# Patient Record
Sex: FEMALE | Race: WHITE | NOT HISPANIC OR LATINO | Employment: OTHER | URBAN - NONMETROPOLITAN AREA
[De-identification: names, ages, dates, MRNs, and addresses within clinical notes are randomized per-mention and may not be internally consistent; named-entity substitution may affect disease eponyms.]

---

## 2017-01-19 PROBLEM — E03.9 HYPOTHYROIDISM: Status: ACTIVE | Noted: 2017-01-19

## 2017-01-19 PROBLEM — M81.0 OSTEOPOROSIS: Status: ACTIVE | Noted: 2017-01-19

## 2017-01-19 PROBLEM — F41.9 ANXIETY: Status: RESOLVED | Noted: 2017-01-19 | Resolved: 2017-01-19

## 2017-01-19 PROBLEM — M19.90 ARTHRITIS: Status: ACTIVE | Noted: 2017-01-19

## 2017-01-19 PROBLEM — M85.80 OSTEOPENIA: Status: ACTIVE | Noted: 2017-01-19

## 2017-01-19 PROBLEM — K58.9 IBS (IRRITABLE BOWEL SYNDROME): Status: ACTIVE | Noted: 2017-01-19

## 2017-01-19 PROBLEM — K21.9 ESOPHAGEAL REFLUX: Status: ACTIVE | Noted: 2017-01-19

## 2017-01-19 PROBLEM — F17.200 CURRENT EVERY DAY SMOKER: Status: ACTIVE | Noted: 2017-01-19

## 2017-01-19 PROBLEM — M79.7 FIBROMYALGIA: Status: ACTIVE | Noted: 2017-01-19

## 2017-01-19 PROBLEM — G47.00 INSOMNIA: Status: ACTIVE | Noted: 2017-01-19

## 2017-01-19 PROBLEM — F41.1 GENERALIZED ANXIETY DISORDER: Status: ACTIVE | Noted: 2017-01-19

## 2017-01-19 PROBLEM — E55.9 VITAMIN D DEFICIENCY: Status: ACTIVE | Noted: 2017-01-19

## 2017-01-19 PROBLEM — E78.5 HYPERLIPIDEMIA: Status: ACTIVE | Noted: 2017-01-19

## 2017-01-19 PROBLEM — F41.9 ANXIETY: Status: ACTIVE | Noted: 2017-01-19

## 2017-01-19 PROBLEM — M06.9 RA (RHEUMATOID ARTHRITIS): Status: ACTIVE | Noted: 2017-01-19

## 2017-01-20 ENCOUNTER — OFFICE VISIT (OUTPATIENT)
Dept: INTERNAL MEDICINE | Facility: CLINIC | Age: 72
End: 2017-01-20

## 2017-01-20 VITALS
TEMPERATURE: 99.7 F | BODY MASS INDEX: 23.54 KG/M2 | WEIGHT: 150 LBS | DIASTOLIC BLOOD PRESSURE: 58 MMHG | HEIGHT: 67 IN | RESPIRATION RATE: 14 BRPM | HEART RATE: 58 BPM | OXYGEN SATURATION: 97 % | SYSTOLIC BLOOD PRESSURE: 130 MMHG

## 2017-01-20 DIAGNOSIS — J06.9 ACUTE URI: Primary | ICD-10-CM

## 2017-01-20 PROCEDURE — 99213 OFFICE O/P EST LOW 20 MIN: CPT | Performed by: INTERNAL MEDICINE

## 2017-01-20 RX ORDER — OMEPRAZOLE 40 MG/1
CAPSULE, DELAYED RELEASE ORAL
COMMUNITY
Start: 2014-02-17 | End: 2018-05-10 | Stop reason: SDUPTHER

## 2017-01-20 RX ORDER — AMOXICILLIN 500 MG/1
1000 CAPSULE ORAL 2 TIMES DAILY
Qty: 21 CAPSULE | Refills: 0 | Status: SHIPPED | OUTPATIENT
Start: 2017-01-20 | End: 2017-05-03

## 2017-01-20 RX ORDER — CHLORAL HYDRATE 500 MG
1000 CAPSULE ORAL
COMMUNITY
Start: 2014-02-17

## 2017-01-20 RX ORDER — IBUPROFEN 200 MG
600 CAPSULE ORAL 2 TIMES DAILY
Status: ON HOLD | COMMUNITY
Start: 2014-02-17 | End: 2023-02-13

## 2017-01-20 RX ORDER — AMITRIPTYLINE HYDROCHLORIDE 10 MG/1
TABLET, FILM COATED ORAL
COMMUNITY
Start: 2014-02-17 | End: 2018-05-10 | Stop reason: SDUPTHER

## 2017-01-20 RX ORDER — TIOCONAZOLE 6.5 %
OINTMENT WITH PREFILLED APPLICATOR VAGINAL DAILY
COMMUNITY
Start: 2014-02-17

## 2017-01-20 RX ORDER — ALPRAZOLAM 0.25 MG/1
TABLET ORAL
COMMUNITY
Start: 2016-10-27 | End: 2017-05-03 | Stop reason: SDUPTHER

## 2017-01-20 RX ORDER — LEVOTHYROXINE SODIUM 88 UG/1
88 TABLET ORAL DAILY
Qty: 90 TABLET | Refills: 3 | Status: SHIPPED | OUTPATIENT
Start: 2017-01-20 | End: 2018-03-11 | Stop reason: SDUPTHER

## 2017-01-20 RX ORDER — GUAIFENESIN AND DEXTROMETHORPHAN HYDROBROMIDE 400; 20 MG/1; MG/1
TABLET ORAL
COMMUNITY
Start: 2014-02-17 | End: 2019-05-22

## 2017-01-20 NOTE — PROGRESS NOTES
Subjective   Yamileth De Guzman is a 71 y.o. female.     Chief Complaint   Patient presents with   • URI     sinus infection possible       URI    This is a new problem. The current episode started 1 to 4 weeks ago. The problem has been gradually worsening. There has been no fever. Associated symptoms include congestion, coughing, headaches, neck pain, a plugged ear sensation, rhinorrhea, sinus pain and sneezing. Pertinent negatives include no nausea, sore throat, vomiting or wheezing.          Current Outpatient Prescriptions:   •  ALPRAZolam (XANAX) 0.25 MG tablet, , Disp: , Rfl:   •  amitriptyline (ELAVIL) 10 MG tablet, Take  by mouth., Disp: , Rfl:   •  calcium (OS-STIVEN) 600 MG tablet, Take  by mouth., Disp: , Rfl:   •  Cholecalciferol (VITAMIN D3) 1000 UNITS capsule, Take  by mouth., Disp: , Rfl:   •  Dextromethorphan-Guaifenesin (MUCOSA DM)  MG tablet, Take  by mouth., Disp: , Rfl:   •  fluticasone (FLONASE) 50 MCG/ACT nasal spray, USE TWO SPRAY(S) IN EACH NOSTRIL ONCE DAILY, Disp: 16 g, Rfl: 5  •  Garlic 400 MG tablet, Take  by mouth., Disp: , Rfl:   •  levothyroxine (SYNTHROID, LEVOTHROID) 88 MCG tablet, Take 1 tablet by mouth Daily., Disp: 90 tablet, Rfl: 3  •  Omega-3 Fatty Acids (FISH OIL) 1000 MG capsule capsule, Take  by mouth., Disp: , Rfl:   •  omeprazole (priLOSEC) 40 MG capsule, Take  by mouth., Disp: , Rfl:   •  amoxicillin (AMOXIL) 500 MG capsule, Take 2 capsules by mouth 2 (Two) Times a Day., Disp: 21 capsule, Rfl: 0    The following portions of the patient's history were reviewed and updated as appropriate: allergies, current medications, past family history, past medical history, past social history, past surgical history and problem list.    Review of Systems   Constitutional: Negative.    HENT: Positive for congestion, rhinorrhea and sneezing. Negative for sore throat.    Respiratory: Positive for cough. Negative for wheezing.    Cardiovascular: Negative.    Gastrointestinal: Negative.   Negative for nausea and vomiting.   Musculoskeletal: Positive for neck pain.   Skin: Negative.    Neurological: Positive for headaches.   Psychiatric/Behavioral: Negative.        Objective   Physical Exam   Constitutional: She is oriented to person, place, and time. She appears well-developed and well-nourished.   HENT:   Head: Normocephalic and atraumatic.   TM bulge   Neck: Neck supple.   Cardiovascular: Normal rate, regular rhythm and normal heart sounds.    Pulmonary/Chest: Effort normal and breath sounds normal.   Abdominal: Soft. Bowel sounds are normal.   Neurological: She is alert and oriented to person, place, and time.   Psychiatric: She has a normal mood and affect. Her behavior is normal.       All tests have been reviewed.    Assessment/Plan   Diagnoses and all orders for this visit:    Acute URI    Other orders  -     omeprazole (priLOSEC) 40 MG capsule; Take  by mouth.  -     ALPRAZolam (XANAX) 0.25 MG tablet;   -     amitriptyline (ELAVIL) 10 MG tablet; Take  by mouth.  -     Garlic 400 MG tablet; Take  by mouth.  -     Cholecalciferol (VITAMIN D3) 1000 UNITS capsule; Take  by mouth.  -     Omega-3 Fatty Acids (FISH OIL) 1000 MG capsule capsule; Take  by mouth.  -     calcium (OS-STIVEN) 600 MG tablet; Take  by mouth.  -     Dextromethorphan-Guaifenesin (MUCOSA DM)  MG tablet; Take  by mouth.  -     levothyroxine (SYNTHROID, LEVOTHROID) 88 MCG tablet; Take 1 tablet by mouth Daily.  -     amoxicillin (AMOXIL) 500 MG capsule; Take 2 capsules by mouth 2 (Two) Times a Day.           zyrtec and advil and mucinex, water and rest, amox

## 2017-01-20 NOTE — MR AVS SNAPSHOT
Yamileth De Guzman   1/20/2017 10:45 AM   Office Visit    Provider:  Da Dong MD   Department:  [facility]   Dept Phone:  [phone]                Your Full Care Plan              Today's Medication Changes          These changes are accurate as of: 1/20/17 11:19 AM.  If you have any questions, ask your nurse or doctor.               New Medication(s)Ordered:     amoxicillin 500 MG capsule   Commonly known as:  AMOXIL   Take 2 capsules by mouth 2 (Two) Times a Day.   Started by:  Da Dong MD         Medication(s)that have changed:     levothyroxine 88 MCG tablet   Commonly known as:  SYNTHROID, LEVOTHROID   Take 1 tablet by mouth Daily.   What changed:  See the new instructions.   Changed by:  Da Dong MD            Where to Get Your Medications      These medications were sent to [facility] [address] [place] [address] - [facility] - [phone] [place] [phone]   [address] [facility] [address]     Phone:  [phone]     amoxicillin 500 MG capsule    levothyroxine 88 MCG tablet                  Your Updated Medication List          This list is accurate as of: 1/20/17 11:19 AM.  Always use your most recent med list.                ALPRAZolam 0.25 MG tablet   Commonly known as:  XANAX       amitriptyline 10 MG tablet   Commonly known as:  ELAVIL       amoxicillin 500 MG capsule   Commonly known as:  AMOXIL   Take 2 capsules by mouth 2 (Two) Times a Day.       calcium 600 MG tablet   Commonly known as:  OS-STIVEN       fish oil 1000 MG capsule capsule       fluticasone 50 MCG/ACT nasal spray   Commonly known as:  FLONASE   USE TWO SPRAY(S) IN EACH NOSTRIL ONCE DAILY       Garlic 400 MG tablet       levothyroxine 88 MCG tablet   Commonly known as:  SYNTHROID, LEVOTHROID   Take 1 tablet by mouth Daily.       MUCOSA DM  MG tablet   Generic drug:  Dextromethorphan-Guaifenesin       omeprazole 40 MG capsule   Commonly known as:  priLOSEC       "   Vitamin D3 1000 UNITS capsule               You Were Diagnosed With        Codes Comments    Acute URI    -  Primary ICD-10-CM: J06.9  ICD-9-CM: 465.9       Instructions     zyrtec and advil and mucinex, water and rest, amox     Patient Instructions History      MyChart Signup     Our records indicate that you have declined [facility]. If you would like to sign up for [facility], please email [email or web address] or call [phone] to obtain an activation code.             Other Info from Your Visit           Your Appointments     Apr 28, 2017  9:00 AM EDT   Subsequent Medicare Wellness with Da Dong MD   [facility] (--)    [address] [place] [id number] [phone]              Allergies     No Known Allergies      Reason for Visit     URI sinus infection possible      Vital Signs     Blood Pressure Pulse Temperature Respirations Height Weight    130/58 58 99.7 °F (37.6 °C) 14 66.5\" (168.9 cm) 150 lb (68 kg)    Oxygen Saturation Body Mass Index Smoking Status             97% 23.85 kg/m2 Current Every Day Smoker         Problems and Diagnoses Noted     Acute upper respiratory infection    -  Primary      "

## 2017-05-03 ENCOUNTER — RESULTS ENCOUNTER (OUTPATIENT)
Dept: INTERNAL MEDICINE | Facility: CLINIC | Age: 72
End: 2017-05-03

## 2017-05-03 ENCOUNTER — OFFICE VISIT (OUTPATIENT)
Dept: INTERNAL MEDICINE | Facility: CLINIC | Age: 72
End: 2017-05-03

## 2017-05-03 VITALS
HEART RATE: 56 BPM | WEIGHT: 150 LBS | DIASTOLIC BLOOD PRESSURE: 64 MMHG | OXYGEN SATURATION: 91 % | BODY MASS INDEX: 23.54 KG/M2 | TEMPERATURE: 98.8 F | SYSTOLIC BLOOD PRESSURE: 118 MMHG | RESPIRATION RATE: 14 BRPM | HEIGHT: 67 IN

## 2017-05-03 DIAGNOSIS — F41.1 GENERALIZED ANXIETY DISORDER: ICD-10-CM

## 2017-05-03 DIAGNOSIS — R31.9 HEMATURIA: Primary | ICD-10-CM

## 2017-05-03 DIAGNOSIS — E55.9 VITAMIN D DEFICIENCY: ICD-10-CM

## 2017-05-03 DIAGNOSIS — M81.0 OSTEOPOROSIS: ICD-10-CM

## 2017-05-03 DIAGNOSIS — M85.80 OSTEOPENIA: ICD-10-CM

## 2017-05-03 DIAGNOSIS — E78.5 HYPERLIPIDEMIA, UNSPECIFIED HYPERLIPIDEMIA TYPE: Primary | ICD-10-CM

## 2017-05-03 DIAGNOSIS — F17.200 CURRENT EVERY DAY SMOKER: ICD-10-CM

## 2017-05-03 DIAGNOSIS — M79.7 FIBROMYALGIA: ICD-10-CM

## 2017-05-03 DIAGNOSIS — K58.9 IRRITABLE BOWEL SYNDROME WITHOUT DIARRHEA: ICD-10-CM

## 2017-05-03 DIAGNOSIS — R31.9 HEMATURIA: ICD-10-CM

## 2017-05-03 DIAGNOSIS — M06.9 RHEUMATOID ARTHRITIS, INVOLVING UNSPECIFIED SITE, UNSPECIFIED RHEUMATOID FACTOR PRESENCE: ICD-10-CM

## 2017-05-03 DIAGNOSIS — E03.9 HYPOTHYROIDISM, UNSPECIFIED TYPE: ICD-10-CM

## 2017-05-03 DIAGNOSIS — K21.9 GASTROESOPHAGEAL REFLUX DISEASE WITHOUT ESOPHAGITIS: ICD-10-CM

## 2017-05-03 PROCEDURE — G0439 PPPS, SUBSEQ VISIT: HCPCS | Performed by: INTERNAL MEDICINE

## 2017-05-03 PROCEDURE — 99213 OFFICE O/P EST LOW 20 MIN: CPT | Performed by: INTERNAL MEDICINE

## 2017-05-03 RX ORDER — ALPRAZOLAM 0.25 MG/1
0.25 TABLET ORAL NIGHTLY PRN
Qty: 30 TABLET | Refills: 2 | Status: SHIPPED | OUTPATIENT
Start: 2017-05-03 | End: 2018-05-10 | Stop reason: SDUPTHER

## 2017-05-04 LAB
25(OH)D3+25(OH)D2 SERPL-MCNC: 62.3 NG/ML
ALBUMIN SERPL-MCNC: 4.5 G/DL (ref 3.5–5)
ALBUMIN/GLOB SERPL: 1.5 G/DL (ref 1–2)
ALP SERPL-CCNC: 73 U/L (ref 38–126)
ALT SERPL-CCNC: 39 U/L (ref 13–69)
APPEARANCE UR: CLEAR
AST SERPL-CCNC: 31 U/L (ref 15–46)
BACTERIA #/AREA URNS HPF: ABNORMAL /HPF
BASOPHILS # BLD AUTO: 0.04 10*3/MM3 (ref 0–0.2)
BASOPHILS NFR BLD AUTO: 0.4 % (ref 0–2.5)
BILIRUB SERPL-MCNC: 0.5 MG/DL (ref 0.2–1.3)
BILIRUB UR QL STRIP: NEGATIVE
BUN SERPL-MCNC: 12 MG/DL (ref 7–20)
BUN/CREAT SERPL: 15 (ref 7.1–23.5)
CALCIUM SERPL-MCNC: 9.8 MG/DL (ref 8.4–10.2)
CHLORIDE SERPL-SCNC: 104 MMOL/L (ref 98–107)
CHOLEST SERPL-MCNC: 189 MG/DL (ref 0–199)
CO2 SERPL-SCNC: 28 MMOL/L (ref 26–30)
COLOR UR: YELLOW
CREAT SERPL-MCNC: 0.8 MG/DL (ref 0.6–1.3)
EOSINOPHIL # BLD AUTO: 0.15 10*3/MM3 (ref 0–0.7)
EOSINOPHIL NFR BLD AUTO: 1.6 % (ref 0–7)
EPI CELLS #/AREA URNS HPF: ABNORMAL /HPF
ERYTHROCYTE [DISTWIDTH] IN BLOOD BY AUTOMATED COUNT: 12.9 % (ref 11.5–14.5)
GLOBULIN SER CALC-MCNC: 3.1 GM/DL
GLUCOSE SERPL-MCNC: 84 MG/DL (ref 74–98)
GLUCOSE UR QL: NEGATIVE
HCT VFR BLD AUTO: 41.5 % (ref 37–47)
HCV AB S/CO SERPL IA: <0.1 S/CO RATIO (ref 0–0.9)
HDLC SERPL-MCNC: 66 MG/DL (ref 40–60)
HGB BLD-MCNC: 13.5 G/DL (ref 12–16)
HGB UR QL STRIP: ABNORMAL
IMM GRANULOCYTES # BLD: 0.02 10*3/MM3 (ref 0–0.06)
IMM GRANULOCYTES NFR BLD: 0.2 % (ref 0–0.6)
KETONES UR QL STRIP: NEGATIVE
LDLC SERPL CALC-MCNC: 104 MG/DL (ref 0–99)
LEUKOCYTE ESTERASE UR QL STRIP: NEGATIVE
LYMPHOCYTES # BLD AUTO: 3.64 10*3/MM3 (ref 0.6–3.4)
LYMPHOCYTES NFR BLD AUTO: 38.3 % (ref 10–50)
MCH RBC QN AUTO: 32.6 PG (ref 27–31)
MCHC RBC AUTO-ENTMCNC: 32.5 G/DL (ref 30–37)
MCV RBC AUTO: 100.2 FL (ref 81–99)
MONOCYTES # BLD AUTO: 0.62 10*3/MM3 (ref 0–0.9)
MONOCYTES NFR BLD AUTO: 6.5 % (ref 0–12)
NEUTROPHILS # BLD AUTO: 5.03 10*3/MM3 (ref 2–6.9)
NEUTROPHILS NFR BLD AUTO: 53 % (ref 37–80)
NITRITE UR QL STRIP: NEGATIVE
NRBC BLD AUTO-RTO: 0 /100 WBC (ref 0–0)
PH UR STRIP: 7 [PH] (ref 5–8)
PLATELET # BLD AUTO: 296 10*3/MM3 (ref 130–400)
POTASSIUM SERPL-SCNC: 4 MMOL/L (ref 3.5–5.1)
PROT SERPL-MCNC: 7.6 G/DL (ref 6.3–8.2)
PROT UR QL STRIP: NEGATIVE
RBC # BLD AUTO: 4.14 10*6/MM3 (ref 4.2–5.4)
RBC #/AREA URNS HPF: ABNORMAL /HPF
SODIUM SERPL-SCNC: 142 MMOL/L (ref 137–145)
SP GR UR: 1.01 (ref 1–1.03)
TRIGL SERPL-MCNC: 97 MG/DL
TSH SERPL DL<=0.005 MIU/L-ACNC: 1.64 MIU/ML (ref 0.47–4.68)
UROBILINOGEN UR STRIP-MCNC: ABNORMAL MG/DL
VLDLC SERPL CALC-MCNC: 19.4 MG/DL
WBC # BLD AUTO: 9.5 10*3/MM3 (ref 4.8–10.8)
WBC #/AREA URNS HPF: ABNORMAL /HPF

## 2017-05-08 ENCOUNTER — HOSPITAL ENCOUNTER (OUTPATIENT)
Dept: CT IMAGING | Facility: HOSPITAL | Age: 72
Discharge: HOME | End: 2017-05-08
Attending: INTERNAL MEDICINE | Admitting: INTERNAL MEDICINE

## 2017-05-08 DIAGNOSIS — E78.5 HYPERLIPIDEMIA, UNSPECIFIED HYPERLIPIDEMIA TYPE: ICD-10-CM

## 2017-05-08 PROCEDURE — 71250 CT THORAX DX C-: CPT

## 2017-06-19 RX ORDER — FLUTICASONE PROPIONATE 50 MCG
SPRAY, SUSPENSION (ML) NASAL
Qty: 1 EACH | Refills: 11 | Status: SHIPPED | OUTPATIENT
Start: 2017-06-19 | End: 2018-05-10 | Stop reason: SDUPTHER

## 2017-07-03 ENCOUNTER — TRANSCRIBE ORDERS (OUTPATIENT)
Dept: MAMMOGRAPHY | Facility: HOSPITAL | Age: 72
End: 2017-07-03

## 2017-07-03 DIAGNOSIS — Z13.9 SCREENING: Primary | ICD-10-CM

## 2017-08-01 ENCOUNTER — HOSPITAL ENCOUNTER (OUTPATIENT)
Dept: MAMMOGRAPHY | Facility: HOSPITAL | Age: 72
Discharge: HOME | End: 2017-08-01
Admitting: INTERNAL MEDICINE

## 2017-08-01 DIAGNOSIS — Z13.9 SCREENING: ICD-10-CM

## 2017-08-01 PROCEDURE — 77063 BREAST TOMOSYNTHESIS BI: CPT

## 2017-08-01 PROCEDURE — G0202 SCR MAMMO BI INCL CAD: HCPCS

## 2018-03-12 RX ORDER — LEVOTHYROXINE SODIUM 88 UG/1
88 TABLET ORAL DAILY
Qty: 30 TABLET | Refills: 1 | Status: SHIPPED | OUTPATIENT
Start: 2018-03-12 | End: 2018-05-10 | Stop reason: SDUPTHER

## 2018-05-09 DIAGNOSIS — E03.9 HYPOTHYROIDISM, UNSPECIFIED TYPE: ICD-10-CM

## 2018-05-09 DIAGNOSIS — M81.0 OSTEOPOROSIS, UNSPECIFIED OSTEOPOROSIS TYPE, UNSPECIFIED PATHOLOGICAL FRACTURE PRESENCE: ICD-10-CM

## 2018-05-09 DIAGNOSIS — M79.7 FIBROMYALGIA: ICD-10-CM

## 2018-05-09 DIAGNOSIS — E55.9 VITAMIN D DEFICIENCY: ICD-10-CM

## 2018-05-09 DIAGNOSIS — K58.9 IRRITABLE BOWEL SYNDROME WITHOUT DIARRHEA: ICD-10-CM

## 2018-05-09 DIAGNOSIS — Z00.00 HEALTHCARE MAINTENANCE: Primary | ICD-10-CM

## 2018-05-09 DIAGNOSIS — M06.9 RHEUMATOID ARTHRITIS, INVOLVING UNSPECIFIED SITE, UNSPECIFIED RHEUMATOID FACTOR PRESENCE: ICD-10-CM

## 2018-05-09 DIAGNOSIS — E78.5 HYPERLIPIDEMIA, UNSPECIFIED HYPERLIPIDEMIA TYPE: Primary | ICD-10-CM

## 2018-05-09 DIAGNOSIS — K21.9 GASTROESOPHAGEAL REFLUX DISEASE WITHOUT ESOPHAGITIS: ICD-10-CM

## 2018-05-10 ENCOUNTER — OFFICE VISIT (OUTPATIENT)
Dept: INTERNAL MEDICINE | Facility: CLINIC | Age: 73
End: 2018-05-10

## 2018-05-10 VITALS
HEART RATE: 65 BPM | BODY MASS INDEX: 23.99 KG/M2 | OXYGEN SATURATION: 98 % | HEIGHT: 66 IN | DIASTOLIC BLOOD PRESSURE: 70 MMHG | TEMPERATURE: 98 F | SYSTOLIC BLOOD PRESSURE: 110 MMHG | WEIGHT: 149.25 LBS

## 2018-05-10 DIAGNOSIS — R91.1 LUNG NODULE: ICD-10-CM

## 2018-05-10 DIAGNOSIS — E78.5 HYPERLIPIDEMIA, UNSPECIFIED HYPERLIPIDEMIA TYPE: Primary | ICD-10-CM

## 2018-05-10 DIAGNOSIS — M81.0 OSTEOPOROSIS, UNSPECIFIED OSTEOPOROSIS TYPE, UNSPECIFIED PATHOLOGICAL FRACTURE PRESENCE: ICD-10-CM

## 2018-05-10 DIAGNOSIS — F17.200 CURRENT EVERY DAY SMOKER: ICD-10-CM

## 2018-05-10 DIAGNOSIS — M79.7 FIBROMYALGIA: ICD-10-CM

## 2018-05-10 DIAGNOSIS — E55.9 VITAMIN D DEFICIENCY: ICD-10-CM

## 2018-05-10 DIAGNOSIS — F41.1 GENERALIZED ANXIETY DISORDER: ICD-10-CM

## 2018-05-10 DIAGNOSIS — E03.9 HYPOTHYROIDISM, UNSPECIFIED TYPE: ICD-10-CM

## 2018-05-10 DIAGNOSIS — K21.9 GASTROESOPHAGEAL REFLUX DISEASE WITHOUT ESOPHAGITIS: ICD-10-CM

## 2018-05-10 DIAGNOSIS — M06.9 RHEUMATOID ARTHRITIS, INVOLVING UNSPECIFIED SITE, UNSPECIFIED RHEUMATOID FACTOR PRESENCE: ICD-10-CM

## 2018-05-10 DIAGNOSIS — K58.9 IRRITABLE BOWEL SYNDROME WITHOUT DIARRHEA: ICD-10-CM

## 2018-05-10 DIAGNOSIS — Z23 NEED FOR PNEUMOCOCCAL VACCINE: ICD-10-CM

## 2018-05-10 PROBLEM — M85.80 OSTEOPENIA: Status: RESOLVED | Noted: 2017-01-19 | Resolved: 2018-05-10

## 2018-05-10 PROBLEM — G47.00 INSOMNIA: Status: RESOLVED | Noted: 2017-01-19 | Resolved: 2018-05-10

## 2018-05-10 LAB
25(OH)D3+25(OH)D2 SERPL-MCNC: 60.1 NG/ML
ALBUMIN SERPL-MCNC: 4.5 G/DL (ref 3.5–5)
ALBUMIN/GLOB SERPL: 1.6 G/DL (ref 1–2)
ALP SERPL-CCNC: 76 U/L (ref 38–126)
ALT SERPL-CCNC: 28 U/L (ref 13–69)
APPEARANCE UR: CLEAR
AST SERPL-CCNC: 25 U/L (ref 15–46)
BACTERIA #/AREA URNS HPF: ABNORMAL /HPF
BASOPHILS # BLD AUTO: 0.05 10*3/MM3 (ref 0–0.2)
BASOPHILS NFR BLD AUTO: 0.6 % (ref 0–2.5)
BILIRUB SERPL-MCNC: 0.5 MG/DL (ref 0.2–1.3)
BILIRUB UR QL STRIP: NEGATIVE
BUN SERPL-MCNC: 15 MG/DL (ref 7–20)
BUN/CREAT SERPL: 21.4 (ref 7.1–23.5)
CALCIUM SERPL-MCNC: 9.8 MG/DL (ref 8.4–10.2)
CASTS URNS MICRO: ABNORMAL
CHLORIDE SERPL-SCNC: 103 MMOL/L (ref 98–107)
CHOLEST SERPL-MCNC: 218 MG/DL (ref 0–199)
CO2 SERPL-SCNC: 28 MMOL/L (ref 26–30)
COLOR UR: YELLOW
CREAT SERPL-MCNC: 0.7 MG/DL (ref 0.6–1.3)
CRP SERPL-MCNC: <0.5 MG/DL (ref 0–1)
EOSINOPHIL # BLD AUTO: 0.16 10*3/MM3 (ref 0–0.7)
EOSINOPHIL NFR BLD AUTO: 2.1 % (ref 0–7)
EPI CELLS #/AREA URNS HPF: ABNORMAL /HPF
ERYTHROCYTE [DISTWIDTH] IN BLOOD BY AUTOMATED COUNT: 12.9 % (ref 11.5–14.5)
ERYTHROCYTE [SEDIMENTATION RATE] IN BLOOD BY WESTERGREN METHOD: 16 MM/HR (ref 0–20)
GFR SERPLBLD CREATININE-BSD FMLA CKD-EPI: 100 ML/MIN/1.73
GFR SERPLBLD CREATININE-BSD FMLA CKD-EPI: 83 ML/MIN/1.73
GLOBULIN SER CALC-MCNC: 2.9 GM/DL
GLUCOSE SERPL-MCNC: 84 MG/DL (ref 74–98)
GLUCOSE UR QL: NEGATIVE
HCT VFR BLD AUTO: 38.8 % (ref 37–47)
HCV AB S/CO SERPL IA: <0.1 S/CO RATIO (ref 0–0.9)
HDLC SERPL-MCNC: 64 MG/DL (ref 40–60)
HGB BLD-MCNC: 12.9 G/DL (ref 12–16)
HGB UR QL STRIP: ABNORMAL
IMM GRANULOCYTES # BLD: 0.02 10*3/MM3 (ref 0–0.06)
IMM GRANULOCYTES NFR BLD: 0.3 % (ref 0–0.6)
KETONES UR QL STRIP: NEGATIVE
LDLC SERPL CALC-MCNC: 125 MG/DL (ref 0–99)
LEUKOCYTE ESTERASE UR QL STRIP: NEGATIVE
LYMPHOCYTES # BLD AUTO: 2.77 10*3/MM3 (ref 0.6–3.4)
LYMPHOCYTES NFR BLD AUTO: 35.7 % (ref 10–50)
MCH RBC QN AUTO: 32.5 PG (ref 27–31)
MCHC RBC AUTO-ENTMCNC: 33.2 G/DL (ref 30–37)
MCV RBC AUTO: 97.7 FL (ref 81–99)
MONOCYTES # BLD AUTO: 0.61 10*3/MM3 (ref 0–0.9)
MONOCYTES NFR BLD AUTO: 7.9 % (ref 0–12)
NEUTROPHILS # BLD AUTO: 4.15 10*3/MM3 (ref 2–6.9)
NEUTROPHILS NFR BLD AUTO: 53.4 % (ref 37–80)
NITRITE UR QL STRIP: NEGATIVE
NRBC BLD AUTO-RTO: 0 /100 WBC (ref 0–0)
PH UR STRIP: 6.5 [PH] (ref 5–8)
PLATELET # BLD AUTO: 237 10*3/MM3 (ref 130–400)
POTASSIUM SERPL-SCNC: 4.4 MMOL/L (ref 3.5–5.1)
PROT SERPL-MCNC: 7.4 G/DL (ref 6.3–8.2)
PROT UR QL STRIP: NEGATIVE
RBC # BLD AUTO: 3.97 10*6/MM3 (ref 4.2–5.4)
RBC #/AREA URNS HPF: ABNORMAL /HPF
RHEUMATOID FACT SERPL-ACNC: <10 IU/ML (ref 0–13.9)
SODIUM SERPL-SCNC: 142 MMOL/L (ref 137–145)
SP GR UR: 1.01 (ref 1–1.03)
TRIGL SERPL-MCNC: 147 MG/DL
TSH SERPL DL<=0.005 MIU/L-ACNC: 2.48 MIU/ML (ref 0.47–4.68)
UROBILINOGEN UR STRIP-MCNC: ABNORMAL MG/DL
VLDLC SERPL CALC-MCNC: 29.4 MG/DL
WBC # BLD AUTO: 7.76 10*3/MM3 (ref 4.8–10.8)
WBC #/AREA URNS HPF: ABNORMAL /HPF

## 2018-05-10 PROCEDURE — G0439 PPPS, SUBSEQ VISIT: HCPCS | Performed by: INTERNAL MEDICINE

## 2018-05-10 RX ORDER — LEVOTHYROXINE SODIUM 88 UG/1
88 TABLET ORAL DAILY
Qty: 90 TABLET | Refills: 3 | Status: SHIPPED | OUTPATIENT
Start: 2018-05-10 | End: 2019-05-22 | Stop reason: SDUPTHER

## 2018-05-10 RX ORDER — AMITRIPTYLINE HYDROCHLORIDE 10 MG/1
10 TABLET, FILM COATED ORAL NIGHTLY
Qty: 90 TABLET | Refills: 3 | Status: SHIPPED | OUTPATIENT
Start: 2018-05-10 | End: 2019-05-22

## 2018-05-10 RX ORDER — FLUTICASONE PROPIONATE 50 MCG
2 SPRAY, SUSPENSION (ML) NASAL DAILY
Qty: 16 G | Refills: 5 | Status: SHIPPED | OUTPATIENT
Start: 2018-05-10 | End: 2019-01-25 | Stop reason: SDUPTHER

## 2018-05-10 RX ORDER — OMEPRAZOLE 40 MG/1
40 CAPSULE, DELAYED RELEASE ORAL DAILY
Qty: 90 CAPSULE | Refills: 3 | Status: SHIPPED | OUTPATIENT
Start: 2018-05-10 | End: 2019-05-22 | Stop reason: SDUPTHER

## 2018-05-10 RX ORDER — ALPRAZOLAM 0.25 MG/1
0.25 TABLET ORAL NIGHTLY PRN
Qty: 30 TABLET | Refills: 2 | Status: SHIPPED | OUTPATIENT
Start: 2018-05-10 | End: 2019-05-22 | Stop reason: SDUPTHER

## 2018-05-10 NOTE — PROGRESS NOTES
QUICK REFERENCE INFORMATION:  The ABCs of the Annual Wellness Visit    Subsequent Medicare Wellness Visit    HEALTH RISK ASSESSMENT    1945    Recent Hospitalizations:  No hospitalization(s) within the last year..        Current Medical Providers:  Patient Care Team:  Da Dong MD as PCP - General  Da Dong MD as PCP - Family Medicine  Da Dong MD as PCP - Claims Attributed        Smoking Status:  History   Smoking Status   • Current Every Day Smoker   Smokeless Tobacco   • Never Used       Alcohol Consumption:  History   Alcohol Use No       Depression Screen:   PHQ-2/PHQ-9 Depression Screening 5/10/2018   Little interest or pleasure in doing things 0   Feeling down, depressed, or hopeless 0   Trouble falling or staying asleep, or sleeping too much -   Feeling tired or having little energy -   Poor appetite or overeating -   Feeling bad about yourself - or that you are a failure or have let yourself or your family down -   Trouble concentrating on things, such as reading the newspaper or watching television -   Moving or speaking so slowly that other people could have noticed. Or the opposite - being so fidgety or restless that you have been moving around a lot more than usual -   Thoughts that you would be better off dead, or of hurting yourself in some way -   Total Score 0   If you checked off any problems, how difficult have these problems made it for you to do your work, take care of things at home, or get along with other people? -       Health Habits and Functional and Cognitive Screening:  Functional & Cognitive Status 5/10/2018   Do you have difficulty preparing food and eating? No   Do you have difficulty bathing yourself, getting dressed or grooming yourself? No   Do you have difficulty using the toilet? No   Do you have difficulty moving around from place to place? No   Do you have trouble with steps or getting out of a bed or a chair? No   In the past year have you fallen or  experienced a near fall? No   Current Diet Well Balanced Diet   Dental Exam Up to date   Eye Exam Up to date   Exercise (times per week) 7 times per week   Current Exercise Activities Include Housecleaning   Do you need help using the phone?  No   Are you deaf or do you have serious difficulty hearing?  No   Do you need help with transportation? No   Do you need help shopping? No   Do you need help preparing meals?  No   Do you need help with housework?  No   Do you need help with laundry? No   Do you need help taking your medications? No   Do you need help managing money? No   Do you ever drive or ride in a car without wearing a seat belt? No   Have you felt unusual stress, anger or loneliness in the last month? No   Who do you live with? Spouse   If you need help, do you have trouble finding someone available to you? No   Have you been bothered in the last four weeks by sexual problems? No   Do you have difficulty concentrating, remembering or making decisions? No           Does the patient have evidence of cognitive impairment? No    Aspirin use counseling: Does not need ASA (and currently is not on it)      Recent Lab Results:  CMP:  Lab Results   Component Value Date    GLU 84 05/09/2018    BUN 15 05/09/2018    CREATININE 0.70 05/09/2018    EGFRIFNONA 83 05/09/2018    EGFRIFAFRI 100 05/09/2018    BCR 21.4 05/09/2018     05/09/2018    K 4.4 05/09/2018    CO2 28.0 05/09/2018    CALCIUM 9.8 05/09/2018    PROTENTOTREF 7.4 05/09/2018    ALBUMIN 4.50 05/09/2018    LABGLOBREF 2.9 05/09/2018    LABIL2 1.6 05/09/2018    BILITOT 0.5 05/09/2018    ALKPHOS 76 05/09/2018    AST 25 05/09/2018    ALT 28 05/09/2018     Lipid Panel:  Lab Results   Component Value Date    TRIG 147 05/09/2018    HDL 64 (H) 05/09/2018    VLDL 29.4 05/09/2018     HbA1c:       Visual Acuity:  No exam data present    Age-appropriate Screening Schedule:  Refer to the list below for future screening recommendations based on patient's age, sex  and/or medical conditions. Orders for these recommended tests are listed in the plan section. The patient has been provided with a written plan.    Health Maintenance   Topic Date Due   • PNEUMOCOCCAL VACCINES (65+ LOW/MEDIUM RISK) (2 of 2 - PPSV23) 05/03/2018   • DXA SCAN  06/01/2018   • INFLUENZA VACCINE  08/01/2018   • LIPID PANEL  05/09/2019   • MAMMOGRAM  08/01/2019   • COLONOSCOPY  04/06/2021   • TDAP/TD VACCINES (2 - Td) 04/15/2025   • ZOSTER VACCINE  Completed        Subjective   History of Present Illness    Yamileth De Guzman is a 72 y.o. female who presents for an Subsequent Wellness Visit.Patient here for annual wellness check.  Anxiety stable on medication patient needs a refill.  Constipation stable supplement.  IBS is stable.  Insomnia stable medication.  Hypothyroidism stable medication.  Lung nodule needs to be repeated.  Bone density scan needs to be repeated of osteoporosis.  History of a bloody urine benign.    The following portions of the patient's history were reviewed and updated as appropriate: allergies, current medications, past family history, past medical history, past social history, past surgical history and problem list.    Outpatient Medications Prior to Visit   Medication Sig Dispense Refill   • ALPRAZolam (XANAX) 0.25 MG tablet Take 1 tablet by mouth At Night As Needed for Anxiety. 30 tablet 2   • calcium (OS-STIVEN) 600 MG tablet Take  by mouth.     • Cholecalciferol (VITAMIN D3) 1000 UNITS capsule Take  by mouth.     • Dextromethorphan-Guaifenesin (MUCOSA DM)  MG tablet Take  by mouth.     • Garlic 400 MG tablet Take  by mouth.     • Omega-3 Fatty Acids (FISH OIL) 1000 MG capsule capsule Take  by mouth.     • amitriptyline (ELAVIL) 10 MG tablet Take  by mouth.     • fluticasone (FLONASE) 50 MCG/ACT nasal spray USE TWO SPRAY(S) IN EACH NOSTRIL ONCE DAILY 1 each 11   • levothyroxine (SYNTHROID, LEVOTHROID) 88 MCG tablet Take 1 tablet by mouth Daily. PATIENT NEEDS FOLLOW UP  "APPOINTMENT FOR FURTHER REFILLS. 30 tablet 1   • omeprazole (priLOSEC) 40 MG capsule Take  by mouth.       No facility-administered medications prior to visit.        Patient Active Problem List   Diagnosis   • RA (rheumatoid arthritis)   • Esophageal reflux   • Fibromyalgia   • Generalized anxiety disorder   • Hyperlipidemia   • Hypothyroidism   • .   • Osteopenia   • Osteoporosis   • Vitamin D deficiency   • IBS (irritable bowel syndrome)   • Current every day smoker       Advance Care Planning:  has NO advance directive - information provided to the patient today    Identification of Risk Factors:  Risk factors include: tobacco use.    Review of Systems   Constitutional: Negative.    Respiratory: Negative.    Cardiovascular: Negative.    Gastrointestinal: Negative.    Skin: Negative.    Psychiatric/Behavioral: Negative.        Compared to one year ago, the patient feels her physical health is the same.  Compared to one year ago, the patient feels her mental health is the same.    Objective     Physical Exam   Constitutional: She is oriented to person, place, and time. She appears well-developed and well-nourished.   Neck: Neck supple.   Cardiovascular: Normal rate, regular rhythm and normal heart sounds.    Pulmonary/Chest: Effort normal and breath sounds normal.   Abdominal: Soft. Bowel sounds are normal.   Neurological: She is alert and oriented to person, place, and time.   Psychiatric: She has a normal mood and affect. Her behavior is normal.       Vitals:    05/10/18 1000   BP: 110/70   Pulse: 65   Temp: 98 °F (36.7 °C)   SpO2: 98%   Weight: 67.7 kg (149 lb 4 oz)   Height: 168.9 cm (66.5\")   PainSc:   2   PainLoc: Abdomen  Comment: right side soreness.       Patient's Body mass index is 23.73 kg/m². BMI is above normal parameters. Recommendations include: normal.      Assessment/Plan   Patient Self-Management and Personalized Health Advice  The patient has been provided with information about: tobacco " cessation and preventive services including:   · Advance directive.    Visit Diagnoses:  No diagnosis found.    No orders of the defined types were placed in this encounter.      Outpatient Encounter Prescriptions as of 5/10/2018   Medication Sig Dispense Refill   • ALPRAZolam (XANAX) 0.25 MG tablet Take 1 tablet by mouth At Night As Needed for Anxiety. 30 tablet 2   • amitriptyline (ELAVIL) 10 MG tablet Take 1 tablet by mouth Every Night. 90 tablet 3   • calcium (OS-STIVEN) 600 MG tablet Take  by mouth.     • Cholecalciferol (VITAMIN D3) 1000 UNITS capsule Take  by mouth.     • Dextromethorphan-Guaifenesin (MUCOSA DM)  MG tablet Take  by mouth.     • fluticasone (FLONASE) 50 MCG/ACT nasal spray 2 sprays by Each Nare route Daily. 16 g 5   • Garlic 400 MG tablet Take  by mouth.     • levothyroxine (SYNTHROID, LEVOTHROID) 88 MCG tablet Take 1 tablet by mouth Daily. 90 tablet 3   • Omega-3 Fatty Acids (FISH OIL) 1000 MG capsule capsule Take  by mouth.     • omeprazole (priLOSEC) 40 MG capsule Take 1 capsule by mouth Daily. 90 capsule 3   • [DISCONTINUED] amitriptyline (ELAVIL) 10 MG tablet Take  by mouth.     • [DISCONTINUED] fluticasone (FLONASE) 50 MCG/ACT nasal spray USE TWO SPRAY(S) IN EACH NOSTRIL ONCE DAILY 1 each 11   • [DISCONTINUED] levothyroxine (SYNTHROID, LEVOTHROID) 88 MCG tablet Take 1 tablet by mouth Daily. PATIENT NEEDS FOLLOW UP APPOINTMENT FOR FURTHER REFILLS. 30 tablet 1   • [DISCONTINUED] omeprazole (priLOSEC) 40 MG capsule Take  by mouth.       No facility-administered encounter medications on file as of 5/10/2018.        Reviewed use of high risk medication in the elderly: no  Reviewed for potential of harmful drug interactions in the elderly: no    Follow Up:  No Follow-up on file.     An After Visit Summary and PPPS with all of these plans were given to the patient.           anxiety stable on xanax. Continue   Constipation continue MiraLAX  IBS no abd pain stable on IBS OTC medicine,  omeprazole to 40mg , history EGD 2011 gastritis and hh, asa d/c  Insomnia stable on amitriptyline. Continue  Hypothryoidism. Continue medication   lung nodule follow up RML soft tissue nodule repeat,   Bone density scan showed this osteoporosis continue Calcium 1,200mg and vit D3 1,000. on reclast refer back to rheum for reclast and repeat dexa   hematuria history of it. seen by urologist long ago. 4/17/14 micro showed trace blood. micro negative, no urine frequency.  vaccination: zostavax done Td 2015 refuse pneumovax,refuse prevnar, refuse flu shot, refuse shingrix  colon 2011 hemorroid  Bimanual today no ovarian mass patient has partial hysterectomy  left carotid bruit, 4/28/14 U/S negative    Follow-up annual physical

## 2018-05-30 ENCOUNTER — APPOINTMENT (OUTPATIENT)
Dept: BONE DENSITY | Facility: HOSPITAL | Age: 73
End: 2018-05-30

## 2018-05-30 ENCOUNTER — HOSPITAL ENCOUNTER (OUTPATIENT)
Dept: CT IMAGING | Facility: HOSPITAL | Age: 73
Discharge: HOME | End: 2018-05-30
Attending: INTERNAL MEDICINE | Admitting: INTERNAL MEDICINE

## 2018-05-30 DIAGNOSIS — R91.1 LUNG NODULE: ICD-10-CM

## 2018-05-30 DIAGNOSIS — R91.1 LUNG NODULE: Primary | ICD-10-CM

## 2018-05-30 PROCEDURE — 71250 CT THORAX DX C-: CPT

## 2018-05-30 PROCEDURE — 77080 DXA BONE DENSITY AXIAL: CPT

## 2018-07-12 ENCOUNTER — TRANSCRIBE ORDERS (OUTPATIENT)
Dept: MAMMOGRAPHY | Facility: HOSPITAL | Age: 73
End: 2018-07-12

## 2018-07-12 DIAGNOSIS — Z12.39 BREAST CANCER SCREENING: Primary | ICD-10-CM

## 2018-08-16 ENCOUNTER — HOSPITAL ENCOUNTER (OUTPATIENT)
Dept: MAMMOGRAPHY | Facility: HOSPITAL | Age: 73
Discharge: HOME | End: 2018-08-16
Admitting: INTERNAL MEDICINE

## 2018-08-16 DIAGNOSIS — Z12.39 BREAST CANCER SCREENING: ICD-10-CM

## 2018-08-16 PROCEDURE — 77067 SCR MAMMO BI INCL CAD: CPT

## 2018-08-16 PROCEDURE — 77063 BREAST TOMOSYNTHESIS BI: CPT

## 2018-10-15 PROBLEM — M81.0 SENILE OSTEOPOROSIS: Status: ACTIVE | Noted: 2018-10-15

## 2018-10-17 ENCOUNTER — HOSPITAL ENCOUNTER (OUTPATIENT)
Dept: INFUSION THERAPY | Facility: HOSPITAL | Age: 73
Discharge: HOME | End: 2018-10-17
Admitting: INTERNAL MEDICINE

## 2018-10-17 VITALS
SYSTOLIC BLOOD PRESSURE: 123 MMHG | HEART RATE: 69 BPM | WEIGHT: 145 LBS | DIASTOLIC BLOOD PRESSURE: 65 MMHG | BODY MASS INDEX: 22.76 KG/M2 | TEMPERATURE: 98.2 F | RESPIRATION RATE: 18 BRPM | OXYGEN SATURATION: 98 % | HEIGHT: 67 IN

## 2018-10-17 DIAGNOSIS — M81.0 SENILE OSTEOPOROSIS: ICD-10-CM

## 2018-10-17 PROCEDURE — 96372 THER/PROPH/DIAG INJ SC/IM: CPT

## 2018-10-17 PROCEDURE — 25010000002 DENOSUMAB 60 MG/ML SOLUTION: Performed by: INTERNAL MEDICINE

## 2018-10-17 PROCEDURE — 96401 CHEMO ANTI-NEOPL SQ/IM: CPT

## 2018-10-17 RX ORDER — CETIRIZINE HYDROCHLORIDE 10 MG/1
10 TABLET ORAL
COMMUNITY
End: 2019-04-08

## 2018-10-17 RX ADMIN — DENOSUMAB 60 MG: 60 INJECTION SUBCUTANEOUS at 11:03

## 2018-11-05 ENCOUNTER — OFFICE VISIT (OUTPATIENT)
Dept: INTERNAL MEDICINE | Facility: CLINIC | Age: 73
End: 2018-11-05

## 2018-11-05 VITALS
DIASTOLIC BLOOD PRESSURE: 64 MMHG | HEART RATE: 68 BPM | HEIGHT: 67 IN | BODY MASS INDEX: 23.07 KG/M2 | WEIGHT: 147 LBS | SYSTOLIC BLOOD PRESSURE: 122 MMHG | OXYGEN SATURATION: 98 %

## 2018-11-05 DIAGNOSIS — J32.9 SINUSITIS, UNSPECIFIED CHRONICITY, UNSPECIFIED LOCATION: ICD-10-CM

## 2018-11-05 DIAGNOSIS — N39.0 URINARY TRACT INFECTION WITHOUT HEMATURIA, SITE UNSPECIFIED: ICD-10-CM

## 2018-11-05 DIAGNOSIS — R82.90 URINE ABNORMALITY: ICD-10-CM

## 2018-11-05 DIAGNOSIS — S09.90XA INJURY OF HEAD, INITIAL ENCOUNTER: Primary | ICD-10-CM

## 2018-11-05 LAB
BILIRUB BLD-MCNC: NEGATIVE MG/DL
CLARITY, POC: CLEAR
COLOR UR: YELLOW
GLUCOSE UR STRIP-MCNC: NEGATIVE MG/DL
KETONES UR QL: NEGATIVE
LEUKOCYTE EST, POC: NEGATIVE
NITRITE UR-MCNC: NEGATIVE MG/ML
PH UR: 7 [PH] (ref 5–8)
PROT UR STRIP-MCNC: NEGATIVE MG/DL
RBC # UR STRIP: ABNORMAL /UL
SP GR UR: 1 (ref 1–1.03)
UROBILINOGEN UR QL: NORMAL

## 2018-11-05 PROCEDURE — 81003 URINALYSIS AUTO W/O SCOPE: CPT | Performed by: INTERNAL MEDICINE

## 2018-11-05 PROCEDURE — 99214 OFFICE O/P EST MOD 30 MIN: CPT | Performed by: INTERNAL MEDICINE

## 2018-11-05 RX ORDER — SULFAMETHOXAZOLE AND TRIMETHOPRIM 800; 160 MG/1; MG/1
1 TABLET ORAL 2 TIMES DAILY
Qty: 14 TABLET | Refills: 0 | Status: SHIPPED | OUTPATIENT
Start: 2018-11-05 | End: 2019-04-25

## 2018-11-05 NOTE — PROGRESS NOTES
Subjective   Yamileth De Guzman is a 73 y.o. female.     Chief Complaint   Patient presents with   • Suture / Staple Removal     on top of head        History of Present Illness   7 days ago head injury by 2x4 resulted in laceration. Patient went to ER work up negative for brain injury. Patient had stappled and requests to have them removed today. Wound clean looking. Also c/o sinus congetion nasal drainage, clear.     Current Outpatient Prescriptions:   •  ALPRAZolam (XANAX) 0.25 MG tablet, Take 1 tablet by mouth At Night As Needed for Anxiety., Disp: 30 tablet, Rfl: 2  •  amitriptyline (ELAVIL) 10 MG tablet, Take 1 tablet by mouth Every Night., Disp: 90 tablet, Rfl: 3  •  calcium (OS-STIVEN) 600 MG tablet, Take  by mouth 2 (Two) Times a Day., Disp: , Rfl:   •  cetirizine (zyrTEC) 10 MG tablet, Take 10 mg by mouth every night at bedtime., Disp: , Rfl:   •  Cholecalciferol (VITAMIN D3) 1000 UNITS capsule, Take  by mouth., Disp: , Rfl:   •  denosumab (PROLIA) 60 MG/ML solution syringe, Inject  under the skin into the appropriate area as directed Every 6 (Six) Months., Disp: , Rfl:   •  Dextromethorphan-Guaifenesin (MUCOSA DM)  MG tablet, Take  by mouth., Disp: , Rfl:   •  fluticasone (FLONASE) 50 MCG/ACT nasal spray, 2 sprays by Each Nare route Daily., Disp: 16 g, Rfl: 5  •  Garlic 400 MG tablet, Take  by mouth., Disp: , Rfl:   •  levothyroxine (SYNTHROID, LEVOTHROID) 88 MCG tablet, Take 1 tablet by mouth Daily., Disp: 90 tablet, Rfl: 3  •  Omega-3 Fatty Acids (FISH OIL) 1000 MG capsule capsule, Take  by mouth., Disp: , Rfl:   •  omeprazole (priLOSEC) 40 MG capsule, Take 1 capsule by mouth Daily., Disp: 90 capsule, Rfl: 3    The following portions of the patient's history were reviewed and updated as appropriate: allergies, current medications, past family history, past medical history, past social history, past surgical history and problem list.    Review of Systems   Constitutional: Negative.    HENT: Positive  for congestion, postnasal drip, rhinorrhea and sinus pressure. Negative for ear pain and nosebleeds.    Respiratory: Negative.    Cardiovascular: Negative.    Gastrointestinal: Negative.    Genitourinary: Positive for frequency and urgency. Negative for dysuria.   Musculoskeletal: Negative.    Skin: Positive for wound.   Neurological: Negative.    Psychiatric/Behavioral: Negative.        Objective   Physical Exam   Constitutional: She is oriented to person, place, and time. She appears well-developed and well-nourished.   Neck: Neck supple.   Cardiovascular: Normal rate, regular rhythm and normal heart sounds.    Pulmonary/Chest: Effort normal and breath sounds normal.   Abdominal: Bowel sounds are normal.   Musculoskeletal: She exhibits no tenderness.   Neurological: She is alert and oriented to person, place, and time.   Skin: Skin is warm.   stapples on the head. Clean looking   Psychiatric: She has a normal mood and affect.       All tests have been reviewed.    Assessment/Plan   Diagnoses and all orders for this visit:    Injury of head, initial encounter laceration and staple remove today    Sinusitis, unspecified chronicity, unspecified location,  Zyrtec no help. Change to allegra 180mg daily. Bactrim start     Urinary tract infection UA without WBC , do micro    hematuria, hx of it watch for now

## 2018-12-04 ENCOUNTER — HOSPITAL ENCOUNTER (OUTPATIENT)
Dept: CT IMAGING | Facility: HOSPITAL | Age: 73
Discharge: HOME | End: 2018-12-04
Admitting: INTERNAL MEDICINE

## 2018-12-04 DIAGNOSIS — R91.1 LUNG NODULE: ICD-10-CM

## 2018-12-04 PROCEDURE — 71250 CT THORAX DX C-: CPT

## 2019-01-25 RX ORDER — FLUTICASONE PROPIONATE 50 MCG
SPRAY, SUSPENSION (ML) NASAL
Qty: 16 G | Refills: 5 | Status: SHIPPED | OUTPATIENT
Start: 2019-01-25 | End: 2019-05-22 | Stop reason: SDUPTHER

## 2019-04-08 ENCOUNTER — OFFICE VISIT (OUTPATIENT)
Dept: INTERNAL MEDICINE | Facility: CLINIC | Age: 74
End: 2019-04-08

## 2019-04-08 VITALS
BODY MASS INDEX: 22.91 KG/M2 | RESPIRATION RATE: 16 BRPM | OXYGEN SATURATION: 98 % | HEIGHT: 67 IN | HEART RATE: 60 BPM | SYSTOLIC BLOOD PRESSURE: 122 MMHG | WEIGHT: 146 LBS | DIASTOLIC BLOOD PRESSURE: 70 MMHG | TEMPERATURE: 98.3 F

## 2019-04-08 DIAGNOSIS — J32.9 SINUSITIS, UNSPECIFIED CHRONICITY, UNSPECIFIED LOCATION: Primary | ICD-10-CM

## 2019-04-08 DIAGNOSIS — R09.89 BRUIT OF LEFT CAROTID ARTERY: ICD-10-CM

## 2019-04-08 PROCEDURE — 99214 OFFICE O/P EST MOD 30 MIN: CPT | Performed by: INTERNAL MEDICINE

## 2019-04-08 RX ORDER — AMOXICILLIN AND CLAVULANATE POTASSIUM 875; 125 MG/1; MG/1
1 TABLET, FILM COATED ORAL 2 TIMES DAILY
Qty: 14 TABLET | Refills: 0 | Status: SHIPPED | OUTPATIENT
Start: 2019-04-08 | End: 2019-04-25

## 2019-04-08 RX ORDER — FEXOFENADINE HCL 180 MG/1
180 TABLET ORAL DAILY
COMMUNITY
End: 2019-05-22 | Stop reason: SDUPTHER

## 2019-04-08 NOTE — PROGRESS NOTES
Subjective   Yamileth De Guzman is a 73 y.o. female.     Chief Complaint   Patient presents with   • Sinus Problem     Pt states it has been going on in all winter but it has gotten worsse in the last 3 or 4 weeks       History of Present Illness   Sinusitis  Patient presents with sinusitis. The patient reports chronic sinus infections for 4 weeks.  Her symptoms include nasal congestion, purulent rhinorrhea, cough, sneezing, sniffing, sore throats, puffiness of the eyes, itchy eyes, itchy nose.  There has not been a history of nasal congestion, cough. There did not have been a history of chronic otitis media or pharyngotonsillitis.  Prior antibiotic therapy has included Azithromycin. Other medications have included Flonase.  She does not have had allergy testing which was not done.        Current Outpatient Medications:   •  ALPRAZolam (XANAX) 0.25 MG tablet, Take 1 tablet by mouth At Night As Needed for Anxiety., Disp: 30 tablet, Rfl: 2  •  amitriptyline (ELAVIL) 10 MG tablet, Take 1 tablet by mouth Every Night., Disp: 90 tablet, Rfl: 3  •  calcium (OS-STIVEN) 600 MG tablet, Take  by mouth 2 (Two) Times a Day., Disp: , Rfl:   •  Cholecalciferol (VITAMIN D3) 1000 UNITS capsule, Take  by mouth., Disp: , Rfl:   •  denosumab (PROLIA) 60 MG/ML solution syringe, Inject  under the skin into the appropriate area as directed Every 6 (Six) Months., Disp: , Rfl:   •  Dextromethorphan-Guaifenesin (MUCOSA DM)  MG tablet, Take  by mouth., Disp: , Rfl:   •  fexofenadine (ALLEGRA) 180 MG tablet, Take 180 mg by mouth Daily., Disp: , Rfl:   •  fluticasone (FLONASE) 50 MCG/ACT nasal spray, USE 2 SPRAY(S) IN EACH NOSTRIL ONCE DAILY, Disp: 16 g, Rfl: 5  •  Garlic 400 MG tablet, Take  by mouth., Disp: , Rfl:   •  levothyroxine (SYNTHROID, LEVOTHROID) 88 MCG tablet, Take 1 tablet by mouth Daily., Disp: 90 tablet, Rfl: 3  •  Omega-3 Fatty Acids (FISH OIL) 1000 MG capsule capsule, Take  by mouth., Disp: , Rfl:   •  omeprazole (priLOSEC)  40 MG capsule, Take 1 capsule by mouth Daily., Disp: 90 capsule, Rfl: 3  •  sulfamethoxazole-trimethoprim (BACTRIM DS) 800-160 MG per tablet, Take 1 tablet by mouth 2 (Two) Times a Day., Disp: 14 tablet, Rfl: 0  •  amoxicillin-clavulanate (AUGMENTIN) 875-125 MG per tablet, Take 1 tablet by mouth 2 (Two) Times a Day., Disp: 14 tablet, Rfl: 0    The following portions of the patient's history were reviewed and updated as appropriate: allergies, current medications, past family history, past medical history, past social history, past surgical history and problem list.    Review of Systems   Constitutional: Negative.    Respiratory: Negative.    Cardiovascular: Negative.    Gastrointestinal: Negative.    Musculoskeletal: Negative.    Skin: Negative.    Neurological: Negative.    Psychiatric/Behavioral: Negative.        Objective   Physical Exam   Constitutional: She is oriented to person, place, and time. She appears well-nourished.   Neck: Neck supple.   Cardiovascular: Normal rate and regular rhythm.   Murmur (left carotid) heard.  Pulmonary/Chest: Effort normal and breath sounds normal.   Abdominal: Bowel sounds are normal.   Neurological: She is alert and oriented to person, place, and time.   Skin: Skin is warm.   Psychiatric: She has a normal mood and affect.       All tests have been reviewed.    Assessment/Plan   Diagnoses and all orders for this visit:    Sinusitis, unspecified chronicity, unspecified location    Bruit of left carotid artery  -     Duplex Carotid Ultrasound CAR    Other orders  -     fexofenadine (ALLEGRA) 180 MG tablet; Take 180 mg by mouth Daily.  -     amoxicillin-clavulanate (AUGMENTIN) 875-125 MG per tablet; Take 1 tablet by mouth 2 (Two) Times a Day.    sinus rinse, mucinex, ibuprofen    Call if no better

## 2019-04-08 NOTE — ADDENDUM NOTE
Addended by: CHINTAN DALLAS on: 4/8/2019 11:34 AM     Modules accepted: Orders, Level of Service    
rolling walker

## 2019-04-25 ENCOUNTER — HOSPITAL ENCOUNTER (OUTPATIENT)
Dept: INFUSION THERAPY | Facility: HOSPITAL | Age: 74
Discharge: HOME | End: 2019-04-25
Admitting: INTERNAL MEDICINE

## 2019-04-25 VITALS
DIASTOLIC BLOOD PRESSURE: 59 MMHG | RESPIRATION RATE: 18 BRPM | BODY MASS INDEX: 23.3 KG/M2 | HEART RATE: 62 BPM | HEIGHT: 66 IN | OXYGEN SATURATION: 98 % | WEIGHT: 145 LBS | TEMPERATURE: 98.8 F | SYSTOLIC BLOOD PRESSURE: 133 MMHG

## 2019-04-25 DIAGNOSIS — M81.0 SENILE OSTEOPOROSIS: Primary | ICD-10-CM

## 2019-04-25 PROCEDURE — 96372 THER/PROPH/DIAG INJ SC/IM: CPT

## 2019-04-25 PROCEDURE — 25010000002 DENOSUMAB 60 MG/ML SOLUTION: Performed by: INTERNAL MEDICINE

## 2019-04-25 RX ADMIN — DENOSUMAB 60 MG: 60 INJECTION SUBCUTANEOUS at 10:52

## 2019-04-25 NOTE — PATIENT INSTRUCTIONS
Denosumab injection  What is this medicine?  DENOSUMAB (den oh sailaja mab) slows bone breakdown. Prolia is used to treat osteoporosis in women after menopause and in men, and in people who are taking corticosteroids for 6 months or more. Xgeva is used to treat a high calcium level due to cancer and to prevent bone fractures and other bone problems caused by multiple myeloma or cancer bone metastases. Xgeva is also used to treat giant cell tumor of the bone.  This medicine may be used for other purposes; ask your health care provider or pharmacist if you have questions.  COMMON BRAND NAME(S): Prolia, XGEVA  What should I tell my health care provider before I take this medicine?  They need to know if you have any of these conditions:  -dental disease  -having surgery or tooth extraction  -infection  -kidney disease  -low levels of calcium or Vitamin D in the blood  -malnutrition  -on hemodialysis  -skin conditions or sensitivity  -thyroid or parathyroid disease  -an unusual reaction to denosumab, other medicines, foods, dyes, or preservatives  -pregnant or trying to get pregnant  -breast-feeding  How should I use this medicine?  This medicine is for injection under the skin. It is given by a health care professional in a hospital or clinic setting.  If you are getting Prolia, a special MedGuide will be given to you by the pharmacist with each prescription and refill. Be sure to read this information carefully each time.  For Prolia, talk to your pediatrician regarding the use of this medicine in children. Special care may be needed. For Xgeva, talk to your pediatrician regarding the use of this medicine in children. While this drug may be prescribed for children as young as 13 years for selected conditions, precautions do apply.  Overdosage: If you think you have taken too much of this medicine contact a poison control center or emergency room at once.  NOTE: This medicine is only for you. Do not share this medicine with  others.  What if I miss a dose?  It is important not to miss your dose. Call your doctor or health care professional if you are unable to keep an appointment.  What may interact with this medicine?  Do not take this medicine with any of the following medications:  -other medicines containing denosumab  This medicine may also interact with the following medications:  -medicines that lower your chance of fighting infection  -steroid medicines like prednisone or cortisone  This list may not describe all possible interactions. Give your health care provider a list of all the medicines, herbs, non-prescription drugs, or dietary supplements you use. Also tell them if you smoke, drink alcohol, or use illegal drugs. Some items may interact with your medicine.  What should I watch for while using this medicine?  Visit your doctor or health care professional for regular checks on your progress. Your doctor or health care professional may order blood tests and other tests to see how you are doing.  Call your doctor or health care professional for advice if you get a fever, chills or sore throat, or other symptoms of a cold or flu. Do not treat yourself. This drug may decrease your body's ability to fight infection. Try to avoid being around people who are sick.  You should make sure you get enough calcium and vitamin D while you are taking this medicine, unless your doctor tells you not to. Discuss the foods you eat and the vitamins you take with your health care professional.  See your dentist regularly. Brush and floss your teeth as directed. Before you have any dental work done, tell your dentist you are receiving this medicine.  Do not become pregnant while taking this medicine or for 5 months after stopping it. Talk with your doctor or health care professional about your birth control options while taking this medicine. Women should inform their doctor if they wish to become pregnant or think they might be pregnant. There  is a potential for serious side effects to an unborn child. Talk to your health care professional or pharmacist for more information.  What side effects may I notice from receiving this medicine?  Side effects that you should report to your doctor or health care professional as soon as possible:  -allergic reactions like skin rash, itching or hives, swelling of the face, lips, or tongue  -bone pain  -breathing problems  -dizziness  -jaw pain, especially after dental work  -redness, blistering, peeling of the skin  -signs and symptoms of infection like fever or chills; cough; sore throat; pain or trouble passing urine  -signs of low calcium like fast heartbeat, muscle cramps or muscle pain; pain, tingling, numbness in the hands or feet; seizures  -unusual bleeding or bruising  -unusually weak or tired  Side effects that usually do not require medical attention (report to your doctor or health care professional if they continue or are bothersome):  -constipation  -diarrhea  -headache  -joint pain  -loss of appetite  -muscle pain  -runny nose  -tiredness  -upset stomach  This list may not describe all possible side effects. Call your doctor for medical advice about side effects. You may report side effects to FDA at 7-407-FDA-3364.  Where should I keep my medicine?  This medicine is only given in a clinic, doctor's office, or other health care setting and will not be stored at home.  NOTE: This sheet is a summary. It may not cover all possible information. If you have questions about this medicine, talk to your doctor, pharmacist, or health care provider.  © 2019 [employer] (2018-05-23 14:50:05)

## 2019-04-27 ENCOUNTER — OFFICE VISIT (OUTPATIENT)
Dept: INTERNAL MEDICINE | Facility: CLINIC | Age: 74
End: 2019-04-27

## 2019-04-27 VITALS
OXYGEN SATURATION: 100 % | HEIGHT: 66 IN | WEIGHT: 149 LBS | SYSTOLIC BLOOD PRESSURE: 115 MMHG | TEMPERATURE: 98.5 F | DIASTOLIC BLOOD PRESSURE: 53 MMHG | BODY MASS INDEX: 23.95 KG/M2 | HEART RATE: 66 BPM

## 2019-04-27 DIAGNOSIS — N89.8 VAGINAL IRRITATION: ICD-10-CM

## 2019-04-27 DIAGNOSIS — R35.0 URINARY FREQUENCY: Primary | ICD-10-CM

## 2019-04-27 LAB
BILIRUB BLD-MCNC: NEGATIVE MG/DL
CLARITY, POC: CLEAR
COLOR UR: YELLOW
GLUCOSE UR STRIP-MCNC: NEGATIVE MG/DL
KETONES UR QL: NEGATIVE
LEUKOCYTE EST, POC: NEGATIVE
NITRITE UR-MCNC: NEGATIVE MG/ML
PH UR: 5 [PH] (ref 5–8)
PROT UR STRIP-MCNC: NEGATIVE MG/DL
RBC # UR STRIP: ABNORMAL /UL
SP GR UR: 1.01 (ref 1–1.03)
UROBILINOGEN UR QL: NORMAL

## 2019-04-27 PROCEDURE — 99213 OFFICE O/P EST LOW 20 MIN: CPT | Performed by: PHYSICIAN ASSISTANT

## 2019-04-27 PROCEDURE — 81003 URINALYSIS AUTO W/O SCOPE: CPT | Performed by: PHYSICIAN ASSISTANT

## 2019-04-27 RX ORDER — NYSTATIN 100000 U/G
OINTMENT TOPICAL 2 TIMES DAILY PRN
Qty: 30 G | Refills: 0 | Status: SHIPPED | OUTPATIENT
Start: 2019-04-27 | End: 2019-05-22

## 2019-04-27 RX ORDER — FLUCONAZOLE 150 MG/1
150 TABLET ORAL ONCE
Qty: 1 TABLET | Refills: 0 | Status: SHIPPED | OUTPATIENT
Start: 2019-04-27 | End: 2019-04-27

## 2019-04-27 NOTE — PROGRESS NOTES
Yamileth De Guzman is a 73 y.o. female.     Subjective   History of Present Illness   Here today with concern of around 1 week of increased urinary frequency, burning of the external vaginal area with urination as well as redness and irritation of the labia and rectum. No vaginal discharge, fever, chills, low back pain, flank pain, lower abdominal pain, hematuria or urgency.  She was recently on Augmentin for sinusitis which she finished right around the time these symptoms began.          The following portions of the patient's history were reviewed and updated as appropriate: allergies, current medications, past family history, past medical history, past social history, past surgical history and problem list.    Review of Systems   Constitutional: Negative for appetite change, chills, fatigue, fever and unexpected weight change.   Respiratory: Negative for shortness of breath.    Cardiovascular: Negative for chest pain and palpitations.   Gastrointestinal: Negative for abdominal pain, constipation, diarrhea, nausea and vomiting.   Genitourinary: Positive for dysuria, frequency, genital sores (rash) and vaginal pain (irritation). Negative for decreased urine volume, difficulty urinating, dyspareunia, enuresis, flank pain, hematuria, pelvic pain, urgency, vaginal bleeding and vaginal discharge.   Musculoskeletal: Negative for back pain.   Skin: Positive for rash.   Neurological: Negative for weakness.         Objective    Physical Exam   Constitutional: She is oriented to person, place, and time. She appears well-developed and well-nourished. No distress.   Eyes: Conjunctivae and EOM are normal. Pupils are equal, round, and reactive to light.   Neck: Normal range of motion. Neck supple.   Cardiovascular: Normal rate, regular rhythm and normal heart sounds. Exam reveals no gallop and no friction rub.   No murmur heard.  Pulmonary/Chest: Effort normal and breath sounds normal. No respiratory distress. She has no  "wheezes. She has no rales.   Abdominal: Soft. Bowel sounds are normal. She exhibits no mass. There is no tenderness. No hernia.   Genitourinary:   Genitourinary Comments: Vaginal exam deferred today.   Musculoskeletal: Normal range of motion. She exhibits no edema, tenderness or deformity.   Neurological: She is alert and oriented to person, place, and time. She displays normal reflexes. No cranial nerve deficit or sensory deficit. She exhibits normal muscle tone. Coordination normal.   Skin: Skin is warm and dry. Capillary refill takes less than 2 seconds. She is not diaphoretic.   Psychiatric: She has a normal mood and affect. Her behavior is normal. Judgment and thought content normal.   Nursing note and vitals reviewed.        /53 (BP Location: Left arm, Patient Position: Sitting, Cuff Size: Adult)   Pulse 66   Temp 98.5 °F (36.9 °C) (Temporal)   Ht 167.6 cm (66\")   Wt 67.6 kg (149 lb)   SpO2 100%   BMI 24.05 kg/m²     Nursing note and vitals reviewed.        Assessment/Plan   Yamileth was seen today for rash and urinary frequency.    Diagnoses and all orders for this visit:    Urinary frequency  -     POC Urinalysis Dipstick, Automated  -     Urine Culture - Urine, Urine, Clean Catch  Brief Urine Lab Results  (Last result in the past 365 days)      Color   Clarity   Blood   Leuk Est   Nitrite   Protein   CREAT   Urine HCG        04/27/19 0952 Yellow Clear 50 Aubrey/ul Negative Negative Negative           Urinalysis minimally positive for UTI so will wait for urine culture results before considering antibiotic use.    Vaginal irritation  -     fluconazole (DIFLUCAN) 150 MG tablet; Take 1 tablet by mouth 1 (One) Time for 1 dose.  -     nystatin (MYCOSTATIN) 836419 UNIT/GM ointment; Apply  topically to the appropriate area as directed 2 (Two) Times a Day As Needed (rash).      Suspect candidiasis secondary to recent antibiotic use.        Call or return to clinic if symptoms worsen or persist.         "

## 2019-05-02 LAB
BACTERIA UR CULT: ABNORMAL
OTHER ANTIBIOTIC SUSC ISLT: ABNORMAL

## 2019-05-02 RX ORDER — NITROFURANTOIN 25; 75 MG/1; MG/1
100 CAPSULE ORAL 2 TIMES DAILY
Qty: 14 CAPSULE | Refills: 0 | Status: SHIPPED | OUTPATIENT
Start: 2019-05-02 | End: 2019-05-09

## 2019-05-09 ENCOUNTER — OFFICE VISIT (OUTPATIENT)
Dept: INTERNAL MEDICINE | Facility: CLINIC | Age: 74
End: 2019-05-09

## 2019-05-09 VITALS
BODY MASS INDEX: 23.63 KG/M2 | WEIGHT: 147 LBS | HEIGHT: 66 IN | HEART RATE: 62 BPM | TEMPERATURE: 98.4 F | OXYGEN SATURATION: 97 % | DIASTOLIC BLOOD PRESSURE: 62 MMHG | SYSTOLIC BLOOD PRESSURE: 110 MMHG

## 2019-05-09 DIAGNOSIS — K64.4 EXTERNAL HEMORRHOID: ICD-10-CM

## 2019-05-09 DIAGNOSIS — B02.9 HERPES ZOSTER WITHOUT COMPLICATION: Primary | ICD-10-CM

## 2019-05-09 DIAGNOSIS — R35.0 URINARY FREQUENCY: ICD-10-CM

## 2019-05-09 LAB
BILIRUB BLD-MCNC: NEGATIVE MG/DL
CLARITY, POC: CLEAR
COLOR UR: YELLOW
GLUCOSE UR STRIP-MCNC: NEGATIVE MG/DL
KETONES UR QL: NEGATIVE
LEUKOCYTE EST, POC: NEGATIVE
NITRITE UR-MCNC: NEGATIVE MG/ML
PH UR: 5 [PH] (ref 5–8)
PROT UR STRIP-MCNC: NEGATIVE MG/DL
RBC # UR STRIP: ABNORMAL /UL
SP GR UR: 1 (ref 1–1.03)
UROBILINOGEN UR QL: NORMAL

## 2019-05-09 PROCEDURE — 81003 URINALYSIS AUTO W/O SCOPE: CPT | Performed by: PHYSICIAN ASSISTANT

## 2019-05-09 PROCEDURE — 99214 OFFICE O/P EST MOD 30 MIN: CPT | Performed by: PHYSICIAN ASSISTANT

## 2019-05-09 RX ORDER — VALACYCLOVIR HYDROCHLORIDE 1 G/1
1000 TABLET, FILM COATED ORAL 3 TIMES DAILY
Qty: 21 TABLET | Refills: 0 | Status: SHIPPED | OUTPATIENT
Start: 2019-05-09 | End: 2019-05-16

## 2019-05-09 NOTE — PROGRESS NOTES
Yamileth De Guzman is a 73 y.o. female.     Subjective   History of Present Illness   Here today with concern of rash in the vaginal and rectal area which has a burning sensation but no pain or itching. One week ago she was prescribed Nystatin ointment which helped some but not significantly.      She has had bothersome gasiness in the LUQ for the last 3-4 days and the day of onset she had diarrhea off and on all day but she has had no bowel movement since then. No nausea or vomiting.  She developed rash on the upper abdomen which began 3-4 days ago also and has worsened since onset. The rash burns some. She has tried OTC cortisone cream which did not help.     1 week ago she began Macrobid which was sensitive to both bacteria grown in her urine culture.  She continues to have urinary frequency, however, the remainder of her symptoms have resolved.         The following portions of the patient's history were reviewed and updated as appropriate: allergies, current medications, past family history, past medical history, past social history, past surgical history and problem list.    Review of Systems   Constitutional: Negative for appetite change, chills, diaphoresis, fatigue, fever and unexpected weight change.   Respiratory: Negative for cough, chest tightness, shortness of breath and wheezing.    Cardiovascular: Negative for chest pain, palpitations and leg swelling.   Gastrointestinal: Positive for abdominal distention, constipation, diarrhea and rectal pain (rectal rash). Negative for abdominal pain, anal bleeding, blood in stool, nausea and vomiting.   Genitourinary: Positive for frequency and vaginal discharge (vaginal rash). Negative for decreased urine volume, difficulty urinating, dysuria, enuresis, flank pain, genital sores, hematuria, pelvic pain, urgency, vaginal bleeding and vaginal pain.   Skin: Positive for color change and rash. Negative for pallor and wound.   Neurological: Negative for dizziness,  "tremors, seizures, syncope, weakness and headaches.   Hematological: Negative.  Negative for adenopathy. Does not bruise/bleed easily.   Psychiatric/Behavioral: Negative for agitation, confusion, dysphoric mood, self-injury and suicidal ideas. The patient is not nervous/anxious and is not hyperactive.          Objective    Physical Exam   Abdominal: Soft. Bowel sounds are normal. She exhibits no distension and no mass. There is no tenderness. There is no rebound and no guarding. No hernia. Hernia confirmed negative in the right inguinal area and confirmed negative in the left inguinal area.   Genitourinary: Rectal exam shows external hemorrhoid. Rectal exam shows no fissure and no tenderness.       Pelvic exam was performed with patient prone. No labial fusion. There is no rash, tenderness, lesion or injury on the right labia. There is no rash, tenderness, lesion or injury on the left labia.   Lymphadenopathy: No inguinal adenopathy noted on the right or left side.   Skin: Rash noted. No purpura noted. Rash is macular and vesicular. Rash is not papular, not maculopapular, not nodular, not pustular and not urticarial.              /62   Pulse 62   Temp 98.4 °F (36.9 °C)   Ht 167.6 cm (65.98\")   Wt 66.7 kg (147 lb)   SpO2 97%   BMI 23.74 kg/m²     Nursing note and vitals reviewed.        Assessment/Plan   Yamileth was seen today for rash.    Diagnoses and all orders for this visit:    Herpes zoster without complication  -     valACYclovir (VALTREX) 1000 MG tablet; Take 1 tablet by mouth 3 (Three) Times a Day for 7 days.  Shingles rash in T7-8 dermatomes. Use tylenol and NSAIDs prn for pain.     External hemorrhoid  Non-thrombosed and non-bleeding. No appreciable rash in vaginal or rectal area.  No current concern.     Urinary frequency  -     POC Urinalysis Dipstick, Automated - 50 blood, otherwise normal.     Finish Macrobid. Will reassess next week if urinary frequency persists.       Call or RTC if " symptoms worsen or persist.

## 2019-05-22 ENCOUNTER — OFFICE VISIT (OUTPATIENT)
Dept: INTERNAL MEDICINE | Facility: CLINIC | Age: 74
End: 2019-05-22

## 2019-05-22 VITALS
HEIGHT: 66 IN | BODY MASS INDEX: 23 KG/M2 | HEART RATE: 66 BPM | WEIGHT: 143.12 LBS | SYSTOLIC BLOOD PRESSURE: 124 MMHG | DIASTOLIC BLOOD PRESSURE: 56 MMHG | OXYGEN SATURATION: 98 % | TEMPERATURE: 98.5 F

## 2019-05-22 DIAGNOSIS — M81.0 SENILE OSTEOPOROSIS: ICD-10-CM

## 2019-05-22 DIAGNOSIS — E03.9 HYPOTHYROIDISM, UNSPECIFIED TYPE: ICD-10-CM

## 2019-05-22 DIAGNOSIS — F15.11 OTHER STIMULANT ABUSE, IN REMISSION (HCC): ICD-10-CM

## 2019-05-22 DIAGNOSIS — F41.1 GENERALIZED ANXIETY DISORDER: ICD-10-CM

## 2019-05-22 DIAGNOSIS — E78.5 HYPERLIPIDEMIA, UNSPECIFIED HYPERLIPIDEMIA TYPE: Primary | ICD-10-CM

## 2019-05-22 DIAGNOSIS — E55.9 VITAMIN D DEFICIENCY: ICD-10-CM

## 2019-05-22 DIAGNOSIS — K21.9 GASTROESOPHAGEAL REFLUX DISEASE WITHOUT ESOPHAGITIS: ICD-10-CM

## 2019-05-22 DIAGNOSIS — F17.200 CURRENT EVERY DAY SMOKER: ICD-10-CM

## 2019-05-22 DIAGNOSIS — M06.9 RHEUMATOID ARTHRITIS, INVOLVING UNSPECIFIED SITE, UNSPECIFIED RHEUMATOID FACTOR PRESENCE: ICD-10-CM

## 2019-05-22 DIAGNOSIS — R09.89 BRUIT OF LEFT CAROTID ARTERY: ICD-10-CM

## 2019-05-22 DIAGNOSIS — M79.7 FIBROMYALGIA: ICD-10-CM

## 2019-05-22 DIAGNOSIS — M81.0 OSTEOPOROSIS, UNSPECIFIED OSTEOPOROSIS TYPE, UNSPECIFIED PATHOLOGICAL FRACTURE PRESENCE: ICD-10-CM

## 2019-05-22 DIAGNOSIS — K58.9 IRRITABLE BOWEL SYNDROME WITHOUT DIARRHEA: ICD-10-CM

## 2019-05-22 PROBLEM — J32.9 SINUSITIS: Status: RESOLVED | Noted: 2018-11-05 | Resolved: 2019-05-22

## 2019-05-22 PROBLEM — S09.90XA HEAD INJURY: Status: RESOLVED | Noted: 2018-11-05 | Resolved: 2019-05-22

## 2019-05-22 PROBLEM — N39.0 URINARY TRACT INFECTION WITHOUT HEMATURIA: Status: RESOLVED | Noted: 2018-11-05 | Resolved: 2019-05-22

## 2019-05-22 PROCEDURE — G0439 PPPS, SUBSEQ VISIT: HCPCS | Performed by: INTERNAL MEDICINE

## 2019-05-22 RX ORDER — ALPRAZOLAM 0.25 MG/1
0.25 TABLET ORAL NIGHTLY PRN
Qty: 30 TABLET | Refills: 2 | Status: SHIPPED | OUTPATIENT
Start: 2019-05-22 | End: 2020-05-26 | Stop reason: SDUPTHER

## 2019-05-22 RX ORDER — OMEPRAZOLE 20 MG/1
CAPSULE, DELAYED RELEASE ORAL
Qty: 60 CAPSULE | Refills: 3 | Status: SHIPPED | OUTPATIENT
Start: 2019-05-22 | End: 2019-12-16 | Stop reason: SDUPTHER

## 2019-05-22 RX ORDER — FLUTICASONE PROPIONATE 50 MCG
2 SPRAY, SUSPENSION (ML) NASAL DAILY
Qty: 16 G | Refills: 5 | Status: SHIPPED | OUTPATIENT
Start: 2019-05-22 | End: 2020-02-12

## 2019-05-22 RX ORDER — FEXOFENADINE HCL 180 MG/1
180 TABLET ORAL DAILY
Qty: 30 TABLET | Refills: 3 | Status: SHIPPED | OUTPATIENT
Start: 2019-05-22 | End: 2020-05-26 | Stop reason: CLARIF

## 2019-05-22 RX ORDER — LEVOTHYROXINE SODIUM 88 UG/1
88 TABLET ORAL DAILY
Qty: 90 TABLET | Refills: 3 | Status: SHIPPED | OUTPATIENT
Start: 2019-05-22 | End: 2019-06-28 | Stop reason: SDUPTHER

## 2019-05-22 RX ORDER — ALPRAZOLAM 0.25 MG/1
0.25 TABLET ORAL NIGHTLY PRN
Qty: 30 TABLET | Refills: 2 | Status: SHIPPED | OUTPATIENT
Start: 2019-05-22 | End: 2019-05-22 | Stop reason: SDUPTHER

## 2019-05-22 NOTE — PROGRESS NOTES
QUICK REFERENCE INFORMATION:  The ABCs of the Annual Wellness Visit    Subsequent Medicare Wellness Visit     HEALTH RISK ASSESSMENT    : 1945    Recent Hospitalizations:  No hospitalization(s) within the last year..  ccc      Current Medical Providers:  Patient Care Team:  Da Dong MD as PCP - General (Internal Medicine)  Da Dong MD as PCP - Family Medicine  Mike Abebe MD as PCP - Claims Attributed        Smoking Status:  Social History     Tobacco Use   Smoking Status Current Every Day Smoker   • Packs/day: 1.00   • Types: Cigarettes   Smokeless Tobacco Never Used       Alcohol Consumption:  Social History     Substance and Sexual Activity   Alcohol Use No       Depression Screen:   PHQ-2/PHQ-9 Depression Screening 2019   Little interest or pleasure in doing things 0   Feeling down, depressed, or hopeless 0   Trouble falling or staying asleep, or sleeping too much -   Feeling tired or having little energy -   Poor appetite or overeating -   Feeling bad about yourself - or that you are a failure or have let yourself or your family down -   Trouble concentrating on things, such as reading the newspaper or watching television -   Moving or speaking so slowly that other people could have noticed. Or the opposite - being so fidgety or restless that you have been moving around a lot more than usual -   Thoughts that you would be better off dead, or of hurting yourself in some way -   Total Score 0   If you checked off any problems, how difficult have these problems made it for you to do your work, take care of things at home, or get along with other people? -       Health Habits and Functional and Cognitive Screening:  Functional & Cognitive Status 2019   Do you have difficulty preparing food and eating? No   Do you have difficulty bathing yourself, getting dressed or grooming yourself? No   Do you have difficulty using the toilet? No   Do you have difficulty moving around from place  to place? No   Do you have trouble with steps or getting out of a bed or a chair? No   In the past year have you fallen or experienced a near fall? No   Current Diet Well Balanced Diet   Dental Exam Not up to date   Eye Exam Not up to date   Exercise (times per week) 0 times per week   Current Exercise Activities Include None   Do you need help using the phone?  No   Are you deaf or do you have serious difficulty hearing?  No   Do you need help with transportation? No   Do you need help shopping? No   Do you need help preparing meals?  No   Do you need help with housework?  No   Do you need help with laundry? No   Do you need help taking your medications? No   Do you need help managing money? No   Do you ever drive or ride in a car without wearing a seat belt? No   Have you felt unusual stress, anger or loneliness in the last month? No   Who do you live with? Spouse   If you need help, do you have trouble finding someone available to you? No   Have you been bothered in the last four weeks by sexual problems? No   Do you have difficulty concentrating, remembering or making decisions? Yes           Does the patient have evidence of cognitive impairment? No    Asiprin use counseling: Does not need ASA (and currently is not on it)      Recent Lab Results:    Lab Results   Component Value Date    GLU 84 05/09/2018        Lab Results   Component Value Date    TRIG 147 05/09/2018    HDL 64 (H) 05/09/2018    VLDL 29.4 05/09/2018           Age-appropriate Screening Schedule:  Refer to the list below for future screening recommendations based on patient's age, sex and/or medical conditions. Orders for these recommended tests are listed in the plan section. The patient has been provided with a written plan.    Health Maintenance   Topic Date Due   • ZOSTER VACCINE (2 of 2) 06/11/2015   • PNEUMOCOCCAL VACCINES (65+ LOW/MEDIUM RISK) (2 of 2 - PPSV23) 05/03/2018   • LIPID PANEL  05/09/2019   • INFLUENZA VACCINE  08/01/2019   •  DXA SCAN  05/30/2020   • MAMMOGRAM  08/16/2020   • COLONOSCOPY  04/06/2021   • TDAP/TD VACCINES (2 - Td) 04/15/2025        Subjective   History of Present Illness    Yamileth De Guzman is a 73 y.o. female who presents for an Annual Wellness Visit.    The following portions of the patient's history were reviewed and updated as appropriate: allergies, current medications, past family history, past medical history, past social history, past surgical history and problem list.    Outpatient Medications Prior to Visit   Medication Sig Dispense Refill   • ALPRAZolam (XANAX) 0.25 MG tablet Take 1 tablet by mouth At Night As Needed for Anxiety. 30 tablet 2   • calcium (OS-STIVEN) 600 MG tablet Take  by mouth 2 (Two) Times a Day.     • denosumab (PROLIA) 60 MG/ML solution syringe Inject  under the skin into the appropriate area as directed Every 6 (Six) Months.     • Garlic 400 MG tablet Take  by mouth.     • Omega-3 Fatty Acids (FISH OIL) 1000 MG capsule capsule Take  by mouth.     • omeprazole (priLOSEC) 40 MG capsule Take 1 capsule by mouth Daily. 90 capsule 3   • amitriptyline (ELAVIL) 10 MG tablet Take 1 tablet by mouth Every Night. 90 tablet 3   • Cholecalciferol (VITAMIN D3) 1000 UNITS capsule Take  by mouth.     • Dextromethorphan-Guaifenesin (MUCOSA DM)  MG tablet Take  by mouth.     • fexofenadine (ALLEGRA) 180 MG tablet Take 180 mg by mouth Daily.     • fluticasone (FLONASE) 50 MCG/ACT nasal spray USE 2 SPRAY(S) IN EACH NOSTRIL ONCE DAILY 16 g 5   • levothyroxine (SYNTHROID, LEVOTHROID) 88 MCG tablet Take 1 tablet by mouth Daily. 90 tablet 3   • nystatin (MYCOSTATIN) 100422 UNIT/GM ointment Apply  topically to the appropriate area as directed 2 (Two) Times a Day As Needed (rash). 30 g 0     No facility-administered medications prior to visit.        Patient Active Problem List   Diagnosis   • RA (rheumatoid arthritis) (CMS/[facility])   • Esophageal reflux   • Fibromyalgia   • Generalized anxiety disorder   •  "Hyperlipidemia   • Hypothyroidism   • Osteoporosis   • Vitamin D deficiency   • IBS (irritable bowel syndrome)   • Current every day smoker   • Senile osteoporosis   • Head injury   • Sinusitis   • Urinary tract infection without hematuria   • Bruit of left carotid artery       Advance Care Planning:  Patient has an advance directive - a copy has not been provided. Have asked the patient to send this to us to add to record    Identification of Risk Factors:  Risk factors include: none.    Review of Systems   Constitutional: Negative.    Respiratory: Negative.    Cardiovascular: Negative.    Gastrointestinal: Negative.    Skin: Negative.    Psychiatric/Behavioral: Negative.        Compared to one year ago, the patient feels her physical health is the same.  Compared to one year ago, the patient feels her mental health is the same.    Objective     Physical Exam   Constitutional: She is oriented to person, place, and time. She appears well-developed and well-nourished.   Neck: Neck supple.   Cardiovascular: Normal rate, regular rhythm and normal heart sounds.   Pulmonary/Chest: Effort normal and breath sounds normal.   Abdominal: Soft. Bowel sounds are normal.   Neurological: She is alert and oriented to person, place, and time.   Psychiatric: She has a normal mood and affect. Her behavior is normal.       Vitals:    05/22/19 0923   BP: 124/56   Pulse: 66   Temp: 98.5 °F (36.9 °C)   TempSrc: Temporal   SpO2: 98%   Weight: 64.9 kg (143 lb 1.9 oz)   Height: 167.6 cm (66\")   PainSc: 0-No pain       Patient's Body mass index is 23.1 kg/m². BMI is within normal parameters. No follow-up required..      Assessment/Plan   Patient Self-Management and Personalized Health Advice  The patient has been provided with information about: diet, exercise and weight management and preventive services including:   · Advance directive.    Visit Diagnoses:  No diagnosis found.    No orders of the defined types were placed in this " encounter.      Outpatient Encounter Medications as of 5/22/2019   Medication Sig Dispense Refill   • ALPRAZolam (XANAX) 0.25 MG tablet Take 1 tablet by mouth At Night As Needed for Anxiety. 30 tablet 2   • calcium (OS-STIVEN) 600 MG tablet Take  by mouth 2 (Two) Times a Day.     • denosumab (PROLIA) 60 MG/ML solution syringe Inject  under the skin into the appropriate area as directed Every 6 (Six) Months.     • fexofenadine (ALLEGRA) 180 MG tablet Take 1 tablet by mouth Daily. 30 tablet 3   • fluticasone (FLONASE) 50 MCG/ACT nasal spray 2 sprays into the nostril(s) as directed by provider Daily. 16 g 5   • Garlic 400 MG tablet Take  by mouth.     • levothyroxine (SYNTHROID, LEVOTHROID) 88 MCG tablet Take 1 tablet by mouth Daily. 90 tablet 3   • Omega-3 Fatty Acids (FISH OIL) 1000 MG capsule capsule Take  by mouth.     • omeprazole (priLOSEC) 40 MG capsule Take 1 capsule by mouth Daily. 90 capsule 3   • [DISCONTINUED] amitriptyline (ELAVIL) 10 MG tablet Take 1 tablet by mouth Every Night. 90 tablet 3   • [DISCONTINUED] Cholecalciferol (VITAMIN D3) 1000 UNITS capsule Take  by mouth.     • [DISCONTINUED] Dextromethorphan-Guaifenesin (MUCOSA DM)  MG tablet Take  by mouth.     • [DISCONTINUED] fexofenadine (ALLEGRA) 180 MG tablet Take 180 mg by mouth Daily.     • [DISCONTINUED] fluticasone (FLONASE) 50 MCG/ACT nasal spray USE 2 SPRAY(S) IN EACH NOSTRIL ONCE DAILY 16 g 5   • [DISCONTINUED] levothyroxine (SYNTHROID, LEVOTHROID) 88 MCG tablet Take 1 tablet by mouth Daily. 90 tablet 3   • [DISCONTINUED] nystatin (MYCOSTATIN) 188950 UNIT/GM ointment Apply  topically to the appropriate area as directed 2 (Two) Times a Day As Needed (rash). 30 g 0     No facility-administered encounter medications on file as of 5/22/2019.        Reviewed use of high risk medication in the elderly: yes  Reviewed for potential of harmful drug interactions in the elderly: yes    Follow Up:  No Follow-up on file.     An After Visit Summary and  PPPS with all of these plans were given to the patient.        anxiety stable on xanax. Continue prn  Constipation continue MiraLAX  IBS no abd pain stable on IBS OTC medicine, omeprazole to 40mg , history EGD 2011 gastritis and hh, asa d/c  Insomnia stable on amitriptyline. Continue  Hypothryoidism. Continue medication   lung nodule follow up RML soft tissue nodule repeat,   Bone density scan showed this osteoporosis continue Calcium 1,200mg and vit D3 1,000. on reclast refer back to rheum for reclast and repeat dexa   hematuria history of it. seen by urologist long ago. 4/17/14 micro showed trace blood. micro negative, no urine frequency.  vaccination: zostavax done Td 2015 refuse pneumovax,refuse prevnar, pending shingrix, refuse hepA shot  colon 2011 hemorroid, patient declines now, patient will call if ready  left carotid bruit, 4/28/14 U/S negative   annual physical

## 2019-05-23 LAB
25(OH)D3+25(OH)D2 SERPL-MCNC: 67.9 NG/ML (ref 30–100)
ALBUMIN SERPL-MCNC: 4.6 G/DL (ref 3.5–4.8)
ALBUMIN/GLOB SERPL: 1.6 {RATIO} (ref 1.2–2.2)
ALP SERPL-CCNC: 101 IU/L (ref 39–117)
ALT SERPL-CCNC: 37 IU/L (ref 0–32)
APPEARANCE UR: CLEAR
AST SERPL-CCNC: 30 IU/L (ref 0–40)
BASOPHILS # BLD AUTO: 0 X10E3/UL (ref 0–0.2)
BASOPHILS NFR BLD AUTO: 0 %
BILIRUB SERPL-MCNC: 0.4 MG/DL (ref 0–1.2)
BILIRUB UR QL STRIP: NEGATIVE
BUN SERPL-MCNC: 16 MG/DL (ref 8–27)
BUN/CREAT SERPL: 20 (ref 12–28)
CALCIUM SERPL-MCNC: 9.6 MG/DL (ref 8.7–10.3)
CHLORIDE SERPL-SCNC: 102 MMOL/L (ref 96–106)
CHOLEST SERPL-MCNC: 225 MG/DL (ref 100–199)
CO2 SERPL-SCNC: 22 MMOL/L (ref 20–29)
COLOR UR: YELLOW
CREAT SERPL-MCNC: 0.79 MG/DL (ref 0.57–1)
EOSINOPHIL # BLD AUTO: 0.4 X10E3/UL (ref 0–0.4)
EOSINOPHIL NFR BLD AUTO: 4 %
ERYTHROCYTE [DISTWIDTH] IN BLOOD BY AUTOMATED COUNT: 14.2 % (ref 12.3–15.4)
GLOBULIN SER CALC-MCNC: 2.8 G/DL (ref 1.5–4.5)
GLUCOSE SERPL-MCNC: 91 MG/DL (ref 65–99)
GLUCOSE UR QL: NEGATIVE
HCT VFR BLD AUTO: 40.8 % (ref 34–46.6)
HDLC SERPL-MCNC: 61 MG/DL
HGB BLD-MCNC: 13.5 G/DL (ref 11.1–15.9)
HGB UR QL STRIP: NEGATIVE
IMM GRANULOCYTES # BLD AUTO: 0 X10E3/UL (ref 0–0.1)
IMM GRANULOCYTES NFR BLD AUTO: 0 %
KETONES UR QL STRIP: NEGATIVE
LDLC SERPL CALC-MCNC: 144 MG/DL (ref 0–99)
LEUKOCYTE ESTERASE UR QL STRIP: NEGATIVE
LYMPHOCYTES # BLD AUTO: 3.2 X10E3/UL (ref 0.7–3.1)
LYMPHOCYTES NFR BLD AUTO: 35 %
MCH RBC QN AUTO: 32.4 PG (ref 26.6–33)
MCHC RBC AUTO-ENTMCNC: 33.1 G/DL (ref 31.5–35.7)
MCV RBC AUTO: 98 FL (ref 79–97)
MICRO URNS: NORMAL
MONOCYTES # BLD AUTO: 0.7 X10E3/UL (ref 0.1–0.9)
MONOCYTES NFR BLD AUTO: 8 %
NEUTROPHILS # BLD AUTO: 4.9 X10E3/UL (ref 1.4–7)
NEUTROPHILS NFR BLD AUTO: 53 %
NITRITE UR QL STRIP: NEGATIVE
PH UR STRIP: 6 [PH] (ref 5–7.5)
PLATELET # BLD AUTO: 334 X10E3/UL (ref 150–450)
POTASSIUM SERPL-SCNC: 4.5 MMOL/L (ref 3.5–5.2)
PROT SERPL-MCNC: 7.4 G/DL (ref 6–8.5)
PROT UR QL STRIP: NEGATIVE
RBC # BLD AUTO: 4.17 X10E6/UL (ref 3.77–5.28)
SODIUM SERPL-SCNC: 142 MMOL/L (ref 134–144)
SP GR UR: 1.01 (ref 1–1.03)
TRIGL SERPL-MCNC: 100 MG/DL (ref 0–149)
TSH SERPL DL<=0.005 MIU/L-ACNC: 6.74 UIU/ML (ref 0.45–4.5)
UROBILINOGEN UR STRIP-MCNC: 0.2 MG/DL (ref 0.2–1)
VLDLC SERPL CALC-MCNC: 20 MG/DL (ref 5–40)
WBC # BLD AUTO: 9.3 X10E3/UL (ref 3.4–10.8)

## 2019-05-31 LAB — DRUGS UR: NORMAL

## 2019-06-28 ENCOUNTER — OFFICE VISIT (OUTPATIENT)
Dept: INTERNAL MEDICINE | Facility: CLINIC | Age: 74
End: 2019-06-28

## 2019-06-28 VITALS
DIASTOLIC BLOOD PRESSURE: 65 MMHG | SYSTOLIC BLOOD PRESSURE: 129 MMHG | OXYGEN SATURATION: 99 % | TEMPERATURE: 98.8 F | HEART RATE: 62 BPM | HEIGHT: 66 IN | BODY MASS INDEX: 22.82 KG/M2 | WEIGHT: 142 LBS

## 2019-06-28 DIAGNOSIS — R91.1 LUNG NODULE: ICD-10-CM

## 2019-06-28 DIAGNOSIS — E78.5 HYPERLIPIDEMIA, UNSPECIFIED HYPERLIPIDEMIA TYPE: Primary | ICD-10-CM

## 2019-06-28 DIAGNOSIS — M79.7 FIBROMYALGIA: ICD-10-CM

## 2019-06-28 DIAGNOSIS — F17.200 CURRENT EVERY DAY SMOKER: ICD-10-CM

## 2019-06-28 DIAGNOSIS — M81.0 SENILE OSTEOPOROSIS: ICD-10-CM

## 2019-06-28 DIAGNOSIS — K58.9 IRRITABLE BOWEL SYNDROME WITHOUT DIARRHEA: ICD-10-CM

## 2019-06-28 DIAGNOSIS — M06.9 RHEUMATOID ARTHRITIS, INVOLVING UNSPECIFIED SITE, UNSPECIFIED RHEUMATOID FACTOR PRESENCE: ICD-10-CM

## 2019-06-28 DIAGNOSIS — E03.9 HYPOTHYROIDISM, UNSPECIFIED TYPE: ICD-10-CM

## 2019-06-28 DIAGNOSIS — R09.89 BRUIT OF LEFT CAROTID ARTERY: ICD-10-CM

## 2019-06-28 DIAGNOSIS — F41.1 GENERALIZED ANXIETY DISORDER: ICD-10-CM

## 2019-06-28 DIAGNOSIS — E55.9 VITAMIN D DEFICIENCY: ICD-10-CM

## 2019-06-28 DIAGNOSIS — M81.0 OSTEOPOROSIS, UNSPECIFIED OSTEOPOROSIS TYPE, UNSPECIFIED PATHOLOGICAL FRACTURE PRESENCE: ICD-10-CM

## 2019-06-28 DIAGNOSIS — K21.9 GASTROESOPHAGEAL REFLUX DISEASE WITHOUT ESOPHAGITIS: ICD-10-CM

## 2019-06-28 PROCEDURE — 99214 OFFICE O/P EST MOD 30 MIN: CPT | Performed by: INTERNAL MEDICINE

## 2019-06-28 RX ORDER — LEVOTHYROXINE SODIUM 0.1 MG/1
TABLET ORAL
Qty: 30 TABLET | Refills: 2 | Status: SHIPPED | OUTPATIENT
Start: 2019-06-28 | End: 2019-09-17 | Stop reason: SDUPTHER

## 2019-06-28 NOTE — PROGRESS NOTES
Subjective   Yamileth De Guzman is a 73 y.o. female.     Chief Complaint   Patient presents with   • Follow-up     lab results       History of Present Illness   Patient here for follow-up of.  Anxiety stable.  Sleeping problems still.  TSH elevated on medication.  Lung nodule needs to be repeated.  Carotid bruit patient yet to do ultrasound of the neck.  Bone density due.  Osteoporosis continue supplements calcium low vitamin D since her vitamin D is on the high side now.  Hyperlipidemia patient declined the medication.    Current Outpatient Medications:   •  ALPRAZolam (XANAX) 0.25 MG tablet, Take 1 tablet by mouth At Night As Needed for Anxiety., Disp: 30 tablet, Rfl: 2  •  calcium (OS-STIVEN) 600 MG tablet, Take  by mouth 2 (Two) Times a Day., Disp: , Rfl:   •  denosumab (PROLIA) 60 MG/ML solution syringe, Inject  under the skin into the appropriate area as directed Every 6 (Six) Months., Disp: , Rfl:   •  fexofenadine (ALLEGRA) 180 MG tablet, Take 1 tablet by mouth Daily., Disp: 30 tablet, Rfl: 3  •  fluticasone (FLONASE) 50 MCG/ACT nasal spray, 2 sprays into the nostril(s) as directed by provider Daily., Disp: 16 g, Rfl: 5  •  Garlic 400 MG tablet, Take  by mouth., Disp: , Rfl:   •  levothyroxine (SYNTHROID, LEVOTHROID) 88 MCG tablet, Take 1 tablet by mouth Daily., Disp: 90 tablet, Rfl: 3  •  Omega-3 Fatty Acids (FISH OIL) 1000 MG capsule capsule, Take  by mouth., Disp: , Rfl:   •  omeprazole (priLOSEC) 20 MG capsule, 1 bid po, Disp: 60 capsule, Rfl: 3    The following portions of the patient's history were reviewed and updated as appropriate: allergies, current medications, past family history, past medical history, past social history, past surgical history and problem list.    Review of Systems   Constitutional: Negative.    Respiratory: Negative.    Cardiovascular: Negative.    Gastrointestinal: Negative.    Musculoskeletal: Negative.    Skin: Negative.    Neurological: Negative.    Psychiatric/Behavioral:  Negative.        Objective   Physical Exam   Constitutional: She is oriented to person, place, and time. She appears well-nourished.   Neck: Neck supple.   Cardiovascular: Normal rate, regular rhythm and normal heart sounds.   Pulmonary/Chest: Effort normal and breath sounds normal.   Abdominal: Bowel sounds are normal.   Neurological: She is alert and oriented to person, place, and time.   Skin: Skin is warm.   Psychiatric: She has a normal mood and affect.       All tests have been reviewed.    Assessment/Plan   There are no diagnoses linked to this encounter.            anxiety stable on xanax. Continue prn  Constipation continue MiraLAX  IBS no abd pain stable on IBS OTC medicine, omeprazole to 40mg , history EGD 2011 gastritis and hh, asa d/c  Insomnia stable on amitriptyline. Continue  Hypothryoidism. Continue medication  Increase med from 88 to 100 daily  lung nodule follow up RML soft tissue nodule repeat, repeat now  Bone density scan showed this osteoporosis continue Calcium 1,200mg and vit D3 hold for now  weight gain is . on reclast refer back to rheum for reclast and repeat dexa   hematuria history of it. seen by urologist long ago. 4/17/14 micro showed trace blood. micro negative, no urine frequency.  vaccination: zostavax done Td 2015 refuse pneumovax,refuse prevnar, pending shingrix, refuse hepA shot  colon 2011 hemorroid, patient declines now, patient will call if ready  left carotid bruit, 4/28/14 U/S negative  AST high watch for now  HLD diet, patient refuses medicine      6 weeks after lab

## 2019-07-15 ENCOUNTER — TRANSCRIBE ORDERS (OUTPATIENT)
Dept: ADMINISTRATIVE | Facility: HOSPITAL | Age: 74
End: 2019-07-15

## 2019-07-15 DIAGNOSIS — Z12.31 ENCOUNTER FOR SCREENING MAMMOGRAM FOR MALIGNANT NEOPLASM OF BREAST: Primary | ICD-10-CM

## 2019-08-08 LAB
AST SERPL-CCNC: 22 U/L (ref 1–32)
CHOLEST SERPL-MCNC: 196 MG/DL (ref 0–200)
HDLC SERPL-MCNC: 65 MG/DL (ref 40–60)
LDLC SERPL CALC-MCNC: 112 MG/DL (ref 0–100)
TRIGL SERPL-MCNC: 97 MG/DL (ref 0–150)
VLDLC SERPL CALC-MCNC: 19.4 MG/DL

## 2019-08-13 ENCOUNTER — OFFICE VISIT (OUTPATIENT)
Dept: INTERNAL MEDICINE | Facility: CLINIC | Age: 74
End: 2019-08-13

## 2019-08-13 VITALS
DIASTOLIC BLOOD PRESSURE: 66 MMHG | SYSTOLIC BLOOD PRESSURE: 114 MMHG | HEIGHT: 66 IN | HEART RATE: 62 BPM | WEIGHT: 140.8 LBS | TEMPERATURE: 97.9 F | BODY MASS INDEX: 22.63 KG/M2 | OXYGEN SATURATION: 98 %

## 2019-08-13 DIAGNOSIS — K58.9 IRRITABLE BOWEL SYNDROME WITHOUT DIARRHEA: ICD-10-CM

## 2019-08-13 DIAGNOSIS — E55.9 VITAMIN D DEFICIENCY: ICD-10-CM

## 2019-08-13 DIAGNOSIS — K21.9 GASTROESOPHAGEAL REFLUX DISEASE WITHOUT ESOPHAGITIS: ICD-10-CM

## 2019-08-13 DIAGNOSIS — R09.89 BRUIT OF LEFT CAROTID ARTERY: ICD-10-CM

## 2019-08-13 DIAGNOSIS — M79.7 FIBROMYALGIA: ICD-10-CM

## 2019-08-13 DIAGNOSIS — E03.9 HYPOTHYROIDISM, UNSPECIFIED TYPE: ICD-10-CM

## 2019-08-13 DIAGNOSIS — E78.5 HYPERLIPIDEMIA, UNSPECIFIED HYPERLIPIDEMIA TYPE: Primary | ICD-10-CM

## 2019-08-13 DIAGNOSIS — M06.9 RHEUMATOID ARTHRITIS, INVOLVING UNSPECIFIED SITE, UNSPECIFIED RHEUMATOID FACTOR PRESENCE: ICD-10-CM

## 2019-08-13 DIAGNOSIS — F41.1 GENERALIZED ANXIETY DISORDER: ICD-10-CM

## 2019-08-13 DIAGNOSIS — F17.200 CURRENT EVERY DAY SMOKER: ICD-10-CM

## 2019-08-13 DIAGNOSIS — M81.0 SENILE OSTEOPOROSIS: ICD-10-CM

## 2019-08-13 PROCEDURE — 99214 OFFICE O/P EST MOD 30 MIN: CPT | Performed by: INTERNAL MEDICINE

## 2019-08-13 NOTE — PROGRESS NOTES
Subjective   Yamileth De Guzman is a 73 y.o. female.     Chief Complaint   Patient presents with   • Follow-up     6 week f/u. Had lab work 8/8/19       History of Present Illness   Patient here for follow-up of.  TSH needs to be repeated levothyroxine adjusted.  Lung nodule patient yet to do CT scan.  Carotid bruit patient yet to do ultrasound.  Anxiety stable.  Constipation stable.  Osteoporosis patient is on medication calcium normal now patient could resume Os-Cole D.  Liver enzyme returned back to normal.  Cholesterol normal.    Current Outpatient Medications:   •  ALPRAZolam (XANAX) 0.25 MG tablet, Take 1 tablet by mouth At Night As Needed for Anxiety., Disp: 30 tablet, Rfl: 2  •  calcium (OS-COLE) 600 MG tablet, Take  by mouth 2 (Two) Times a Day., Disp: , Rfl:   •  denosumab (PROLIA) 60 MG/ML solution syringe, Inject  under the skin into the appropriate area as directed Every 6 (Six) Months., Disp: , Rfl:   •  fexofenadine (ALLEGRA) 180 MG tablet, Take 1 tablet by mouth Daily., Disp: 30 tablet, Rfl: 3  •  fluticasone (FLONASE) 50 MCG/ACT nasal spray, 2 sprays into the nostril(s) as directed by provider Daily., Disp: 16 g, Rfl: 5  •  Garlic 400 MG tablet, Take  by mouth., Disp: , Rfl:   •  levothyroxine (SYNTHROID, LEVOTHROID) 100 MCG tablet, 1 qd po, Disp: 30 tablet, Rfl: 2  •  Omega-3 Fatty Acids (FISH OIL) 1000 MG capsule capsule, Take  by mouth., Disp: , Rfl:   •  omeprazole (priLOSEC) 20 MG capsule, 1 bid po, Disp: 60 capsule, Rfl: 3    The following portions of the patient's history were reviewed and updated as appropriate: allergies, current medications, past family history, past medical history, past social history, past surgical history and problem list.    Review of Systems   Constitutional: Negative.    Respiratory: Negative.    Cardiovascular: Negative.    Gastrointestinal: Negative.    Musculoskeletal: Negative.    Skin: Negative.    Neurological: Negative.    Psychiatric/Behavioral: Negative.         Objective   Physical Exam   Constitutional: She is oriented to person, place, and time. She appears well-developed and well-nourished.   Neck: Neck supple.   Cardiovascular: Normal rate, regular rhythm and normal heart sounds.   Pulmonary/Chest: Effort normal and breath sounds normal.   Abdominal: Bowel sounds are normal.   Neurological: She is alert and oriented to person, place, and time.   Skin: Skin is warm.   Psychiatric: She has a normal mood and affect.       All tests have been reviewed.    Assessment/Plan   Diagnoses and all orders for this visit:    Hyperlipidemia, unspecified hyperlipidemia type    Gastroesophageal reflux disease without esophagitis    Vitamin D deficiency    Irritable bowel syndrome without diarrhea    Hypothyroidism, unspecified type    Fibromyalgia    Rheumatoid arthritis, involving unspecified site, unspecified rheumatoid factor presence (CMS/[facility])    Senile osteoporosis    Generalized anxiety disorder    Current every day smoker    Bruit of left carotid artery                 anxiety stable on xanax. Continue prn  Constipation continue MiraLAX  IBS no abd pain stable on IBS OTC medicine, omeprazole to 40mg , history EGD 2011 gastritis and hh, asa d/c  Insomnia stable on amitriptyline. Continue  Hypothryoidism. Continue medication  Increase med from 88 to 100 daily do lab  lung nodule follow up RML soft tissue nodule repeat, repeat now  Bone density scan showed this osteoporosis continue Calcium 1,200mg and vit D3 hold for now  osteoporosis on reclast resume oscal D  hematuria history of it. seen by urologist long ago. 4/17/14 micro showed trace blood. micro negative, no urine frequency.  vaccination: zostavax done Td 2015 refuse pneumovax,refuse prevnar, pending shingrix, refuse hepA shot  colon 2011 hemorroid, patient declines now, patient will call if ready  left carotid bruit, 4/28/14 U/S negative, pending US  AST high watch for now repeat normal  HLD diet, patient refuses  medicine  tob to quit      6 weeks after lab

## 2019-08-14 LAB — TSH SERPL DL<=0.005 MIU/L-ACNC: 0.79 MIU/ML (ref 0.27–4.2)

## 2019-09-16 ENCOUNTER — HOSPITAL ENCOUNTER (OUTPATIENT)
Dept: MAMMOGRAPHY | Facility: HOSPITAL | Age: 74
Discharge: HOME | End: 2019-09-16
Admitting: INTERNAL MEDICINE

## 2019-09-16 DIAGNOSIS — Z12.31 ENCOUNTER FOR SCREENING MAMMOGRAM FOR MALIGNANT NEOPLASM OF BREAST: ICD-10-CM

## 2019-09-16 PROCEDURE — 77063 BREAST TOMOSYNTHESIS BI: CPT

## 2019-09-16 PROCEDURE — 77067 SCR MAMMO BI INCL CAD: CPT

## 2019-09-17 DIAGNOSIS — E03.9 HYPOTHYROIDISM, UNSPECIFIED TYPE: ICD-10-CM

## 2019-09-17 RX ORDER — LEVOTHYROXINE SODIUM 0.1 MG/1
TABLET ORAL
Qty: 90 TABLET | Refills: 0 | Status: SHIPPED | OUTPATIENT
Start: 2019-09-17 | End: 2019-12-16 | Stop reason: SDUPTHER

## 2019-10-28 ENCOUNTER — HOSPITAL ENCOUNTER (OUTPATIENT)
Dept: INFUSION THERAPY | Facility: HOSPITAL | Age: 74
Discharge: HOME | End: 2019-10-28
Admitting: INTERNAL MEDICINE

## 2019-10-28 VITALS
HEART RATE: 57 BPM | HEIGHT: 67 IN | SYSTOLIC BLOOD PRESSURE: 119 MMHG | RESPIRATION RATE: 18 BRPM | OXYGEN SATURATION: 99 % | BODY MASS INDEX: 21.97 KG/M2 | TEMPERATURE: 99 F | WEIGHT: 140 LBS | DIASTOLIC BLOOD PRESSURE: 48 MMHG

## 2019-10-28 DIAGNOSIS — M81.0 SENILE OSTEOPOROSIS: Primary | ICD-10-CM

## 2019-10-28 PROCEDURE — 96372 THER/PROPH/DIAG INJ SC/IM: CPT

## 2019-10-28 PROCEDURE — 25010000002 DENOSUMAB 60 MG/ML SOLUTION PREFILLED SYRINGE: Performed by: INTERNAL MEDICINE

## 2019-10-28 PROCEDURE — 96401 CHEMO ANTI-NEOPL SQ/IM: CPT

## 2019-10-28 RX ADMIN — DENOSUMAB 60 MG: 60 INJECTION SUBCUTANEOUS at 11:19

## 2019-12-16 DIAGNOSIS — E03.9 HYPOTHYROIDISM, UNSPECIFIED TYPE: ICD-10-CM

## 2019-12-16 RX ORDER — LEVOTHYROXINE SODIUM 0.1 MG/1
TABLET ORAL
Qty: 90 TABLET | Refills: 0 | Status: SHIPPED | OUTPATIENT
Start: 2019-12-16 | End: 2020-03-17

## 2019-12-16 RX ORDER — OMEPRAZOLE 20 MG/1
CAPSULE, DELAYED RELEASE ORAL
Qty: 180 CAPSULE | Refills: 0 | Status: SHIPPED | OUTPATIENT
Start: 2019-12-16 | End: 2020-03-27

## 2020-02-12 RX ORDER — FLUTICASONE PROPIONATE 50 MCG
SPRAY, SUSPENSION (ML) NASAL
Qty: 48 G | Refills: 0 | Status: SHIPPED | OUTPATIENT
Start: 2020-02-12 | End: 2020-05-15

## 2020-02-20 ENCOUNTER — OFFICE VISIT (OUTPATIENT)
Dept: INTERNAL MEDICINE | Facility: CLINIC | Age: 75
End: 2020-02-20

## 2020-02-20 VITALS
RESPIRATION RATE: 16 BRPM | BODY MASS INDEX: 23.14 KG/M2 | TEMPERATURE: 98.3 F | HEIGHT: 66 IN | WEIGHT: 144 LBS | SYSTOLIC BLOOD PRESSURE: 120 MMHG | OXYGEN SATURATION: 95 % | HEART RATE: 67 BPM | DIASTOLIC BLOOD PRESSURE: 62 MMHG

## 2020-02-20 DIAGNOSIS — J32.9 SINUSITIS, UNSPECIFIED CHRONICITY, UNSPECIFIED LOCATION: Primary | ICD-10-CM

## 2020-02-20 PROCEDURE — 99213 OFFICE O/P EST LOW 20 MIN: CPT | Performed by: INTERNAL MEDICINE

## 2020-02-20 RX ORDER — AMOXICILLIN AND CLAVULANATE POTASSIUM 875; 125 MG/1; MG/1
1 TABLET, FILM COATED ORAL 2 TIMES DAILY
Qty: 14 TABLET | Refills: 0 | Status: SHIPPED | OUTPATIENT
Start: 2020-02-20 | End: 2020-05-26

## 2020-02-20 NOTE — PROGRESS NOTES
Subjective   Yamileth De Guzman is a 74 y.o. female.     Chief Complaint   Patient presents with   • Nasal Congestion   • URI   • Facial Pain       History of Present Illness   3 mo, nasal congestion, sinus congestion, facial pain PND , mild cough. No fever chill, no wheeze no soa, mild cough , drainage green looking, otc med no help.     Current Outpatient Medications:   •  ALPRAZolam (XANAX) 0.25 MG tablet, Take 1 tablet by mouth At Night As Needed for Anxiety., Disp: 30 tablet, Rfl: 2  •  calcium (OS-STIVEN) 600 MG tablet, Take  by mouth 2 (Two) Times a Day., Disp: , Rfl:   •  denosumab (PROLIA) 60 MG/ML solution syringe, Inject  under the skin into the appropriate area as directed Every 6 (Six) Months., Disp: , Rfl:   •  fexofenadine (ALLEGRA) 180 MG tablet, Take 1 tablet by mouth Daily., Disp: 30 tablet, Rfl: 3  •  fluticasone (FLONASE) 50 MCG/ACT nasal spray, USE 2 SPRAY(S) IN EACH NOSTRIL ONCE DAILY AS DIRECTED, Disp: 48 g, Rfl: 0  •  Garlic 400 MG tablet, Take  by mouth., Disp: , Rfl:   •  levothyroxine (SYNTHROID, LEVOTHROID) 100 MCG tablet, TAKE 1 TABLET BY MOUTH ONCE DAILY, Disp: 90 tablet, Rfl: 0  •  Omega-3 Fatty Acids (FISH OIL) 1000 MG capsule capsule, Take  by mouth., Disp: , Rfl:   •  omeprazole (priLOSEC) 20 MG capsule, TAKE 1 CAPSULE BY MOUTH TWICE DAILY, Disp: 180 capsule, Rfl: 0  •  amoxicillin-clavulanate (AUGMENTIN) 875-125 MG per tablet, Take 1 tablet by mouth 2 (Two) Times a Day., Disp: 14 tablet, Rfl: 0    The following portions of the patient's history were reviewed and updated as appropriate: allergies, current medications, past family history, past medical history, past social history, past surgical history and problem list.    Review of Systems   Constitutional: Negative.    HENT: Positive for congestion, postnasal drip, sinus pressure and sinus pain. Negative for ear pain and sore throat.    Respiratory: Positive for cough. Negative for wheezing.    Cardiovascular: Negative.     Gastrointestinal: Negative.    Skin: Negative.    Psychiatric/Behavioral: Negative.        Objective   Physical Exam   Constitutional: She is oriented to person, place, and time. She appears well-developed and well-nourished.   HENT:   Tm bulge   Neck: Neck supple.   Cardiovascular: Normal rate, regular rhythm and normal heart sounds.   Pulmonary/Chest: Effort normal and breath sounds normal.   Abdominal: Soft. Bowel sounds are normal.   Neurological: She is alert and oriented to person, place, and time.   Psychiatric: She has a normal mood and affect. Her behavior is normal.       All tests have been reviewed.    Assessment/Plan   Diagnoses and all orders for this visit:    Sinusitis, unspecified chronicity, unspecified location  -     amoxicillin-clavulanate (AUGMENTIN) 875-125 MG per tablet; Take 1 tablet by mouth 2 (Two) Times a Day.      Zyrtec and advil mucinex     1 mo

## 2020-02-25 ENCOUNTER — TELEPHONE (OUTPATIENT)
Dept: INTERNAL MEDICINE | Facility: CLINIC | Age: 75
End: 2020-02-25

## 2020-02-25 DIAGNOSIS — R91.1 LUNG NODULE: Primary | ICD-10-CM

## 2020-02-26 ENCOUNTER — HOSPITAL ENCOUNTER (OUTPATIENT)
Dept: CT IMAGING | Facility: HOSPITAL | Age: 75
Discharge: HOME | End: 2020-02-26

## 2020-02-26 ENCOUNTER — HOSPITAL ENCOUNTER (OUTPATIENT)
Dept: CARDIOLOGY | Facility: HOSPITAL | Age: 75
Discharge: HOME | End: 2020-02-26
Admitting: INTERNAL MEDICINE

## 2020-02-26 DIAGNOSIS — R91.1 LUNG NODULE: ICD-10-CM

## 2020-02-26 LAB
BH CV ECHO MEAS - BSA(HAYCOCK): 1.8 M^2
BH CV ECHO MEAS - BSA: 1.7 M^2
BH CV ECHO MEAS - BZI_BMI: 23.4 KILOGRAMS/M^2
BH CV ECHO MEAS - BZI_METRIC_HEIGHT: 167.6 CM
BH CV ECHO MEAS - BZI_METRIC_WEIGHT: 65.8 KG
BH CV XLRA MEAS LEFT DIST CCA EDV: 25.9 CM/SEC
BH CV XLRA MEAS LEFT DIST CCA PSV: 107 CM/SEC
BH CV XLRA MEAS LEFT DIST ICA EDV: 22.4 CM/SEC
BH CV XLRA MEAS LEFT DIST ICA PSV: 68 CM/SEC
BH CV XLRA MEAS LEFT MID CCA EDV: 26.7 CM/SEC
BH CV XLRA MEAS LEFT MID CCA PSV: 141 CM/SEC
BH CV XLRA MEAS LEFT MID ICA EDV: 32.2 CM/SEC
BH CV XLRA MEAS LEFT MID ICA PSV: 111 CM/SEC
BH CV XLRA MEAS LEFT PROX CCA EDV: 15.7 CM/SEC
BH CV XLRA MEAS LEFT PROX CCA PSV: 93.5 CM/SEC
BH CV XLRA MEAS LEFT PROX ECA EDV: 16.5 CM/SEC
BH CV XLRA MEAS LEFT PROX ECA PSV: 106 CM/SEC
BH CV XLRA MEAS LEFT PROX ICA EDV: 24.4 CM/SEC
BH CV XLRA MEAS LEFT PROX ICA PSV: 102 CM/SEC
BH CV XLRA MEAS LEFT PROX SCLA EDV: 0 CM/SEC
BH CV XLRA MEAS LEFT PROX SCLA PSV: 87.2 CM/SEC
BH CV XLRA MEAS LEFT VERTEBRAL A EDV: 22 CM/SEC
BH CV XLRA MEAS LEFT VERTEBRAL A PSV: 59.7 CM/SEC
BH CV XLRA MEAS RIGHT DIST CCA EDV: 22 CM/SEC
BH CV XLRA MEAS RIGHT DIST CCA PSV: 94.3 CM/SEC
BH CV XLRA MEAS RIGHT DIST ICA EDV: 30.8 CM/SEC
BH CV XLRA MEAS RIGHT DIST ICA PSV: 102 CM/SEC
BH CV XLRA MEAS RIGHT ICA/CCA RATIO: 1
BH CV XLRA MEAS RIGHT MID CCA EDV: 20.1 CM/SEC
BH CV XLRA MEAS RIGHT MID CCA PSV: 106 CM/SEC
BH CV XLRA MEAS RIGHT MID ICA EDV: 27 CM/SEC
BH CV XLRA MEAS RIGHT MID ICA PSV: 94.3 CM/SEC
BH CV XLRA MEAS RIGHT PROX CCA EDV: 13.8 CM/SEC
BH CV XLRA MEAS RIGHT PROX CCA PSV: 97.4 CM/SEC
BH CV XLRA MEAS RIGHT PROX ECA EDV: 19.6 CM/SEC
BH CV XLRA MEAS RIGHT PROX ECA PSV: 191 CM/SEC
BH CV XLRA MEAS RIGHT PROX ICA EDV: 28.9 CM/SEC
BH CV XLRA MEAS RIGHT PROX ICA PSV: 86.1 CM/SEC
BH CV XLRA MEAS RIGHT PROX SCLA EDV: 0 CM/SEC
BH CV XLRA MEAS RIGHT PROX SCLA PSV: 164 CM/SEC
BH CV XLRA MEAS RIGHT VERTEBRAL A EDV: 23.6 CM/SEC
BH CV XLRA MEAS RIGHT VERTEBRAL A PSV: 110 CM/SEC

## 2020-02-26 PROCEDURE — 93880 EXTRACRANIAL BILAT STUDY: CPT

## 2020-02-26 PROCEDURE — 71250 CT THORAX DX C-: CPT

## 2020-02-26 PROCEDURE — 93880 EXTRACRANIAL BILAT STUDY: CPT | Performed by: INTERNAL MEDICINE

## 2020-03-16 DIAGNOSIS — E03.9 HYPOTHYROIDISM, UNSPECIFIED TYPE: ICD-10-CM

## 2020-03-17 RX ORDER — LEVOTHYROXINE SODIUM 0.1 MG/1
TABLET ORAL
Qty: 90 TABLET | Refills: 0 | Status: SHIPPED | OUTPATIENT
Start: 2020-03-17 | End: 2020-05-26 | Stop reason: SDUPTHER

## 2020-03-27 RX ORDER — OMEPRAZOLE 20 MG/1
CAPSULE, DELAYED RELEASE ORAL
Qty: 180 CAPSULE | Refills: 0 | Status: SHIPPED | OUTPATIENT
Start: 2020-03-27 | End: 2020-05-26 | Stop reason: SDUPTHER

## 2020-04-28 ENCOUNTER — TELEPHONE (OUTPATIENT)
Dept: INFUSION THERAPY | Facility: HOSPITAL | Age: 75
End: 2020-04-28

## 2020-04-29 ENCOUNTER — HOSPITAL ENCOUNTER (OUTPATIENT)
Dept: INFUSION THERAPY | Facility: HOSPITAL | Age: 75
Discharge: HOME | End: 2020-04-29
Admitting: INTERNAL MEDICINE

## 2020-04-29 VITALS
RESPIRATION RATE: 18 BRPM | DIASTOLIC BLOOD PRESSURE: 58 MMHG | HEIGHT: 67 IN | HEART RATE: 70 BPM | TEMPERATURE: 98.7 F | WEIGHT: 145 LBS | SYSTOLIC BLOOD PRESSURE: 118 MMHG | OXYGEN SATURATION: 96 % | BODY MASS INDEX: 22.76 KG/M2

## 2020-04-29 DIAGNOSIS — M81.0 SENILE OSTEOPOROSIS: Primary | ICD-10-CM

## 2020-04-29 PROCEDURE — 96372 THER/PROPH/DIAG INJ SC/IM: CPT

## 2020-04-29 PROCEDURE — 25010000002 DENOSUMAB 60 MG/ML SOLUTION PREFILLED SYRINGE: Performed by: INTERNAL MEDICINE

## 2020-04-29 PROCEDURE — 96401 CHEMO ANTI-NEOPL SQ/IM: CPT

## 2020-04-29 RX ADMIN — DENOSUMAB 60 MG: 60 INJECTION SUBCUTANEOUS at 10:57

## 2020-05-15 RX ORDER — FLUTICASONE PROPIONATE 50 MCG
SPRAY, SUSPENSION (ML) NASAL
Qty: 48 G | Refills: 5 | Status: SHIPPED | OUTPATIENT
Start: 2020-05-15 | End: 2020-05-26 | Stop reason: SDUPTHER

## 2020-05-26 ENCOUNTER — OFFICE VISIT (OUTPATIENT)
Dept: INTERNAL MEDICINE | Facility: CLINIC | Age: 75
End: 2020-05-26

## 2020-05-26 VITALS
HEIGHT: 67 IN | DIASTOLIC BLOOD PRESSURE: 68 MMHG | SYSTOLIC BLOOD PRESSURE: 127 MMHG | HEART RATE: 68 BPM | BODY MASS INDEX: 22.88 KG/M2 | OXYGEN SATURATION: 98 % | TEMPERATURE: 98.2 F | WEIGHT: 145.8 LBS

## 2020-05-26 DIAGNOSIS — K58.9 IRRITABLE BOWEL SYNDROME WITHOUT DIARRHEA: ICD-10-CM

## 2020-05-26 DIAGNOSIS — E78.5 HYPERLIPIDEMIA, UNSPECIFIED HYPERLIPIDEMIA TYPE: ICD-10-CM

## 2020-05-26 DIAGNOSIS — F41.1 GENERALIZED ANXIETY DISORDER: ICD-10-CM

## 2020-05-26 DIAGNOSIS — F17.200 CURRENT EVERY DAY SMOKER: ICD-10-CM

## 2020-05-26 DIAGNOSIS — M06.9 RHEUMATOID ARTHRITIS, INVOLVING UNSPECIFIED SITE, UNSPECIFIED RHEUMATOID FACTOR PRESENCE: ICD-10-CM

## 2020-05-26 DIAGNOSIS — M79.7 FIBROMYALGIA: ICD-10-CM

## 2020-05-26 DIAGNOSIS — E03.9 HYPOTHYROIDISM, UNSPECIFIED TYPE: ICD-10-CM

## 2020-05-26 DIAGNOSIS — K21.9 GASTROESOPHAGEAL REFLUX DISEASE WITHOUT ESOPHAGITIS: ICD-10-CM

## 2020-05-26 DIAGNOSIS — R09.89 BRUIT OF LEFT CAROTID ARTERY: Primary | ICD-10-CM

## 2020-05-26 DIAGNOSIS — M81.0 SENILE OSTEOPOROSIS: ICD-10-CM

## 2020-05-26 DIAGNOSIS — E55.9 VITAMIN D DEFICIENCY: ICD-10-CM

## 2020-05-26 PROBLEM — J32.9 SINUSITIS: Status: RESOLVED | Noted: 2018-11-05 | Resolved: 2020-05-26

## 2020-05-26 LAB
25(OH)D3+25(OH)D2 SERPL-MCNC: 69.3 NG/ML (ref 30–100)
ALBUMIN SERPL-MCNC: 4.5 G/DL (ref 3.5–5.2)
ALBUMIN/GLOB SERPL: 2.4 G/DL
ALP SERPL-CCNC: 56 U/L (ref 39–117)
ALT SERPL-CCNC: 17 U/L (ref 1–33)
AST SERPL-CCNC: 21 U/L (ref 1–32)
BASOPHILS # BLD AUTO: 0.04 10*3/MM3 (ref 0–0.2)
BASOPHILS NFR BLD AUTO: 0.5 % (ref 0–1.5)
BILIRUB SERPL-MCNC: 0.3 MG/DL (ref 0.2–1.2)
BUN SERPL-MCNC: 18 MG/DL (ref 8–23)
BUN/CREAT SERPL: 22.5 (ref 7–25)
CALCIUM SERPL-MCNC: 9.3 MG/DL (ref 8.6–10.5)
CHLORIDE SERPL-SCNC: 105 MMOL/L (ref 98–107)
CHOLEST SERPL-MCNC: 194 MG/DL (ref 0–200)
CO2 SERPL-SCNC: 27.1 MMOL/L (ref 22–29)
CREAT SERPL-MCNC: 0.8 MG/DL (ref 0.57–1)
EOSINOPHIL # BLD AUTO: 0.1 10*3/MM3 (ref 0–0.4)
EOSINOPHIL NFR BLD AUTO: 1.2 % (ref 0.3–6.2)
ERYTHROCYTE [DISTWIDTH] IN BLOOD BY AUTOMATED COUNT: 12.4 % (ref 12.3–15.4)
GLOBULIN SER CALC-MCNC: 1.9 GM/DL
GLUCOSE SERPL-MCNC: 93 MG/DL (ref 65–99)
HCT VFR BLD AUTO: 38 % (ref 34–46.6)
HDLC SERPL-MCNC: 66 MG/DL (ref 40–60)
HGB BLD-MCNC: 12.8 G/DL (ref 12–15.9)
IMM GRANULOCYTES # BLD AUTO: 0.03 10*3/MM3 (ref 0–0.05)
IMM GRANULOCYTES NFR BLD AUTO: 0.4 % (ref 0–0.5)
LDLC SERPL CALC-MCNC: 107 MG/DL (ref 0–100)
LYMPHOCYTES # BLD AUTO: 2 10*3/MM3 (ref 0.7–3.1)
LYMPHOCYTES NFR BLD AUTO: 24.5 % (ref 19.6–45.3)
MCH RBC QN AUTO: 33 PG (ref 26.6–33)
MCHC RBC AUTO-ENTMCNC: 33.7 G/DL (ref 31.5–35.7)
MCV RBC AUTO: 97.9 FL (ref 79–97)
MONOCYTES # BLD AUTO: 0.59 10*3/MM3 (ref 0.1–0.9)
MONOCYTES NFR BLD AUTO: 7.2 % (ref 5–12)
NEUTROPHILS # BLD AUTO: 5.39 10*3/MM3 (ref 1.7–7)
NEUTROPHILS NFR BLD AUTO: 66.2 % (ref 42.7–76)
NRBC BLD AUTO-RTO: 0 /100 WBC (ref 0–0.2)
PLATELET # BLD AUTO: 216 10*3/MM3 (ref 140–450)
POTASSIUM SERPL-SCNC: 4.2 MMOL/L (ref 3.5–5.2)
PROT SERPL-MCNC: 6.4 G/DL (ref 6–8.5)
RBC # BLD AUTO: 3.88 10*6/MM3 (ref 3.77–5.28)
SODIUM SERPL-SCNC: 141 MMOL/L (ref 136–145)
TRIGL SERPL-MCNC: 107 MG/DL (ref 0–150)
TSH SERPL DL<=0.005 MIU/L-ACNC: 2.04 UIU/ML (ref 0.27–4.2)
VLDLC SERPL CALC-MCNC: 21.4 MG/DL
WBC # BLD AUTO: 8.15 10*3/MM3 (ref 3.4–10.8)

## 2020-05-26 PROCEDURE — G0439 PPPS, SUBSEQ VISIT: HCPCS | Performed by: INTERNAL MEDICINE

## 2020-05-26 RX ORDER — ALPRAZOLAM 0.25 MG/1
0.25 TABLET ORAL NIGHTLY PRN
Qty: 30 TABLET | Refills: 2 | Status: SHIPPED | OUTPATIENT
Start: 2020-05-26 | End: 2021-06-01 | Stop reason: SDUPTHER

## 2020-05-26 RX ORDER — CETIRIZINE HYDROCHLORIDE 10 MG/1
10 TABLET ORAL NIGHTLY
COMMUNITY

## 2020-05-26 RX ORDER — LEVOTHYROXINE SODIUM 0.1 MG/1
100 TABLET ORAL DAILY
Qty: 90 TABLET | Refills: 0 | Status: SHIPPED | OUTPATIENT
Start: 2020-05-26 | End: 2020-08-25

## 2020-05-26 RX ORDER — OMEPRAZOLE 20 MG/1
20 CAPSULE, DELAYED RELEASE ORAL 2 TIMES DAILY
Qty: 180 CAPSULE | Refills: 0 | Status: SHIPPED | OUTPATIENT
Start: 2020-05-26 | End: 2020-11-25 | Stop reason: SDUPTHER

## 2020-05-26 RX ORDER — FLUTICASONE PROPIONATE 50 MCG
2 SPRAY, SUSPENSION (ML) NASAL DAILY
Qty: 48 G | Refills: 5 | Status: SHIPPED | OUTPATIENT
Start: 2020-05-26 | End: 2021-08-19 | Stop reason: SDUPTHER

## 2020-05-26 NOTE — PROGRESS NOTES
The ABCs of the Annual Wellness Visit  Subsequent Medicare Wellness Visit    Chief Complaint   Patient presents with   • Medicare Wellness-subsequent       Subjective   History of Present Illness:  Yamileth De Guzman is a 74 y.o. female who presents for a Subsequent Medicare Wellness Visit.    HEALTH RISK ASSESSMENT    Recent Hospitalizations:  No hospitalization(s) within the last year.    Current Medical Providers:  Patient Care Team:  Da Dong MD as PCP - General (Internal Medicine)  Da Dong MD as PCP - Family Medicine  Da Dong MD as PCP - Claims Attributed    Smoking Status:  Social History     Tobacco Use   Smoking Status Current Every Day Smoker   • Packs/day: 1.00   • Types: Cigarettes   Smokeless Tobacco Never Used       Alcohol Consumption:  Social History     Substance and Sexual Activity   Alcohol Use No       Depression Screen:   PHQ-2/PHQ-9 Depression Screening 5/26/2020   Little interest or pleasure in doing things 0   Feeling down, depressed, or hopeless 0   Trouble falling or staying asleep, or sleeping too much -   Feeling tired or having little energy -   Poor appetite or overeating -   Feeling bad about yourself - or that you are a failure or have let yourself or your family down -   Trouble concentrating on things, such as reading the newspaper or watching television -   Moving or speaking so slowly that other people could have noticed. Or the opposite - being so fidgety or restless that you have been moving around a lot more than usual -   Thoughts that you would be better off dead, or of hurting yourself in some way -   Total Score 0   If you checked off any problems, how difficult have these problems made it for you to do your work, take care of things at home, or get along with other people? -       Fall Risk Screen:  STEADI Fall Risk Assessment was completed, and patient is at LOW risk for falls.Assessment completed on:5/26/2020    Health Habits and Functional and  Cognitive Screening:  Functional & Cognitive Status 5/26/2020   Do you have difficulty preparing food and eating? No   Do you have difficulty bathing yourself, getting dressed or grooming yourself? No   Do you have difficulty using the toilet? No   Do you have difficulty moving around from place to place? No   Do you have trouble with steps or getting out of a bed or a chair? No   Current Diet Well Balanced Diet   Dental Exam Up to date   Eye Exam Up to date   Exercise (times per week) 0 times per week   Current Exercise Activities Include Yard Work   Do you need help using the phone?  No   Are you deaf or do you have serious difficulty hearing?  No   Do you need help with transportation? No   Do you need help shopping? No   Do you need help preparing meals?  No   Do you need help with housework?  No   Do you need help with laundry? No   Do you need help taking your medications? No   Do you need help managing money? No   Do you ever drive or ride in a car without wearing a seat belt? No   Have you felt unusual stress, anger or loneliness in the last month? No   Who do you live with? Spouse   If you need help, do you have trouble finding someone available to you? No   Have you been bothered in the last four weeks by sexual problems? No   Do you have difficulty concentrating, remembering or making decisions? No         Does the patient have evidence of cognitive impairment? No    Asprin use counseling:Start ASA 81 mg daily     Age-appropriate Screening Schedule:  Refer to the list below for future screening recommendations based on patient's age, sex and/or medical conditions. Orders for these recommended tests are listed in the plan section. The patient has been provided with a written plan.    Health Maintenance   Topic Date Due   • ZOSTER VACCINE (2 of 2) 06/11/2015   • DXA SCAN  05/30/2020   • INFLUENZA VACCINE  08/01/2020   • LIPID PANEL  08/08/2020   • COLONOSCOPY  04/06/2021   • MAMMOGRAM  09/16/2021   •  TDAP/TD VACCINES (3 - Td) 04/15/2025          The following portions of the patient's history were reviewed and updated as appropriate: allergies, current medications, past family history, past medical history, past social history, past surgical history and problem list.    Outpatient Medications Prior to Visit   Medication Sig Dispense Refill   • calcium (OS-STIVEN) 600 MG tablet Take  by mouth 2 (Two) Times a Day.     • cetirizine (zyrTEC) 10 MG tablet Take 10 mg by mouth Daily.     • denosumab (PROLIA) 60 MG/ML solution syringe Inject  under the skin into the appropriate area as directed Every 6 (Six) Months.     • Garlic 400 MG tablet Take  by mouth.     • Omega-3 Fatty Acids (FISH OIL) 1000 MG capsule capsule Take  by mouth.     • ALPRAZolam (XANAX) 0.25 MG tablet Take 1 tablet by mouth At Night As Needed for Anxiety. 30 tablet 2   • fluticasone (FLONASE) 50 MCG/ACT nasal spray USE 2 SPRAY(S) IN EACH NOSTRIL ONCE DAILY AS DIRECTED 48 g 5   • levothyroxine (SYNTHROID, LEVOTHROID) 100 MCG tablet Take 1 tablet by mouth once daily 90 tablet 0   • omeprazole (priLOSEC) 20 MG capsule Take 1 capsule by mouth twice daily 180 capsule 0   • amoxicillin-clavulanate (AUGMENTIN) 875-125 MG per tablet Take 1 tablet by mouth 2 (Two) Times a Day. 14 tablet 0   • fexofenadine (ALLEGRA) 180 MG tablet Take 1 tablet by mouth Daily. 30 tablet 3     No facility-administered medications prior to visit.        Patient Active Problem List   Diagnosis   • RA (rheumatoid arthritis) (CMS/[facility])   • Esophageal reflux   • Fibromyalgia   • Generalized anxiety disorder   • Hyperlipidemia   • Hypothyroidism   • Vitamin D deficiency   • IBS (irritable bowel syndrome)   • Current every day smoker   • Senile osteoporosis   • Bruit of left carotid artery       Advanced Care Planning:  ACP discussion was held with the patient during this visit. Patient has an advance directive in EMR which is still valid.     Review of Systems   Constitutional:  "Negative.    HENT: Negative.    Eyes: Negative.    Respiratory: Negative.    Cardiovascular: Negative.    Gastrointestinal: Negative.    Endocrine: Negative.    Genitourinary: Negative.    Musculoskeletal: Negative.    Skin: Negative.    Allergic/Immunologic: Negative.    Neurological: Negative.    Hematological: Negative.    Psychiatric/Behavioral: Negative.        Compared to one year ago, the patient feels her physical health is the same.  Compared to one year ago, the patient feels her mental health is the same.    Reviewed chart for potential of high risk medication in the elderly: yes  Reviewed chart for potential of harmful drug interactions in the elderly:no    Objective         Vitals:    05/26/20 0819   BP: 127/68   Pulse: 68   Temp: 98.2 °F (36.8 °C)   TempSrc: Temporal   SpO2: 98%   Weight: 66.1 kg (145 lb 12.8 oz)   Height: 168.9 cm (66.5\")   PainSc: 0-No pain       Body mass index is 23.18 kg/m².  Discussed the patient's BMI with her. The BMI is in the acceptable range.    Physical Exam   Constitutional: She is oriented to person, place, and time. She appears well-developed and well-nourished.   Neck: Neck supple.   Cardiovascular: Normal rate, regular rhythm and normal heart sounds.   Pulmonary/Chest: Effort normal and breath sounds normal.   Abdominal: Soft. Bowel sounds are normal.   Neurological: She is alert and oriented to person, place, and time.   Psychiatric: She has a normal mood and affect. Her behavior is normal.             Assessment/Plan   Medicare Risks and Personalized Health Plan  CMS Preventative Services Quick Reference  Advance Directive Discussion  Inactivity/Sedentary    The above risks/problems have been discussed with the patient.  Pertinent information has been shared with the patient in the After Visit Summary.  Follow up plans and orders are seen below in the Assessment/Plan Section.    Diagnoses and all orders for this visit:    1. Bruit of left carotid artery " (Primary)    2. Hypothyroidism, unspecified type  -     levothyroxine (SYNTHROID, LEVOTHROID) 100 MCG tablet; Take 1 tablet by mouth Daily.  Dispense: 90 tablet; Refill: 0  -     TSH    3. Hyperlipidemia, unspecified hyperlipidemia type  -     Lipid Panel    4. Gastroesophageal reflux disease without esophagitis    5. Vitamin D deficiency  -     Vitamin D 25 Hydroxy    6. Irritable bowel syndrome without diarrhea    7. Fibromyalgia    8. Rheumatoid arthritis, involving unspecified site, unspecified rheumatoid factor presence (CMS/[facility])  -     CBC & Differential  -     Comprehensive Metabolic Panel  -     C-reactive Protein  -     Sedimentation Rate    9. Senile osteoporosis    10. Generalized anxiety disorder  -     ALPRAZolam (XANAX) 0.25 MG tablet; Take 1 tablet by mouth At Night As Needed for Anxiety.  Dispense: 30 tablet; Refill: 2    11. Current every day smoker    Other orders  -     omeprazole (priLOSEC) 20 MG capsule; Take 1 capsule by mouth 2 (Two) Times a Day.  Dispense: 180 capsule; Refill: 0  -     fluticasone (FLONASE) 50 MCG/ACT nasal spray; 2 sprays by Each Nare route Daily.  Dispense: 48 g; Refill: 5      Follow Up:  No follow-ups on file.     An After Visit Summary and PPPS were given to the patient.           anxiety stable on xanax. Continue prn  Constipation continue MiraLAX  IBS no abd pain stable on IBS OTC medicine history EGD 2011 gastritis and hh, asa d/c  Insomnia stable on amitriptyline. Continue  Hypothryoidism. Continue medication  do lab  lung nodule follow up RML soft tissue nodule repeat no more,   Bone density scan showed this osteoporosis continue Calcium 1,200mg and vit D3 hold for now  osteoporosis on reclast  oscal D  hematuria history of it. seen by urologist long ago. 4/17/14 micro showed trace blood. micro negative, no urine frequency.  vaccination: zostavax done Td 2015 refuse pneumovax,refuse prevnar, pending shingrix, refuse hepA shot  colon 2011 hemorroid, patient declines  now, patient will call if ready--  left carotid bruit, 0-49%  HLD diet, patient refuses medicine  tob to quit, low dose ct next       6 weeks after lab

## 2020-05-27 LAB
CRP SERPL-MCNC: 0.05 MG/DL (ref 0–0.5)
ERYTHROCYTE [SEDIMENTATION RATE] IN BLOOD BY WESTERGREN METHOD: 8 MM/HR (ref 0–30)

## 2020-07-09 ENCOUNTER — TRANSCRIBE ORDERS (OUTPATIENT)
Dept: ADMINISTRATIVE | Facility: HOSPITAL | Age: 75
End: 2020-07-09

## 2020-07-09 DIAGNOSIS — Z12.31 VISIT FOR SCREENING MAMMOGRAM: Primary | ICD-10-CM

## 2020-07-10 ENCOUNTER — OFFICE VISIT (OUTPATIENT)
Dept: INTERNAL MEDICINE | Facility: CLINIC | Age: 75
End: 2020-07-10

## 2020-07-10 VITALS
SYSTOLIC BLOOD PRESSURE: 110 MMHG | RESPIRATION RATE: 16 BRPM | TEMPERATURE: 96.9 F | BODY MASS INDEX: 22.6 KG/M2 | WEIGHT: 144 LBS | DIASTOLIC BLOOD PRESSURE: 54 MMHG | HEART RATE: 71 BPM | HEIGHT: 67 IN | OXYGEN SATURATION: 99 %

## 2020-07-10 DIAGNOSIS — R10.32 LEFT LOWER QUADRANT ABDOMINAL PAIN: ICD-10-CM

## 2020-07-10 DIAGNOSIS — K21.9 GASTROESOPHAGEAL REFLUX DISEASE WITHOUT ESOPHAGITIS: ICD-10-CM

## 2020-07-10 DIAGNOSIS — M06.9 RHEUMATOID ARTHRITIS, INVOLVING UNSPECIFIED SITE, UNSPECIFIED RHEUMATOID FACTOR PRESENCE: ICD-10-CM

## 2020-07-10 DIAGNOSIS — K58.9 IRRITABLE BOWEL SYNDROME WITHOUT DIARRHEA: ICD-10-CM

## 2020-07-10 DIAGNOSIS — E78.5 HYPERLIPIDEMIA, UNSPECIFIED HYPERLIPIDEMIA TYPE: Primary | ICD-10-CM

## 2020-07-10 DIAGNOSIS — F41.1 GENERALIZED ANXIETY DISORDER: ICD-10-CM

## 2020-07-10 DIAGNOSIS — E03.9 HYPOTHYROIDISM, UNSPECIFIED TYPE: ICD-10-CM

## 2020-07-10 DIAGNOSIS — M79.7 FIBROMYALGIA: ICD-10-CM

## 2020-07-10 PROCEDURE — 99214 OFFICE O/P EST MOD 30 MIN: CPT | Performed by: INTERNAL MEDICINE

## 2020-07-10 NOTE — PROGRESS NOTES
Subjective   Yamileth De Guzman is a 74 y.o. female.     Chief Complaint   Patient presents with   • Abdominal Pain     patient wanting to discuss getting a colonoscopy       History of Present Illness   Left lower abd pain 1 mo off and on , gas pain , BM no help, no n/v/d, has constipation. Normal appetitie, hx of partial hysterectomy. Vitamin D stable hyperlipidemia stable on medication.  The rheumatoid arthritis and lab work are negative.  Osteoporosis on medication stable.  Anxiety stable patient still smokes.  IBS is stable now.  Constipation still has.    Current Outpatient Medications:   •  ALPRAZolam (XANAX) 0.25 MG tablet, Take 1 tablet by mouth At Night As Needed for Anxiety., Disp: 30 tablet, Rfl: 2  •  calcium (OS-STIVEN) 600 MG tablet, Take  by mouth 2 (Two) Times a Day., Disp: , Rfl:   •  cetirizine (zyrTEC) 10 MG tablet, Take 10 mg by mouth Daily., Disp: , Rfl:   •  denosumab (PROLIA) 60 MG/ML solution syringe, Inject  under the skin into the appropriate area as directed Every 6 (Six) Months., Disp: , Rfl:   •  fluticasone (FLONASE) 50 MCG/ACT nasal spray, 2 sprays by Each Nare route Daily., Disp: 48 g, Rfl: 5  •  Garlic 400 MG tablet, Take  by mouth., Disp: , Rfl:   •  levothyroxine (SYNTHROID, LEVOTHROID) 100 MCG tablet, Take 1 tablet by mouth Daily., Disp: 90 tablet, Rfl: 0  •  Omega-3 Fatty Acids (FISH OIL) 1000 MG capsule capsule, Take  by mouth., Disp: , Rfl:   •  omeprazole (priLOSEC) 20 MG capsule, Take 1 capsule by mouth 2 (Two) Times a Day., Disp: 180 capsule, Rfl: 0    The following portions of the patient's history were reviewed and updated as appropriate: allergies, current medications, past family history, past medical history, past social history, past surgical history and problem list.    Review of Systems   Constitutional: Negative.    Respiratory: Negative.    Cardiovascular: Negative.    Gastrointestinal: Positive for abdominal pain.   Musculoskeletal: Negative.    Skin: Negative.     Neurological: Negative.    Psychiatric/Behavioral: Negative.        Objective   Physical Exam   Constitutional: She is oriented to person, place, and time. She appears well-nourished.   Neck: Neck supple.   Cardiovascular: Normal rate, regular rhythm and normal heart sounds.   Pulmonary/Chest: Effort normal and breath sounds normal.   Abdominal: Bowel sounds are normal.   No abdominal tenderness today   Neurological: She is alert and oriented to person, place, and time.   Skin: Skin is warm.   Psychiatric: She has a normal mood and affect.       All tests have been reviewed.    Assessment/Plan   There are no diagnoses linked to this encounter.          1. Bruit of left carotid artery (Primary)    Left lower abdominal pain we will do ultrasound abdomen.  Also colonoscopy patient is due for colon cancer screening test.? Due to constipation , start miralax and fiber     2. Hypothyroidism, cont med  3. Hyperlipidemia,cont med     4. Gastroesophageal reflux disease without esophagitis     5. Vitamin D deficiency  -     Vitamin D 25 Hydroxy     6. Irritable bowel syndrome without diarrhea stable     7. Fibromyalgia     8. Rheumatoid arthritis, involving unspecified site, unspecified rheumatoid factor presence (CMS/[facility]) normal labs     9. Senile osteoporosis cont med     10. Generalized anxiety disorder cont med     11. Current every day smoker to quit          Follow Up:  No follow-ups on file.      An After Visit Summary and PPPS were given to the patient.         Below is to historical records for reference only:  anxiety stable on xanax. Continue prn  Constipation continue MiraLAX  IBS no abd pain stable on IBS OTC medicine history EGD 2011 gastritis and hh, asa d/c  Insomnia stable on amitriptyline. Continue  Hypothryoidism. Continue medication    lung nodule follow up RML soft tissue nodule repeat no more,   Bone density scan showed this osteoporosis continue Calcium 1,200mg and vit D3 hold for now  osteoporosis on  reclast  oscal D  hematuria history of it. seen by urologist long ago. 4/17/14 micro showed trace blood. micro negative, no urine frequency.  vaccination: zostavax done Td 2015 refuse pneumovax,refuse prevnar, pending shingrix, refuse hepA shot  colon 2011 hemorroid, patient declines now, patient will call if ready--  left carotid bruit, 0-49%  HLD diet, patient refuses medicine  tob to quit, low dose ct next

## 2020-07-17 ENCOUNTER — HOSPITAL ENCOUNTER (OUTPATIENT)
Dept: ULTRASOUND IMAGING | Facility: HOSPITAL | Age: 75
Discharge: HOME | End: 2020-07-17
Admitting: INTERNAL MEDICINE

## 2020-07-17 PROCEDURE — 76700 US EXAM ABDOM COMPLETE: CPT

## 2020-07-21 ENCOUNTER — OFFICE VISIT (OUTPATIENT)
Dept: GASTROENTEROLOGY | Facility: CLINIC | Age: 75
End: 2020-07-21

## 2020-07-21 VITALS
RESPIRATION RATE: 16 BRPM | TEMPERATURE: 97.7 F | HEIGHT: 67 IN | BODY MASS INDEX: 22.6 KG/M2 | SYSTOLIC BLOOD PRESSURE: 95 MMHG | HEART RATE: 58 BPM | DIASTOLIC BLOOD PRESSURE: 51 MMHG | WEIGHT: 144 LBS

## 2020-07-21 DIAGNOSIS — R12 HEARTBURN: Chronic | ICD-10-CM

## 2020-07-21 DIAGNOSIS — Z01.812 PRE-PROCEDURE LAB EXAM: ICD-10-CM

## 2020-07-21 DIAGNOSIS — K59.00 CONSTIPATION, UNSPECIFIED CONSTIPATION TYPE: Chronic | ICD-10-CM

## 2020-07-21 DIAGNOSIS — R10.32 LEFT LOWER QUADRANT ABDOMINAL PAIN: Chronic | ICD-10-CM

## 2020-07-21 DIAGNOSIS — R14.1 GAS PAIN: Chronic | ICD-10-CM

## 2020-07-21 DIAGNOSIS — K57.92 DIVERTICULITIS: Primary | ICD-10-CM

## 2020-07-21 PROBLEM — R91.8 LUNG MASS: Status: ACTIVE | Noted: 2020-07-21

## 2020-07-21 PROBLEM — R14.0 ABDOMINAL BLOATING: Status: ACTIVE | Noted: 2020-07-21

## 2020-07-21 PROBLEM — R31.9 HEMATURIA: Status: ACTIVE | Noted: 2020-07-21

## 2020-07-21 PROBLEM — K21.00 GASTROESOPHAGEAL REFLUX DISEASE WITH ESOPHAGITIS: Status: ACTIVE | Noted: 2020-07-21

## 2020-07-21 PROCEDURE — 99204 OFFICE O/P NEW MOD 45 MIN: CPT | Performed by: NURSE PRACTITIONER

## 2020-07-21 RX ORDER — METRONIDAZOLE 250 MG/1
TABLET ORAL
Qty: 28 TABLET | Refills: 0 | Status: SHIPPED | OUTPATIENT
Start: 2020-07-21 | End: 2020-08-17 | Stop reason: HOSPADM

## 2020-07-21 RX ORDER — MULTIVIT WITH MINERALS/LUTEIN
250 TABLET ORAL DAILY
COMMUNITY

## 2020-07-21 RX ORDER — SODIUM CHLORIDE 9 MG/ML
70 INJECTION, SOLUTION INTRAVENOUS CONTINUOUS PRN
Status: CANCELLED | OUTPATIENT
Start: 2020-08-17

## 2020-07-21 RX ORDER — CIPROFLOXACIN 500 MG/1
TABLET, FILM COATED ORAL
Qty: 14 TABLET | Refills: 0 | Status: SHIPPED | OUTPATIENT
Start: 2020-07-21 | End: 2020-08-17 | Stop reason: HOSPADM

## 2020-07-21 RX ORDER — BISACODYL 5 MG/1
TABLET, DELAYED RELEASE ORAL
Qty: 4 TABLET | Refills: 0 | Status: SHIPPED | OUTPATIENT
Start: 2020-07-21 | End: 2020-08-21

## 2020-07-21 NOTE — PATIENT INSTRUCTIONS
1. Antireflux measures: Avoid fried, fatty foods, alcohol, chocolate, coffee, tea,  soft drinks, peppermint and spearmint, spicy foods, tomatoes and tomato based foods, onion based foods, and smoking. Other antireflux measures include weight reduction if overweight, avoiding tight clothing around the abdomen, elevating the head of the bed 6 inches with blocks under the head board, and don't drink or eat before going to bed and avoid lying down immediately after meals.  2. Omeprazole 20 mg 1 po daily in the am 30 minutes before breakfast.  3. Recommended to take Levothyroxine first in the am upon waking, wait 30 minutes, then take Omeprazole 20 mg, wait 30 minutes, then eat breakfast and take other medications.  4. Treatment for diverticulitis:  A. Low-fat low fiber diet for 5 days thereafter low-fat high-fiber diet.   B. Cipro (ciprofloxacin) tablets 500 mg. Take 1 tablet by mouth twice a day for 7 days. Side effects were discussed.   C. Flagyl (metronidazole) tablets 250 mg. Take 1 tablet one by mouth 4 times a day for 7 days. Side effects were discussed.  D. Avoid laxatives, enemas for next 5 days. However, for constipation the patient may use stool softeners.  E. Patient may take probiotics while taking antibiotics, and continue for an additional 1-2 weeks.  F. Colonoscopy: Description of the procedure, risks, benefits, alternatives and options, including nonoperative options, were discussed with the patient in detail. The patient understands and wishes to proceed, although it may be advisable to wait 3-4 weeks to schedule procedure.  G. COVID-19 testing prior to procedure. The patient will need to self-quarantine after testing until the procedure. Instructions given to patient.

## 2020-07-21 NOTE — PROGRESS NOTES
New Patient Consult      Date: 2020   Patient Name: Yamileth De Guzman  MRN: [id number]  : 1945     Primary Care Provider: Da Dong MD    Chief Complaint   Patient presents with   • Constipation   • Gas     History of Present Illness: Yamileth De Guzman is a 74 y.o. female who is here today to establish care with Gastroenterology for constipation and gas.    There is a long standing history of constipation. The patient has 1 firm bowel movement every 3-4 days. She frequently has gas and bloating. For the past 2-3 months, constipation and gas have worsened, and she has been having pain in the lower abdomen. The pain comes and goes, and is a moderate, sharp pain. No history of fevers or chills, nausea or vomiting.    The patient is taking Omeprazole 20mg with reasonable control of heartburn. Reflux is worse at night with lying down. Heartburn is moderate. The patient denies difficulty swallowing.    Last EGD was in .  Last colonoscopy was in . She has a history of polyps in the past. There is no family history of colon cancer.    Subjective      Past Medical History:   Diagnosis Date   • Abdominal bloating    • Acid reflux    • Arthritis    • Carotid bruit    • Colon polyp    • Disease of thyroid gland    • Fibromyalgia    • Hematuria    • Lung nodule    • Osteoporosis    • Sinusitis      Past Surgical History:   Procedure Laterality Date   • APPENDECTOMY     • COLONOSCOPY     • HYSTERECTOMY     • UPPER GASTROINTESTINAL ENDOSCOPY       Family History   Problem Relation Age of Onset   • Hearing loss Father    • Arthritis Father    • Cancer Father    • GI problems Father    • Breast cancer Sister    • Colon cancer Neg Hx      Social History     Socioeconomic History   • Marital status:      Spouse name: Not on file   • Number of children: Not on file   • Years of education: Not on file   • Highest education level: Not on file   Tobacco Use   • Smoking status: Current Every  Day Smoker     Packs/day: 1.00     Types: Cigarettes   • Smokeless tobacco: Never Used   Substance and Sexual Activity   • Alcohol use: Yes     Comment: moderate   • Drug use: No   • Sexual activity: Defer       Current Outpatient Medications:   •  ALPRAZolam (XANAX) 0.25 MG tablet, Take 1 tablet by mouth At Night As Needed for Anxiety., Disp: 30 tablet, Rfl: 2  •  calcium (OS-STIVEN) 600 MG tablet, Take  by mouth 2 (Two) Times a Day., Disp: , Rfl:   •  cetirizine (zyrTEC) 10 MG tablet, Take 10 mg by mouth Daily., Disp: , Rfl:   •  denosumab (PROLIA) 60 MG/ML solution syringe, Inject  under the skin into the appropriate area as directed Every 6 (Six) Months., Disp: , Rfl:   •  fluticasone (FLONASE) 50 MCG/ACT nasal spray, 2 sprays by Each Nare route Daily., Disp: 48 g, Rfl: 5  •  Garlic 400 MG tablet, Take  by mouth., Disp: , Rfl:   •  levothyroxine (SYNTHROID, LEVOTHROID) 100 MCG tablet, Take 1 tablet by mouth Daily., Disp: 90 tablet, Rfl: 0  •  Multiple Vitamin (MULTI VITAMIN DAILY PO), Take  by mouth., Disp: , Rfl:   •  Omega-3 Fatty Acids (FISH OIL) 1000 MG capsule capsule, Take  by mouth., Disp: , Rfl:   •  omeprazole (priLOSEC) 20 MG capsule, Take 1 capsule by mouth 2 (Two) Times a Day., Disp: 180 capsule, Rfl: 0  •  vitamin C (ASCORBIC ACID) 250 MG tablet, Take 250 mg by mouth Daily., Disp: , Rfl:   •  bisacodyl (DULCOLAX) 5 MG EC tablet, Take as directed for colon prep, Disp: 4 tablet, Rfl: 0  •  ciprofloxacin (Cipro) 500 MG tablet, Take 1 tablet twice a day x 7 days, Disp: 14 tablet, Rfl: 0  •  metroNIDAZOLE (FLAGYL) 250 MG tablet, Take 1 tablet four times daily x 7 days, Disp: 28 tablet, Rfl: 0  •  polyethylene glycol (GoLYTELY) 236 g solution, Starting at 5 pm on day prior to procedure, drink 8 ounces every 30 minutes as directed, Disp: 4000 mL, Rfl: 0  •  Probiotic capsule, Take 1 capsule by mouth Daily., Disp: 30 capsule, Rfl: 1    No Known Allergies    Review of Systems   Constitutional: Negative for  appetite change and unexpected weight change.   HENT: Negative for tinnitus.    Gastrointestinal: Positive for abdominal distention and constipation. Negative for abdominal pain, anal bleeding, blood in stool, diarrhea, nausea and vomiting.     The following portions of the patient's history were reviewed and updated as appropriate: allergies, current medications, past family history, past medical history, past social history, past surgical history and problem list.    Objective     Physical Exam   Constitutional: She is oriented to person, place, and time. She appears well-developed and well-nourished. No distress.   HENT:   Head: Normocephalic and atraumatic.   Right Ear: Hearing and external ear normal.   Left Ear: Hearing and external ear normal.   Nose: Nose normal.   Mouth/Throat: Oropharynx is clear and moist and mucous membranes are normal. Mucous membranes are not pale, not dry and not cyanotic. No oral lesions. No oropharyngeal exudate.   Eyes: Conjunctivae and EOM are normal. Right eye exhibits no discharge. Left eye exhibits no discharge.   Neck: Trachea normal. Neck supple. No JVD present. No edema present. No thyroid mass and no thyromegaly present.   Cardiovascular: Normal rate, regular rhythm, S2 normal and normal heart sounds. Exam reveals no gallop, no S3 and no friction rub.   No murmur heard.  Pulmonary/Chest: Effort normal and breath sounds normal. No respiratory distress. She exhibits no tenderness.   Abdominal: Normal appearance and bowel sounds are normal. She exhibits no distension, no ascites and no mass. There is no splenomegaly or hepatomegaly. There is tenderness (moderate) in the left lower quadrant. There is no rigidity, no rebound and no guarding. No hernia.     Vascular Status -  Her right foot exhibits no edema. Her left foot exhibits no edema.  Lymphadenopathy:     She has no cervical adenopathy.        Left: No supraclavicular adenopathy present.   Neurological: She is alert and  "oriented to person, place, and time. She has normal strength. No cranial nerve deficit or sensory deficit.   Skin: No rash noted. She is not diaphoretic. No cyanosis. No pallor. Nails show no clubbing.   Psychiatric: She has a normal mood and affect.   Nursing note and vitals reviewed.    Vital Signs:   Vitals:    07/21/20 0919   BP: 95/51   Pulse: 58   Resp: 16   Temp: 97.7 °F (36.5 °C)   Weight: 65.3 kg (144 lb)   Height: 168.9 cm (66.5\")     Results Review:   I have reviewed the patient's new clinical and imaging results.    Office Visit on 05/26/2020   Component Date Value Ref Range Status   • WBC 05/26/2020 8.15  3.40 - 10.80 10*3/mm3 Final   • RBC 05/26/2020 3.88  3.77 - 5.28 10*6/mm3 Final   • Hemoglobin 05/26/2020 12.8  12.0 - 15.9 g/dL Final   • Hematocrit 05/26/2020 38.0  34.0 - 46.6 % Final   • MCV 05/26/2020 97.9* 79.0 - 97.0 fL Final   • MCH 05/26/2020 33.0  26.6 - 33.0 pg Final   • MCHC 05/26/2020 33.7  31.5 - 35.7 g/dL Final   • RDW 05/26/2020 12.4  12.3 - 15.4 % Final   • Platelets 05/26/2020 216  140 - 450 10*3/mm3 Final   • Neutrophil Rel % 05/26/2020 66.2  42.7 - 76.0 % Final   • Lymphocyte Rel % 05/26/2020 24.5  19.6 - 45.3 % Final   • Monocyte Rel % 05/26/2020 7.2  5.0 - 12.0 % Final   • Eosinophil Rel % 05/26/2020 1.2  0.3 - 6.2 % Final   • Basophil Rel % 05/26/2020 0.5  0.0 - 1.5 % Final   • Neutrophils Absolute 05/26/2020 5.39  1.70 - 7.00 10*3/mm3 Final   • Lymphocytes Absolute 05/26/2020 2.00  0.70 - 3.10 10*3/mm3 Final   • Monocytes Absolute 05/26/2020 0.59  0.10 - 0.90 10*3/mm3 Final   • Eosinophils Absolute 05/26/2020 0.10  0.00 - 0.40 10*3/mm3 Final   • Basophils Absolute 05/26/2020 0.04  0.00 - 0.20 10*3/mm3 Final   • Immature Granulocyte Rel % 05/26/2020 0.4  0.0 - 0.5 % Final   • Immature Grans Absolute 05/26/2020 0.03  0.00 - 0.05 10*3/mm3 Final   • nRBC 05/26/2020 0.0  0.0 - 0.2 /100 WBC Final   • Glucose 05/26/2020 93  65 - 99 mg/dL Final   • BUN 05/26/2020 18  8 - 23 mg/dL Final  "   • Creatinine 05/26/2020 0.80  0.57 - 1.00 mg/dL Final   • eGFR Non African Am 05/26/2020 71  >60 mL/min/1.73 Final    Comment: The MDRD GFR formula is only valid for adults with stable  renal function between ages 18 and 70.     • eGFR  Am 05/26/2020 85  >60 mL/min/1.73 Final   • BUN/Creatinine Ratio 05/26/2020 22.5  7.0 - 25.0 Final   • Sodium 05/26/2020 141  136 - 145 mmol/L Final   • Potassium 05/26/2020 4.2  3.5 - 5.2 mmol/L Final   • Chloride 05/26/2020 105  98 - 107 mmol/L Final   • Total CO2 05/26/2020 27.1  22.0 - 29.0 mmol/L Final   • Calcium 05/26/2020 9.3  8.6 - 10.5 mg/dL Final   • Total Protein 05/26/2020 6.4  6.0 - 8.5 g/dL Final   • Albumin 05/26/2020 4.50  3.50 - 5.20 g/dL Final   • Globulin 05/26/2020 1.9  gm/dL Final   • A/G Ratio 05/26/2020 2.4  g/dL Final   • Total Bilirubin 05/26/2020 0.3  0.2 - 1.2 mg/dL Final   • Alkaline Phosphatase 05/26/2020 56  39 - 117 U/L Final   • AST (SGOT) 05/26/2020 21  1 - 32 U/L Final   • ALT (SGPT) 05/26/2020 17  1 - 33 U/L Final   • Total Cholesterol 05/26/2020 194  0 - 200 mg/dL Final   • Triglycerides 05/26/2020 107  0 - 150 mg/dL Final   • HDL Cholesterol 05/26/2020 66* 40 - 60 mg/dL Final   • VLDL Cholesterol 05/26/2020 21.4  mg/dL Final   • LDL Cholesterol  05/26/2020 107* 0 - 100 mg/dL Final   • TSH 05/26/2020 2.040  0.270 - 4.200 uIU/mL Final    Results may be falsely decreased if patient taking Biotin   • 25 Hydroxy, Vitamin D 05/26/2020 69.3  30.0 - 100.0 ng/ml Final    Comment: Results may be falsely increased if patient taking Biotin.  Reference Range for Total Vitamin D 25(OH)  Deficiency <20.0 ng/mL  Insufficiency 21-29 ng/mL  Sufficiency  ng/mL  Toxicity >100 ng/ml     • C-Reactive Protein 05/26/2020 0.05  0.00 - 0.50 mg/dL Final   • Sed Rate 05/26/2020 8  0 - 30 mm/hr Final      Assessment / Plan      1. Diverticulitis  Symptoms and exam consistent with diverticulitis.    - ciprofloxacin (Cipro) 500 MG tablet; Take 1 tablet twice a  day x 7 days  Dispense: 14 tablet; Refill: 0  - metroNIDAZOLE (FLAGYL) 250 MG tablet; Take 1 tablet four times daily x 7 days  Dispense: 28 tablet; Refill: 0  - Probiotic capsule; Take 1 capsule by mouth Daily.  Dispense: 30 capsule; Refill: 1  - Case Request; Standing  - Implement Anesthesia Orders Day of Procedure; Standing  - Obtain Informed Consent; Standing  - Verify Bowel Prep Was Successful; Standing  - Oxygen Therapy- Nasal Cannula; 2 LPM; Titrate for SPO2: equal to or greater than, 90%; Standing  - sodium chloride 0.9 % infusion  - Case Request  - polyethylene glycol (GoLYTELY) 236 g solution; Starting at 5 pm on day prior to procedure, drink 8 ounces every 30 minutes as directed  Dispense: 4000 mL; Refill: 0  - bisacodyl (DULCOLAX) 5 MG EC tablet; Take as directed for colon prep  Dispense: 4 tablet; Refill: 0    2. Left lower quadrant abdominal pain  Differentials include diverticulitis.    - Case Request; Standing  - Implement Anesthesia Orders Day of Procedure; Standing  - Obtain Informed Consent; Standing  - Verify Bowel Prep Was Successful; Standing  - Oxygen Therapy- Nasal Cannula; 2 LPM; Titrate for SPO2: equal to or greater than, 90%; Standing  - sodium chloride 0.9 % infusion  - Case Request  - polyethylene glycol (GoLYTELY) 236 g solution; Starting at 5 pm on day prior to procedure, drink 8 ounces every 30 minutes as directed  Dispense: 4000 mL; Refill: 0  - bisacodyl (DULCOLAX) 5 MG EC tablet; Take as directed for colon prep  Dispense: 4 tablet; Refill: 0    3. Constipation, unspecified constipation type  Long standing history of constipation with recent worsening. Differentials include diverticulitis.    - Case Request; Standing  - Implement Anesthesia Orders Day of Procedure; Standing  - Obtain Informed Consent; Standing  - Verify Bowel Prep Was Successful; Standing  - Oxygen Therapy- Nasal Cannula; 2 LPM; Titrate for SPO2: equal to or greater than, 90%; Standing  - sodium chloride 0.9 %  infusion  - Case Request  - polyethylene glycol (GoLYTELY) 236 g solution; Starting at 5 pm on day prior to procedure, drink 8 ounces every 30 minutes as directed  Dispense: 4000 mL; Refill: 0  - bisacodyl (DULCOLAX) 5 MG EC tablet; Take as directed for colon prep  Dispense: 4 tablet; Refill: 0    4. Gas pain  Likely secondary to constipation and diverticulitis.    - Case Request; Standing  - Implement Anesthesia Orders Day of Procedure; Standing  - Obtain Informed Consent; Standing  - Verify Bowel Prep Was Successful; Standing  - Oxygen Therapy- Nasal Cannula; 2 LPM; Titrate for SPO2: equal to or greater than, 90%; Standing  - sodium chloride 0.9 % infusion  - Case Request    5. Heartburn  Well controlled with Omeprazole.    6. Pre-procedure lab exam    - COVID PRE-OP / PRE-PROCEDURE SCREENING ORDER (NO ISOLATION) - Swab, Nasopharynx; Future    Patient Instructions   1. Antireflux measures: Avoid fried, fatty foods, alcohol, chocolate, coffee, tea,  soft drinks, peppermint and spearmint, spicy foods, tomatoes and tomato based foods, onion based foods, and smoking. Other antireflux measures include weight reduction if overweight, avoiding tight clothing around the abdomen, elevating the head of the bed 6 inches with blocks under the head board, and don't drink or eat before going to bed and avoid lying down immediately after meals.  2. Omeprazole 20 mg 1 po daily in the am 30 minutes before breakfast.  3. Recommended to take Levothyroxine first in the am upon waking, wait 30 minutes, then take Omeprazole 20 mg, wait 30 minutes, then eat breakfast and take other medications.  4. Treatment for diverticulitis:  A. Low-fat low fiber diet for 5 days thereafter low-fat high-fiber diet.   B. Cipro (ciprofloxacin) tablets 500 mg. Take 1 tablet by mouth twice a day for 7 days. Side effects were discussed.   C. Flagyl (metronidazole) tablets 250 mg. Take 1 tablet one by mouth 4 times a day for 7 days. Side effects were  discussed.  D. Avoid laxatives, enemas for next 5 days. However, for constipation the patient may use stool softeners.  E. Patient may take probiotics while taking antibiotics, and continue for an additional 1-2 weeks.  F. Colonoscopy: Description of the procedure, risks, benefits, alternatives and options, including nonoperative options, were discussed with the patient in detail. The patient understands and wishes to proceed, although it may be advisable to wait 3-4 weeks to schedule procedure.  G. COVID-19 testing prior to procedure. The patient will need to self-quarantine after testing until the procedure. Instructions given to patient.     Stew Grant, APRN  7/21/2020    Please note that portions of this note may have been completed with a voice recognition program. Efforts were made to edit the dictations, but occasionally words are mistranscribed.

## 2020-08-13 ENCOUNTER — LAB (OUTPATIENT)
Dept: LAB | Facility: HOSPITAL | Age: 75
End: 2020-08-13

## 2020-08-13 DIAGNOSIS — Z01.812 PRE-PROCEDURE LAB EXAM: ICD-10-CM

## 2020-08-13 PROCEDURE — U0004 COV-19 TEST NON-CDC HGH THRU: HCPCS

## 2020-08-13 PROCEDURE — C9803 HOPD COVID-19 SPEC COLLECT: HCPCS

## 2020-08-13 PROCEDURE — U0002 COVID-19 LAB TEST NON-CDC: HCPCS

## 2020-08-14 LAB
REF LAB TEST METHOD: NORMAL
SARS-COV-2 RNA RESP QL NAA+PROBE: NOT DETECTED

## 2020-08-17 ENCOUNTER — ANESTHESIA EVENT (OUTPATIENT)
Dept: GASTROENTEROLOGY | Facility: HOSPITAL | Age: 75
End: 2020-08-17

## 2020-08-17 ENCOUNTER — HOSPITAL ENCOUNTER (OUTPATIENT)
Facility: HOSPITAL | Age: 75
Setting detail: HOSPITAL OUTPATIENT SURGERY
Discharge: HOME | End: 2020-08-17
Attending: INTERNAL MEDICINE | Admitting: INTERNAL MEDICINE

## 2020-08-17 ENCOUNTER — ANESTHESIA (OUTPATIENT)
Dept: GASTROENTEROLOGY | Facility: HOSPITAL | Age: 75
End: 2020-08-17

## 2020-08-17 VITALS
TEMPERATURE: 97.6 F | BODY MASS INDEX: 23.3 KG/M2 | OXYGEN SATURATION: 98 % | DIASTOLIC BLOOD PRESSURE: 55 MMHG | WEIGHT: 145 LBS | HEIGHT: 66 IN | SYSTOLIC BLOOD PRESSURE: 132 MMHG | HEART RATE: 57 BPM | RESPIRATION RATE: 18 BRPM

## 2020-08-17 DIAGNOSIS — K59.00 CONSTIPATION, UNSPECIFIED CONSTIPATION TYPE: ICD-10-CM

## 2020-08-17 DIAGNOSIS — R14.1 GAS PAIN: ICD-10-CM

## 2020-08-17 DIAGNOSIS — K57.92 DIVERTICULITIS: ICD-10-CM

## 2020-08-17 DIAGNOSIS — R10.32 LEFT LOWER QUADRANT ABDOMINAL PAIN: ICD-10-CM

## 2020-08-17 PROCEDURE — 25010000002 PROPOFOL 10 MG/ML EMULSION: Performed by: NURSE ANESTHETIST, CERTIFIED REGISTERED

## 2020-08-17 PROCEDURE — 45390 COLONOSCOPY W/RESECTION: CPT | Performed by: INTERNAL MEDICINE

## 2020-08-17 PROCEDURE — 25010000002 MIDAZOLAM PER 1MG: Performed by: NURSE ANESTHETIST, CERTIFIED REGISTERED

## 2020-08-17 PROCEDURE — 45385 COLONOSCOPY W/LESION REMOVAL: CPT | Performed by: INTERNAL MEDICINE

## 2020-08-17 PROCEDURE — 25010000002 FENTANYL CITRATE (PF) 100 MCG/2ML SOLUTION: Performed by: NURSE ANESTHETIST, CERTIFIED REGISTERED

## 2020-08-17 PROCEDURE — 45381 COLONOSCOPY SUBMUCOUS NJX: CPT | Performed by: INTERNAL MEDICINE

## 2020-08-17 DEVICE — DEV CLIP GI QUICKCLIPPRO FIX ROT 2300MM: Type: IMPLANTABLE DEVICE | Site: ASCENDING COLON | Status: FUNCTIONAL

## 2020-08-17 RX ORDER — SODIUM CHLORIDE 9 MG/ML
70 INJECTION, SOLUTION INTRAVENOUS CONTINUOUS PRN
Status: DISCONTINUED | OUTPATIENT
Start: 2020-08-17 | End: 2020-08-17 | Stop reason: HOSPADM

## 2020-08-17 RX ORDER — FENTANYL CITRATE 50 UG/ML
INJECTION, SOLUTION INTRAMUSCULAR; INTRAVENOUS AS NEEDED
Status: DISCONTINUED | OUTPATIENT
Start: 2020-08-17 | End: 2020-08-17 | Stop reason: SURG

## 2020-08-17 RX ORDER — ONDANSETRON 2 MG/ML
4 INJECTION INTRAMUSCULAR; INTRAVENOUS ONCE
Status: CANCELLED | OUTPATIENT
Start: 2020-08-17 | End: 2020-08-17

## 2020-08-17 RX ORDER — SODIUM CHLORIDE 9 MG/ML
INJECTION, SOLUTION INTRAVENOUS CONTINUOUS PRN
Status: DISCONTINUED | OUTPATIENT
Start: 2020-08-17 | End: 2020-08-17 | Stop reason: SURG

## 2020-08-17 RX ORDER — PROPOFOL 10 MG/ML
VIAL (ML) INTRAVENOUS AS NEEDED
Status: DISCONTINUED | OUTPATIENT
Start: 2020-08-17 | End: 2020-08-17 | Stop reason: SURG

## 2020-08-17 RX ORDER — MIDAZOLAM HYDROCHLORIDE 2 MG/2ML
INJECTION, SOLUTION INTRAMUSCULAR; INTRAVENOUS AS NEEDED
Status: DISCONTINUED | OUTPATIENT
Start: 2020-08-17 | End: 2020-08-17 | Stop reason: SURG

## 2020-08-17 RX ORDER — KETAMINE HCL IN NACL, ISO-OSM 100MG/10ML
SYRINGE (ML) INJECTION AS NEEDED
Status: DISCONTINUED | OUTPATIENT
Start: 2020-08-17 | End: 2020-08-17 | Stop reason: SURG

## 2020-08-17 RX ADMIN — PROPOFOL 20 MG: 10 INJECTION, EMULSION INTRAVENOUS at 12:27

## 2020-08-17 RX ADMIN — FENTANYL CITRATE 50 MCG: 50 INJECTION, SOLUTION INTRAMUSCULAR; INTRAVENOUS at 11:36

## 2020-08-17 RX ADMIN — FENTANYL CITRATE 50 MCG: 50 INJECTION, SOLUTION INTRAMUSCULAR; INTRAVENOUS at 11:39

## 2020-08-17 RX ADMIN — PROPOFOL 20 MG: 10 INJECTION, EMULSION INTRAVENOUS at 11:45

## 2020-08-17 RX ADMIN — PROPOFOL 20 MG: 10 INJECTION, EMULSION INTRAVENOUS at 12:23

## 2020-08-17 RX ADMIN — PROPOFOL 20 MG: 10 INJECTION, EMULSION INTRAVENOUS at 12:12

## 2020-08-17 RX ADMIN — Medication 25 MG: at 11:36

## 2020-08-17 RX ADMIN — PROPOFOL 20 MG: 10 INJECTION, EMULSION INTRAVENOUS at 11:48

## 2020-08-17 RX ADMIN — PROPOFOL 20 MG: 10 INJECTION, EMULSION INTRAVENOUS at 11:36

## 2020-08-17 RX ADMIN — SODIUM CHLORIDE 70 ML/HR: 9 INJECTION, SOLUTION INTRAVENOUS at 11:00

## 2020-08-17 RX ADMIN — PROPOFOL 20 MG: 10 INJECTION, EMULSION INTRAVENOUS at 11:55

## 2020-08-17 RX ADMIN — MIDAZOLAM HYDROCHLORIDE 2 MG: 1 INJECTION, SOLUTION INTRAMUSCULAR; INTRAVENOUS at 11:34

## 2020-08-17 RX ADMIN — PROPOFOL 20 MG: 10 INJECTION, EMULSION INTRAVENOUS at 11:39

## 2020-08-17 RX ADMIN — PROPOFOL 20 MG: 10 INJECTION, EMULSION INTRAVENOUS at 12:19

## 2020-08-17 RX ADMIN — PROPOFOL 20 MG: 10 INJECTION, EMULSION INTRAVENOUS at 12:15

## 2020-08-17 RX ADMIN — PROPOFOL 20 MG: 10 INJECTION, EMULSION INTRAVENOUS at 11:51

## 2020-08-17 RX ADMIN — PROPOFOL 20 MG: 10 INJECTION, EMULSION INTRAVENOUS at 12:05

## 2020-08-17 RX ADMIN — PROPOFOL 20 MG: 10 INJECTION, EMULSION INTRAVENOUS at 12:09

## 2020-08-17 RX ADMIN — PROPOFOL 20 MG: 10 INJECTION, EMULSION INTRAVENOUS at 11:59

## 2020-08-17 RX ADMIN — SODIUM CHLORIDE: 9 INJECTION, SOLUTION INTRAVENOUS at 10:53

## 2020-08-17 RX ADMIN — PROPOFOL 20 MG: 10 INJECTION, EMULSION INTRAVENOUS at 11:42

## 2020-08-17 RX ADMIN — PROPOFOL 20 MG: 10 INJECTION, EMULSION INTRAVENOUS at 12:02

## 2020-08-17 NOTE — ANESTHESIA PREPROCEDURE EVALUATION
Anesthesia Evaluation     Patient summary reviewed and Nursing notes reviewed   no history of anesthetic complications:  NPO Solid Status: > 8 hours  NPO Liquid Status: > 8 hours           Airway   Mallampati: II  TM distance: >3 FB  Neck ROM: full  No difficulty expected  Dental      Pulmonary    (+) a smoker Current, COPD, decreased breath sounds,     ROS comment: Lung mass  Cardiovascular     (+) PVD, hyperlipidemia,       Neuro/Psych  (+) psychiatric history Anxiety and Depression,     GI/Hepatic/Renal/Endo    (+)  GERD,  thyroid problem hypothyroidism    Musculoskeletal     (+) arthralgias, back pain, chronic pain, myalgias,   Abdominal    Substance History   (+) alcohol use,      OB/GYN          Other   arthritis,                      Anesthesia Plan    ASA 3     MAC   (Risks and benefits discussed including risk of aspiration, recall and dental damage. All patient questions answered.    Will continue with plan of care.)  intravenous induction     Anesthetic plan, all risks, benefits, and alternatives have been provided, discussed and informed consent has been obtained with: patient.

## 2020-08-17 NOTE — H&P
[facility]  HISTORY AND PHYSICAL    Patient Name: Yamileth De Guzman  : 1945  MRN: [id number]    Chief Complaint:   For surveillance colonoscopy    History Of Presenting Illness:      History of colon polyp , details unknown    Past Medical History:   Diagnosis Date   • Abdominal bloating    • Acid reflux    • Arthritis    • Carotid bruit    • Colon polyp    • Disease of thyroid gland    • Fibromyalgia    • Hematuria    • Lung nodule    • Osteoporosis    • Sinusitis        Past Surgical History:   Procedure Laterality Date   • APPENDECTOMY     • COLONOSCOPY     • HYSTERECTOMY     • UPPER GASTROINTESTINAL ENDOSCOPY         Social History     Socioeconomic History   • Marital status:      Spouse name: Not on file   • Number of children: Not on file   • Years of education: Not on file   • Highest education level: Not on file   Tobacco Use   • Smoking status: Current Every Day Smoker     Packs/day: 1.00     Types: Cigarettes   • Smokeless tobacco: Never Used   Substance and Sexual Activity   • Alcohol use: Yes     Comment: moderate   • Drug use: No   • Sexual activity: Defer       Family History   Problem Relation Age of Onset   • Hearing loss Father    • Arthritis Father    • Cancer Father    • GI problems Father    • Breast cancer Sister    • Colon cancer Neg Hx        Prior to Admission Medications:  Medications Prior to Admission   Medication Sig Dispense Refill Last Dose   • ALPRAZolam (XANAX) 0.25 MG tablet Take 1 tablet by mouth At Night As Needed for Anxiety. 30 tablet 2 Taking   • bisacodyl (DULCOLAX) 5 MG EC tablet Take as directed for colon prep 4 tablet 0    • calcium (OS-STIVEN) 600 MG tablet Take 600 mg by mouth 2 (Two) Times a Day.   Taking   • cetirizine (zyrTEC) 10 MG tablet Take 10 mg by mouth Daily.   Taking   • denosumab (PROLIA) 60 MG/ML solution syringe Inject 60 mg under the skin into the appropriate area as directed Every 6 (Six) Months.   Taking   •  fluticasone (FLONASE) 50 MCG/ACT nasal spray 2 sprays by Each Nare route Daily. 48 g 5 Taking   • Garlic 400 MG tablet Take  by mouth Daily.   Taking   • levothyroxine (SYNTHROID, LEVOTHROID) 100 MCG tablet Take 1 tablet by mouth Daily. 90 tablet 0 Taking   • Multiple Vitamin (MULTI VITAMIN DAILY PO) Take 1 tablet by mouth Daily.   Taking   • Omega-3 Fatty Acids (FISH OIL) 1000 MG capsule capsule Take 1,000 mg by mouth Daily With Breakfast.   Taking   • omeprazole (priLOSEC) 20 MG capsule Take 1 capsule by mouth 2 (Two) Times a Day. 180 capsule 0 Taking   • polyethylene glycol (GoLYTELY) 236 g solution Starting at 5 pm on day prior to procedure, drink 8 ounces every 30 minutes as directed 4000 mL 0    • vitamin C (ASCORBIC ACID) 250 MG tablet Take 250 mg by mouth Daily.   Taking   • ciprofloxacin (Cipro) 500 MG tablet Take 1 tablet twice a day x 7 days 14 tablet 0    • metroNIDAZOLE (FLAGYL) 250 MG tablet Take 1 tablet four times daily x 7 days 28 tablet 0    • Probiotic capsule Take 1 capsule by mouth Daily. 30 capsule 1        Allergies:  No Known Allergies     Vitals:      Review Of Systems:  Constitutional:  Negative for chills, fever, and unexpected weight change.  Respiratory:  Negative for cough, chest tightness, shortness of breath, and wheezing.  Cardiovascular:  Negative for chest pain, palpitations, and leg swelling.  Gastrointestinal:  Negative for abdominal distention, abdominal pain, Nausea, vomiting.  Neurological:  Negative for Weakness, numbness, and headaches.     Physical Exam:    General Appearance:  Alert, cooperative, in no acute distress.   Lungs:   Clear to auscultation, respirations regular, even and                 unlabored.   Heart:  Regular rhythm and normal rate.   Abdomen:   Normal bowel sounds, no masses, no organomegaly. Soft, non-tender, non-distended   Neurologic: Alert and oriented x 3. Moves all four limbs equally       Plan: COLONOSCOPY (N/A)     Scar Rogers  MD  8/17/2020

## 2020-08-17 NOTE — ANESTHESIA POSTPROCEDURE EVALUATION
Patient called said she would like the doctors opinion for a orthopedic surgeon for her injured shoulder.    Patient: Yamileth De Guzman    Procedure Summary     Date:  08/17/20 Room / Location:  [facility] ENDOSCOPY 1 / [facility] ENDOSCOPY    Anesthesia Start:  1132 Anesthesia Stop:  1236    Procedure:  COLONOSCOPY WITH COLD SNARE POLYPECTOMY, COLD FORCEP POLYPECTOMY, SUBMUCOSAL INJECTION OF NORMAL SALINE, ENDOSCOPIC MUCOSAL RESECTION, HOT SNARE POLYPECTOMY, THERMAL ABLATION, QUICK CLIP PLACEMENT TIMES 4 AND ABRBIE INK INJECTION (N/A Anus) Diagnosis:       Diverticulitis      Left lower quadrant abdominal pain      Constipation, unspecified constipation type      Gas pain      (Diverticulitis [K57.92])      (Left lower quadrant abdominal pain [R10.32])      (Constipation, unspecified constipation type [K59.00])      (Gas pain [R14.1])    Surgeon:  Scar Rogers MD Provider:  Velasquez Rosenberg CRNA    Anesthesia Type:  MAC ASA Status:  3          Anesthesia Type: MAC    Vitals  No vitals data found for the desired time range.          Post Anesthesia Care and Evaluation    Patient location during evaluation: PHASE II  Patient participation: complete - patient participated  Level of consciousness: awake and alert  Pain score: 1  Pain management: satisfactory to patient  Airway patency: patent  Anesthetic complications: No anesthetic complications  PONV Status: none  Cardiovascular status: acceptable and stable  Respiratory status: acceptable and nasal cannula  Hydration status: acceptable    Comments: vsss resp spont, reflexes intact, responsive, report given to pacu nurse

## 2020-08-17 NOTE — DISCHARGE INSTRUCTIONS
Please follow all post op instructions and follow up appointment time from your physician's office included in your discharge packet.     No pushing, pulling, tugging,  heavy lifting, or strenuous activity.  No major decision making, driving, or drinking alcoholic beverages for 24 hours. ( due to the medications you have  received)  Always use good hand hygiene/washing techniques.  NO driving while taking pain medications.    To assist you in voiding:  Drink plenty of fluids  Listen to running water while attempting to void.    - Discharge patient to home (ambulatory).   - High fiber diet.   - Continue present medications.   - Await pathology results.   - No NSAIDS/ASA for 3 days  - Repeat colonoscopy in 6 months for surveillance.   - Return to GI office in 4 weeks.  If you are unable to urinate and you have an uncomfortable urge to void or it has been   6 hours since you were discharged, return to the Emergency Room

## 2020-08-21 ENCOUNTER — OFFICE VISIT (OUTPATIENT)
Dept: INTERNAL MEDICINE | Facility: CLINIC | Age: 75
End: 2020-08-21

## 2020-08-21 VITALS
DIASTOLIC BLOOD PRESSURE: 64 MMHG | HEIGHT: 66 IN | SYSTOLIC BLOOD PRESSURE: 122 MMHG | HEART RATE: 63 BPM | RESPIRATION RATE: 16 BRPM | BODY MASS INDEX: 22.95 KG/M2 | WEIGHT: 142.8 LBS | TEMPERATURE: 97.1 F | OXYGEN SATURATION: 97 %

## 2020-08-21 DIAGNOSIS — M81.0 SENILE OSTEOPOROSIS: ICD-10-CM

## 2020-08-21 DIAGNOSIS — K21.9 GASTROESOPHAGEAL REFLUX DISEASE WITHOUT ESOPHAGITIS: ICD-10-CM

## 2020-08-21 DIAGNOSIS — K21.00 GASTROESOPHAGEAL REFLUX DISEASE WITH ESOPHAGITIS: ICD-10-CM

## 2020-08-21 DIAGNOSIS — K59.00 CONSTIPATION, UNSPECIFIED CONSTIPATION TYPE: ICD-10-CM

## 2020-08-21 DIAGNOSIS — K58.9 IRRITABLE BOWEL SYNDROME WITHOUT DIARRHEA: ICD-10-CM

## 2020-08-21 DIAGNOSIS — K63.5 POLYP OF COLON, UNSPECIFIED PART OF COLON, UNSPECIFIED TYPE: ICD-10-CM

## 2020-08-21 DIAGNOSIS — E78.5 HYPERLIPIDEMIA, UNSPECIFIED HYPERLIPIDEMIA TYPE: Primary | ICD-10-CM

## 2020-08-21 DIAGNOSIS — M06.9 RHEUMATOID ARTHRITIS, INVOLVING UNSPECIFIED SITE, UNSPECIFIED RHEUMATOID FACTOR PRESENCE: ICD-10-CM

## 2020-08-21 DIAGNOSIS — M79.7 FIBROMYALGIA: ICD-10-CM

## 2020-08-21 DIAGNOSIS — R09.89 CAROTID BRUIT, UNSPECIFIED LATERALITY: ICD-10-CM

## 2020-08-21 DIAGNOSIS — E03.9 HYPOTHYROIDISM, UNSPECIFIED TYPE: ICD-10-CM

## 2020-08-21 DIAGNOSIS — F17.200 CURRENT EVERY DAY SMOKER: ICD-10-CM

## 2020-08-21 DIAGNOSIS — F41.1 GENERALIZED ANXIETY DISORDER: ICD-10-CM

## 2020-08-21 DIAGNOSIS — E55.9 VITAMIN D DEFICIENCY: ICD-10-CM

## 2020-08-21 DIAGNOSIS — R31.9 HEMATURIA, UNSPECIFIED TYPE: ICD-10-CM

## 2020-08-21 DIAGNOSIS — R91.8 LUNG MASS: ICD-10-CM

## 2020-08-21 PROBLEM — R14.0 ABDOMINAL BLOATING: Status: RESOLVED | Noted: 2020-07-21 | Resolved: 2020-08-21

## 2020-08-21 PROBLEM — R14.1 GAS PAIN: Chronic | Status: RESOLVED | Noted: 2020-07-21 | Resolved: 2020-08-21

## 2020-08-21 PROBLEM — F41.9 ANXIETY: Status: RESOLVED | Noted: 2017-01-19 | Resolved: 2020-08-21

## 2020-08-21 PROBLEM — R10.32 LEFT LOWER QUADRANT ABDOMINAL PAIN: Chronic | Status: RESOLVED | Noted: 2020-07-21 | Resolved: 2020-08-21

## 2020-08-21 PROBLEM — K57.92 DIVERTICULITIS: Status: RESOLVED | Noted: 2020-07-21 | Resolved: 2020-08-21

## 2020-08-21 PROBLEM — M85.80 OSTEOPENIA: Status: RESOLVED | Noted: 2017-01-19 | Resolved: 2020-08-21

## 2020-08-21 PROBLEM — R12 HEARTBURN: Chronic | Status: RESOLVED | Noted: 2020-07-21 | Resolved: 2020-08-21

## 2020-08-21 LAB
LAB AP CASE REPORT: NORMAL
PATH REPORT.FINAL DX SPEC: NORMAL

## 2020-08-21 PROCEDURE — 99214 OFFICE O/P EST MOD 30 MIN: CPT | Performed by: INTERNAL MEDICINE

## 2020-08-21 NOTE — PROGRESS NOTES
Subjective   Yamileth De Guzman is a 74 y.o. female.     Chief Complaint   Patient presents with   • Hyperlipidemia     1 mo f/u, pt also had colonoscopy done and wants to go over results    • Hypothyroidism       History of Present Illness   Patient here for follow-up.  Hyperlipidemia stable on medication.  GERD stable on medication.  Hypothyroidism stable on medication.  Patient still uses tobacco.  Anxiety stable on medication.  Hematuria stable.  Rheumatoid arthritis is stable now.  Recent colonoscopy showed multiple polyps no malignancy.    Current Outpatient Medications:   •  ALPRAZolam (XANAX) 0.25 MG tablet, Take 1 tablet by mouth At Night As Needed for Anxiety., Disp: 30 tablet, Rfl: 2  •  calcium (OS-STIVNE) 600 MG tablet, Take 600 mg by mouth 2 (Two) Times a Day., Disp: , Rfl:   •  cetirizine (zyrTEC) 10 MG tablet, Take 10 mg by mouth Daily., Disp: , Rfl:   •  denosumab (PROLIA) 60 MG/ML solution syringe, Inject 60 mg under the skin into the appropriate area as directed Every 6 (Six) Months., Disp: , Rfl:   •  fluticasone (FLONASE) 50 MCG/ACT nasal spray, 2 sprays by Each Nare route Daily., Disp: 48 g, Rfl: 5  •  Garlic 400 MG tablet, Take  by mouth Daily., Disp: , Rfl:   •  levothyroxine (SYNTHROID, LEVOTHROID) 100 MCG tablet, Take 1 tablet by mouth Daily., Disp: 90 tablet, Rfl: 0  •  Multiple Vitamin (MULTI VITAMIN DAILY PO), Take 1 tablet by mouth Daily., Disp: , Rfl:   •  Omega-3 Fatty Acids (FISH OIL) 1000 MG capsule capsule, Take 1,000 mg by mouth Daily With Breakfast., Disp: , Rfl:   •  omeprazole (priLOSEC) 20 MG capsule, Take 1 capsule by mouth 2 (Two) Times a Day., Disp: 180 capsule, Rfl: 0  •  Probiotic capsule, Take 1 capsule by mouth Daily., Disp: 30 capsule, Rfl: 1  •  vitamin C (ASCORBIC ACID) 250 MG tablet, Take 250 mg by mouth Daily., Disp: , Rfl:     The following portions of the patient's history were reviewed and updated as appropriate: allergies, current medications, past family history,  past medical history, past social history, past surgical history and problem list.    Review of Systems   Constitutional: Negative.    Respiratory: Negative.    Cardiovascular: Negative.    Gastrointestinal: Negative.    Musculoskeletal: Negative.    Skin: Negative.    Neurological: Negative.    Psychiatric/Behavioral: Negative.        Objective   Physical Exam   Constitutional: She is oriented to person, place, and time. She appears well-nourished.   Neck: Neck supple.   Cardiovascular: Normal rate, regular rhythm and normal heart sounds.   Pulmonary/Chest: Effort normal and breath sounds normal.   Abdominal: Bowel sounds are normal.   Neurological: She is alert and oriented to person, place, and time.   Skin: Skin is warm.   Psychiatric: She has a normal mood and affect.       All tests have been reviewed.    Assessment/Plan   Diagnoses and all orders for this visit:    Hyperlipidemia, unspecified hyperlipidemia type continue medication    Carotid bruit, unspecified laterality mild blockages    Lung mass continue follow-up    Gastroesophageal reflux disease with esophagitis continue medication    Hypothyroidism, unspecified type continue medication    Current every day smoker to quit    Generalized anxiety disorder continue medication    Rheumatoid arthritis, involving unspecified site, unspecified rheumatoid factor presence (CMS/[facility]) continue medication    Fibromyalgia continue medication    Hematuria, unspecified type stable    Senile osteoporosis continue medication    Constipation, unspecified constipation type stable    Vitamin D deficiency continue supplement    Irritable bowel syndrome without diarrhea stable now    Polyp of colon, unspecified part of colon, unspecified type repeat 6 months    6 mo            Below is to historical records for reference only:  anxiety stable on xanax. Continue prn  Constipation continue MiraLAX  IBS no abd pain stable on IBS OTC medicine history EGD 2011 gastritis and hh,  asa d/c  Insomnia stable on amitriptyline. Continue  Hypothryoidism. Continue medication    lung nodule follow up RML soft tissue nodule repeat no more,   Bone density scan showed this osteoporosis continue Calcium 1,200mg and vit D3 hold for now  osteoporosis on reclast  oscal D  hematuria history of it. seen by urologist long ago. 4/17/14 micro showed trace blood. micro negative, no urine frequency.  vaccination: zostavax done Td 2015 refuse pneumovax,refuse prevnar, pending shingrix, refuse hepA shot  colon 2011 hemorroid, patient declines now, patient will call if ready--  left carotid bruit, 0-49%  HLD diet, patient refuses medicine  tob to quit,

## 2020-08-25 DIAGNOSIS — E03.9 HYPOTHYROIDISM, UNSPECIFIED TYPE: ICD-10-CM

## 2020-08-25 RX ORDER — LEVOTHYROXINE SODIUM 0.1 MG/1
TABLET ORAL
Qty: 90 TABLET | Refills: 0 | Status: SHIPPED | OUTPATIENT
Start: 2020-08-25 | End: 2020-11-25 | Stop reason: SDUPTHER

## 2020-09-18 ENCOUNTER — HOSPITAL ENCOUNTER (OUTPATIENT)
Dept: MAMMOGRAPHY | Facility: HOSPITAL | Age: 75
Discharge: HOME | End: 2020-09-18
Admitting: INTERNAL MEDICINE

## 2020-09-18 DIAGNOSIS — Z12.31 VISIT FOR SCREENING MAMMOGRAM: ICD-10-CM

## 2020-09-18 PROCEDURE — 77063 BREAST TOMOSYNTHESIS BI: CPT

## 2020-09-18 PROCEDURE — 77067 SCR MAMMO BI INCL CAD: CPT

## 2020-09-21 ENCOUNTER — OFFICE VISIT (OUTPATIENT)
Dept: GASTROENTEROLOGY | Facility: CLINIC | Age: 75
End: 2020-09-21

## 2020-09-21 ENCOUNTER — TELEPHONE (OUTPATIENT)
Dept: GASTROENTEROLOGY | Facility: CLINIC | Age: 75
End: 2020-09-21

## 2020-09-21 VITALS
TEMPERATURE: 98.6 F | WEIGHT: 146 LBS | RESPIRATION RATE: 16 BRPM | DIASTOLIC BLOOD PRESSURE: 58 MMHG | BODY MASS INDEX: 23.46 KG/M2 | SYSTOLIC BLOOD PRESSURE: 135 MMHG | HEIGHT: 66 IN | HEART RATE: 63 BPM

## 2020-09-21 DIAGNOSIS — D36.9 ADENOMATOUS POLYPS: Primary | Chronic | ICD-10-CM

## 2020-09-21 DIAGNOSIS — K21.9 GASTROESOPHAGEAL REFLUX DISEASE, ESOPHAGITIS PRESENCE NOT SPECIFIED: Chronic | ICD-10-CM

## 2020-09-21 DIAGNOSIS — K59.04 CHRONIC IDIOPATHIC CONSTIPATION: Chronic | ICD-10-CM

## 2020-09-21 PROCEDURE — 99214 OFFICE O/P EST MOD 30 MIN: CPT | Performed by: NURSE PRACTITIONER

## 2020-09-21 RX ORDER — SODIUM CHLORIDE 9 MG/ML
70 INJECTION, SOLUTION INTRAVENOUS CONTINUOUS PRN
Status: CANCELLED | OUTPATIENT
Start: 2020-09-21

## 2020-09-21 RX ORDER — BISACODYL 5 MG/1
TABLET, DELAYED RELEASE ORAL
Qty: 4 TABLET | Refills: 0 | Status: SHIPPED | OUTPATIENT
Start: 2020-09-21 | End: 2020-11-25

## 2020-09-21 NOTE — PATIENT INSTRUCTIONS
1. Antireflux measures: Avoid fried, fatty foods, alcohol, chocolate, coffee, tea,  soft drinks, peppermint and spearmint, spicy foods, tomatoes and tomato based foods, onion based foods, and smoking. Other antireflux measures include weight reduction if overweight, avoiding tight clothing around the abdomen, elevating the head of the bed 6 inches with blocks under the head board, and don't drink or eat before going to bed and avoid lying down immediately after meals.  2. Omeprazole 20 mg 1 po daily in the am 30 minutes before breakfast.  3. Recommended to take Levothyroxine first in the am upon waking, wait 30 minutes, then take Omeprazole 20 mg, wait 30 minutes, then eat breakfast and take other medications.  4. High fiber, low fat diet with liberal water intake.  5. Miralax 17 grams in 8 ounces of noncarbonated beverage daily. Adjust to have 1-2 soft bowel movements per day.  6. Follow up colonoscopy in 6 months due to EMR of 20-25mm polyp in proximal ascending colon.  7. Colonoscopy: Description of the procedure, risks, benefits, alternatives and options, including nonoperative options, were discussed with the patient in detail. The patient understands and wishes to proceed.  8. COVID-19 testing prior to procedure. The patient will need to self-quarantine after testing until the procedure. Instructions given to patient.

## 2020-09-21 NOTE — TELEPHONE ENCOUNTER
Patient will be scheduled for colonoscopy in March. Will call her back around January to schedule.

## 2020-09-21 NOTE — PROGRESS NOTES
Follow Up Note     Date: 2020   Patient Name: Yamileth De Guzman  MRN: [id number]  : 1945     Primary Care Provider: Da Dong MD     Chief Complaint:    Chief Complaint   Patient presents with   • Follow-up     History of present illness:   2020  Yamileth De Guzman is a 75 y.o. female who is here today for follow up for constipation. She has felt much better. Abdominal pain has significantly improved, she has not had any further episodes. Constipation is improving with high fiber diet.    Interval History:  2020    There is a long standing history of constipation. The patient has 1 firm bowel movement every 3-4 days. She frequently has gas and bloating. For the past 2-3 months, constipation and gas have worsened, and she has been having pain in the lower abdomen. The pain comes and goes, and is a moderate, sharp pain. No history of fevers or chills, nausea or vomiting.     The patient is taking Omeprazole 20mg with reasonable control of heartburn. Reflux is worse at night with lying down. Heartburn is moderate. The patient denies difficulty swallowing.     Last EGD was in .    Subjective      Past Medical History:   Diagnosis Date   • Abdominal bloating    • Acid reflux    • Adenomatous polyp of colon 2020   • Arthritis    • Carotid bruit    • Colon polyp    • Disease of thyroid gland    • Fibromyalgia    • Hematuria    • Lung nodule    • Osteoporosis    • Sinusitis      Past Surgical History:   Procedure Laterality Date   • APPENDECTOMY     • COLONOSCOPY     • COLONOSCOPY N/A 2020    Procedure: COLONOSCOPY WITH COLD SNARE POLYPECTOMY, COLD FORCEP POLYPECTOMY, SUBMUCOSAL INJECTION OF NORMAL SALINE, ENDOSCOPIC MUCOSAL RESECTION, HOT SNARE POLYPECTOMY, THERMAL ABLATION, QUICK CLIP PLACEMENT TIMES 4 AND BARBIE INK INJECTION;  Surgeon: Scar Rogers MD;  Location: [facility];  Service: Gastroenterology;  Laterality: N/A;   • HYSTERECTOMY     • UPPER  GASTROINTESTINAL ENDOSCOPY  2012     Family History   Problem Relation Age of Onset   • Hearing loss Father    • Arthritis Father    • Cancer Father    • GI problems Father    • Breast cancer Sister    • Colon cancer Neg Hx      Social History     Socioeconomic History   • Marital status:      Spouse name: Not on file   • Number of children: Not on file   • Years of education: Not on file   • Highest education level: Not on file   Tobacco Use   • Smoking status: Current Every Day Smoker     Packs/day: 1.00     Types: Cigarettes   • Smokeless tobacco: Never Used   Substance and Sexual Activity   • Alcohol use: Yes     Comment: moderate   • Drug use: No   • Sexual activity: Defer       Current Outpatient Medications:   •  ALPRAZolam (XANAX) 0.25 MG tablet, Take 1 tablet by mouth At Night As Needed for Anxiety., Disp: 30 tablet, Rfl: 2  •  calcium (OS-STIVEN) 600 MG tablet, Take 600 mg by mouth 2 (Two) Times a Day., Disp: , Rfl:   •  cetirizine (zyrTEC) 10 MG tablet, Take 10 mg by mouth Daily., Disp: , Rfl:   •  denosumab (PROLIA) 60 MG/ML solution syringe, Inject 60 mg under the skin into the appropriate area as directed Every 6 (Six) Months., Disp: , Rfl:   •  fluticasone (FLONASE) 50 MCG/ACT nasal spray, 2 sprays by Each Nare route Daily., Disp: 48 g, Rfl: 5  •  Garlic 400 MG tablet, Take  by mouth Daily., Disp: , Rfl:   •  levothyroxine (SYNTHROID, LEVOTHROID) 100 MCG tablet, Take 1 tablet by mouth once daily, Disp: 90 tablet, Rfl: 0  •  Multiple Vitamin (MULTI VITAMIN DAILY PO), Take 1 tablet by mouth Daily., Disp: , Rfl:   •  Omega-3 Fatty Acids (FISH OIL) 1000 MG capsule capsule, Take 1,000 mg by mouth Daily With Breakfast., Disp: , Rfl:   •  omeprazole (priLOSEC) 20 MG capsule, Take 1 capsule by mouth 2 (Two) Times a Day., Disp: 180 capsule, Rfl: 0  •  vitamin C (ASCORBIC ACID) 250 MG tablet, Take 250 mg by mouth Daily., Disp: , Rfl:   •  bisacodyl (DULCOLAX) 5 MG EC tablet, Take as directed for colon prep,  Disp: 4 tablet, Rfl: 0  •  polyethylene glycol (GoLYTELY) 236 g solution, Starting at 5 pm on day prior to procedure, drink 8 ounces every 30 minutes as directed, Disp: 4000 mL, Rfl: 0     No Known Allergies     Review of Systems   Constitutional: Negative for appetite change and unexpected weight loss.   HENT: Negative for trouble swallowing.    Eyes: Negative for blurred vision.   Respiratory: Negative for choking and chest tightness.    Cardiovascular: Negative for leg swelling.   Gastrointestinal: Positive for constipation and GERD. Negative for abdominal distention, abdominal pain, anal bleeding, blood in stool, diarrhea, nausea, rectal pain, vomiting and indigestion.   Endocrine: Negative for polyphagia.   Genitourinary: Negative for hematuria.   Musculoskeletal: Negative for arthralgias and myalgias.   Skin: Negative for rash.   Allergic/Immunologic: Negative for food allergies.   Neurological: Negative for dizziness, syncope and confusion.   Hematological: Does not bruise/bleed easily.   Psychiatric/Behavioral: Negative for depressed mood.      The following portions of the patient's history were reviewed and updated as appropriate: allergies, current medications, past family history, past medical history, past social history, past surgical history and problem list.  Objective     Physical Exam  Vitals signs and nursing note reviewed.   Constitutional:       General: She is awake. She is not in acute distress.     Appearance: Normal appearance. She is well-developed and normal weight. She is not diaphoretic.   HENT:      Head: Normocephalic and atraumatic.      Right Ear: Hearing and external ear normal.      Left Ear: Hearing and external ear normal.      Nose: Nose normal.      Mouth/Throat:      Lips: Pink.      Mouth: Mucous membranes are not pale, not dry and not cyanotic. No oral lesions.      Pharynx: No oropharyngeal exudate.   Eyes:      General: Lids are normal.         Right eye: No discharge.      "     Left eye: No discharge.      Conjunctiva/sclera: Conjunctivae normal.   Neck:      Musculoskeletal: Neck supple. No edema.      Thyroid: No thyroid mass or thyromegaly.      Vascular: No JVD.      Trachea: Trachea normal.   Cardiovascular:      Rate and Rhythm: Normal rate and regular rhythm.      Heart sounds: Normal heart sounds and S2 normal. No murmur. No friction rub. No gallop. No S3 sounds.    Pulmonary:      Effort: Pulmonary effort is normal. No respiratory distress.      Breath sounds: Normal breath sounds.   Chest:      Chest wall: No tenderness.   Abdominal:      General: Bowel sounds are normal. There is no distension.      Palpations: Abdomen is not rigid. There is no hepatomegaly, splenomegaly or mass.      Tenderness: There is no abdominal tenderness. There is no guarding or rebound.      Hernia: No hernia is present.   Musculoskeletal: Normal range of motion.   Lymphadenopathy:      Cervical: No cervical adenopathy.      Upper Body:      Left upper body: No supraclavicular adenopathy.   Skin:     General: Skin is warm and dry.      Coloration: Skin is not pale.      Findings: No rash.      Nails: There is no clubbing.     Neurological:      Mental Status: She is alert and oriented to person, place, and time.      Cranial Nerves: No cranial nerve deficit.      Sensory: No sensory deficit.   Psychiatric:         Mood and Affect: Mood normal.         Speech: Speech normal.         Behavior: Behavior is cooperative.       Vitals:    09/21/20 1031   BP: 135/58   Pulse: 63   Resp: 16   Temp: 98.6 °F (37 °C)   Weight: 66.2 kg (146 lb)   Height: 167.6 cm (66\")     Results Review:   I reviewed the patient's new clinical results.    Admission on 08/17/2020, Discharged on 08/17/2020   Component Date Value Ref Range Status   • Case Report 08/17/2020    Final                    Value:Surgical Pathology Report                         Case: [id number]                                  Authorizing Provider:  " Scar Rogers MD  Collected:           08/17/2020 11:53 AM          Ordering Location:     [facility]    Received:            08/18/2020 08:54 AM                                 [facility]                                                                    Pathologist:           Maxi Blue MD                                                       Specimens:   1) - Large Intestine, Cecum                                                                         2) - Large Intestine, Right / Ascending Colon                                                       3) - Large Intestine, Right / Ascending Colon, ASCENDING COLON POLYPS JAR #2                        4) - Large Intestine, Transverse Colon, TRANSVERSE COLON POLYPS                           • Final Diagnosis 08/17/2020    Final                    Value:This result contains rich text formatting which cannot be displayed here.   Lab on 08/13/2020   Component Date Value Ref Range Status   • Reference Lab Report 08/13/2020 See attachment   Final   • COVID19 08/13/2020 Not Detected  Not Detected - Ref. Range Final      Us Abdomen Complete    Result Date: 7/17/2020  Unremarkable ultrasound of the abdomen.  This report was finalized on 7/17/2020 10:21 AM by Yanni Renee M.D..    Mammo Screening Digital Tomosynthesis Bilateral With Cad    Result Date: 9/18/2020  BI-RADS CATEGORY: 1 , NEGATIVE.      RECOMMENDATIONS: Annual screening mammogram.    NOTES: Mammography does not detect approximately 10-15% of breast cancers. Physical examination of the breasts by a physician and regular monthly breast self examinations are integral parts of breast cancer screening.  A normal mammogram does not exclude breast cancer if there is an abnormal finding on physical examination. When clinically indicated, a biopsy should not be postponed because of a normal mammogram report.  [place]. [id number]  NOTE:  If a biopsy is performed on this patient, a copy of the pathology report would be appreciated.  This report was finalized on 9/18/2020 11:07 AM by Yanni Renee M.D..     Colonoscopy dated 8/17/2020 with 6 6 to 8 mm polyps removed.  One 20 to 25 mm polyp in the ascending colon removed by mucosal resection.  Cecum, ascending colon, and transverse colon polyps with tubular adenoma.  No high-grade dysplasia.    Assessment / Plan      1. Adenomatous polyps  6 colon polyps removed, tubular adenoma without dysplasia. Ascending colon with 20-25 mm polyp removed by EMR, tubular adenoma without dysplasia.   Colonoscopy in 6 months for surveillance.    - Case Request; Standing  - Implement Anesthesia Orders Day of Procedure; Standing  - Obtain Informed Consent; Standing  - Verify Bowel Prep Was Successful; Standing  - Oxygen Therapy- Nasal Cannula; 2 LPM; Titrate for SPO2: equal to or greater than, 90%; Standing  - sodium chloride 0.9 % infusion  - Case Request  - bisacodyl (DULCOLAX) 5 MG EC tablet; Take as directed for colon prep  Dispense: 4 tablet; Refill: 0  - polyethylene glycol (GoLYTELY) 236 g solution; Starting at 5 pm on day prior to procedure, drink 8 ounces every 30 minutes as directed  Dispense: 4000 mL; Refill: 0    2. Chronic idiopathic constipation  No history of rectal bleeding. TSH normal.  Improved with high fiber diet and Miralax. Will continue.    3. Gastroesophageal reflux disease, esophagitis presence not specified  Reasonable control of reflux with low dose PPI. Symptoms return when off of PPI.  Continue Omeprazole 20 mg daily for now.    Patient Instructions   1. Antireflux measures: Avoid fried, fatty foods, alcohol, chocolate, coffee, tea,  soft drinks, peppermint and spearmint, spicy foods, tomatoes and tomato based foods, onion based foods, and smoking. Other antireflux measures include weight reduction if overweight, avoiding tight clothing around the abdomen, elevating the head of the bed 6  inches with blocks under the head board, and don't drink or eat before going to bed and avoid lying down immediately after meals.  2. Omeprazole 20 mg 1 po daily in the am 30 minutes before breakfast.  3. Recommended to take Levothyroxine first in the am upon waking, wait 30 minutes, then take Omeprazole 20 mg, wait 30 minutes, then eat breakfast and take other medications.  4. High fiber, low fat diet with liberal water intake.  5. Miralax 17 grams in 8 ounces of noncarbonated beverage daily. Adjust to have 1-2 soft bowel movements per day.  6. Follow up colonoscopy in 6 months due to EMR of 20-25mm polyp in proximal ascending colon.  7. Colonoscopy: Description of the procedure, risks, benefits, alternatives and options, including nonoperative options, were discussed with the patient in detail. The patient understands and wishes to proceed.  8. COVID-19 testing prior to procedure. The patient will need to self-quarantine after testing until the procedure. Instructions given to patient.    Stew Grant, APRN  9/21/2020    Please note that portions of this note may have been completed with a voice recognition program. Efforts were made to edit the dictations, but occasionally words are mistranscribed.

## 2020-10-30 ENCOUNTER — HOSPITAL ENCOUNTER (OUTPATIENT)
Dept: INFUSION THERAPY | Facility: HOSPITAL | Age: 75
Discharge: HOME | End: 2020-10-30
Admitting: INTERNAL MEDICINE

## 2020-10-30 VITALS
HEART RATE: 61 BPM | WEIGHT: 145 LBS | OXYGEN SATURATION: 100 % | BODY MASS INDEX: 22.76 KG/M2 | SYSTOLIC BLOOD PRESSURE: 127 MMHG | DIASTOLIC BLOOD PRESSURE: 41 MMHG | TEMPERATURE: 98.4 F | HEIGHT: 67 IN

## 2020-10-30 DIAGNOSIS — M81.0 SENILE OSTEOPOROSIS: Primary | ICD-10-CM

## 2020-10-30 PROCEDURE — 96372 THER/PROPH/DIAG INJ SC/IM: CPT

## 2020-10-30 PROCEDURE — 25010000002 DENOSUMAB 60 MG/ML SOLUTION PREFILLED SYRINGE: Performed by: INTERNAL MEDICINE

## 2020-10-30 PROCEDURE — 96401 CHEMO ANTI-NEOPL SQ/IM: CPT

## 2020-10-30 RX ADMIN — DENOSUMAB 60 MG: 60 INJECTION SUBCUTANEOUS at 10:39

## 2020-11-25 ENCOUNTER — HOSPITAL ENCOUNTER (OUTPATIENT)
Dept: GENERAL RADIOLOGY | Facility: HOSPITAL | Age: 75
Discharge: HOME | End: 2020-11-25
Admitting: INTERNAL MEDICINE

## 2020-11-25 ENCOUNTER — OFFICE VISIT (OUTPATIENT)
Dept: INTERNAL MEDICINE | Facility: CLINIC | Age: 75
End: 2020-11-25

## 2020-11-25 VITALS
HEIGHT: 66 IN | RESPIRATION RATE: 16 BRPM | TEMPERATURE: 97.1 F | HEART RATE: 63 BPM | SYSTOLIC BLOOD PRESSURE: 110 MMHG | WEIGHT: 142.12 LBS | BODY MASS INDEX: 22.84 KG/M2 | OXYGEN SATURATION: 97 % | DIASTOLIC BLOOD PRESSURE: 66 MMHG

## 2020-11-25 DIAGNOSIS — M54.50 ACUTE RIGHT-SIDED LOW BACK PAIN, UNSPECIFIED WHETHER SCIATICA PRESENT: ICD-10-CM

## 2020-11-25 DIAGNOSIS — K21.00 GASTROESOPHAGEAL REFLUX DISEASE WITH ESOPHAGITIS WITHOUT HEMORRHAGE: ICD-10-CM

## 2020-11-25 DIAGNOSIS — R31.9 HEMATURIA, UNSPECIFIED TYPE: ICD-10-CM

## 2020-11-25 DIAGNOSIS — R10.31 RIGHT LOWER QUADRANT ABDOMINAL PAIN: ICD-10-CM

## 2020-11-25 DIAGNOSIS — N39.41 URGE INCONTINENCE OF URINE: Primary | ICD-10-CM

## 2020-11-25 DIAGNOSIS — E03.9 HYPOTHYROIDISM, UNSPECIFIED TYPE: ICD-10-CM

## 2020-11-25 LAB
ALBUMIN SERPL-MCNC: 4.7 G/DL (ref 3.5–5.2)
ALBUMIN/GLOB SERPL: 2 G/DL
ALP SERPL-CCNC: 56 U/L (ref 39–117)
ALT SERPL-CCNC: 14 U/L (ref 1–33)
AST SERPL-CCNC: 18 U/L (ref 1–32)
BASOPHILS # BLD AUTO: 0.03 10*3/MM3 (ref 0–0.2)
BASOPHILS NFR BLD AUTO: 0.3 % (ref 0–1.5)
BILIRUB BLD-MCNC: NEGATIVE MG/DL
BILIRUB SERPL-MCNC: 0.2 MG/DL (ref 0–1.2)
BUN SERPL-MCNC: 16 MG/DL (ref 8–23)
BUN/CREAT SERPL: 17.6 (ref 7–25)
CALCIUM SERPL-MCNC: 9.9 MG/DL (ref 8.6–10.5)
CHLORIDE SERPL-SCNC: 102 MMOL/L (ref 98–107)
CLARITY, POC: CLEAR
CO2 SERPL-SCNC: 28.1 MMOL/L (ref 22–29)
COLOR UR: YELLOW
CREAT SERPL-MCNC: 0.91 MG/DL (ref 0.57–1)
EOSINOPHIL # BLD AUTO: 0.1 10*3/MM3 (ref 0–0.4)
EOSINOPHIL NFR BLD AUTO: 1 % (ref 0.3–6.2)
ERYTHROCYTE [DISTWIDTH] IN BLOOD BY AUTOMATED COUNT: 12.2 % (ref 12.3–15.4)
GLOBULIN SER CALC-MCNC: 2.3 GM/DL
GLUCOSE SERPL-MCNC: 81 MG/DL (ref 65–99)
GLUCOSE UR STRIP-MCNC: NEGATIVE MG/DL
HCT VFR BLD AUTO: 41.6 % (ref 34–46.6)
HGB BLD-MCNC: 13.9 G/DL (ref 12–15.9)
IMM GRANULOCYTES # BLD AUTO: 0.04 10*3/MM3 (ref 0–0.05)
IMM GRANULOCYTES NFR BLD AUTO: 0.4 % (ref 0–0.5)
KETONES UR QL: NEGATIVE
LEUKOCYTE EST, POC: NEGATIVE
LYMPHOCYTES # BLD AUTO: 2.91 10*3/MM3 (ref 0.7–3.1)
LYMPHOCYTES NFR BLD AUTO: 29.9 % (ref 19.6–45.3)
MCH RBC QN AUTO: 32.6 PG (ref 26.6–33)
MCHC RBC AUTO-ENTMCNC: 33.4 G/DL (ref 31.5–35.7)
MCV RBC AUTO: 97.7 FL (ref 79–97)
MONOCYTES # BLD AUTO: 0.6 10*3/MM3 (ref 0.1–0.9)
MONOCYTES NFR BLD AUTO: 6.2 % (ref 5–12)
NEUTROPHILS # BLD AUTO: 6.04 10*3/MM3 (ref 1.7–7)
NEUTROPHILS NFR BLD AUTO: 62.2 % (ref 42.7–76)
NITRITE UR-MCNC: NEGATIVE MG/ML
NRBC BLD AUTO-RTO: 0 /100 WBC (ref 0–0.2)
PH UR: 6 [PH] (ref 5–8)
PLATELET # BLD AUTO: 231 10*3/MM3 (ref 140–450)
POTASSIUM SERPL-SCNC: 4.3 MMOL/L (ref 3.5–5.2)
PROT SERPL-MCNC: 7 G/DL (ref 6–8.5)
PROT UR STRIP-MCNC: NEGATIVE MG/DL
RBC # BLD AUTO: 4.26 10*6/MM3 (ref 3.77–5.28)
RBC # UR STRIP: ABNORMAL /UL
SODIUM SERPL-SCNC: 140 MMOL/L (ref 136–145)
SP GR UR: 1.01 (ref 1–1.03)
UROBILINOGEN UR QL: NORMAL
WBC # BLD AUTO: 9.72 10*3/MM3 (ref 3.4–10.8)

## 2020-11-25 PROCEDURE — 74018 RADEX ABDOMEN 1 VIEW: CPT

## 2020-11-25 PROCEDURE — 99214 OFFICE O/P EST MOD 30 MIN: CPT | Performed by: INTERNAL MEDICINE

## 2020-11-25 PROCEDURE — 81003 URINALYSIS AUTO W/O SCOPE: CPT | Performed by: INTERNAL MEDICINE

## 2020-11-25 RX ORDER — LEVOTHYROXINE SODIUM 0.1 MG/1
100 TABLET ORAL DAILY
Qty: 90 TABLET | Refills: 3 | Status: SHIPPED | OUTPATIENT
Start: 2020-11-25 | End: 2021-07-07 | Stop reason: SDUPTHER

## 2020-11-25 RX ORDER — OMEPRAZOLE 20 MG/1
20 CAPSULE, DELAYED RELEASE ORAL 2 TIMES DAILY
Qty: 180 CAPSULE | Refills: 3 | Status: SHIPPED | OUTPATIENT
Start: 2020-11-25 | End: 2022-02-25

## 2020-11-25 NOTE — PROGRESS NOTES
Subjective   Yamileth De Guzman is a 75 y.o. female.     Chief Complaint   Patient presents with   • Hip Pain     c/o lower right back and hip pain, onset last Friday.   • Abdominal Pain     c/o right lower abdomen pain        History of Present Illness   Right hip pain, supragluteal pain, radiate to thigh and groin. No numb and tingling, started after cleaning fridge. No urinary sx. No fever or chill , right lower abd pain close to the groin, and sometime flank pain.  Hypothyroidism stable on medication GERD stable on medication.  Urine test that showed blood in urine    Current Outpatient Medications:   •  ALPRAZolam (XANAX) 0.25 MG tablet, Take 1 tablet by mouth At Night As Needed for Anxiety., Disp: 30 tablet, Rfl: 2  •  calcium (OS-STIVEN) 600 MG tablet, Take 600 mg by mouth 2 (Two) Times a Day., Disp: , Rfl:   •  cetirizine (zyrTEC) 10 MG tablet, Take 10 mg by mouth Daily., Disp: , Rfl:   •  denosumab (PROLIA) 60 MG/ML solution syringe, Inject 60 mg under the skin into the appropriate area as directed Every 6 (Six) Months., Disp: , Rfl:   •  fluticasone (FLONASE) 50 MCG/ACT nasal spray, 2 sprays by Each Nare route Daily., Disp: 48 g, Rfl: 5  •  Garlic 400 MG tablet, Take  by mouth Daily., Disp: , Rfl:   •  Multiple Vitamin (MULTI VITAMIN DAILY PO), Take 1 tablet by mouth Daily., Disp: , Rfl:   •  Omega-3 Fatty Acids (FISH OIL) 1000 MG capsule capsule, Take 1,000 mg by mouth Daily With Breakfast., Disp: , Rfl:   •  vitamin C (ASCORBIC ACID) 250 MG tablet, Take 250 mg by mouth Daily., Disp: , Rfl:   •  levothyroxine (SYNTHROID, LEVOTHROID) 100 MCG tablet, Take 1 tablet by mouth Daily., Disp: 90 tablet, Rfl: 3  •  omeprazole (priLOSEC) 20 MG capsule, Take 1 capsule by mouth 2 (Two) Times a Day., Disp: 180 capsule, Rfl: 3    The following portions of the patient's history were reviewed and updated as appropriate: allergies, current medications, past family history, past medical history, past social history, past  surgical history and problem list.    Review of Systems   Constitutional: Negative.    Respiratory: Negative.    Cardiovascular: Negative.    Gastrointestinal: Positive for abdominal pain.   Musculoskeletal: Positive for back pain.   Skin: Negative.    Neurological: Negative.    Psychiatric/Behavioral: Negative.        Objective   Physical Exam  Neck:      Musculoskeletal: Neck supple.   Cardiovascular:      Rate and Rhythm: Normal rate and regular rhythm.      Heart sounds: Normal heart sounds.   Pulmonary:      Effort: Pulmonary effort is normal.      Breath sounds: Normal breath sounds.   Abdominal:      General: Bowel sounds are normal. There is no distension.      Palpations: There is no mass.      Tenderness: There is abdominal tenderness ( Right lower quadrant close to groin mild tenderness). There is no right CVA tenderness, left CVA tenderness, guarding or rebound.      Hernia: No hernia is present.   Musculoskeletal:         General: Tenderness (  Right lower back tenderness) present.   Skin:     General: Skin is warm.   Neurological:      Mental Status: She is alert and oriented to person, place, and time.         All tests have been reviewed.    Assessment/Plan   Diagnoses and all orders for this visit:    Urge incontinence of urine  -     POCT urinalysis dipstick, automated  -     Urinalysis With Microscopic - Urine, Clean Catch    Hypothyroidism, unspecified type  -     levothyroxine (SYNTHROID, LEVOTHROID) 100 MCG tablet; Take 1 tablet by mouth Daily.    Gastroesophageal reflux disease with esophagitis without hemorrhage  -     omeprazole (priLOSEC) 20 MG capsule; Take 1 capsule by mouth 2 (Two) Times a Day.    Acute right-sided low back pain, unspecified whether sciatica present  -     Ambulatory Referral to Physical Therapy    Right lower quadrant abdominal pain  -     CBC & Differential  -     Comprehensive Metabolic Panel    Hematuria, unspecified type  -     XR Abdomen KUB      1 week

## 2020-11-26 LAB
APPEARANCE UR: CLEAR
BACTERIA #/AREA URNS HPF: NORMAL /HPF
BILIRUB UR QL STRIP: NEGATIVE
CASTS URNS MICRO: NORMAL
COLOR UR: YELLOW
EPI CELLS #/AREA URNS HPF: NORMAL /HPF
GLUCOSE UR QL: NEGATIVE
HGB UR QL STRIP: NEGATIVE
KETONES UR QL STRIP: NEGATIVE
LEUKOCYTE ESTERASE UR QL STRIP: NEGATIVE
NITRITE UR QL STRIP: NEGATIVE
PH UR STRIP: 6 [PH] (ref 5–8)
PROT UR QL STRIP: NEGATIVE
RBC #/AREA URNS HPF: NORMAL /HPF
SP GR UR: 1.01 (ref 1–1.03)
UROBILINOGEN UR STRIP-MCNC: NORMAL MG/DL
WBC #/AREA URNS HPF: NORMAL /HPF

## 2020-12-10 ENCOUNTER — OFFICE VISIT (OUTPATIENT)
Dept: INTERNAL MEDICINE | Facility: CLINIC | Age: 75
End: 2020-12-10

## 2020-12-10 VITALS
TEMPERATURE: 98 F | DIASTOLIC BLOOD PRESSURE: 60 MMHG | WEIGHT: 146 LBS | HEART RATE: 69 BPM | BODY MASS INDEX: 23.46 KG/M2 | OXYGEN SATURATION: 97 % | HEIGHT: 66 IN | SYSTOLIC BLOOD PRESSURE: 120 MMHG

## 2020-12-10 DIAGNOSIS — K21.00 GASTROESOPHAGEAL REFLUX DISEASE WITH ESOPHAGITIS WITHOUT HEMORRHAGE: ICD-10-CM

## 2020-12-10 DIAGNOSIS — R31.9 HEMATURIA, UNSPECIFIED TYPE: Primary | ICD-10-CM

## 2020-12-10 DIAGNOSIS — E03.9 HYPOTHYROIDISM, UNSPECIFIED TYPE: ICD-10-CM

## 2020-12-10 DIAGNOSIS — K59.04 CHRONIC IDIOPATHIC CONSTIPATION: ICD-10-CM

## 2020-12-10 LAB
BILIRUB BLD-MCNC: NEGATIVE MG/DL
CLARITY, POC: CLEAR
COLOR UR: YELLOW
GLUCOSE UR STRIP-MCNC: NEGATIVE MG/DL
KETONES UR QL: NEGATIVE
LEUKOCYTE EST, POC: NEGATIVE
NITRITE UR-MCNC: NEGATIVE MG/ML
PH UR: 6 [PH] (ref 5–8)
PROT UR STRIP-MCNC: NEGATIVE MG/DL
RBC # UR STRIP: ABNORMAL /UL
SP GR UR: 1.02 (ref 1–1.03)
UROBILINOGEN UR QL: NORMAL

## 2020-12-10 PROCEDURE — 81003 URINALYSIS AUTO W/O SCOPE: CPT | Performed by: INTERNAL MEDICINE

## 2020-12-10 PROCEDURE — 99214 OFFICE O/P EST MOD 30 MIN: CPT | Performed by: INTERNAL MEDICINE

## 2020-12-10 NOTE — PROGRESS NOTES
Subjective   Yamileth De Guzman is a 75 y.o. female.     Chief Complaint   Patient presents with   • Abdominal Pain     1 wk f/u        History of Present Illness   Patient here for follow-up.  Abdominal pain resolved.  Lower back pain resolved.  Hypothyroidism stable on medication.  Urine dip test that showed blood patient yet to do microscopic urinalysis.  GERD stable on medication.  Patient also complains of constipation KUB showed moderate load of stool.    Current Outpatient Medications:   •  ALPRAZolam (XANAX) 0.25 MG tablet, Take 1 tablet by mouth At Night As Needed for Anxiety., Disp: 30 tablet, Rfl: 2  •  calcium (OS-STIVEN) 600 MG tablet, Take 600 mg by mouth 2 (Two) Times a Day., Disp: , Rfl:   •  cetirizine (zyrTEC) 10 MG tablet, Take 10 mg by mouth Daily., Disp: , Rfl:   •  denosumab (PROLIA) 60 MG/ML solution syringe, Inject 60 mg under the skin into the appropriate area as directed Every 6 (Six) Months., Disp: , Rfl:   •  fluticasone (FLONASE) 50 MCG/ACT nasal spray, 2 sprays by Each Nare route Daily., Disp: 48 g, Rfl: 5  •  Garlic 400 MG tablet, Take  by mouth Daily., Disp: , Rfl:   •  levothyroxine (SYNTHROID, LEVOTHROID) 100 MCG tablet, Take 1 tablet by mouth Daily., Disp: 90 tablet, Rfl: 3  •  Multiple Vitamin (MULTI VITAMIN DAILY PO), Take 1 tablet by mouth Daily., Disp: , Rfl:   •  Omega-3 Fatty Acids (FISH OIL) 1000 MG capsule capsule, Take 1,000 mg by mouth Daily With Breakfast., Disp: , Rfl:   •  omeprazole (priLOSEC) 20 MG capsule, Take 1 capsule by mouth 2 (Two) Times a Day., Disp: 180 capsule, Rfl: 3  •  vitamin C (ASCORBIC ACID) 250 MG tablet, Take 250 mg by mouth Daily., Disp: , Rfl:     The following portions of the patient's history were reviewed and updated as appropriate: allergies, current medications, past family history, past medical history, past social history, past surgical history and problem list.    Review of Systems   Constitutional: Negative.    Respiratory: Negative.     Cardiovascular: Negative.    Gastrointestinal: Positive for constipation.   Musculoskeletal: Negative.    Skin: Negative.    Neurological: Negative.    Psychiatric/Behavioral: Negative.        Objective   Physical Exam  Neck:      Musculoskeletal: Neck supple.   Cardiovascular:      Rate and Rhythm: Normal rate and regular rhythm.      Heart sounds: Normal heart sounds.   Pulmonary:      Effort: Pulmonary effort is normal.      Breath sounds: Normal breath sounds.   Abdominal:      General: Bowel sounds are normal.   Skin:     General: Skin is warm.   Neurological:      Mental Status: She is alert and oriented to person, place, and time.         All tests have been reviewed.    Assessment/Plan   There are no diagnoses linked to this encounter.          hematuria  Repeat      Hypothyroidism, cont med  Gastroesophageal reflux disease cont med.     Acute right-sided low back pain, unspecified whether sciatica present resolved     Right lower quadrant abdominal pain resolved     Constipation fiber and water        As a scheduled

## 2020-12-11 DIAGNOSIS — R31.9 HEMATURIA, UNSPECIFIED TYPE: Primary | ICD-10-CM

## 2020-12-11 LAB
APPEARANCE UR: CLEAR
BACTERIA #/AREA URNS HPF: NORMAL /HPF
BILIRUB UR QL STRIP: NEGATIVE
CASTS URNS MICRO: NORMAL
COLOR UR: YELLOW
EPI CELLS #/AREA URNS HPF: NORMAL /HPF
GLUCOSE UR QL: NEGATIVE
HGB UR QL STRIP: ABNORMAL
KETONES UR QL STRIP: NEGATIVE
LEUKOCYTE ESTERASE UR QL STRIP: NEGATIVE
NITRITE UR QL STRIP: NEGATIVE
PH UR STRIP: 6 [PH] (ref 5–8)
PROT UR QL STRIP: NEGATIVE
RBC #/AREA URNS HPF: NORMAL /HPF
SP GR UR: 1.01 (ref 1–1.03)
UROBILINOGEN UR STRIP-MCNC: ABNORMAL MG/DL
WBC #/AREA URNS HPF: NORMAL /HPF

## 2021-01-04 ENCOUNTER — TELEPHONE (OUTPATIENT)
Dept: GASTROENTEROLOGY | Facility: CLINIC | Age: 76
End: 2021-01-04

## 2021-01-04 NOTE — TELEPHONE ENCOUNTER
----- Message from Renay Solano MA sent at 9/21/2020 11:13 AM EDT -----  Call patient to schedule colonoscopy for March. She has prep instructions for Golytely as well as a printed Rx script

## 2021-01-06 ENCOUNTER — TELEPHONE (OUTPATIENT)
Dept: GASTROENTEROLOGY | Facility: CLINIC | Age: 76
End: 2021-01-06

## 2021-01-13 ENCOUNTER — TELEPHONE (OUTPATIENT)
Dept: GASTROENTEROLOGY | Facility: CLINIC | Age: 76
End: 2021-01-13

## 2021-01-13 DIAGNOSIS — D36.9 ADENOMATOUS POLYPS: Primary | ICD-10-CM

## 2021-01-13 RX ORDER — BISACODYL 5 MG/1
TABLET, DELAYED RELEASE ORAL
Qty: 4 TABLET | Refills: 0 | Status: SHIPPED | OUTPATIENT
Start: 2021-01-13 | End: 2021-03-01 | Stop reason: HOSPADM

## 2021-01-13 NOTE — TELEPHONE ENCOUNTER
Patient has been scheduled for colonoscopy however she states that she no longer has the Rx or the prep instructions. I will mail the instructions once prep has been sent to pharmacy.

## 2021-01-13 NOTE — TELEPHONE ENCOUNTER
----- Message from Renay Solano MA sent at 1/13/2021 11:14 AM EST -----  Had placed patient on call list to call this month to schedule her colonoscopy for March. You had given her the written Rx for prep however she states that she no longer has it. Can you send to pharmacy? [place] is in [place] so I believe she can still get the Golytely there.

## 2021-02-25 ENCOUNTER — LAB (OUTPATIENT)
Dept: LAB | Facility: HOSPITAL | Age: 76
End: 2021-02-25

## 2021-02-25 DIAGNOSIS — Z01.818 PREOP TESTING: Primary | ICD-10-CM

## 2021-02-25 DIAGNOSIS — Z01.818 PREOP TESTING: ICD-10-CM

## 2021-02-25 PROCEDURE — C9803 HOPD COVID-19 SPEC COLLECT: HCPCS

## 2021-02-25 PROCEDURE — U0005 INFEC AGEN DETEC AMPLI PROBE: HCPCS

## 2021-02-25 PROCEDURE — U0004 COV-19 TEST NON-CDC HGH THRU: HCPCS

## 2021-02-26 LAB — SARS-COV-2 RNA RESP QL NAA+PROBE: NOT DETECTED

## 2021-03-01 ENCOUNTER — HOSPITAL ENCOUNTER (OUTPATIENT)
Facility: HOSPITAL | Age: 76
Setting detail: HOSPITAL OUTPATIENT SURGERY
Discharge: HOME | End: 2021-03-01
Attending: INTERNAL MEDICINE | Admitting: INTERNAL MEDICINE

## 2021-03-01 ENCOUNTER — ANESTHESIA (OUTPATIENT)
Dept: GASTROENTEROLOGY | Facility: HOSPITAL | Age: 76
End: 2021-03-01

## 2021-03-01 ENCOUNTER — ANESTHESIA EVENT (OUTPATIENT)
Dept: GASTROENTEROLOGY | Facility: HOSPITAL | Age: 76
End: 2021-03-01

## 2021-03-01 VITALS
TEMPERATURE: 97.8 F | HEIGHT: 66 IN | RESPIRATION RATE: 16 BRPM | DIASTOLIC BLOOD PRESSURE: 62 MMHG | WEIGHT: 146 LBS | OXYGEN SATURATION: 100 % | SYSTOLIC BLOOD PRESSURE: 118 MMHG | BODY MASS INDEX: 23.46 KG/M2 | HEART RATE: 66 BPM

## 2021-03-01 DIAGNOSIS — D36.9 ADENOMATOUS POLYPS: ICD-10-CM

## 2021-03-01 PROCEDURE — 25010000002 MIDAZOLAM PER 1MG: Performed by: NURSE ANESTHETIST, CERTIFIED REGISTERED

## 2021-03-01 PROCEDURE — 45380 COLONOSCOPY AND BIOPSY: CPT | Performed by: INTERNAL MEDICINE

## 2021-03-01 PROCEDURE — 25010000002 FENTANYL CITRATE (PF) 100 MCG/2ML SOLUTION: Performed by: NURSE ANESTHETIST, CERTIFIED REGISTERED

## 2021-03-01 PROCEDURE — 45385 COLONOSCOPY W/LESION REMOVAL: CPT | Performed by: INTERNAL MEDICINE

## 2021-03-01 PROCEDURE — 88305 TISSUE EXAM BY PATHOLOGIST: CPT | Performed by: INTERNAL MEDICINE

## 2021-03-01 PROCEDURE — 25010000002 PROPOFOL 10 MG/ML EMULSION: Performed by: NURSE ANESTHETIST, CERTIFIED REGISTERED

## 2021-03-01 RX ORDER — SODIUM CHLORIDE 9 MG/ML
70 INJECTION, SOLUTION INTRAVENOUS CONTINUOUS PRN
Status: DISCONTINUED | OUTPATIENT
Start: 2021-03-01 | End: 2021-03-01 | Stop reason: HOSPADM

## 2021-03-01 RX ORDER — KETAMINE HCL IN NACL, ISO-OSM 100MG/10ML
SYRINGE (ML) INJECTION AS NEEDED
Status: DISCONTINUED | OUTPATIENT
Start: 2021-03-01 | End: 2021-03-01 | Stop reason: SURG

## 2021-03-01 RX ORDER — SODIUM CHLORIDE 0.9 % (FLUSH) 0.9 %
10 SYRINGE (ML) INJECTION AS NEEDED
Status: DISCONTINUED | OUTPATIENT
Start: 2021-03-01 | End: 2021-03-01 | Stop reason: HOSPADM

## 2021-03-01 RX ORDER — SODIUM CHLORIDE 9 MG/ML
INJECTION, SOLUTION INTRAVENOUS CONTINUOUS PRN
Status: DISCONTINUED | OUTPATIENT
Start: 2021-03-01 | End: 2021-03-01 | Stop reason: SURG

## 2021-03-01 RX ORDER — MIDAZOLAM HYDROCHLORIDE 2 MG/2ML
INJECTION, SOLUTION INTRAMUSCULAR; INTRAVENOUS AS NEEDED
Status: DISCONTINUED | OUTPATIENT
Start: 2021-03-01 | End: 2021-03-01 | Stop reason: SURG

## 2021-03-01 RX ORDER — FENTANYL CITRATE 50 UG/ML
INJECTION, SOLUTION INTRAMUSCULAR; INTRAVENOUS AS NEEDED
Status: DISCONTINUED | OUTPATIENT
Start: 2021-03-01 | End: 2021-03-01 | Stop reason: SURG

## 2021-03-01 RX ORDER — PROPOFOL 10 MG/ML
VIAL (ML) INTRAVENOUS AS NEEDED
Status: DISCONTINUED | OUTPATIENT
Start: 2021-03-01 | End: 2021-03-01 | Stop reason: SURG

## 2021-03-01 RX ADMIN — PROPOFOL 20 MG: 10 INJECTION, EMULSION INTRAVENOUS at 09:07

## 2021-03-01 RX ADMIN — FENTANYL CITRATE 50 MCG: 50 INJECTION, SOLUTION INTRAMUSCULAR; INTRAVENOUS at 09:06

## 2021-03-01 RX ADMIN — PROPOFOL 20 MG: 10 INJECTION, EMULSION INTRAVENOUS at 09:24

## 2021-03-01 RX ADMIN — MIDAZOLAM HYDROCHLORIDE 2 MG: 1 INJECTION, SOLUTION INTRAMUSCULAR; INTRAVENOUS at 09:05

## 2021-03-01 RX ADMIN — PROPOFOL 20 MG: 10 INJECTION, EMULSION INTRAVENOUS at 09:15

## 2021-03-01 RX ADMIN — GLYCOPYRROLATE 0.2 MG: 0.2 INJECTION, SOLUTION INTRAMUSCULAR; INTRAVITREAL at 09:17

## 2021-03-01 RX ADMIN — FENTANYL CITRATE 50 MCG: 50 INJECTION, SOLUTION INTRAMUSCULAR; INTRAVENOUS at 09:07

## 2021-03-01 RX ADMIN — Medication 25 MG: at 09:06

## 2021-03-01 RX ADMIN — SODIUM CHLORIDE 70 ML/HR: 9 INJECTION, SOLUTION INTRAVENOUS at 08:15

## 2021-03-01 RX ADMIN — PROPOFOL 20 MG: 10 INJECTION, EMULSION INTRAVENOUS at 09:10

## 2021-03-01 RX ADMIN — SODIUM CHLORIDE: 9 INJECTION, SOLUTION INTRAVENOUS at 08:53

## 2021-03-01 RX ADMIN — PROPOFOL 20 MG: 10 INJECTION, EMULSION INTRAVENOUS at 09:20

## 2021-03-01 RX ADMIN — PROPOFOL 20 MG: 10 INJECTION, EMULSION INTRAVENOUS at 09:12

## 2021-03-01 NOTE — ANESTHESIA POSTPROCEDURE EVALUATION
Patient: Yamileth De Guzman    Procedure Summary     Date: 03/01/21 Room / Location: [facility] / [facility]    Anesthesia Start: 0902 Anesthesia Stop:     Procedure: COLONOSCOPY WITH BIOPSY AND POLYPECTOMY (N/A Anus) Diagnosis:       Adenomatous polyps      (Adenomatous polyps [D36.9])    Surgeon: Scar Rogers MD Provider: Velasquez Rosenberg CRNA    Anesthesia Type: MAC ASA Status: 3          Anesthesia Type: MAC    Vitals  No vitals data found for the desired time range.          Post Anesthesia Care and Evaluation    Patient location during evaluation: PHASE II  Patient participation: complete - patient participated  Level of consciousness: awake  Pain score: 0  Pain management: adequate  Airway patency: patent  Anesthetic complications: No anesthetic complications  PONV Status: none  Cardiovascular status: acceptable  Respiratory status: acceptable and nasal cannula  Hydration status: acceptable    Comments: vsss resp spont, reflexes intact, responsive, report given to pacu nurse

## 2021-03-01 NOTE — ANESTHESIA PREPROCEDURE EVALUATION
Anesthesia Evaluation     Patient summary reviewed and Nursing notes reviewed   no history of anesthetic complications:  NPO Solid Status: > 8 hours  NPO Liquid Status: > 8 hours           Airway   Mallampati: II  TM distance: >3 FB  Neck ROM: full  No difficulty expected  Dental      Pulmonary    (+) a smoker Current, COPD, decreased breath sounds,     ROS comment: Lung mass  Cardiovascular     (+) PVD, hyperlipidemia,       Neuro/Psych  (+) psychiatric history Anxiety and Depression,     GI/Hepatic/Renal/Endo    (+)  GERD,  thyroid problem hypothyroidism    Musculoskeletal     (+) arthralgias, back pain, chronic pain, myalgias,   Abdominal    Substance History   (+) alcohol use,      OB/GYN          Other   arthritis,      ROS/Med Hx Other: Lung mass                    Anesthesia Plan    ASA 3     MAC   (Risks and benefits discussed including risk of aspiration, recall and dental damage. All patient questions answered.    Will continue with plan of care.)  intravenous induction     Anesthetic plan, all risks, benefits, and alternatives have been provided, discussed and informed consent has been obtained with: patient.

## 2021-03-01 NOTE — DISCHARGE INSTRUCTIONS
Rest today  No pushing,pulling,tugging,heavy lifting, or strenuous activity   No major decision making,driving,or drinking alcoholic beverages for 24 hours due to the sedation you received  Always use good hand hygiene/washing technique  No driving on pain medication.    To assist you in voiding:  Drink plenty of fluids  Listen to running water while attempting to void.    If you are unable to urinate and you have an uncomfortable urge to void or it has been   6 hours since you were discharged, return to the Emergency Room.    - Discharge patient to home (ambulatory).   - High fiber diet.   - Continue present medications.   - Await pathology results.   - Repeat colonoscopy in 3 years for surveillance ( unless prior EMR site biopsy shows adenomatous changes).   - Return to GI office in 6 weeks.

## 2021-03-01 NOTE — H&P
[facility]  HISTORY AND PHYSICAL    Patient Name: Yamileth De Guzman  : 1945  MRN: [id number]    Chief Complaint:   For surveillance colonoscopy    History Of Presenting Illness:    Advanced poly AC s/p EMR with in one year for surveillance    Past Medical History:   Diagnosis Date   • Abdominal bloating    • Acid reflux    • Adenomatous polyp of colon 2020   • Arthritis    • Carotid bruit    • Colon polyp    • Constipation    • Disease of thyroid gland    • Fibromyalgia    • Full dentures    • Hematuria    • Lung nodule    • Osteoporosis    • Sinusitis    • Wears glasses     READING GLASSES ONLY       Past Surgical History:   Procedure Laterality Date   • APPENDECTOMY     • COLONOSCOPY     • COLONOSCOPY N/A 2020    Procedure: COLONOSCOPY WITH COLD SNARE POLYPECTOMY, COLD FORCEP POLYPECTOMY, SUBMUCOSAL INJECTION OF NORMAL SALINE, ENDOSCOPIC MUCOSAL RESECTION, HOT SNARE POLYPECTOMY, THERMAL ABLATION, QUICK CLIP PLACEMENT TIMES 4 AND BARBIE INK INJECTION;  Surgeon: Scar Rogers MD;  Location: [facility] ENDOSCOPY;  Service: Gastroenterology;  Laterality: N/A;   • HYSTERECTOMY      PARTIAL STILL HAS OVARIES   • MULTIPLE TOOTH EXTRACTIONS     • TUBAL ABDOMINAL LIGATION     • UPPER GASTROINTESTINAL ENDOSCOPY     • VAGINAL DELIVERY      X1       Social History     Socioeconomic History   • Marital status:      Spouse name: Not on file   • Number of children: Not on file   • Years of education: Not on file   • Highest education level: Not on file   Tobacco Use   • Smoking status: Current Every Day Smoker     Packs/day: 1.00     Types: Cigarettes   • Smokeless tobacco: Never Used   Substance and Sexual Activity   • Alcohol use: Yes     Comment: moderate   • Drug use: No   • Sexual activity: Defer       Family History   Problem Relation Age of Onset   • Hearing loss Father    • Arthritis Father    • Cancer Father    • GI problems Father    • Breast cancer Sister     • Colon cancer Neg Hx        Prior to Admission Medications:  Medications Prior to Admission   Medication Sig Dispense Refill Last Dose   • ALPRAZolam (XANAX) 0.25 MG tablet Take 1 tablet by mouth At Night As Needed for Anxiety. 30 tablet 2 Past Week at Unknown time   • bisacodyl (DULCOLAX) 5 MG EC tablet Take as directed for colon prep 4 tablet 0 2/28/2021 at 1900   • calcium (OS-STIVEN) 600 MG tablet Take 600 mg by mouth 2 (Two) Times a Day.   2/28/2021 at 0800   • cetirizine (zyrTEC) 10 MG tablet Take 10 mg by mouth Daily.   2/28/2021 at 2200   • fluticasone (FLONASE) 50 MCG/ACT nasal spray 2 sprays by Each Nare route Daily. 48 g 5 2/28/2021 at 2200   • Garlic 400 MG tablet Take  by mouth Daily.   2/28/2021 at 0800   • levothyroxine (SYNTHROID, LEVOTHROID) 100 MCG tablet Take 1 tablet by mouth Daily. 90 tablet 3 2/28/2021 at 0800   • Multiple Vitamin (MULTI VITAMIN DAILY PO) Take 1 tablet by mouth Daily.   Past Week at Unknown time   • Omega-3 Fatty Acids (FISH OIL) 1000 MG capsule capsule Take 1,000 mg by mouth Daily With Breakfast.   2/28/2021 at 0800   • omeprazole (priLOSEC) 20 MG capsule Take 1 capsule by mouth 2 (Two) Times a Day. 180 capsule 3 2/28/2021 at 0800   • polyethylene glycol (GoLYTELY) 236 g solution Starting at 5 pm on day prior to procedure, drink 8 ounces every 30 minutes as directed 4000 mL 0 2/28/2021 at 1600   • vitamin C (ASCORBIC ACID) 250 MG tablet Take 250 mg by mouth Daily.   2/28/2021 at 0800   • denosumab (PROLIA) 60 MG/ML solution syringe Inject 60 mg under the skin into the appropriate area as directed Every 6 (Six) Months.   More than a month at Unknown time       Allergies:  No Known Allergies     Vitals: Temp:  [98.1 °F (36.7 °C)] 98.1 °F (36.7 °C)  Heart Rate:  [71] 71  Resp:  [16] 16  BP: (136)/(63) 136/63    Review Of Systems:  Constitutional:  Negative for chills, fever, and unexpected weight change.  Respiratory:  Negative for cough, chest tightness, shortness of breath, and  wheezing.  Cardiovascular:  Negative for chest pain, palpitations, and leg swelling.  Gastrointestinal:  Negative for abdominal distention, abdominal pain, Nausea, vomiting.  Neurological:  Negative for Weakness, numbness, and headaches.     Physical Exam:    General Appearance:  Alert, cooperative, in no acute distress.   Lungs:   Clear to auscultation, respirations regular, even and                 unlabored.   Heart:  Regular rhythm and normal rate.   Abdomen:   Normal bowel sounds, no masses, no organomegaly. Soft, non-tender, non-distended   Neurologic: Alert and oriented x 3. Moves all four limbs equally       Plan: COLONOSCOPY (N/A)     Scar Rogers MD  3/1/2021

## 2021-03-04 LAB
LAB AP CASE REPORT: NORMAL
PATH REPORT.FINAL DX SPEC: NORMAL

## 2021-04-14 ENCOUNTER — OFFICE VISIT (OUTPATIENT)
Dept: GASTROENTEROLOGY | Facility: CLINIC | Age: 76
End: 2021-04-14

## 2021-04-14 VITALS
SYSTOLIC BLOOD PRESSURE: 109 MMHG | DIASTOLIC BLOOD PRESSURE: 60 MMHG | WEIGHT: 146 LBS | RESPIRATION RATE: 16 BRPM | TEMPERATURE: 98.2 F | HEIGHT: 66 IN | HEART RATE: 72 BPM | BODY MASS INDEX: 23.46 KG/M2

## 2021-04-14 DIAGNOSIS — D36.9 ADENOMATOUS POLYPS: Primary | Chronic | ICD-10-CM

## 2021-04-14 DIAGNOSIS — K59.04 CHRONIC IDIOPATHIC CONSTIPATION: Chronic | ICD-10-CM

## 2021-04-14 DIAGNOSIS — K21.9 GASTROESOPHAGEAL REFLUX DISEASE, UNSPECIFIED WHETHER ESOPHAGITIS PRESENT: Chronic | ICD-10-CM

## 2021-04-14 PROBLEM — K59.00 CONSTIPATION: Status: RESOLVED | Noted: 2020-07-21 | Resolved: 2021-04-14

## 2021-04-14 PROBLEM — R10.31 RIGHT LOWER QUADRANT ABDOMINAL PAIN: Status: RESOLVED | Noted: 2020-11-25 | Resolved: 2021-04-14

## 2021-04-14 PROBLEM — K58.9 IRRITABLE BOWEL SYNDROME: Status: RESOLVED | Noted: 2017-01-19 | Resolved: 2021-04-14

## 2021-04-14 PROBLEM — K21.00 GASTROESOPHAGEAL REFLUX DISEASE WITH ESOPHAGITIS: Chronic | Status: ACTIVE | Noted: 2020-07-21

## 2021-04-14 PROCEDURE — 99213 OFFICE O/P EST LOW 20 MIN: CPT | Performed by: NURSE PRACTITIONER

## 2021-04-14 RX ORDER — ASPIRIN 81 MG
TABLET, DELAYED RELEASE (ENTERIC COATED) ORAL
Qty: 60 TABLET | Refills: 2 | Status: SHIPPED | OUTPATIENT
Start: 2021-04-14 | End: 2022-12-13

## 2021-04-14 NOTE — PROGRESS NOTES
Follow Up Note     Date: 2021   Patient Name: Yamileth De Guzman  MRN: [id number]  : 1945     Primary Care Provider: Da Dong MD     Chief Complaint   Patient presents with   • Follow-up   • Constipation     History of present illness:   2021  Yamileth De Guzman is a 75 y.o. female who is here today for follow up for constipation.     She continues to have constipation that is unchanged. She tried Miralax, but it caused her to have incontinence at times and she stopped it. She has been eating a high fiber diet with improvement. No rectal bleeding. No abdominal pain. Reflux well controlled with Omeprazole 20 mg. No history of difficulty swallowing.    Interval History:  2020  Yamileth De Guzman is a 75 y.o. female who is here today for follow up for constipation. She has felt much better. Abdominal pain has significantly improved, she has not had any further episodes. Constipation is improving with high fiber diet.     2020  There is a long standing history of constipation. The patient has 1 firm bowel movement every 3-4 days. She frequently has gas and bloating. For the past 2-3 months, constipation and gas have worsened, and she has been having pain in the lower abdomen. The pain comes and goes, and is a moderate, sharp pain. No history of fevers or chills, nausea or vomiting.     The patient is taking Omeprazole 20mg with reasonable control of heartburn. Reflux is worse at night with lying down. Heartburn is moderate. The patient denies difficulty swallowing.     Last EGD was in .    Subjective      Past Medical History:   Diagnosis Date   • Abdominal bloating    • Acid reflux    • Adenomatous polyp of colon 2020   • Arthritis    • Carotid bruit    • Colon polyp    • Constipation    • Disease of thyroid gland    • Fibromyalgia    • Full dentures    • Hematuria    • Lung nodule    • Osteoporosis    • Sinusitis    • Wears glasses     READING GLASSES ONLY     Past  Surgical History:   Procedure Laterality Date   • APPENDECTOMY     • COLONOSCOPY  2012   • COLONOSCOPY N/A 8/17/2020    Procedure: COLONOSCOPY WITH COLD SNARE POLYPECTOMY, COLD FORCEP POLYPECTOMY, SUBMUCOSAL INJECTION OF NORMAL SALINE, ENDOSCOPIC MUCOSAL RESECTION, HOT SNARE POLYPECTOMY, THERMAL ABLATION, QUICK CLIP PLACEMENT TIMES 4 AND BARBIE INK INJECTION;  Surgeon: Scar Rogers MD;  Location: [facility] ENDOSCOPY;  Service: Gastroenterology;  Laterality: N/A;   • COLONOSCOPY N/A 3/1/2021    Procedure: COLONOSCOPY WITH BIOPSY AND POLYPECTOMY;  Surgeon: Scar Rogers MD;  Location: [facility] ENDOSCOPY;  Service: Gastroenterology;  Laterality: N/A;   • HYSTERECTOMY      PARTIAL STILL HAS OVARIES   • MULTIPLE TOOTH EXTRACTIONS     • TUBAL ABDOMINAL LIGATION     • UPPER GASTROINTESTINAL ENDOSCOPY  2012   • VAGINAL DELIVERY      X1     Family History   Problem Relation Age of Onset   • Hearing loss Father    • Arthritis Father    • Cancer Father    • GI problems Father    • Breast cancer Sister    • Colon cancer Neg Hx      Social History     Socioeconomic History   • Marital status:      Spouse name: Not on file   • Number of children: Not on file   • Years of education: Not on file   • Highest education level: Not on file   Tobacco Use   • Smoking status: Current Every Day Smoker     Packs/day: 1.00     Types: Cigarettes   • Smokeless tobacco: Never Used   Vaping Use   • Vaping Use: Never used   Substance and Sexual Activity   • Alcohol use: Yes     Comment: moderate   • Drug use: No   • Sexual activity: Defer       Current Outpatient Medications:   •  ALPRAZolam (XANAX) 0.25 MG tablet, Take 1 tablet by mouth At Night As Needed for Anxiety., Disp: 30 tablet, Rfl: 2  •  calcium (OS-STIVEN) 600 MG tablet, Take 600 mg by mouth 2 (Two) Times a Day., Disp: , Rfl:   •  cetirizine (zyrTEC) 10 MG tablet, Take 10 mg by mouth Daily., Disp: , Rfl:   •  denosumab (PROLIA) 60 MG/ML solution syringe, Inject 60 mg  under the skin into the appropriate area as directed Every 6 (Six) Months., Disp: , Rfl:   •  fluticasone (FLONASE) 50 MCG/ACT nasal spray, 2 sprays by Each Nare route Daily., Disp: 48 g, Rfl: 5  •  Garlic 400 MG tablet, Take  by mouth Daily., Disp: , Rfl:   •  levothyroxine (SYNTHROID, LEVOTHROID) 100 MCG tablet, Take 1 tablet by mouth Daily., Disp: 90 tablet, Rfl: 3  •  Multiple Vitamin (MULTI VITAMIN DAILY PO), Take 1 tablet by mouth Daily., Disp: , Rfl:   •  Omega-3 Fatty Acids (FISH OIL) 1000 MG capsule capsule, Take 1,000 mg by mouth Daily With Breakfast., Disp: , Rfl:   •  omeprazole (priLOSEC) 20 MG capsule, Take 1 capsule by mouth 2 (Two) Times a Day., Disp: 180 capsule, Rfl: 3  •  vitamin C (ASCORBIC ACID) 250 MG tablet, Take 250 mg by mouth Daily., Disp: , Rfl:   •  Docusate Sodium 100 MG capsule, Take 1 - 2 tablets daily. Adjust to have 1-2 soft stools daily., Disp: 60 tablet, Rfl: 2     No Known Allergies     The following portions of the patient's history were reviewed and updated as appropriate: allergies, current medications, past family history, past medical history, past social history, past surgical history and problem list.  Objective     Physical Exam  Vitals and nursing note reviewed.   Constitutional:       General: She is awake. She is not in acute distress.     Appearance: Normal appearance. She is well-developed. She is not diaphoretic.   HENT:      Head: Normocephalic and atraumatic.      Right Ear: Hearing and external ear normal.      Left Ear: Hearing and external ear normal.      Nose: Nose normal.      Mouth/Throat:      Mouth: Mucous membranes are not pale, not dry and not cyanotic.   Eyes:      General: Lids are normal.         Right eye: No discharge.         Left eye: No discharge.      Conjunctiva/sclera: Conjunctivae normal.   Neck:      Thyroid: No thyroid mass or thyromegaly.      Vascular: No JVD.      Trachea: Trachea normal.   Cardiovascular:      Rate and Rhythm: Normal  "rate and regular rhythm.      Heart sounds: Normal heart sounds and S2 normal. No murmur heard.   No friction rub. No gallop. No S3 sounds.    Pulmonary:      Effort: Pulmonary effort is normal. No respiratory distress.      Breath sounds: Normal breath sounds.   Chest:      Chest wall: No tenderness.   Abdominal:      General: Bowel sounds are normal. There is no distension.      Palpations: Abdomen is not rigid. There is no hepatomegaly, splenomegaly or mass.      Tenderness: There is no abdominal tenderness. There is no guarding or rebound.      Hernia: No hernia is present.   Musculoskeletal:         General: Normal range of motion.      Cervical back: Neck supple. No edema.   Lymphadenopathy:      Cervical: No cervical adenopathy.      Upper Body:      Left upper body: No supraclavicular adenopathy.   Skin:     General: Skin is warm and dry.      Coloration: Skin is not pale.      Findings: No rash.      Nails: There is no clubbing.   Neurological:      Mental Status: She is alert and oriented to person, place, and time.      Cranial Nerves: No cranial nerve deficit.      Sensory: No sensory deficit.   Psychiatric:         Mood and Affect: Mood normal.         Speech: Speech normal.         Behavior: Behavior is cooperative.       Vitals:    04/14/21 1027   BP: 109/60   Pulse: 72   Resp: 16   Temp: 98.2 °F (36.8 °C)   Weight: 66.2 kg (146 lb)   Height: 167.6 cm (66\")     Results Review:   I reviewed the patient's new clinical results.    Admission on 03/01/2021, Discharged on 03/01/2021   Component Date Value Ref Range Status   • Case Report 03/01/2021    Final                    Value:Surgical Pathology Report                         Case: [id number]                                  Authorizing Provider:  Scar Rogers MD  Collected:           03/01/2021 09:34 AM          Ordering Location:     [facility]    Received:            03/01/2021 02:45 PM                                 SURG " ENDO                                                                    Pathologist:           Jesse Morales MD                                                          Specimens:   1) - Large Intestine, Right / Ascending Colon, previous emr site                                    2) - Large Intestine, Right / Ascending Colon, proximal ascending previous polyps                   site                                                                                                3) - Large Intestine, Left / Descending Colon                                                       4) - Large Intestine, Rectum, rectal sigmoid polyp x 2                                    • Final Diagnosis 03/01/2021    Final                    Value:This result contains rich text formatting which cannot be displayed here.   Lab on 02/25/2021   Component Date Value Ref Range Status   • SARS-CoV-2 ELENA 02/25/2021 Not Detected  Not Detected Final      No radiology results for the last 90 days.     Colonoscopy dated 8/17/2020 with 6 6 to 8 mm polyps removed.  One 20 to 25 mm polyp in the ascending colon removed by mucosal resection.  Cecum, ascending colon, and transverse colon polyps with tubular adenoma.  No high-grade dysplasia.     Colonoscopy dated 3/1/2021 1 6 to 8 mm polyp in the proximal ascending colon removed.  1 5 mm polyp in the proximal descending colon removed.  A few diminutive polyps in the rectum and sigmoid colon removed.  Diverticulosis in sigmoid colon.  Hyperplastic changes of prior EMR site noted, biopsies obtained.  Ascending colon polyp biopsy with tubular adenoma without high-grade dysplasia.  Ascending colon biopsy proximal with tubular adenoma fragments without high-grade dysplasia.  Descending colon polyp biopsy of tubular adenoma fragments without high-grade dysplasia.  Rectosigmoid colon polyp x2 biopsy with hyperplastic polyps.    Assessment / Plan      1. Adenomatous polyps  Colonoscopy with tubular adenomas  without dysplasia removed. Biopsy at site of previous removed 20-25 mm polyp with tubular adenoma without dysplasia.   Colonoscopy for surveillance in 3 years, 2024.    9/21/2020  6 colon polyps removed, tubular adenoma without dysplasia. Ascending colon with 20-25 mm polyp removed by EMR, tubular adenoma without dysplasia.     2. Chronic idiopathic constipation  She is no longer taking the Miralax as it can cause urgency resulting in occasional incontinence. She is eating a high fiber diet and fiber supplements with improvement. No rectal bleeding. No abdominal pain. Colonoscopy with polyps removed, otherwise unremarkable.  Continue high fiber diet with liberal water intake.   May take stool softeners, 1-2 per day if needed for constipation.    - Docusate Sodium 100 MG capsule; Take 1 - 2 tablets daily. Adjust to have 1-2 soft stools daily.  Dispense: 60 tablet; Refill: 2    9/21/2020  No history of rectal bleeding. TSH normal.  Improved with high fiber diet and Miralax. Will continue.    3. Gastroesophageal reflux disease, unspecified whether esophagitis present  Reasonable control with Omeprazole. Last EGD was in 2012 and was normal per patient.  Anti-reflux measures  Continue PPI for now.    9/21/2020  Reasonable control of reflux with low dose PPI. Symptoms return when off of PPI.  Continue Omeprazole 20 mg daily for now.    Patient Instructions   Antireflux measures: Avoid fried, fatty foods, alcohol, chocolate, coffee, tea,  soft drinks, peppermint and spearmint, spicy foods, tomatoes and tomato based foods, onion based foods, and smoking. Other antireflux measures include weight reduction if overweight, avoiding tight clothing around the abdomen, elevating the head of the bed 6 inches with blocks under the head board, and don't drink or eat before going to bed and avoid lying down immediately after meals.  Omeprazole 20 mg 1 po daily in the am 30 minutes before breakfast.  Recommended to take Levothyroxine  first in the am upon waking, wait 30 minutes, then take Omeprazole 20 mg, wait 30 minutes, then eat breakfast and take other medications.  High fiber, low fat diet with liberal water intake.   May take stool softeners, 1-2 per day.  Follow up: 6 months or sooner if needed    High-Fiber Diet  Fiber, also called dietary fiber, is a type of carbohydrate that is found in fruits, vegetables, whole grains, and beans. A high-fiber diet can have many health benefits. Your health care provider may recommend a high-fiber diet to help:  · Prevent constipation. Fiber can make your bowel movements more regular.  · Lower your cholesterol.  · Relieve the following conditions:  ? Swelling of veins in the anus (hemorrhoids).  ? Swelling and irritation (inflammation) of specific areas of the digestive tract (uncomplicated diverticulosis).  ? A problem of the large intestine (colon) that sometimes causes pain and diarrhea (irritable bowel syndrome, IBS).  · Prevent overeating as part of a weight-loss plan.  · Prevent heart disease, type 2 diabetes, and certain cancers.  What is my plan?  The recommended daily fiber intake in grams (g) includes:  · 38 g for men age 50 or younger.  · 30 g for men over age 50.  · 25 g for women age 50 or younger.  · 21 g for women over age 50.  You can get the recommended daily intake of dietary fiber by:  · Eating a variety of fruits, vegetables, grains, and beans.  · Taking a fiber supplement, if it is not possible to get enough fiber through your diet.  What do I need to know about a high-fiber diet?  · It is better to get fiber through food sources rather than from fiber supplements. There is not a lot of research about how effective supplements are.  · Always check the fiber content on the nutrition facts label of any prepackaged food. Look for foods that contain 5 g of fiber or more per serving.  · Talk with a diet and nutrition specialist (dietitian) if you have questions about specific foods that  are recommended or not recommended for your medical condition, especially if those foods are not listed below.  · Gradually increase how much fiber you consume. If you increase your intake of dietary fiber too quickly, you may have bloating, cramping, or gas.  · Drink plenty of water. Water helps you to digest fiber.  What are tips for following this plan?  · Eat a wide variety of high-fiber foods.  · Make sure that half of the grains that you eat each day are whole grains.  · Eat breads and cereals that are made with whole-grain flour instead of refined flour or white flour.  · Eat brown rice, bulgur wheat, or millet instead of white rice.  · Start the day with a breakfast that is high in fiber, such as a cereal that contains 5 g of fiber or more per serving.  · Use beans in place of meat in soups, salads, and pasta dishes.  · Eat high-fiber snacks, such as berries, raw vegetables, nuts, and popcorn.  · Choose whole fruits and vegetables instead of processed forms like juice or sauce.  What foods can I eat?    Fruits  Berries. Pears. Apples. Oranges. Avocado. Prunes and raisins. Dried figs.  Vegetables  Sweet potatoes. Spinach. Kale. Artichokes. Cabbage. Broccoli. Cauliflower. Green peas. Carrots. Squash.  Grains  Whole-grain breads. Multigrain cereal. Oats and oatmeal. Brown rice. Barley. Bulgur wheat. Millet. Quinoa. Bran muffins. Popcorn. Rye wafer crackers.  Meats and other proteins  Navy, kidney, and dorsey beans. Soybeans. Split peas. Lentils. Nuts and seeds.  Dairy  Fiber-fortified yogurt.  Beverages  Fiber-fortified soy milk. Fiber-fortified orange juice.  Other foods  Fiber bars.  The items listed above may not be a complete list of recommended foods and beverages. Contact a dietitian for more options.  What foods are not recommended?  Fruits  Fruit juice. Cooked, strained fruit.  Vegetables  Fried potatoes. Canned vegetables. Well-cooked vegetables.  Grains  White bread. Pasta made with refined flour.  White rice.  Meats and other proteins  Fatty cuts of meat. Fried chicken or fried fish.  Dairy  Milk. Yogurt. Cream cheese. Sour cream.  Fats and oils  [place].  Beverages  Soft drinks.  Other foods  Cakes and pastries.  The items listed above may not be a complete list of foods and beverages to avoid. Contact a dietitian for more information.  Summary  · Fiber is a type of carbohydrate. It is found in fruits, vegetables, whole grains, and beans.  · There are many health benefits of eating a high-fiber diet, such as preventing constipation, lowering blood cholesterol, helping with weight loss, and reducing your risk of heart disease, diabetes, and certain cancers.  · Gradually increase your intake of fiber. Increasing too fast can result in cramping, bloating, and gas. Drink plenty of water while you increase your fiber.  · The best sources of fiber include whole fruits and vegetables, whole grains, nuts, seeds, and beans.  This information is not intended to replace advice given to you by your health care provider. Make sure you discuss any questions you have with your health care provider.  Document Revised: 10/22/2018 Document Reviewed: 10/22/2018  [employer] Patient Education © 2021 [employer]    Stew Grant, KRISTI  4/14/2021    Please note that portions of this note may have been completed with a voice recognition program. Efforts were made to edit the dictations, but occasionally words are mistranscribed.

## 2021-04-14 NOTE — PATIENT INSTRUCTIONS
Antireflux measures: Avoid fried, fatty foods, alcohol, chocolate, coffee, tea,  soft drinks, peppermint and spearmint, spicy foods, tomatoes and tomato based foods, onion based foods, and smoking. Other antireflux measures include weight reduction if overweight, avoiding tight clothing around the abdomen, elevating the head of the bed 6 inches with blocks under the head board, and don't drink or eat before going to bed and avoid lying down immediately after meals.  Omeprazole 20 mg 1 po daily in the am 30 minutes before breakfast.  Recommended to take Levothyroxine first in the am upon waking, wait 30 minutes, then take Omeprazole 20 mg, wait 30 minutes, then eat breakfast and take other medications.  High fiber, low fat diet with liberal water intake.   May take stool softeners, 1-2 per day.  Follow up: 6 months or sooner if needed    High-Fiber Diet  Fiber, also called dietary fiber, is a type of carbohydrate that is found in fruits, vegetables, whole grains, and beans. A high-fiber diet can have many health benefits. Your health care provider may recommend a high-fiber diet to help:  · Prevent constipation. Fiber can make your bowel movements more regular.  · Lower your cholesterol.  · Relieve the following conditions:  ? Swelling of veins in the anus (hemorrhoids).  ? Swelling and irritation (inflammation) of specific areas of the digestive tract (uncomplicated diverticulosis).  ? A problem of the large intestine (colon) that sometimes causes pain and diarrhea (irritable bowel syndrome, IBS).  · Prevent overeating as part of a weight-loss plan.  · Prevent heart disease, type 2 diabetes, and certain cancers.  What is my plan?  The recommended daily fiber intake in grams (g) includes:  · 38 g for men age 50 or younger.  · 30 g for men over age 50.  · 25 g for women age 50 or younger.  · 21 g for women over age 50.  You can get the recommended daily intake of dietary fiber by:  · Eating a variety of fruits,  vegetables, grains, and beans.  · Taking a fiber supplement, if it is not possible to get enough fiber through your diet.  What do I need to know about a high-fiber diet?  · It is better to get fiber through food sources rather than from fiber supplements. There is not a lot of research about how effective supplements are.  · Always check the fiber content on the nutrition facts label of any prepackaged food. Look for foods that contain 5 g of fiber or more per serving.  · Talk with a diet and nutrition specialist (dietitian) if you have questions about specific foods that are recommended or not recommended for your medical condition, especially if those foods are not listed below.  · Gradually increase how much fiber you consume. If you increase your intake of dietary fiber too quickly, you may have bloating, cramping, or gas.  · Drink plenty of water. Water helps you to digest fiber.  What are tips for following this plan?  · Eat a wide variety of high-fiber foods.  · Make sure that half of the grains that you eat each day are whole grains.  · Eat breads and cereals that are made with whole-grain flour instead of refined flour or white flour.  · Eat brown rice, bulgur wheat, or millet instead of white rice.  · Start the day with a breakfast that is high in fiber, such as a cereal that contains 5 g of fiber or more per serving.  · Use beans in place of meat in soups, salads, and pasta dishes.  · Eat high-fiber snacks, such as berries, raw vegetables, nuts, and popcorn.  · Choose whole fruits and vegetables instead of processed forms like juice or sauce.  What foods can I eat?    Fruits  Berries. Pears. Apples. Oranges. Avocado. Prunes and raisins. Dried figs.  Vegetables  Sweet potatoes. Spinach. Kale. Artichokes. Cabbage. Broccoli. Cauliflower. Green peas. Carrots. Squash.  Grains  Whole-grain breads. Multigrain cereal. Oats and oatmeal. Brown rice. Barley. Bulgur wheat. Millet. Quinoa. Bran muffins. Popcorn. Rye  wafer crackers.  Meats and other proteins  Navy, kidney, and dorsey beans. Soybeans. Split peas. Lentils. Nuts and seeds.  Dairy  Fiber-fortified yogurt.  Beverages  Fiber-fortified soy milk. Fiber-fortified orange juice.  Other foods  Fiber bars.  The items listed above may not be a complete list of recommended foods and beverages. Contact a dietitian for more options.  What foods are not recommended?  Fruits  Fruit juice. Cooked, strained fruit.  Vegetables  Fried potatoes. Canned vegetables. Well-cooked vegetables.  Grains  White bread. Pasta made with refined flour. White rice.  Meats and other proteins  Fatty cuts of meat. Fried chicken or fried fish.  Dairy  Milk. Yogurt. Cream cheese. Sour cream.  Fats and oils  [place].  Beverages  Soft drinks.  Other foods  Cakes and pastries.  The items listed above may not be a complete list of foods and beverages to avoid. Contact a dietitian for more information.  Summary  · Fiber is a type of carbohydrate. It is found in fruits, vegetables, whole grains, and beans.  · There are many health benefits of eating a high-fiber diet, such as preventing constipation, lowering blood cholesterol, helping with weight loss, and reducing your risk of heart disease, diabetes, and certain cancers.  · Gradually increase your intake of fiber. Increasing too fast can result in cramping, bloating, and gas. Drink plenty of water while you increase your fiber.  · The best sources of fiber include whole fruits and vegetables, whole grains, nuts, seeds, and beans.  This information is not intended to replace advice given to you by your health care provider. Make sure you discuss any questions you have with your health care provider.  Document Revised: 10/22/2018 Document Reviewed: 10/22/2018  [employer] Patient Education © 2021 [employer]

## 2021-04-15 ENCOUNTER — TELEPHONE (OUTPATIENT)
Dept: GASTROENTEROLOGY | Facility: CLINIC | Age: 76
End: 2021-04-15

## 2021-04-15 NOTE — TELEPHONE ENCOUNTER
----- Message from KRISTI Sage sent at 4/14/2021  6:00 PM EDT -----  I tried to call her, but no answer and no voicemail. Can you let her know she needs her next colonoscopy in 3 years, 2024.

## 2021-04-19 NOTE — TELEPHONE ENCOUNTER
Dr. Rogers reviewed colonoscopy pathology/report again and would like patient to have colonoscopy for surveillance in 1 year, 2022, instead of 3. Contacted patient and discussed. She verbalizes understanding.

## 2021-05-03 ENCOUNTER — HOSPITAL ENCOUNTER (OUTPATIENT)
Dept: INFUSION THERAPY | Facility: HOSPITAL | Age: 76
Discharge: HOME | End: 2021-05-03
Admitting: INTERNAL MEDICINE

## 2021-05-03 VITALS
TEMPERATURE: 96.9 F | HEART RATE: 56 BPM | RESPIRATION RATE: 18 BRPM | DIASTOLIC BLOOD PRESSURE: 50 MMHG | SYSTOLIC BLOOD PRESSURE: 106 MMHG | OXYGEN SATURATION: 96 %

## 2021-05-03 DIAGNOSIS — M81.0 SENILE OSTEOPOROSIS: Primary | ICD-10-CM

## 2021-05-03 PROCEDURE — 25010000002 DENOSUMAB 60 MG/ML SOLUTION PREFILLED SYRINGE: Performed by: INTERNAL MEDICINE

## 2021-05-03 PROCEDURE — 96372 THER/PROPH/DIAG INJ SC/IM: CPT

## 2021-05-03 RX ADMIN — DENOSUMAB 60 MG: 60 INJECTION SUBCUTANEOUS at 10:54

## 2021-05-25 ENCOUNTER — TELEPHONE (OUTPATIENT)
Dept: INTERNAL MEDICINE | Facility: CLINIC | Age: 76
End: 2021-05-25

## 2021-05-25 NOTE — TELEPHONE ENCOUNTER
Attempted to contact patient; left detailed message per release notifying patient that provider prefers to wait until scheduled appointment to order labs in case additional testing is needed.

## 2021-05-25 NOTE — TELEPHONE ENCOUNTER
Caller: Yamileth De Guzman    Relationship: Self    Best call back number: [phone] (PERMISSSION TO LEAVE A MESSAGE)    What orders are you requesting (i.e. lab or imaging): LABS     In what timeframe would the patient need to come in: ASAP    Where will you receive your lab/imaging services: IN OFFICE    Additional notes: PATIENT WOULD LIKE TO COME IN WHEN HER  DOES FOR HIS APPOINTMENT. SHE HAS A WELLNESS VISIT NEXT WEEK AND WOULD LIKE A CALL TO KNOW IF THIS IS GOING TO BE POSSIBLE.

## 2021-06-01 ENCOUNTER — OFFICE VISIT (OUTPATIENT)
Dept: INTERNAL MEDICINE | Facility: CLINIC | Age: 76
End: 2021-06-01

## 2021-06-01 VITALS
HEART RATE: 63 BPM | TEMPERATURE: 95.8 F | DIASTOLIC BLOOD PRESSURE: 70 MMHG | WEIGHT: 145 LBS | SYSTOLIC BLOOD PRESSURE: 120 MMHG | HEIGHT: 66 IN | BODY MASS INDEX: 23.3 KG/M2 | OXYGEN SATURATION: 99 %

## 2021-06-01 DIAGNOSIS — Z87.448 HISTORY OF HEMATURIA: Primary | ICD-10-CM

## 2021-06-01 DIAGNOSIS — K21.00 GASTROESOPHAGEAL REFLUX DISEASE WITH ESOPHAGITIS WITHOUT HEMORRHAGE: Chronic | ICD-10-CM

## 2021-06-01 DIAGNOSIS — Z12.39 ENCOUNTER FOR SCREENING FOR MALIGNANT NEOPLASM OF BREAST, UNSPECIFIED SCREENING MODALITY: ICD-10-CM

## 2021-06-01 DIAGNOSIS — E55.9 VITAMIN D DEFICIENCY, UNSPECIFIED: ICD-10-CM

## 2021-06-01 DIAGNOSIS — E03.9 HYPOTHYROIDISM, UNSPECIFIED TYPE: ICD-10-CM

## 2021-06-01 DIAGNOSIS — K63.5 POLYP OF COLON, UNSPECIFIED PART OF COLON, UNSPECIFIED TYPE: ICD-10-CM

## 2021-06-01 DIAGNOSIS — R09.89 CAROTID BRUIT, UNSPECIFIED LATERALITY: ICD-10-CM

## 2021-06-01 DIAGNOSIS — M79.7 FIBROMYALGIA: ICD-10-CM

## 2021-06-01 DIAGNOSIS — N39.41 URGE INCONTINENCE OF URINE: ICD-10-CM

## 2021-06-01 DIAGNOSIS — Z79.899 OTHER LONG TERM (CURRENT) DRUG THERAPY: ICD-10-CM

## 2021-06-01 DIAGNOSIS — E78.5 HYPERLIPIDEMIA, UNSPECIFIED HYPERLIPIDEMIA TYPE: ICD-10-CM

## 2021-06-01 DIAGNOSIS — R91.8 LUNG MASS: ICD-10-CM

## 2021-06-01 DIAGNOSIS — E55.9 VITAMIN D DEFICIENCY: ICD-10-CM

## 2021-06-01 DIAGNOSIS — F17.200 CURRENT EVERY DAY SMOKER: ICD-10-CM

## 2021-06-01 DIAGNOSIS — Z00.00 WELLNESS EXAMINATION: ICD-10-CM

## 2021-06-01 DIAGNOSIS — M06.9 RHEUMATOID ARTHRITIS, INVOLVING UNSPECIFIED SITE, UNSPECIFIED WHETHER RHEUMATOID FACTOR PRESENT (HCC): ICD-10-CM

## 2021-06-01 DIAGNOSIS — M81.0 SENILE OSTEOPOROSIS: ICD-10-CM

## 2021-06-01 DIAGNOSIS — Z12.31 ENCOUNTER FOR SCREENING MAMMOGRAM FOR MALIGNANT NEOPLASM OF BREAST: ICD-10-CM

## 2021-06-01 DIAGNOSIS — F41.1 GENERALIZED ANXIETY DISORDER: ICD-10-CM

## 2021-06-01 PROBLEM — M54.50 ACUTE RIGHT-SIDED LOW BACK PAIN: Status: RESOLVED | Noted: 2020-11-25 | Resolved: 2021-06-01

## 2021-06-01 LAB
BILIRUB BLD-MCNC: NEGATIVE MG/DL
CLARITY, POC: CLEAR
COLOR UR: YELLOW
GLUCOSE UR STRIP-MCNC: NEGATIVE MG/DL
KETONES UR QL: NEGATIVE
LEUKOCYTE EST, POC: NEGATIVE
NITRITE UR-MCNC: NEGATIVE MG/ML
PH UR: 6 [PH] (ref 5–8)
PROT UR STRIP-MCNC: NEGATIVE MG/DL
RBC # UR STRIP: ABNORMAL /UL
SP GR UR: 1.01 (ref 1–1.03)
UROBILINOGEN UR QL: NORMAL

## 2021-06-01 PROCEDURE — G0439 PPPS, SUBSEQ VISIT: HCPCS | Performed by: INTERNAL MEDICINE

## 2021-06-01 PROCEDURE — 99214 OFFICE O/P EST MOD 30 MIN: CPT | Performed by: INTERNAL MEDICINE

## 2021-06-01 PROCEDURE — 81003 URINALYSIS AUTO W/O SCOPE: CPT | Performed by: INTERNAL MEDICINE

## 2021-06-01 RX ORDER — ALPRAZOLAM 0.25 MG/1
0.25 TABLET ORAL NIGHTLY PRN
Qty: 30 TABLET | Refills: 2 | Status: SHIPPED | OUTPATIENT
Start: 2021-06-01 | End: 2022-06-07 | Stop reason: SDUPTHER

## 2021-06-01 NOTE — PROGRESS NOTES
T  Subsequent Medicare Wellness Visit    Chief Complaint   Patient presents with   • Medicare Wellness-subsequent       Subjective   History of Present Illness:  Yamileth De Guzman is a 75 y.o. female who presents for a Subsequent Medicare Wellness Visit.  Patient here for Medicare wellness also has medical problems to be followed up.  History of osteoporosis needs to be followed up.  Also mild carotid stenosis needs to be followed up. Recent colonoscopy showed polyps.  Anxiety patient is taking Xanax needs to be followed up and a refill.  Hyperlipidemia stable.  Vitamin D deficiency on supplements are stable.  Hypothyroidism stable on medication.  GERD stable now.  Patient still smokes.  Fibromyalgia rheumatoid arthritis patient is seeing a rheumatologist.  Incontinence is stable now.  HEALTH RISK ASSESSMENT    Recent Hospitalizations:  No hospitalization(s) within the last year.    Current Medical Providers:  Patient Care Team:  Da Dong MD as PCP - General (Internal Medicine)  Da Dong MD as PCP - Family Medicine    Smoking Status:  Social History     Tobacco Use   Smoking Status Current Every Day Smoker   • Packs/day: 1.00   • Types: Cigarettes   Smokeless Tobacco Never Used       Alcohol Consumption:  Social History     Substance and Sexual Activity   Alcohol Use Yes    Comment: moderate       Depression Screen:   PHQ-2/PHQ-9 Depression Screening 6/1/2021   Little interest or pleasure in doing things 0   Feeling down, depressed, or hopeless 0   Trouble falling or staying asleep, or sleeping too much -   Feeling tired or having little energy -   Poor appetite or overeating -   Feeling bad about yourself - or that you are a failure or have let yourself or your family down -   Trouble concentrating on things, such as reading the newspaper or watching television -   Moving or speaking so slowly that other people could have noticed. Or the opposite - being so fidgety or restless that you have been  moving around a lot more than usual -   Thoughts that you would be better off dead, or of hurting yourself in some way -   Total Score 0   If you checked off any problems, how difficult have these problems made it for you to do your work, take care of things at home, or get along with other people? -       Fall Risk Screen:  TANA Fall Risk Assessment was completed, and patient is at LOW risk for falls.Assessment completed on:6/1/2021    Health Habits and Functional and Cognitive Screening:  Functional & Cognitive Status 6/1/2021   Do you have difficulty preparing food and eating? No   Do you have difficulty bathing yourself, getting dressed or grooming yourself? No   Do you have difficulty using the toilet? No   Do you have difficulty moving around from place to place? No   Do you have trouble with steps or getting out of a bed or a chair? No   Current Diet Well Balanced Diet   Dental Exam Not up to date        Dental Exam Comment Dentures   Eye Exam Not up to date   Exercise (times per week) 0 times per week   Current Exercise Activities Include None   Do you need help using the phone?  No   Are you deaf or do you have serious difficulty hearing?  No   Do you need help with transportation? No   Do you need help shopping? No   Do you need help preparing meals?  No   Do you need help with housework?  No   Do you need help with laundry? No   Do you need help taking your medications? No   Do you need help managing money? No   Do you ever drive or ride in a car without wearing a seat belt? No   Have you felt unusual stress, anger or loneliness in the last month? No   Who do you live with? Spouse   If you need help, do you have trouble finding someone available to you? No   Have you been bothered in the last four weeks by sexual problems? No   Do you have difficulty concentrating, remembering or making decisions? No         Does the patient have evidence of cognitive impairment? No    Asprin use counseling:Does not  need ASA (and currently is not on it)    Age-appropriate Screening Schedule:  Refer to the list below for future screening recommendations based on patient's age, sex and/or medical conditions. Orders for these recommended tests are listed in the plan section. The patient has been provided with a written plan.    Health Maintenance   Topic Date Due   • ZOSTER VACCINE (2 of 2) 06/11/2015   • DXA SCAN  05/30/2020   • LIPID PANEL  05/26/2021   • INFLUENZA VACCINE  08/01/2021   • MAMMOGRAM  09/18/2022   • TDAP/TD VACCINES (3 - Td or Tdap) 04/15/2025          The following portions of the patient's history were reviewed and updated as appropriate: allergies, current medications, past family history, past medical history, past social history, past surgical history and problem list.    Outpatient Medications Prior to Visit   Medication Sig Dispense Refill   • calcium (OS-STIVEN) 600 MG tablet Take 600 mg by mouth 2 (Two) Times a Day.     • cetirizine (zyrTEC) 10 MG tablet Take 10 mg by mouth Daily.     • denosumab (PROLIA) 60 MG/ML solution syringe Inject 60 mg under the skin into the appropriate area as directed Every 6 (Six) Months.     • Docusate Sodium 100 MG capsule Take 1 - 2 tablets daily. Adjust to have 1-2 soft stools daily. 60 tablet 2   • fluticasone (FLONASE) 50 MCG/ACT nasal spray 2 sprays by Each Nare route Daily. 48 g 5   • Garlic 400 MG tablet Take  by mouth Daily.     • levothyroxine (SYNTHROID, LEVOTHROID) 100 MCG tablet Take 1 tablet by mouth Daily. 90 tablet 3   • Multiple Vitamin (MULTI VITAMIN DAILY PO) Take 1 tablet by mouth Daily.     • Omega-3 Fatty Acids (FISH OIL) 1000 MG capsule capsule Take 1,000 mg by mouth Daily With Breakfast.     • omeprazole (priLOSEC) 20 MG capsule Take 1 capsule by mouth 2 (Two) Times a Day. 180 capsule 3   • vitamin C (ASCORBIC ACID) 250 MG tablet Take 250 mg by mouth Daily.     • ALPRAZolam (XANAX) 0.25 MG tablet Take 1 tablet by mouth At Night As Needed for Anxiety. 30  "tablet 2     No facility-administered medications prior to visit.       Patient Active Problem List   Diagnosis   • Rheumatoid arthritis (CMS/HCC)   • Fibromyalgia   • Generalized anxiety disorder   • Hyperlipidemia   • Hypothyroidism   • Vitamin D deficiency   • Current every day smoker   • Senile osteoporosis   • Carotid bruit   • Gastroesophageal reflux disease with esophagitis   • Hematuria   • Lung mass   • Polyp of colon   • Adenomatous polyps   • Urge incontinence of urine       Advanced Care Planning:  ACP discussion was declined by the patient. Patient does not have an advance directive, information provided.    Review of Systems   Constitutional: Negative.    Respiratory: Negative.    Cardiovascular: Negative.    Gastrointestinal: Negative.    Musculoskeletal: Negative.    Skin: Negative.    Neurological: Negative.    Psychiatric/Behavioral: Negative.        Compared to one year ago, the patient feels her physical health is the same.  Compared to one year ago, the patient feels her mental health is the same.    Reviewed chart for potential of high risk medication in the elderly: yes  Reviewed chart for potential of harmful drug interactions in the elderly:no    Objective         Vitals:    06/01/21 1050   BP: 120/70   Pulse: 63   Temp: 95.8 °F (35.4 °C)   SpO2: 99%   Weight: 65.8 kg (145 lb)   Height: 167.6 cm (66\")   PainSc:   3       Body mass index is 23.4 kg/m².  Discussed the patient's BMI with her. The BMI is in the acceptable range.    Physical Exam  Cardiovascular:      Rate and Rhythm: Normal rate and regular rhythm.      Heart sounds: Normal heart sounds.   Pulmonary:      Effort: Pulmonary effort is normal.      Breath sounds: Normal breath sounds.   Abdominal:      General: Bowel sounds are normal.   Musculoskeletal:      Cervical back: Neck supple.   Skin:     General: Skin is warm.   Neurological:      Mental Status: She is alert and oriented to person, place, and time.           "     Assessment/Plan   Medicare Risks and Personalized Health Plan  CMS Preventative Services Quick Reference  Advance Directive Discussion    The above risks/problems have been discussed with the patient.  Pertinent information has been shared with the patient in the After Visit Summary.  Follow up plans and orders are seen below in the Assessment/Plan Section.    Diagnoses and all orders for this visit:    1. History of hematuria (Primary) unremarkable  -     POCT urinalysis dipstick, automated    2. Generalized anxiety disorder refill  -     ALPRAZolam (XANAX) 0.25 MG tablet; Take 1 tablet by mouth At Night As Needed for Anxiety.  Dispense: 30 tablet; Refill: 2  -     Compliance Drug Analysis, Ur - Urine, Clean Catch    3. Other long term (current) drug therapy   -     Compliance Drug Analysis, Ur - Urine, Clean Catch    4. Hyperlipidemia, continue good diet    5. Vitamin D deficiency continue supplement    6. Hypothyroidism, check lab    7. Gastroesophageal reflux disease with esophagitis without hemorrhage continue medication    8. Lung mass no need for follow-up    9. Current every day smoker to quit    10. Fibromyalgia follow rheumatology    11. Rheumatoid arthritis, involving unspecified site, unspecified whether rheumatoid factor present (CMS/[facility]) follow rheumatology    12. Senile osteoporosis continue medication follow-up with DEXA  -     DEXA Bone Density Axial    13. Urge incontinence of urine continue to watch    14. Carotid bruit, unspecified laterality follow-up  -     US carotid bilateral    15. Polyp of colon, unspecified part of colon, unspecified type    16. Wellness examination  -     CBC & Differential  -     Comprehensive Metabolic Panel  -     Lipid Panel  -     TSH  -     Vitamin D 25 Hydroxy    17. Vitamin D deficiency, unspecified   -     Vitamin D 25 Hydroxy    18. Encounter for screening for malignant neoplasm of breast, unspecified screening modality  -     Mammo Screening Digital  Tomosynthesis Bilateral With CAD    19. Encounter for screening mammogram for malignant neoplasm of breast   -     Mammo Screening Digital Tomosynthesis Bilateral With CAD  Constipation continue medication and add a fiber and increase fluids intake  colon 4/2021 need repeat yearly per pat due to polyp   Follow Up:  Return in about 6 months (around 12/1/2021) for Recheck.     An After Visit Summary and PPPS were given to the patient.             Below is to historical records for reference only:  anxiety stable on xanax. Continue prn  Constipation continue MiraLAX  IBS no abd pain stable on IBS OTC medicine history EGD 2011 gastritis and hh, asa d/c  Insomnia stable on amitriptyline. Continue  Hypothryoidism. Continue medication    lung nodule follow up RML soft tissue nodule repeat no more,   Bone density scan showed this osteoporosis continue Calcium 1,200mg and vit D3 hold for now  osteoporosis on reclast  oscal D  hematuria history of it. seen by urologist long ago. 4/17/14 micro showed trace blood. micro negative, no urine frequency.  vaccination: zostavax done Td 2015 refuse pneumovax,refuse prevnar, refuse  shingrix, refuse hepA shot  left carotid bruit, 0-49%  HLD diet, patient refuses medicine  tob to quit,

## 2021-06-02 LAB
25(OH)D3+25(OH)D2 SERPL-MCNC: 65.6 NG/ML (ref 30–100)
ALBUMIN SERPL-MCNC: 4.6 G/DL (ref 3.5–5.2)
ALBUMIN/GLOB SERPL: 2.2 G/DL
ALP SERPL-CCNC: 56 U/L (ref 39–117)
ALT SERPL-CCNC: 17 U/L (ref 1–33)
AST SERPL-CCNC: 19 U/L (ref 1–32)
BASOPHILS # BLD AUTO: 0.04 10*3/MM3 (ref 0–0.2)
BASOPHILS NFR BLD AUTO: 0.5 % (ref 0–1.5)
BILIRUB SERPL-MCNC: 0.3 MG/DL (ref 0–1.2)
BUN SERPL-MCNC: 15 MG/DL (ref 8–23)
BUN/CREAT SERPL: 20 (ref 7–25)
CALCIUM SERPL-MCNC: 9.5 MG/DL (ref 8.6–10.5)
CHLORIDE SERPL-SCNC: 102 MMOL/L (ref 98–107)
CHOLEST SERPL-MCNC: 209 MG/DL (ref 0–200)
CO2 SERPL-SCNC: 27 MMOL/L (ref 22–29)
CREAT SERPL-MCNC: 0.75 MG/DL (ref 0.57–1)
EOSINOPHIL # BLD AUTO: 0.1 10*3/MM3 (ref 0–0.4)
EOSINOPHIL NFR BLD AUTO: 1.3 % (ref 0.3–6.2)
ERYTHROCYTE [DISTWIDTH] IN BLOOD BY AUTOMATED COUNT: 12.8 % (ref 12.3–15.4)
GLOBULIN SER CALC-MCNC: 2.1 GM/DL
GLUCOSE SERPL-MCNC: 80 MG/DL (ref 65–99)
HCT VFR BLD AUTO: 39.3 % (ref 34–46.6)
HDLC SERPL-MCNC: 73 MG/DL (ref 40–60)
HGB BLD-MCNC: 13.1 G/DL (ref 12–15.9)
IMM GRANULOCYTES # BLD AUTO: 0.02 10*3/MM3 (ref 0–0.05)
IMM GRANULOCYTES NFR BLD AUTO: 0.3 % (ref 0–0.5)
LDLC SERPL CALC-MCNC: 120 MG/DL (ref 0–100)
LYMPHOCYTES # BLD AUTO: 2.73 10*3/MM3 (ref 0.7–3.1)
LYMPHOCYTES NFR BLD AUTO: 35.6 % (ref 19.6–45.3)
MCH RBC QN AUTO: 33.3 PG (ref 26.6–33)
MCHC RBC AUTO-ENTMCNC: 33.3 G/DL (ref 31.5–35.7)
MCV RBC AUTO: 100 FL (ref 79–97)
MONOCYTES # BLD AUTO: 0.52 10*3/MM3 (ref 0.1–0.9)
MONOCYTES NFR BLD AUTO: 6.8 % (ref 5–12)
NEUTROPHILS # BLD AUTO: 4.26 10*3/MM3 (ref 1.7–7)
NEUTROPHILS NFR BLD AUTO: 55.5 % (ref 42.7–76)
NRBC BLD AUTO-RTO: 0 /100 WBC (ref 0–0.2)
PLATELET # BLD AUTO: 233 10*3/MM3 (ref 140–450)
POTASSIUM SERPL-SCNC: 4 MMOL/L (ref 3.5–5.2)
PROT SERPL-MCNC: 6.7 G/DL (ref 6–8.5)
RBC # BLD AUTO: 3.93 10*6/MM3 (ref 3.77–5.28)
SODIUM SERPL-SCNC: 140 MMOL/L (ref 136–145)
TRIGL SERPL-MCNC: 92 MG/DL (ref 0–150)
TSH SERPL DL<=0.005 MIU/L-ACNC: 4.34 UIU/ML (ref 0.27–4.2)
VLDLC SERPL CALC-MCNC: 16 MG/DL (ref 5–40)
WBC # BLD AUTO: 7.67 10*3/MM3 (ref 3.4–10.8)

## 2021-06-08 LAB — DRUGS UR: NORMAL

## 2021-06-09 ENCOUNTER — HOSPITAL ENCOUNTER (OUTPATIENT)
Dept: ULTRASOUND IMAGING | Facility: HOSPITAL | Age: 76
Discharge: HOME | End: 2021-06-09

## 2021-06-09 ENCOUNTER — APPOINTMENT (OUTPATIENT)
Dept: BONE DENSITY | Facility: HOSPITAL | Age: 76
End: 2021-06-09

## 2021-06-09 PROCEDURE — 77080 DXA BONE DENSITY AXIAL: CPT

## 2021-06-09 PROCEDURE — 93880 EXTRACRANIAL BILAT STUDY: CPT

## 2021-07-07 ENCOUNTER — OFFICE VISIT (OUTPATIENT)
Dept: INTERNAL MEDICINE | Facility: CLINIC | Age: 76
End: 2021-07-07

## 2021-07-07 VITALS
TEMPERATURE: 95.1 F | HEART RATE: 70 BPM | DIASTOLIC BLOOD PRESSURE: 62 MMHG | SYSTOLIC BLOOD PRESSURE: 110 MMHG | WEIGHT: 145 LBS | BODY MASS INDEX: 23.3 KG/M2 | HEIGHT: 66 IN | OXYGEN SATURATION: 98 %

## 2021-07-07 DIAGNOSIS — R91.8 LUNG MASS: ICD-10-CM

## 2021-07-07 DIAGNOSIS — F17.200 CURRENT EVERY DAY SMOKER: ICD-10-CM

## 2021-07-07 DIAGNOSIS — E78.5 HYPERLIPIDEMIA, UNSPECIFIED HYPERLIPIDEMIA TYPE: ICD-10-CM

## 2021-07-07 DIAGNOSIS — E55.9 VITAMIN D DEFICIENCY: ICD-10-CM

## 2021-07-07 DIAGNOSIS — F17.210 NICOTINE DEPENDENCE, CIGARETTES, UNCOMPLICATED: ICD-10-CM

## 2021-07-07 DIAGNOSIS — E03.9 HYPOTHYROIDISM, UNSPECIFIED TYPE: ICD-10-CM

## 2021-07-07 DIAGNOSIS — J01.00 ACUTE NON-RECURRENT MAXILLARY SINUSITIS: ICD-10-CM

## 2021-07-07 DIAGNOSIS — M81.0 SENILE OSTEOPOROSIS: ICD-10-CM

## 2021-07-07 DIAGNOSIS — F41.1 GENERALIZED ANXIETY DISORDER: Primary | ICD-10-CM

## 2021-07-07 DIAGNOSIS — T78.40XD ALLERGY, SUBSEQUENT ENCOUNTER: ICD-10-CM

## 2021-07-07 DIAGNOSIS — R09.89 CAROTID BRUIT, UNSPECIFIED LATERALITY: ICD-10-CM

## 2021-07-07 DIAGNOSIS — R31.9 HEMATURIA, UNSPECIFIED TYPE: ICD-10-CM

## 2021-07-07 PROBLEM — T78.40XA ALLERGIES: Status: ACTIVE | Noted: 2021-07-07

## 2021-07-07 PROCEDURE — 99214 OFFICE O/P EST MOD 30 MIN: CPT | Performed by: INTERNAL MEDICINE

## 2021-07-07 RX ORDER — AMOXICILLIN AND CLAVULANATE POTASSIUM 875; 125 MG/1; MG/1
1 TABLET, FILM COATED ORAL 2 TIMES DAILY
Qty: 14 TABLET | Refills: 0 | Status: SHIPPED | OUTPATIENT
Start: 2021-07-07 | End: 2021-11-10

## 2021-07-07 RX ORDER — LEVOTHYROXINE SODIUM 112 UG/1
100 TABLET ORAL DAILY
Qty: 30 TABLET | Refills: 1 | Status: SHIPPED | OUTPATIENT
Start: 2021-07-07 | End: 2021-08-28

## 2021-07-07 NOTE — PROGRESS NOTES
Subjective   Yamileth De Guzman is a 75 y.o. female.     Chief Complaint   Patient presents with   • Follow-up     recent lab results and imaging   • URI     3 days; cough, congestion; denies fever       History of Present Illness   Patient here for follow-up.  Congestion sinus pain pressure ear pressure cough postnasal drip over-the-counter medicine no help patient is taking Zyrtec and Flonase.  Blood test that showed TSH elevated patient is on levothyroxine 100 mcg daily.  Vitamin D slightly elevated in the 60s patient still smokes.  Still has a bloody urine.  Lung mass unremarkable.  Osteoporosis improved after Prolia now is osteopenia.  Carotid bruit ultrasound showed negative blockages.  Hyperlipidemia stable on medication.  Anxiety improved after medication    Current Outpatient Medications:   •  ALPRAZolam (XANAX) 0.25 MG tablet, Take 1 tablet by mouth At Night As Needed for Anxiety., Disp: 30 tablet, Rfl: 2  •  calcium (OS-STIVEN) 600 MG tablet, Take 600 mg by mouth 2 (Two) Times a Day., Disp: , Rfl:   •  cetirizine (zyrTEC) 10 MG tablet, Take 10 mg by mouth Daily., Disp: , Rfl:   •  denosumab (PROLIA) 60 MG/ML solution syringe, Inject 60 mg under the skin into the appropriate area as directed Every 6 (Six) Months., Disp: , Rfl:   •  Docusate Sodium 100 MG capsule, Take 1 - 2 tablets daily. Adjust to have 1-2 soft stools daily., Disp: 60 tablet, Rfl: 2  •  fluticasone (FLONASE) 50 MCG/ACT nasal spray, 2 sprays by Each Nare route Daily., Disp: 48 g, Rfl: 5  •  Garlic 400 MG tablet, Take  by mouth Daily., Disp: , Rfl:   •  levothyroxine (SYNTHROID, LEVOTHROID) 112 MCG tablet, Take 1 tablet by mouth Daily., Disp: 30 tablet, Rfl: 1  •  Multiple Vitamin (MULTI VITAMIN DAILY PO), Take 1 tablet by mouth Daily., Disp: , Rfl:   •  Omega-3 Fatty Acids (FISH OIL) 1000 MG capsule capsule, Take 1,000 mg by mouth Daily With Breakfast., Disp: , Rfl:   •  omeprazole (priLOSEC) 20 MG capsule, Take 1 capsule by mouth 2 (Two)  Times a Day., Disp: 180 capsule, Rfl: 3  •  vitamin C (ASCORBIC ACID) 250 MG tablet, Take 250 mg by mouth Daily., Disp: , Rfl:   •  amoxicillin-clavulanate (Augmentin) 875-125 MG per tablet, Take 1 tablet by mouth 2 (Two) Times a Day., Disp: 14 tablet, Rfl: 0    The following portions of the patient's history were reviewed and updated as appropriate: allergies, current medications, past family history, past medical history, past social history, past surgical history and problem list.    Review of Systems   Constitutional: Negative.  Negative for chills and fatigue.   HENT: Positive for congestion, facial swelling, postnasal drip, sinus pressure and sinus pain. Negative for sore throat.    Respiratory: Positive for cough. Negative for shortness of breath and wheezing.    Cardiovascular: Negative.  Negative for chest pain.   Gastrointestinal: Negative.    Musculoskeletal: Negative.    Skin: Negative.    Neurological: Negative.    Psychiatric/Behavioral: Negative.        Objective   Physical Exam  Constitutional:       Appearance: Normal appearance.   HENT:      Head:      Comments: Sinus tenderness maxillary     Right Ear: Ear canal normal.      Left Ear: Ear canal normal.      Ears:      Comments: Unable to visualize tympanic membrane     Nose: Nose normal.      Mouth/Throat:      Mouth: Mucous membranes are moist.      Pharynx: Oropharynx is clear.   Cardiovascular:      Rate and Rhythm: Normal rate and regular rhythm.      Pulses: Normal pulses.      Heart sounds: Normal heart sounds.   Pulmonary:      Effort: Pulmonary effort is normal.      Breath sounds: Normal breath sounds.   Abdominal:      General: Bowel sounds are normal.   Musculoskeletal:      Cervical back: Neck supple.   Skin:     General: Skin is warm.   Neurological:      Mental Status: She is alert and oriented to person, place, and time.   Psychiatric:         Mood and Affect: Mood normal.         Behavior: Behavior normal.         All tests have  been reviewed.    Assessment/Plan   Diagnoses and all orders for this visit:    Generalized anxiety disorder medication helped    Hypothyroidism, unspecified type increase levothyroxine to 112 check lab in 6 weeks  -     levothyroxine (SYNTHROID, LEVOTHROID) 112 MCG tablet; Take 1 tablet by mouth Daily.  -     TSH    Vitamin D deficiency continue vitamin D supplement to 1000 daily    Hematuria, unspecified type worked up by urology benign per patient    Current every day smoker  -      CT Chest Low Dose Cancer Screening WO    Lung mass unremarkable on CT scan    Senile osteoporosis on Prolia DEXA scan showed only osteopenia    Hyperlipidemia, continue medication    Carotid bruit, ultrasound no blockages    Nicotine dependence, cigarettes, uncomplicated   -      CT Chest Low Dose Cancer Screening WO    Allergy, continue medication    Acute non-recurrent maxillary sinusitis initiate antibiotics  -     amoxicillin-clavulanate (Augmentin) 875-125 MG per tablet; Take 1 tablet by mouth 2 (Two) Times a Day.    6 mo           Below is to historical records for reference only:  anxiety stable on xanax. Continue prn  Constipation continue MiraLAX  IBS no abd pain stable on IBS OTC medicine history EGD 2011 gastritis and hh, asa d/c  Insomnia stable on amitriptyline. Continue  Hypothryoidism. Continue medication    lung nodule follow up RML soft tissue nodule repeat no more,   Bone density scan showed this osteoporosis continue Calcium 1,200mg and vit D3 hold for now  osteoporosis on reclast  oscal D  hematuria history of it. seen by urologist long ago. 4/17/14 micro showed trace blood. micro negative, no urine frequency.  vaccination: zostavax done Td 2015 refuse pneumovax,refuse prevnar, refuse  shingrix, refuse hepA shot  left carotid bruit, 0-49%  HLD diet, patient refuses medicine  tob to quit,

## 2021-08-03 ENCOUNTER — OFFICE VISIT (OUTPATIENT)
Dept: INTERNAL MEDICINE | Facility: CLINIC | Age: 76
End: 2021-08-03

## 2021-08-03 VITALS
SYSTOLIC BLOOD PRESSURE: 120 MMHG | WEIGHT: 146 LBS | HEIGHT: 66 IN | HEART RATE: 74 BPM | OXYGEN SATURATION: 98 % | DIASTOLIC BLOOD PRESSURE: 80 MMHG | BODY MASS INDEX: 23.46 KG/M2 | TEMPERATURE: 96 F

## 2021-08-03 DIAGNOSIS — M54.2 NECK PAIN: ICD-10-CM

## 2021-08-03 DIAGNOSIS — R51.9 NONINTRACTABLE HEADACHE, UNSPECIFIED CHRONICITY PATTERN, UNSPECIFIED HEADACHE TYPE: ICD-10-CM

## 2021-08-03 DIAGNOSIS — E03.9 HYPOTHYROIDISM, UNSPECIFIED TYPE: Primary | ICD-10-CM

## 2021-08-03 DIAGNOSIS — F17.200 CURRENT EVERY DAY SMOKER: ICD-10-CM

## 2021-08-03 DIAGNOSIS — J32.9 SINUSITIS, UNSPECIFIED CHRONICITY, UNSPECIFIED LOCATION: ICD-10-CM

## 2021-08-03 PROCEDURE — 99214 OFFICE O/P EST MOD 30 MIN: CPT | Performed by: INTERNAL MEDICINE

## 2021-08-03 RX ORDER — BACLOFEN 10 MG/1
10 TABLET ORAL 2 TIMES DAILY
Qty: 50 TABLET | Refills: 0 | Status: SHIPPED | OUTPATIENT
Start: 2021-08-03

## 2021-08-03 NOTE — PROGRESS NOTES
Subjective   Yamileth De Guzman is a 75 y.o. female.     Chief Complaint   Patient presents with   • Headache     3-4 weeks; top of head; sinus pressure, nasal drainage       History of Present Illness   Patient here for follow-up of.  Hypothyroidism on medication doing well.  Patient still smokes yet to do CT scan.  Patient complains the last 2 to 3 weeks headache on top of the head soreness to touch no nausea vomiting.  Patient also has some neck pain.  Patient also complains some sinus congestion.  Nasal drainage    Current Outpatient Medications:   •  ALPRAZolam (XANAX) 0.25 MG tablet, Take 1 tablet by mouth At Night As Needed for Anxiety., Disp: 30 tablet, Rfl: 2  •  calcium (OS-STIVEN) 600 MG tablet, Take 600 mg by mouth 2 (Two) Times a Day., Disp: , Rfl:   •  cetirizine (zyrTEC) 10 MG tablet, Take 10 mg by mouth Daily., Disp: , Rfl:   •  denosumab (PROLIA) 60 MG/ML solution syringe, Inject 60 mg under the skin into the appropriate area as directed Every 6 (Six) Months., Disp: , Rfl:   •  Docusate Sodium 100 MG capsule, Take 1 - 2 tablets daily. Adjust to have 1-2 soft stools daily., Disp: 60 tablet, Rfl: 2  •  fluticasone (FLONASE) 50 MCG/ACT nasal spray, 2 sprays by Each Nare route Daily., Disp: 48 g, Rfl: 5  •  Garlic 400 MG tablet, Take  by mouth Daily., Disp: , Rfl:   •  levothyroxine (SYNTHROID, LEVOTHROID) 112 MCG tablet, Take 1 tablet by mouth Daily., Disp: 30 tablet, Rfl: 1  •  Multiple Vitamin (MULTI VITAMIN DAILY PO), Take 1 tablet by mouth Daily., Disp: , Rfl:   •  Omega-3 Fatty Acids (FISH OIL) 1000 MG capsule capsule, Take 1,000 mg by mouth Daily With Breakfast., Disp: , Rfl:   •  omeprazole (priLOSEC) 20 MG capsule, Take 1 capsule by mouth 2 (Two) Times a Day., Disp: 180 capsule, Rfl: 3  •  vitamin C (ASCORBIC ACID) 250 MG tablet, Take 250 mg by mouth Daily., Disp: , Rfl:   •  amoxicillin-clavulanate (Augmentin) 875-125 MG per tablet, Take 1 tablet by mouth 2 (Two) Times a Day., Disp: 14 tablet,  Rfl: 0  •  baclofen (LIORESAL) 10 MG tablet, Take 1 tablet by mouth 2 (Two) Times a Day., Disp: 50 tablet, Rfl: 0    The following portions of the patient's history were reviewed and updated as appropriate: allergies, current medications, past family history, past medical history, past social history, past surgical history and problem list.    Review of Systems   Constitutional: Negative.    HENT: Positive for congestion.    Respiratory: Negative.    Cardiovascular: Negative.    Gastrointestinal: Negative.    Musculoskeletal: Negative.    Skin: Negative.    Neurological: Positive for headaches.   Psychiatric/Behavioral: Negative.        Objective   Physical Exam  HENT:      Head: Normocephalic and atraumatic.   Cardiovascular:      Rate and Rhythm: Normal rate and regular rhythm.      Heart sounds: Normal heart sounds.   Pulmonary:      Effort: Pulmonary effort is normal.      Breath sounds: Normal breath sounds.   Abdominal:      General: Bowel sounds are normal.   Musculoskeletal:      Cervical back: Neck supple.   Skin:     General: Skin is warm.   Neurological:      Mental Status: She is alert and oriented to person, place, and time.      Cranial Nerves: No cranial nerve deficit.   Psychiatric:         Mood and Affect: Mood normal.         All tests have been reviewed.    Assessment/Plan   Diagnoses and all orders for this visit:    Hypothyroidism, unspecified type cont med do lab     Current every day smoker pending CT     Nonintractable headache, unspecified chronicity pattern, unspecified headache type aleve or tylenol   -     baclofen (LIORESAL) 10 MG tablet; Take 1 tablet by mouth 2 (Two) Times a Day.    Sinusitis, start allegra D    Neck pain  -     baclofen (LIORESAL) 10 MG tablet; Take 1 tablet by mouth 2 (Two) Times a Day.    3 week

## 2021-08-04 ENCOUNTER — HOSPITAL ENCOUNTER (OUTPATIENT)
Dept: CT IMAGING | Facility: HOSPITAL | Age: 76
Discharge: HOME | End: 2021-08-04
Admitting: INTERNAL MEDICINE

## 2021-08-04 PROCEDURE — 71271 CT THORAX LUNG CANCER SCR C-: CPT

## 2021-08-19 ENCOUNTER — OFFICE VISIT (OUTPATIENT)
Dept: INTERNAL MEDICINE | Facility: CLINIC | Age: 76
End: 2021-08-19

## 2021-08-19 VITALS
TEMPERATURE: 95.3 F | BODY MASS INDEX: 22.98 KG/M2 | OXYGEN SATURATION: 99 % | HEIGHT: 66 IN | SYSTOLIC BLOOD PRESSURE: 138 MMHG | HEART RATE: 69 BPM | DIASTOLIC BLOOD PRESSURE: 80 MMHG | WEIGHT: 143 LBS

## 2021-08-19 DIAGNOSIS — R31.9 HEMATURIA, UNSPECIFIED TYPE: ICD-10-CM

## 2021-08-19 DIAGNOSIS — M54.2 NECK PAIN: Primary | ICD-10-CM

## 2021-08-19 DIAGNOSIS — R51.9 SCALP PAIN: ICD-10-CM

## 2021-08-19 LAB
BILIRUB BLD-MCNC: NEGATIVE MG/DL
CLARITY, POC: CLEAR
COLOR UR: YELLOW
GLUCOSE UR STRIP-MCNC: NEGATIVE MG/DL
KETONES UR QL: NEGATIVE
LEUKOCYTE EST, POC: NEGATIVE
NITRITE UR-MCNC: NEGATIVE MG/ML
PH UR: 6 [PH] (ref 5–8)
PROT UR STRIP-MCNC: NEGATIVE MG/DL
RBC # UR STRIP: ABNORMAL /UL
SP GR UR: 1.01 (ref 1–1.03)
TSH SERPL DL<=0.005 MIU/L-ACNC: 1.15 UIU/ML (ref 0.27–4.2)
UROBILINOGEN UR QL: NORMAL

## 2021-08-19 PROCEDURE — 81003 URINALYSIS AUTO W/O SCOPE: CPT | Performed by: INTERNAL MEDICINE

## 2021-08-19 PROCEDURE — 99214 OFFICE O/P EST MOD 30 MIN: CPT | Performed by: INTERNAL MEDICINE

## 2021-08-19 RX ORDER — NAPROXEN 500 MG/1
500 TABLET ORAL 2 TIMES DAILY WITH MEALS
Qty: 40 TABLET | Refills: 0 | Status: SHIPPED | OUTPATIENT
Start: 2021-08-19 | End: 2022-06-07

## 2021-08-19 RX ORDER — FLUTICASONE PROPIONATE 50 MCG
2 SPRAY, SUSPENSION (ML) NASAL DAILY
Qty: 48 G | Refills: 5 | Status: SHIPPED | OUTPATIENT
Start: 2021-08-19 | End: 2022-08-23

## 2021-08-19 NOTE — PROGRESS NOTES
Subjective   Yamileth De Guzman is a 75 y.o. female.     Chief Complaint   Patient presents with   • Follow-up   • Headache     symptoms have not resolved with medication    • Neck Pain       History of Present Illness   Patient here for follow-up of.  Hypothyroidism.  Patient is going to do blood test.  CT scan lung cancer screening test unremarkable.  Patient still has top of the scalp pain neck pain baclofen no significant help Aleve no help sinus improved after medication.  Patient denies any throbbing pain no nausea vomiting no photophobia    Current Outpatient Medications:   •  ALPRAZolam (XANAX) 0.25 MG tablet, Take 1 tablet by mouth At Night As Needed for Anxiety., Disp: 30 tablet, Rfl: 2  •  baclofen (LIORESAL) 10 MG tablet, Take 1 tablet by mouth 2 (Two) Times a Day., Disp: 50 tablet, Rfl: 0  •  calcium (OS-STIVEN) 600 MG tablet, Take 600 mg by mouth 2 (Two) Times a Day., Disp: , Rfl:   •  cetirizine (zyrTEC) 10 MG tablet, Take 10 mg by mouth Daily., Disp: , Rfl:   •  denosumab (PROLIA) 60 MG/ML solution syringe, Inject 60 mg under the skin into the appropriate area as directed Every 6 (Six) Months., Disp: , Rfl:   •  Docusate Sodium 100 MG capsule, Take 1 - 2 tablets daily. Adjust to have 1-2 soft stools daily., Disp: 60 tablet, Rfl: 2  •  fluticasone (FLONASE) 50 MCG/ACT nasal spray, 2 sprays by Each Nare route Daily., Disp: 48 g, Rfl: 5  •  Garlic 400 MG tablet, Take  by mouth Daily., Disp: , Rfl:   •  levothyroxine (SYNTHROID, LEVOTHROID) 112 MCG tablet, Take 1 tablet by mouth Daily., Disp: 30 tablet, Rfl: 1  •  Multiple Vitamin (MULTI VITAMIN DAILY PO), Take 1 tablet by mouth Daily., Disp: , Rfl:   •  Omega-3 Fatty Acids (FISH OIL) 1000 MG capsule capsule, Take 1,000 mg by mouth Daily With Breakfast., Disp: , Rfl:   •  omeprazole (priLOSEC) 20 MG capsule, Take 1 capsule by mouth 2 (Two) Times a Day., Disp: 180 capsule, Rfl: 3  •  vitamin C (ASCORBIC ACID) 250 MG tablet, Take 250 mg by mouth Daily., Disp:  , Rfl:   •  amoxicillin-clavulanate (Augmentin) 875-125 MG per tablet, Take 1 tablet by mouth 2 (Two) Times a Day., Disp: 14 tablet, Rfl: 0    The following portions of the patient's history were reviewed and updated as appropriate: allergies, current medications, past family history, past medical history, past social history, past surgical history and problem list.    Review of Systems   Constitutional: Negative.    Respiratory: Negative.    Cardiovascular: Negative.    Gastrointestinal: Negative.    Musculoskeletal: Positive for neck pain.   Skin: Negative.    Neurological: Negative.    Psychiatric/Behavioral: Negative.        Objective   Physical Exam  Cardiovascular:      Rate and Rhythm: Normal rate and regular rhythm.      Heart sounds: Normal heart sounds.   Pulmonary:      Effort: Pulmonary effort is normal.      Breath sounds: Normal breath sounds.   Abdominal:      General: Bowel sounds are normal.   Musculoskeletal:         General: Tenderness present.      Cervical back: Neck supple.   Skin:     General: Skin is warm.   Neurological:      Mental Status: She is alert and oriented to person, place, and time.         All tests have been reviewed.    Assessment/Plan   There are no diagnoses linked to this encounter.          Hypothyroidism, cont med do lab  today     Current every day smoker  CT normal     Nonintractable headache, unspecified chronicity pattern, unspecified headache type aleve or tylenol Neck pain headache are probably related to neck pain.  Refer for physical therapy and initiate naproxen 500 mg twice a day.  -     baclofen (LIORESAL) 10 MG tablet; Take 1 tablet by mouth 2 (Two) Times a Day.     Sinusitis, improved after  allegra D continue     Neck pain physical therapy  -     baclofen (LIORESAL) 10 MG tablet; Take 1 tablet by mouth 2 (Two) Times a Day.     1 month follow-up

## 2021-08-25 DIAGNOSIS — E03.9 HYPOTHYROIDISM, UNSPECIFIED TYPE: ICD-10-CM

## 2021-08-25 RX ORDER — LEVOTHYROXINE SODIUM 112 UG/1
TABLET ORAL
Qty: 30 TABLET | Refills: 0 | OUTPATIENT
Start: 2021-08-25

## 2021-08-27 DIAGNOSIS — E03.9 HYPOTHYROIDISM, UNSPECIFIED TYPE: ICD-10-CM

## 2021-08-28 RX ORDER — LEVOTHYROXINE SODIUM 112 UG/1
TABLET ORAL
Qty: 90 TABLET | Refills: 3 | Status: SHIPPED | OUTPATIENT
Start: 2021-08-28 | End: 2022-06-07 | Stop reason: SDUPTHER

## 2021-08-28 NOTE — TELEPHONE ENCOUNTER
Rx Refill Note  Requested Prescriptions     Pending Prescriptions Disp Refills   • Euthyrox 112 MCG tablet [Pharmacy Med Name: Euthyrox 112 MCG Oral Tablet] 30 tablet 0     Sig: Take 1 tablet by mouth once daily      Last office visit with prescribing clinician: 8/19/2021      Next office visit with prescribing clinician: 9/20/2021            ARASELI HOUSER MA  08/28/21, 09:01 EDT

## 2021-09-20 ENCOUNTER — HOSPITAL ENCOUNTER (OUTPATIENT)
Dept: MAMMOGRAPHY | Facility: HOSPITAL | Age: 76
Discharge: HOME | End: 2021-09-20
Admitting: INTERNAL MEDICINE

## 2021-09-20 PROCEDURE — 77067 SCR MAMMO BI INCL CAD: CPT

## 2021-09-20 PROCEDURE — 77063 BREAST TOMOSYNTHESIS BI: CPT

## 2021-11-01 ENCOUNTER — HOSPITAL ENCOUNTER (OUTPATIENT)
Dept: INFUSION THERAPY | Facility: HOSPITAL | Age: 76
Discharge: HOME | End: 2021-11-01
Admitting: INTERNAL MEDICINE

## 2021-11-01 VITALS
RESPIRATION RATE: 18 BRPM | DIASTOLIC BLOOD PRESSURE: 45 MMHG | OXYGEN SATURATION: 98 % | TEMPERATURE: 97.8 F | HEART RATE: 65 BPM | SYSTOLIC BLOOD PRESSURE: 134 MMHG

## 2021-11-01 DIAGNOSIS — M81.0 SENILE OSTEOPOROSIS: Primary | ICD-10-CM

## 2021-11-01 PROCEDURE — 96401 CHEMO ANTI-NEOPL SQ/IM: CPT

## 2021-11-01 PROCEDURE — 96372 THER/PROPH/DIAG INJ SC/IM: CPT

## 2021-11-01 PROCEDURE — 25010000002 DENOSUMAB 60 MG/ML SOLUTION PREFILLED SYRINGE: Performed by: INTERNAL MEDICINE

## 2021-11-01 RX ADMIN — DENOSUMAB 60 MG: 60 INJECTION SUBCUTANEOUS at 10:01

## 2021-11-10 ENCOUNTER — OFFICE VISIT (OUTPATIENT)
Dept: GASTROENTEROLOGY | Facility: CLINIC | Age: 76
End: 2021-11-10

## 2021-11-10 VITALS
RESPIRATION RATE: 16 BRPM | BODY MASS INDEX: 23.63 KG/M2 | DIASTOLIC BLOOD PRESSURE: 45 MMHG | SYSTOLIC BLOOD PRESSURE: 128 MMHG | HEIGHT: 66 IN | TEMPERATURE: 98.7 F | WEIGHT: 147 LBS | HEART RATE: 66 BPM

## 2021-11-10 DIAGNOSIS — K59.04 CHRONIC IDIOPATHIC CONSTIPATION: Primary | Chronic | ICD-10-CM

## 2021-11-10 DIAGNOSIS — K21.9 GASTROESOPHAGEAL REFLUX DISEASE WITHOUT ESOPHAGITIS: Chronic | ICD-10-CM

## 2021-11-10 DIAGNOSIS — Z86.010 PERSONAL HISTORY OF COLONIC POLYPS: ICD-10-CM

## 2021-11-10 PROCEDURE — 99214 OFFICE O/P EST MOD 30 MIN: CPT | Performed by: NURSE PRACTITIONER

## 2021-11-10 RX ORDER — BISACODYL 5 MG/1
TABLET, DELAYED RELEASE ORAL
Qty: 4 TABLET | Refills: 0 | Status: SHIPPED | OUTPATIENT
Start: 2021-11-10 | End: 2021-11-29

## 2021-11-10 RX ORDER — SODIUM CHLORIDE 9 MG/ML
70 INJECTION, SOLUTION INTRAVENOUS CONTINUOUS PRN
Status: CANCELLED | OUTPATIENT
Start: 2021-11-10

## 2021-11-10 NOTE — PATIENT INSTRUCTIONS
Antireflux measures: Avoid fried, fatty foods, alcohol, chocolate, coffee, tea,  soft drinks, peppermint and spearmint, spicy foods, tomatoes and tomato based foods, onions, peppers, and smoking.   Other antireflux measures include weight reduction if overweight, avoiding tight clothing around the abdomen, elevating the head of the bed 6 inches with blocks under the head board, and don't drink or eat before going to bed and avoid lying down immediately after meals.  Omeprazole 20 mg 1 by mouth in the am 30 minutes before breakfast.  High fiber, low fat diet with liberal water intake.    Stool softeners 1-2 per day.   Colonoscopy for surveillance:  The indications, technique, alternatives and potential risk and complications were discussed with the patient including but not limited to bleeding, perforations, missing lesions and anesthetic complications. The patient understands and wishes to proceed with the procedure and has given their verbal consent. Written patient education information was given to the patient.   The patient will call if they have further questions before procedure.

## 2021-11-10 NOTE — PROGRESS NOTES
Follow Up Note     Date: 11/10/2021   Patient Name: Yamileth De Guzman  MRN: [id number]  : 1945     Primary Care Provider: Da Dong MD     Chief Complaint   Patient presents with   • Follow-up   • Constipation     History of present illness:   11/10/2021  Yamileth De Guzman is a 76 y.o. female who is here today for follow up for constipation.    She is taking stool softeners 1-2 per day with reasonable control of constipation. No rectal bleeding. No abdominal pain. Reflux is doing well with Omeprazole 20 mg. No difficulty swallowing.    Interval History:  2021  She continues to have constipation that is unchanged. She tried Miralax, but it caused her to have incontinence at times and she stopped it. She has been eating a high fiber diet with improvement. No rectal bleeding. No abdominal pain. Reflux well controlled with Omeprazole 20 mg. No history of difficulty swallowing.    2020  Yamileth De Guzman is a 75 y.o. female who is here today for follow up for constipation. She has felt much better. Abdominal pain has significantly improved, she has not had any further episodes. Constipation is improving with high fiber diet.     2020  There is a long standing history of constipation. The patient has 1 firm bowel movement every 3-4 days. She frequently has gas and bloating. For the past 2-3 months, constipation and gas have worsened, and she has been having pain in the lower abdomen. The pain comes and goes, and is a moderate, sharp pain. No history of fevers or chills, nausea or vomiting.     The patient is taking Omeprazole 20mg with reasonable control of heartburn. Reflux is worse at night with lying down. Heartburn is moderate. The patient denies difficulty swallowing.     Last EGD was in .    Subjective      Past Medical History:   Diagnosis Date   • Abdominal bloating    • Acid reflux    • Adenomatous polyp of colon 2020   • Arthritis    • Carotid bruit    • Colon polyp  2012   • Constipation    • Disease of thyroid gland    • Fibromyalgia    • Full dentures    • Hematuria    • Lung nodule    • Osteoporosis    • Sinusitis    • Wears glasses     READING GLASSES ONLY     Past Surgical History:   Procedure Laterality Date   • APPENDECTOMY     • COLONOSCOPY  2012   • COLONOSCOPY N/A 8/17/2020    Procedure: COLONOSCOPY WITH COLD SNARE POLYPECTOMY, COLD FORCEP POLYPECTOMY, SUBMUCOSAL INJECTION OF NORMAL SALINE, ENDOSCOPIC MUCOSAL RESECTION, HOT SNARE POLYPECTOMY, THERMAL ABLATION, QUICK CLIP PLACEMENT TIMES 4 AND BARBIE INK INJECTION;  Surgeon: Scar oRgers MD;  Location: [facility] ENDOSCOPY;  Service: Gastroenterology;  Laterality: N/A;   • COLONOSCOPY N/A 3/1/2021    Procedure: COLONOSCOPY WITH BIOPSY AND POLYPECTOMY;  Surgeon: Scar Rogers MD;  Location: [facility] ENDOSCOPY;  Service: Gastroenterology;  Laterality: N/A;   • HYSTERECTOMY      PARTIAL STILL HAS OVARIES   • MULTIPLE TOOTH EXTRACTIONS     • TUBAL ABDOMINAL LIGATION     • UPPER GASTROINTESTINAL ENDOSCOPY  2012   • VAGINAL DELIVERY      X1     Family History   Problem Relation Age of Onset   • Hearing loss Father    • Arthritis Father    • Cancer Father    • GI problems Father    • Breast cancer Sister    • Colon cancer Neg Hx      Social History     Socioeconomic History   • Marital status:    Tobacco Use   • Smoking status: Current Every Day Smoker     Packs/day: 1.00     Types: Cigarettes   • Smokeless tobacco: Never Used   Vaping Use   • Vaping Use: Never used   Substance and Sexual Activity   • Alcohol use: Yes     Comment: moderate   • Drug use: No   • Sexual activity: Defer       Current Outpatient Medications:   •  ALPRAZolam (XANAX) 0.25 MG tablet, Take 1 tablet by mouth At Night As Needed for Anxiety., Disp: 30 tablet, Rfl: 2  •  baclofen (LIORESAL) 10 MG tablet, Take 1 tablet by mouth 2 (Two) Times a Day., Disp: 50 tablet, Rfl: 0  •  calcium (OS-STIVEN) 600 MG tablet, Take 600 mg by mouth 2 (Two)  Times a Day., Disp: , Rfl:   •  cetirizine (zyrTEC) 10 MG tablet, Take 10 mg by mouth Daily., Disp: , Rfl:   •  denosumab (PROLIA) 60 MG/ML solution syringe, Inject 60 mg under the skin into the appropriate area as directed Every 6 (Six) Months., Disp: , Rfl:   •  Docusate Sodium 100 MG capsule, Take 1 - 2 tablets daily. Adjust to have 1-2 soft stools daily., Disp: 60 tablet, Rfl: 2  •  Euthyrox 112 MCG tablet, Take 1 tablet by mouth once daily, Disp: 90 tablet, Rfl: 3  •  fluticasone (FLONASE) 50 MCG/ACT nasal spray, 2 sprays by Each Nare route Daily., Disp: 48 g, Rfl: 5  •  Garlic 400 MG tablet, Take  by mouth Daily., Disp: , Rfl:   •  Multiple Vitamin (MULTI VITAMIN DAILY PO), Take 1 tablet by mouth Daily., Disp: , Rfl:   •  naproxen (Naprosyn) 500 MG tablet, Take 1 tablet by mouth 2 (Two) Times a Day With Meals., Disp: 40 tablet, Rfl: 0  •  Omega-3 Fatty Acids (FISH OIL) 1000 MG capsule capsule, Take 1,000 mg by mouth Daily With Breakfast., Disp: , Rfl:   •  omeprazole (priLOSEC) 20 MG capsule, Take 1 capsule by mouth 2 (Two) Times a Day., Disp: 180 capsule, Rfl: 3  •  vitamin C (ASCORBIC ACID) 250 MG tablet, Take 250 mg by mouth Daily., Disp: , Rfl:   •  bisacodyl (DULCOLAX) 5 MG EC tablet, Take as directed for colon prep, Disp: 4 tablet, Rfl: 0  •  polyethylene glycol (GoLYTELY) 236 g solution, Starting at 5 pm on day prior to procedure, drink 8 ounces every 30 minutes as directed, Disp: 4000 mL, Rfl: 0     No Known Allergies     The following portions of the patient's history were reviewed and updated as appropriate: allergies, current medications, past family history, past medical history, past social history, past surgical history and problem list.  Objective     Physical Exam  Vitals and nursing note reviewed.   Constitutional:       General: She is not in acute distress.     Appearance: Normal appearance. She is well-developed.   HENT:      Head: Normocephalic and atraumatic.      Right Ear: Hearing  "normal.      Left Ear: Hearing normal.      Mouth/Throat:      Mouth: Mucous membranes are not pale, not dry and not cyanotic.   Eyes:      General: Lids are normal.      Conjunctiva/sclera: Conjunctivae normal.   Neck:      Trachea: Trachea normal.   Cardiovascular:      Rate and Rhythm: Normal rate and regular rhythm.      Heart sounds: Normal heart sounds.   Pulmonary:      Effort: Pulmonary effort is normal. No respiratory distress.      Breath sounds: Normal breath sounds.   Abdominal:      General: Bowel sounds are normal.      Palpations: Abdomen is soft. There is no mass.      Tenderness: There is no abdominal tenderness.      Hernia: No hernia is present.   Skin:     General: Skin is warm and dry.   Neurological:      Mental Status: She is alert and oriented to person, place, and time.   Psychiatric:         Mood and Affect: Mood normal.         Speech: Speech normal.         Behavior: Behavior normal. Behavior is cooperative.       Vitals:    11/10/21 0927   BP: 128/45   Pulse: 66   Resp: 16   Temp: 98.7 °F (37.1 °C)   Weight: 66.7 kg (147 lb)   Height: 167.6 cm (66\")     Body mass index is 23.73 kg/m².     Results Review:   I reviewed the patient's new clinical results.    Office Visit on 08/19/2021   Component Date Value Ref Range Status   • Color 08/19/2021 Yellow  Yellow, Straw, Dark Yellow, [place] Final   • Clarity, UA 08/19/2021 Clear  Clear Final   • Specific Gravity  08/19/2021 1.010  1.005 - 1.030 Final   • pH, Urine 08/19/2021 6.0  5.0 - 8.0 Final   • Leukocytes 08/19/2021 Negative  Negative Final   • Nitrite, UA 08/19/2021 Negative  Negative Final   • Protein, POC 08/19/2021 Negative  Negative mg/dL Final   • Glucose, UA 08/19/2021 Negative  Negative, 1000 mg/dL (3+) mg/dL Final   • Ketones, UA 08/19/2021 Negative  Negative Final   • Urobilinogen, UA 08/19/2021 Normal  Normal Final   • Bilirubin 08/19/2021 Negative  Negative Final   • Blood, UA 08/19/2021 Trace* Negative Final      US Abdomen " Complete (07/17/2020 09:45)    Colonoscopy dated 8/17/2020 with 6 6 to 8 mm polyps removed.  One 20 to 25 mm polyp in the ascending colon removed by mucosal resection.  Cecum, ascending colon, and transverse colon polyps with tubular adenoma.  No high-grade dysplasia.     Colonoscopy dated 3/1/2021 1 6 to 8 mm polyp in the proximal ascending colon removed.  1 5 mm polyp in the proximal descending colon removed.  A few diminutive polyps in the rectum and sigmoid colon removed.  Diverticulosis in sigmoid colon.  Hyperplastic changes of prior EMR site noted, biopsies obtained.  Ascending colon polyp biopsy with tubular adenoma without high-grade dysplasia.  Ascending colon biopsy proximal with tubular adenoma fragments without high-grade dysplasia.  Descending colon polyp biopsy of tubular adenoma fragments without high-grade dysplasia.  Rectosigmoid colon polyp x2 biopsy with hyperplastic polyps.     Assessment / Plan      1. Chronic idiopathic constipation  She has a history of constipation for many years that is reasonably controlled with stool softeners 1-2 per day. She has tried Miralax but it caused urgency and incontinence. No history of rectal bleeding. Denies abdominal pain. Colonoscopy with polyps removed, otherwise unremarkable. Basic labs unremarkable. TSH normal.  High fiber, low fat diet with liberal water intake.   Continue stool softeners 1-2 per day.    2. Gastroesophageal reflux disease without esophagitis  She has a history of reflux for several years that is reasonably controlled with low dose Omeprazole. Continue same. No difficulty swallowing. She had EGD in 2012 and was normal per patient.   Anti-reflux measures.    3. Personal history of colonic polyps  Colonoscopy in August 2020 with 6 polyps removed, tubular adenoma without dysplasia. Ascending colon with 20-25mm polyp removed by EMR, tubular adenoma without dysplasia.  Colonoscopy in March 2021 with biopsy at site of previous removed 20-25mm  polyp with tubular adenoma without dysplasia. There is no family history of colon cancer.   Colonoscopy for surveillance    - Case Request; Standing  - Case Request  - polyethylene glycol (GoLYTELY) 236 g solution; Starting at 5 pm on day prior to procedure, drink 8 ounces every 30 minutes as directed  Dispense: 4000 mL; Refill: 0  - bisacodyl (DULCOLAX) 5 MG EC tablet; Take as directed for colon prep  Dispense: 4 tablet; Refill: 0    Patient Instructions   Antireflux measures: Avoid fried, fatty foods, alcohol, chocolate, coffee, tea,  soft drinks, peppermint and spearmint, spicy foods, tomatoes and tomato based foods, onions, peppers, and smoking.   Other antireflux measures include weight reduction if overweight, avoiding tight clothing around the abdomen, elevating the head of the bed 6 inches with blocks under the head board, and don't drink or eat before going to bed and avoid lying down immediately after meals.  Omeprazole 20 mg 1 by mouth in the am 30 minutes before breakfast.  High fiber, low fat diet with liberal water intake.    Stool softeners 1-2 per day.   Colonoscopy for surveillance:  The indications, technique, alternatives and potential risk and complications were discussed with the patient including but not limited to bleeding, perforations, missing lesions and anesthetic complications. The patient understands and wishes to proceed with the procedure and has given their verbal consent. Written patient education information was given to the patient.   The patient will call if they have further questions before procedure.     Stew Grant, APRN  11/10/2021    Please note that portions of this note may have been completed with a voice recognition program. Efforts were made to edit the dictations, but occasionally words are mistranscribed.

## 2021-11-11 PROBLEM — Z86.010 PERSONAL HISTORY OF COLONIC POLYPS: Status: ACTIVE | Noted: 2021-11-11

## 2021-11-11 PROBLEM — Z86.0100 PERSONAL HISTORY OF COLONIC POLYPS: Status: ACTIVE | Noted: 2021-11-11

## 2021-11-29 ENCOUNTER — OFFICE VISIT (OUTPATIENT)
Dept: INTERNAL MEDICINE | Facility: CLINIC | Age: 76
End: 2021-11-29

## 2021-11-29 VITALS
TEMPERATURE: 98 F | HEIGHT: 66 IN | BODY MASS INDEX: 23.46 KG/M2 | DIASTOLIC BLOOD PRESSURE: 60 MMHG | HEART RATE: 72 BPM | WEIGHT: 146 LBS | SYSTOLIC BLOOD PRESSURE: 120 MMHG | OXYGEN SATURATION: 95 %

## 2021-11-29 DIAGNOSIS — R91.8 LUNG MASS: ICD-10-CM

## 2021-11-29 DIAGNOSIS — R51.9 NONINTRACTABLE HEADACHE, UNSPECIFIED CHRONICITY PATTERN, UNSPECIFIED HEADACHE TYPE: ICD-10-CM

## 2021-11-29 DIAGNOSIS — E03.9 HYPOTHYROIDISM, UNSPECIFIED TYPE: Primary | ICD-10-CM

## 2021-11-29 DIAGNOSIS — F17.210 NICOTINE DEPENDENCE, CIGARETTES, UNCOMPLICATED: ICD-10-CM

## 2021-11-29 PROCEDURE — 99214 OFFICE O/P EST MOD 30 MIN: CPT | Performed by: INTERNAL MEDICINE

## 2021-11-29 NOTE — PROGRESS NOTES
Subjective   Yamileth De Guzman is a 76 y.o. female.     Chief Complaint   Patient presents with   • Follow-up   • Hypotension     lowered diastolic reading       History of Present Illness   Patient here for follow-up of.  Patient complaints diastolic blood pressure low at home the lowest was 45 denies any dizziness short of breath fatigue.  Hypothyroidism stable on medication.  Neck pain and the headache much improved after physical therapy and the muscle relaxant.  Patient still smokes.  CT scan no mass.    Current Outpatient Medications:   •  ALPRAZolam (XANAX) 0.25 MG tablet, Take 1 tablet by mouth At Night As Needed for Anxiety., Disp: 30 tablet, Rfl: 2  •  baclofen (LIORESAL) 10 MG tablet, Take 1 tablet by mouth 2 (Two) Times a Day., Disp: 50 tablet, Rfl: 0  •  calcium (OS-STIVEN) 600 MG tablet, Take 600 mg by mouth 2 (Two) Times a Day., Disp: , Rfl:   •  cetirizine (zyrTEC) 10 MG tablet, Take 10 mg by mouth Daily., Disp: , Rfl:   •  denosumab (PROLIA) 60 MG/ML solution syringe, Inject 60 mg under the skin into the appropriate area as directed Every 6 (Six) Months., Disp: , Rfl:   •  Docusate Sodium 100 MG capsule, Take 1 - 2 tablets daily. Adjust to have 1-2 soft stools daily., Disp: 60 tablet, Rfl: 2  •  Euthyrox 112 MCG tablet, Take 1 tablet by mouth once daily, Disp: 90 tablet, Rfl: 3  •  fluticasone (FLONASE) 50 MCG/ACT nasal spray, 2 sprays by Each Nare route Daily., Disp: 48 g, Rfl: 5  •  Garlic 400 MG tablet, Take  by mouth Daily., Disp: , Rfl:   •  Multiple Vitamin (MULTI VITAMIN DAILY PO), Take 1 tablet by mouth Daily., Disp: , Rfl:   •  naproxen (Naprosyn) 500 MG tablet, Take 1 tablet by mouth 2 (Two) Times a Day With Meals., Disp: 40 tablet, Rfl: 0  •  Omega-3 Fatty Acids (FISH OIL) 1000 MG capsule capsule, Take 1,000 mg by mouth Daily With Breakfast., Disp: , Rfl:   •  omeprazole (priLOSEC) 20 MG capsule, Take 1 capsule by mouth 2 (Two) Times a Day., Disp: 180 capsule, Rfl: 3  •  vitamin C (ASCORBIC  ACID) 250 MG tablet, Take 250 mg by mouth Daily., Disp: , Rfl:     The following portions of the patient's history were reviewed and updated as appropriate: allergies, current medications, past family history, past medical history, past social history, past surgical history and problem list.    Review of Systems   Constitutional: Negative.    Respiratory: Negative.    Cardiovascular: Negative.    Gastrointestinal: Negative.    Musculoskeletal: Negative.    Skin: Negative.    Neurological: Negative.    Psychiatric/Behavioral: Negative.        Objective   Physical Exam  Cardiovascular:      Rate and Rhythm: Normal rate and regular rhythm.      Heart sounds: Normal heart sounds.   Pulmonary:      Effort: Pulmonary effort is normal.      Breath sounds: Normal breath sounds.   Abdominal:      General: Bowel sounds are normal.   Musculoskeletal:      Cervical back: Neck supple.   Skin:     General: Skin is warm.   Neurological:      Mental Status: She is alert and oriented to person, place, and time.         All tests have been reviewed.    Assessment/Plan   There are no diagnoses linked to this encounter.          Hypothyroidism, cont med    Hypotension blood pressure normal today continue to watch patient denies any symptoms.     Current every day smoker   encourage patient to quit tobacco    Lung nodule no change    Neck pain and HA improved continue medication     6 months for annual physical        Below is to historical records for reference only:  anxiety stable on xanax. Continue prn  Constipation continue MiraLAX  IBS no abd pain stable on IBS OTC medicine history EGD 2011 gastritis and hh, asa d/c  Insomnia stable on amitriptyline. Continue  Hypothryoidism. Continue medication    lung nodule follow up RML soft tissue nodule repeat no more,   Bone density scan showed this osteoporosis continue Calcium 1,200mg and vit D3 hold for now  osteoporosis on reclast  oscal D  hematuria history of it. seen by urologist  long ago. 4/17/14 micro showed trace blood. micro negative, no urine frequency.  vaccination: zostavax done Td 2015 refuse pneumovax,refuse prevnar, refuse  shingrix, refuse hepA shot  left carotid bruit, 0-49%  HLD diet, patient refuses medicine  tob to quit,     6 mo PE

## 2022-02-25 DIAGNOSIS — K21.00 GASTROESOPHAGEAL REFLUX DISEASE WITH ESOPHAGITIS WITHOUT HEMORRHAGE: ICD-10-CM

## 2022-02-25 RX ORDER — OMEPRAZOLE 20 MG/1
CAPSULE, DELAYED RELEASE ORAL
Qty: 180 CAPSULE | Refills: 0 | Status: SHIPPED | OUTPATIENT
Start: 2022-02-25 | End: 2022-05-23

## 2022-02-25 NOTE — TELEPHONE ENCOUNTER
Rx Refill Note  Requested Prescriptions     Pending Prescriptions Disp Refills   • omeprazole (priLOSEC) 20 MG capsule [Pharmacy Med Name: Omeprazole 20 MG Oral Capsule Delayed Release] 180 capsule 0     Sig: Take 1 capsule by mouth twice daily      Last office visit with prescribing clinician: 11/29/2021      Next office visit with prescribing clinician: 5/31/2022            Radha Pope LPN  02/25/22, 08:56 EST

## 2022-03-14 ENCOUNTER — TELEPHONE (OUTPATIENT)
Dept: GASTROENTEROLOGY | Facility: CLINIC | Age: 77
End: 2022-03-14

## 2022-03-17 NOTE — PRE-PROCEDURE INSTRUCTIONS
PAT phone history completed with pt for upcoming procedure on  3/18/22 with Dr. Rogers.    PAT PASS GIVEN/REVIEWED WITH PT.  VERBALIZED UNDERSTANDING OF THE FOLLOWING:  DO NOT EAT, DRINK, SMOKE, USE SMOKELESS TOBACCO OR CHEW GUM AFTER MIDNIGHT THE NIGHT BEFORE SURGERY.  THIS ALSO INCLUDES HARD CANDIES AND MINTS.    DO NOT SHAVE THE AREA TO BE OPERATED ON AT LEAST 48 HOURS PRIOR TO THE PROCEDURE.  DO NOT WEAR MAKE UP OR NAIL POLISH.  DO NOT LEAVE IN ANY PIERCING OR WEAR JEWELRY THE DAY OF SURGERY.      DO NOT USE ADHESIVES IF YOU WEAR DENTURES.    DO NOT WEAR EYE CONTACTS; BRING IN YOUR GLASSES.    ONLY TAKE MEDICATION THE MORNING OF YOUR PROCEDURE IF INSTRUCTED BY YOUR SURGEON WITH ENOUGH WATER TO SWALLOW THE MEDICATION.  IF YOUR SURGEON DID NOT SPECIFY WHICH MEDICATIONS TO TAKE, YOU WILL NEED TO CALL THEIR OFFICE FOR FURTHER INSTRUCTIONS AND DO AS THEY INSTRUCT.    LEAVE ANYTHING YOU CONSIDER VALUABLE AT HOME.    YOU WILL NEED TO ARRANGE FOR SOMEONE TO DRIVE YOU HOME AFTER SURGERY.  IT IS RECOMMENDED THAT YOU DO NOT DRIVE, WORK, DRINK ALCOHOL OR MAKE MAJOR DECISIONS FOR AT LEAST 24 HOURS AFTER YOUR PROCEDURE IS COMPLETE.      THE DAY OF YOUR PROCEDURE, BRING IN THE FOLLOWING IF APPLICABLE:   PICTURE ID AND INSURANCE/MEDICARE OR MEDICAID CARDS/ANY CO-PAY THAT MAY BE DUE   COPY OF ADVANCED DIRECTIVE/LIVING WILL/POWER OR    CPAP/BIPAP/INHALERS   SKIN PREP SHEET   YOUR PREADMISSION TESTING PASS (IF NOT A PHONE HISTORY)           COVID self-quarantine instructions reviewed with the pt.  Verbalized understanding.

## 2022-03-18 ENCOUNTER — ANESTHESIA EVENT (OUTPATIENT)
Dept: GASTROENTEROLOGY | Facility: HOSPITAL | Age: 77
End: 2022-03-18

## 2022-03-18 ENCOUNTER — ANESTHESIA (OUTPATIENT)
Dept: GASTROENTEROLOGY | Facility: HOSPITAL | Age: 77
End: 2022-03-18

## 2022-03-18 ENCOUNTER — HOSPITAL ENCOUNTER (OUTPATIENT)
Facility: HOSPITAL | Age: 77
Setting detail: HOSPITAL OUTPATIENT SURGERY
Discharge: HOME | End: 2022-03-18
Attending: INTERNAL MEDICINE | Admitting: INTERNAL MEDICINE

## 2022-03-18 VITALS
DIASTOLIC BLOOD PRESSURE: 43 MMHG | OXYGEN SATURATION: 99 % | SYSTOLIC BLOOD PRESSURE: 113 MMHG | HEART RATE: 64 BPM | TEMPERATURE: 98.1 F | BODY MASS INDEX: 23.3 KG/M2 | HEIGHT: 66 IN | RESPIRATION RATE: 16 BRPM | WEIGHT: 145 LBS

## 2022-03-18 DIAGNOSIS — Z86.010 PERSONAL HISTORY OF COLONIC POLYPS: ICD-10-CM

## 2022-03-18 PROCEDURE — 25010000002 PROPOFOL 1000 MG/100ML EMULSION: Performed by: NURSE ANESTHETIST, CERTIFIED REGISTERED

## 2022-03-18 PROCEDURE — 45385 COLONOSCOPY W/LESION REMOVAL: CPT | Performed by: INTERNAL MEDICINE

## 2022-03-18 RX ORDER — PROPOFOL 10 MG/ML
INJECTION, EMULSION INTRAVENOUS AS NEEDED
Status: DISCONTINUED | OUTPATIENT
Start: 2022-03-18 | End: 2022-03-18 | Stop reason: SURG

## 2022-03-18 RX ORDER — SIMETHICONE 20 MG/.3ML
EMULSION ORAL AS NEEDED
Status: DISCONTINUED | OUTPATIENT
Start: 2022-03-18 | End: 2022-03-18 | Stop reason: HOSPADM

## 2022-03-18 RX ORDER — SODIUM CHLORIDE 9 MG/ML
70 INJECTION, SOLUTION INTRAVENOUS CONTINUOUS PRN
Status: DISCONTINUED | OUTPATIENT
Start: 2022-03-18 | End: 2022-03-18 | Stop reason: HOSPADM

## 2022-03-18 RX ORDER — LIDOCAINE HYDROCHLORIDE 20 MG/ML
INJECTION, SOLUTION INTRAVENOUS AS NEEDED
Status: DISCONTINUED | OUTPATIENT
Start: 2022-03-18 | End: 2022-03-18 | Stop reason: SURG

## 2022-03-18 RX ORDER — KETAMINE HYDROCHLORIDE 50 MG/ML
INJECTION, SOLUTION, CONCENTRATE INTRAMUSCULAR; INTRAVENOUS AS NEEDED
Status: DISCONTINUED | OUTPATIENT
Start: 2022-03-18 | End: 2022-03-18 | Stop reason: SURG

## 2022-03-18 RX ADMIN — SODIUM CHLORIDE 70 ML/HR: 9 INJECTION, SOLUTION INTRAVENOUS at 07:24

## 2022-03-18 RX ADMIN — KETAMINE HYDROCHLORIDE 25 MG: 50 INJECTION, SOLUTION INTRAMUSCULAR; INTRAVENOUS at 08:18

## 2022-03-18 RX ADMIN — LIDOCAINE HYDROCHLORIDE 60 MG: 20 INJECTION, SOLUTION INTRAVENOUS at 08:18

## 2022-03-18 RX ADMIN — PROPOFOL 50 MG: 10 INJECTION, EMULSION INTRAVENOUS at 08:18

## 2022-03-18 RX ADMIN — PROPOFOL 50 MG: 10 INJECTION, EMULSION INTRAVENOUS at 08:28

## 2022-03-18 NOTE — ANESTHESIA POSTPROCEDURE EVALUATION
Patient: Yamileth De Guzman    Procedure Summary     Date: 03/18/22 Room / Location: [facility] ENDOSCOPY 1 / [facility] ENDOSCOPY    Anesthesia Start: 0816 Anesthesia Stop: 0844    Procedure: COLONOSCOPY with polypectomy x2 (N/A Anus) Diagnosis:       Personal history of colonic polyps      (Personal history of colonic polyps [Z86.010])    Surgeons: Scar Rogers MD Provider: [place] CRNA    Anesthesia Type: MAC ASA Status: 3          Anesthesia Type: MAC    Vitals  Vitals Value Taken Time   /48 03/18/22 0845   Temp 98.1 °F (36.7 °C) 03/18/22 0845   Pulse 58 03/18/22 0845   Resp 16 03/18/22 0845   SpO2 95 % 03/18/22 0845           Post Anesthesia Care and Evaluation    Patient location during evaluation: bedside  Patient participation: complete - patient participated  Level of consciousness: awake  Pain score: 0  Pain management: adequate  Airway patency: patent  Anesthetic complications: No anesthetic complications  PONV Status: controlled  Cardiovascular status: acceptable and stable  Respiratory status: acceptable and room air  Hydration status: acceptable

## 2022-03-18 NOTE — H&P
[facility]  HISTORY AND PHYSICAL    Patient Name: Yamileth De Guzman  : 1945  MRN: [id number]    Chief Complaint:   For surveillance colonoscopy    History Of Presenting Illness:    Personal history of advanced polyp s/p EMR  EMR site recurrence    Past Medical History:   Diagnosis Date   • Abdominal bloating    • Acid reflux    • Adenomatous polyp of colon 2020   • Arthritis    • Carotid bruit    • Colon polyp    • Constipation    • Disease of thyroid gland    • Fibromyalgia    • Full dentures    • Hematuria    • Lung nodule     benign   • Osteoporosis    • Seasonal allergies    • Sinusitis    • Wears glasses     READING GLASSES ONLY       Past Surgical History:   Procedure Laterality Date   • APPENDECTOMY     • COLONOSCOPY     • COLONOSCOPY N/A 2020    Procedure: COLONOSCOPY WITH COLD SNARE POLYPECTOMY, COLD FORCEP POLYPECTOMY, SUBMUCOSAL INJECTION OF NORMAL SALINE, ENDOSCOPIC MUCOSAL RESECTION, HOT SNARE POLYPECTOMY, THERMAL ABLATION, QUICK CLIP PLACEMENT TIMES 4 AND BARBIE INK INJECTION;  Surgeon: Scar Rogers MD;  Location: [facility] ENDOSCOPY;  Service: Gastroenterology;  Laterality: N/A;   • COLONOSCOPY N/A 2021    Procedure: COLONOSCOPY WITH BIOPSY AND POLYPECTOMY;  Surgeon: Scar Rogers MD;  Location: [facility] ENDOSCOPY;  Service: Gastroenterology;  Laterality: N/A;   • HYSTERECTOMY      PARTIAL STILL HAS OVARIES   • MULTIPLE TOOTH EXTRACTIONS     • TUBAL ABDOMINAL LIGATION     • UPPER GASTROINTESTINAL ENDOSCOPY     • VAGINAL DELIVERY      X1       Social History     Socioeconomic History   • Marital status:    Tobacco Use   • Smoking status: Current Every Day Smoker     Packs/day: 1.00     Types: Cigarettes   • Smokeless tobacco: Never Used   Vaping Use   • Vaping Use: Never used   Substance and Sexual Activity   • Alcohol use: Not Currently     Comment: moderate   • Drug use: No   • Sexual activity: Defer       Family History    Problem Relation Age of Onset   • Hearing loss Father    • Arthritis Father    • Cancer Father    • GI problems Father    • Breast cancer Sister    • Colon cancer Neg Hx        Prior to Admission Medications:  Medications Prior to Admission   Medication Sig Dispense Refill Last Dose   • ALPRAZolam (XANAX) 0.25 MG tablet Take 1 tablet by mouth At Night As Needed for Anxiety. 30 tablet 2 3/17/2022 at 0800   • baclofen (LIORESAL) 10 MG tablet Take 1 tablet by mouth 2 (Two) Times a Day. 50 tablet 0 Past Week at Unknown time   • calcium (OS-STIVEN) 600 MG tablet Take 600 mg by mouth 2 (Two) Times a Day.   Past Week at Unknown time   • cetirizine (zyrTEC) 10 MG tablet Take 10 mg by mouth Daily.   3/17/2022 at 2200   • denosumab (PROLIA) 60 MG/ML solution syringe Inject 60 mg under the skin into the appropriate area as directed Every 6 (Six) Months.   Past Week at Unknown time   • Docusate Sodium 100 MG capsule Take 1 - 2 tablets daily. Adjust to have 1-2 soft stools daily. 60 tablet 2 Past Week at Unknown time   • Euthyrox 112 MCG tablet Take 1 tablet by mouth once daily 90 tablet 3 3/17/2022 at 0800   • fluticasone (FLONASE) 50 MCG/ACT nasal spray 2 sprays by Each Nare route Daily. 48 g 5 3/17/2022 at 2200   • Garlic 400 MG tablet Take  by mouth Daily.   3/17/2022 at 0800   • Multiple Vitamin (MULTI VITAMIN DAILY PO) Take 1 tablet by mouth Daily.   3/17/2022 at 0800   • naproxen (Naprosyn) 500 MG tablet Take 1 tablet by mouth 2 (Two) Times a Day With Meals. 40 tablet 0 3/17/2022 at 0800   • Omega-3 Fatty Acids (FISH OIL) 1000 MG capsule capsule Take 1,000 mg by mouth Daily With Breakfast.   3/17/2022 at 0800   • omeprazole (priLOSEC) 20 MG capsule Take 1 capsule by mouth twice daily 180 capsule 0 3/17/2022 at 0800   • vitamin C (ASCORBIC ACID) 250 MG tablet Take 250 mg by mouth Daily.   3/17/2022 at 0800       Allergies:  No Known Allergies     Vitals: Temp:  [98.3 °F (36.8 °C)] 98.3 °F (36.8 °C)  Heart Rate:  [76]  76  Resp:  [16] 16  BP: (106)/(46) 106/46    Review Of Systems:  Constitutional:  Negative for chills, fever, and unexpected weight change.  Respiratory:  Negative for cough, chest tightness, shortness of breath, and wheezing.  Cardiovascular:  Negative for chest pain, palpitations, and leg swelling.  Gastrointestinal:  Negative for abdominal distention, abdominal pain, Nausea, vomiting.  Neurological:  Negative for Weakness, numbness, and headaches.     Physical Exam:    General Appearance:  Alert, cooperative, in no acute distress.   Lungs:   Clear to auscultation, respirations regular, even and                 unlabored.   Heart:  Regular rhythm and normal rate.   Abdomen:   Normal bowel sounds, no masses, no organomegaly. Soft, non-tender, non-distended   Neurologic: Alert and oriented x 3. Moves all four limbs equally       Plan: COLONOSCOPY (N/A)     Scar Rogers MD  3/18/2022

## 2022-03-18 NOTE — ANESTHESIA PREPROCEDURE EVALUATION
Anesthesia Evaluation     Patient summary reviewed and Nursing notes reviewed   no history of anesthetic complications:  NPO Solid Status: > 8 hours  NPO Liquid Status: > 8 hours           Airway   Mallampati: II  TM distance: >3 FB  Neck ROM: full  No difficulty expected  Dental      Pulmonary    (+) a smoker Current Smoked day of surgery, COPD, decreased breath sounds,     ROS comment: Lung mass  Cardiovascular     (+) PVD, hyperlipidemia,       Neuro/Psych  (+) psychiatric history Anxiety and Depression,    GI/Hepatic/Renal/Endo    (+)  GERD well controlled,  thyroid problem hypothyroidism    Musculoskeletal     (+) arthralgias, back pain, chronic pain, myalgias,   Abdominal    Substance History   (+) alcohol use,      OB/GYN          Other   arthritis,      ROS/Med Hx Other: Lung mass                    Anesthesia Plan    ASA 3     MAC   (Risks and benefits discussed including risk of aspiration, recall and dental damage. All patient questions answered.    Will continue with plan of care.)  intravenous induction     Anesthetic plan, all risks, benefits, and alternatives have been provided, discussed and informed consent has been obtained with: patient.

## 2022-03-18 NOTE — DISCHARGE INSTRUCTIONS
Rest today.  No pushing, pulling, tugging,  heavy lifting, or strenuous activity.  No major decision making, driving, or drinking alcoholic beverages for 24 hours. ( due to the medications you have  received)  Always use good hand hygiene/washing techniques.  NO driving while taking pain medications.    To assist you in voiding:  Drink plenty of fluids  Listen to running water while attempting to void.    If you are unable to urinate and you have an uncomfortable urge to void or it has been   6 hours since you were discharged, return to the Emergency Room     - Discharge patient to home (ambulatory).   - High fiber diet.   - Continue present medications.   - No ASA/NSAIDS for 3 days   - Await pathology results.   - Repeat colonoscopy in 3 years for surveillance.   - Return to GI office in 8 weeks.

## 2022-03-23 LAB
LAB AP CASE REPORT: NORMAL
PATH REPORT.FINAL DX SPEC: NORMAL

## 2022-05-02 ENCOUNTER — APPOINTMENT (OUTPATIENT)
Dept: INFUSION THERAPY | Facility: HOSPITAL | Age: 77
End: 2022-05-02

## 2022-05-21 DIAGNOSIS — K21.00 GASTROESOPHAGEAL REFLUX DISEASE WITH ESOPHAGITIS WITHOUT HEMORRHAGE: ICD-10-CM

## 2022-05-23 RX ORDER — OMEPRAZOLE 20 MG/1
CAPSULE, DELAYED RELEASE ORAL
Qty: 180 CAPSULE | Refills: 0 | Status: SHIPPED | OUTPATIENT
Start: 2022-05-23 | End: 2022-11-23

## 2022-05-25 ENCOUNTER — OFFICE VISIT (OUTPATIENT)
Dept: GASTROENTEROLOGY | Facility: CLINIC | Age: 77
End: 2022-05-25

## 2022-05-25 VITALS
WEIGHT: 143 LBS | HEIGHT: 66 IN | TEMPERATURE: 96.9 F | BODY MASS INDEX: 22.98 KG/M2 | SYSTOLIC BLOOD PRESSURE: 120 MMHG | DIASTOLIC BLOOD PRESSURE: 60 MMHG

## 2022-05-25 DIAGNOSIS — D12.6 ADENOMATOUS POLYP OF COLON, UNSPECIFIED PART OF COLON: ICD-10-CM

## 2022-05-25 DIAGNOSIS — K59.04 CHRONIC IDIOPATHIC CONSTIPATION: Primary | Chronic | ICD-10-CM

## 2022-05-25 DIAGNOSIS — K21.9 GASTROESOPHAGEAL REFLUX DISEASE WITHOUT ESOPHAGITIS: Chronic | ICD-10-CM

## 2022-05-25 PROBLEM — K63.5 POLYP OF COLON: Status: RESOLVED | Noted: 2020-08-21 | Resolved: 2022-05-25

## 2022-05-25 PROCEDURE — 99214 OFFICE O/P EST MOD 30 MIN: CPT | Performed by: NURSE PRACTITIONER

## 2022-05-25 NOTE — PATIENT INSTRUCTIONS
Antireflux measures: Avoid fried, fatty foods, alcohol, chocolate, coffee, tea,  soft drinks, peppermint and spearmint, spicy foods, tomatoes and tomato based foods, onion based foods, and smoking.  Other antireflux measures include weight reduction if overweight, avoiding tight clothing around the abdomen, elevating the head of the bed 6 inches with blocks under the head board, and don't drink or eat before going to bed and avoid lying down immediately after meals.  Omeprazole 20 mg 1 po daily in the am 30 minutes before breakfast.  Recommended to take Levothyroxine first in the am upon waking, wait 30 minutes, then take Omeprazole 20 mg, wait 30 minutes, then eat breakfast and take other medications.   High fiber, low fat diet with liberal water intake.   Metamucil 1 scoop daily.  Continue stool softeners 1-2 per day for constipation.   Colonoscopy for surveillance in 3 years, March 2025.  Follow up: The patient wants to call back

## 2022-05-25 NOTE — PROGRESS NOTES
Follow Up Note     Date: 2022   Patient Name: Yamileth De Guzman  MRN: [id number]  : 1945     Primary Care Provider: Da Dong MD     Chief Complaint   Patient presents with   • Follow-up     Colonoscopy 3/18/2022   • Chronic idiopathic constipation     History of present illness:   2022  Yamileth De Guzman is a 76 y.o. female who is here today for follow up after colonoscopy. She has been doing well. Constipation controlled with stool softeners. Denies GI bleeding. No history of nausea or vomiting.     Interval History:  11/10/2021  She is taking stool softeners 1-2 per day with reasonable control of constipation. No rectal bleeding. No abdominal pain. Reflux is doing well with Omeprazole 20 mg. No difficulty swallowing.     2021  She continues to have constipation that is unchanged. She tried Miralax, but it caused her to have incontinence at times and she stopped it. She has been eating a high fiber diet with improvement. No rectal bleeding. No abdominal pain. Reflux well controlled with Omeprazole 20 mg. No history of difficulty swallowing.     2020  Yamileth De Guzman is a 75 y.o. female who is here today for follow up for constipation. She has felt much better. Abdominal pain has significantly improved, she has not had any further episodes. Constipation is improving with high fiber diet.     2020  There is a long standing history of constipation. The patient has 1 firm bowel movement every 3-4 days. She frequently has gas and bloating. For the past 2-3 months, constipation and gas have worsened, and she has been having pain in the lower abdomen. The pain comes and goes, and is a moderate, sharp pain. No history of fevers or chills, nausea or vomiting.     The patient is taking Omeprazole 20mg with reasonable control of heartburn. Reflux is worse at night with lying down. Heartburn is moderate. The patient denies difficulty swallowing.     Last EGD was in  2012.    Subjective      Past Medical History:   Diagnosis Date   • Abdominal bloating    • Acid reflux    • Adenomatous polyp of colon 08/17/2020   • Arthritis    • Carotid bruit    • Colon polyp 2012   • Constipation    • Disease of thyroid gland    • Fibromyalgia    • Full dentures    • Hematuria    • Lung nodule     benign   • Osteoporosis    • Seasonal allergies    • Sinusitis    • Wears glasses     READING GLASSES ONLY     Past Surgical History:   Procedure Laterality Date   • APPENDECTOMY     • COLONOSCOPY  2012   • COLONOSCOPY N/A 08/17/2020    Procedure: COLONOSCOPY WITH COLD SNARE POLYPECTOMY, COLD FORCEP POLYPECTOMY, SUBMUCOSAL INJECTION OF NORMAL SALINE, ENDOSCOPIC MUCOSAL RESECTION, HOT SNARE POLYPECTOMY, THERMAL ABLATION, QUICK CLIP PLACEMENT TIMES 4 AND BARBIE INK INJECTION;  Surgeon: Scar Rogers MD;  Location: [facility] ENDOSCOPY;  Service: Gastroenterology;  Laterality: N/A;   • COLONOSCOPY N/A 03/01/2021    Procedure: COLONOSCOPY WITH BIOPSY AND POLYPECTOMY;  Surgeon: Scar Rogers MD;  Location: [facility] ENDOSCOPY;  Service: Gastroenterology;  Laterality: N/A;   • COLONOSCOPY N/A 3/18/2022    Procedure: COLONOSCOPY with polypectomy x2;  Surgeon: Scar Rogers MD;  Location: [facility] ENDOSCOPY;  Service: Gastroenterology;  Laterality: N/A;   • HYSTERECTOMY      PARTIAL STILL HAS OVARIES   • MULTIPLE TOOTH EXTRACTIONS     • TUBAL ABDOMINAL LIGATION     • UPPER GASTROINTESTINAL ENDOSCOPY  2012   • VAGINAL DELIVERY      X1     Family History   Problem Relation Age of Onset   • Hearing loss Father    • Arthritis Father    • Cancer Father    • GI problems Father    • Breast cancer Sister    • Colon cancer Neg Hx      Social History     Socioeconomic History   • Marital status:    Tobacco Use   • Smoking status: Current Every Day Smoker     Packs/day: 1.00     Types: Cigarettes   • Smokeless tobacco: Never Used   Vaping Use   • Vaping Use: Never used   Substance and Sexual  Activity   • Alcohol use: Not Currently     Comment: moderate   • Drug use: No   • Sexual activity: Defer       Current Outpatient Medications:   •  ALPRAZolam (XANAX) 0.25 MG tablet, Take 1 tablet by mouth At Night As Needed for Anxiety., Disp: 30 tablet, Rfl: 2  •  baclofen (LIORESAL) 10 MG tablet, Take 1 tablet by mouth 2 (Two) Times a Day., Disp: 50 tablet, Rfl: 0  •  calcium (OS-STIVEN) 600 MG tablet, Take 600 mg by mouth 2 (Two) Times a Day., Disp: , Rfl:   •  cetirizine (zyrTEC) 10 MG tablet, Take 10 mg by mouth Daily., Disp: , Rfl:   •  denosumab (PROLIA) 60 MG/ML solution syringe, Inject 60 mg under the skin into the appropriate area as directed Every 6 (Six) Months., Disp: , Rfl:   •  Docusate Sodium 100 MG capsule, Take 1 - 2 tablets daily. Adjust to have 1-2 soft stools daily., Disp: 60 tablet, Rfl: 2  •  Euthyrox 112 MCG tablet, Take 1 tablet by mouth once daily, Disp: 90 tablet, Rfl: 3  •  fluticasone (FLONASE) 50 MCG/ACT nasal spray, 2 sprays by Each Nare route Daily., Disp: 48 g, Rfl: 5  •  Garlic 400 MG tablet, Take  by mouth Daily., Disp: , Rfl:   •  Multiple Vitamin (MULTI VITAMIN DAILY PO), Take 1 tablet by mouth Daily., Disp: , Rfl:   •  naproxen (Naprosyn) 500 MG tablet, Take 1 tablet by mouth 2 (Two) Times a Day With Meals., Disp: 40 tablet, Rfl: 0  •  Omega-3 Fatty Acids (FISH OIL) 1000 MG capsule capsule, Take 1,000 mg by mouth Daily With Breakfast., Disp: , Rfl:   •  omeprazole (priLOSEC) 20 MG capsule, Take 1 capsule by mouth twice daily, Disp: 180 capsule, Rfl: 0  •  vitamin C (ASCORBIC ACID) 250 MG tablet, Take 250 mg by mouth Daily., Disp: , Rfl:      No Known Allergies     The following portions of the patient's history were reviewed and updated as appropriate: allergies, current medications, past family history, past medical history, past social history, past surgical history and problem list.  Objective     Physical Exam  Vitals and nursing note reviewed.   Constitutional:        "General: She is not in acute distress.     Appearance: Normal appearance. She is well-developed.   HENT:      Head: Normocephalic and atraumatic.      Mouth/Throat:      Mouth: Mucous membranes are not pale, not dry and not cyanotic.   Eyes:      General: Lids are normal.   Neck:      Trachea: Trachea normal.   Cardiovascular:      Rate and Rhythm: Normal rate.   Pulmonary:      Effort: Pulmonary effort is normal. No respiratory distress.      Breath sounds: Normal breath sounds.   Abdominal:      Tenderness: There is no abdominal tenderness.   Skin:     General: Skin is warm and dry.   Neurological:      Mental Status: She is alert and oriented to person, place, and time.   Psychiatric:         Mood and Affect: Mood normal.         Speech: Speech normal.         Behavior: Behavior normal. Behavior is cooperative.       Vitals:    05/25/22 0855   BP: 120/60   Temp: 96.9 °F (36.1 °C)   Weight: 64.9 kg (143 lb)   Height: 167.6 cm (66\")     Body mass index is 23.08 kg/m².     Results Review:   I reviewed the patient's new clinical results.    Admission on 03/18/2022, Discharged on 03/18/2022   Component Date Value Ref Range Status   • Case Report 03/18/2022    Final                    Value:Surgical Pathology Report                         Case: [id number]                                  Authorizing Provider:  Scar Rogers MD  Collected:           03/18/2022 08:29 AM          Ordering Location:     [facility]    Received:            03/18/2022 01:52 PM                                 [facility]                                                                    Pathologist:           Mary Andrew DO                                                        Specimens:   1) - Large Intestine, Right / Ascending Colon, prior EMR site polyp                                 2) - Large Intestine, Hepatic Flexure                                                     • Final Diagnosis 03/18/2022    Final "                    Value:This result contains rich text formatting which cannot be displayed here.      No radiology results for the last 90 days.     US Abdomen Complete (07/17/2020 09:45)     Colonoscopy dated 8/17/2020 per Dr. Rogers  -  Six 6 to 8 mm polyps removed.    - One 20 to 25 mm polyp in the ascending colon removed by mucosal resection.    - Cecum, ascending colon, and transverse colon polyps with tubular adenoma.  No high-grade dysplasia.     Colonoscopy dated 3/1/2021 per Dr. Rogers  - One 6 to 8 mm polyp in the proximal ascending colon removed.    - 1 5 mm polyp in the proximal descending colon removed.   - A few diminutive polyps in the rectum and sigmoid colon removed.  - Diverticulosis in sigmoid colon.    - Hyperplastic changes of prior EMR site noted, biopsies obtained.    - Ascending colon polyp biopsy with tubular adenoma without high-grade dysplasia.  Ascending colon biopsy proximal with tubular adenoma fragments without high-grade dysplasia.  Descending colon polyp biopsy of tubular adenoma fragments without high-grade dysplasia.  Rectosigmoid colon polyp x2 biopsy with hyperplastic polyps.    Colonoscopy dated 3/18/2022 per Dr. Rogers  - One 6 mm polyp in the proximal ascending colon, removed with a hot snare. Resected and retrieved. Edges were cauterized with soft coagulation  - A tattoo was seen in the proximal ascending colon distal to EMR scar.  - One 5 mm polyp at the hepatic flexure, removed with a hot snare. Resected and retrieved.  - Diverticulosis in the sigmoid colon.  - External and internal hemorrhoids.  - Ascending colon polyp biopsy with tubular adenoma, no dysplasia.  Hepatic flexure polyp biopsy tubular adenoma, no dysplasia.    Assessment / Plan      1. Chronic idiopathic constipation  Reasonable control with stool softeners daily.  Continue same.  Colonoscopy with polyps removed, diverticulosis, otherwise unremarkable.  High fiber, low fat diet with liberal water  intake.   Metamucil 1 scoop daily.    11/10/2021  She has a history of constipation for many years that is reasonably controlled with stool softeners 1-2 per day. She has tried Miralax but it caused urgency and incontinence. No history of rectal bleeding. Denies abdominal pain. Colonoscopy with polyps removed, otherwise unremarkable. Basic labs unremarkable. TSH normal.    2. Gastroesophageal reflux disease without esophagitis  Reasonably controlled with low dose PPI. Continue same. No history of difficulty swallowing.  Anti-reflux measures.    11/10/2021  She has a history of reflux for several years that is reasonably controlled with low dose Omeprazole. Continue same. No difficulty swallowing. She had EGD in 2012 and was normal per patient.     3. Adenomatous polyp of colon, unspecified part of colon  Colonoscopy with polyps removed, tubular adenoma without dysplasia. Colonoscopy in August 2020 with 6 polyps removed, tubular adenoma without dysplasia. Ascending colon with 20-25mm polyp removed by EMR, tubular adenoma without dysplasia.  Colonoscopy in March 2021 with biopsy at site of previous removed 20-25mm polyp with tubular adenoma without dysplasia. There is no family history of colon cancer.   Colonoscopy for surveillance in 3 years, March 2025.    Patient Instructions   1. Antireflux measures: Avoid fried, fatty foods, alcohol, chocolate, coffee, tea,  soft drinks, peppermint and spearmint, spicy foods, tomatoes and tomato based foods, onion based foods, and smoking.  2. Other antireflux measures include weight reduction if overweight, avoiding tight clothing around the abdomen, elevating the head of the bed 6 inches with blocks under the head board, and don't drink or eat before going to bed and avoid lying down immediately after meals.  3. Omeprazole 20 mg 1 po daily in the am 30 minutes before breakfast.  4. Recommended to take Levothyroxine first in the am upon waking, wait 30 minutes, then take  Omeprazole 20 mg, wait 30 minutes, then eat breakfast and take other medications.   5. High fiber, low fat diet with liberal water intake.   6. Metamucil 1 scoop daily.  7. Continue stool softeners 1-2 per day for constipation.   8. Colonoscopy for surveillance in 3 years, March 2025.  9. Follow up: The patient wants to call back    Stew Grant, APRN  5/25/2022    Please note that portions of this note may have been completed with a voice recognition program. Efforts were made to edit the dictations, but occasionally words are mistranscribed.

## 2022-06-01 ENCOUNTER — TELEPHONE (OUTPATIENT)
Dept: INTERNAL MEDICINE | Facility: CLINIC | Age: 77
End: 2022-06-01

## 2022-06-01 NOTE — TELEPHONE ENCOUNTER
Caller: Yamileth De Guzman    Relationship: Self    Best call back number: [phone]    What is the best time to reach you: WILL NOT BE AVAILABLE FOR US TO CALL BACK TODAY SHE WILL BE OUT BUT PLEASE NOTE AND SHE WILL CALL US BACK THIS AFTERNOON    Who are you requesting to speak with (clinical staff, provider,  specific staff member): CLINICAL STAFF    Do you know the name of the person who called: FRANK    What was the call regarding: PATIENT IS HAVING BURNING AND TINGLING IN HER LEGS AND TOES; STARTED IN April 2022 AND SHE WANTED TO SCHEDULE APPOINTMENT HOWEVER ANY DATE HUB GAVE HER SHE WAS UNAVAILABLE FOR TO COME INTO THE OFFICE     Do you require a callback: PATIENT WILL CALL BACK THIS AFTERNOON TO SEE THE STATUS

## 2022-06-02 NOTE — TELEPHONE ENCOUNTER
Left a detailed message for patient per signed release, patient needs to call and schedule an appointment.

## 2022-06-07 ENCOUNTER — OFFICE VISIT (OUTPATIENT)
Dept: INTERNAL MEDICINE | Facility: CLINIC | Age: 77
End: 2022-06-07

## 2022-06-07 VITALS
SYSTOLIC BLOOD PRESSURE: 132 MMHG | OXYGEN SATURATION: 97 % | WEIGHT: 138 LBS | BODY MASS INDEX: 22.18 KG/M2 | TEMPERATURE: 96.8 F | HEIGHT: 66 IN | DIASTOLIC BLOOD PRESSURE: 68 MMHG | HEART RATE: 76 BPM

## 2022-06-07 DIAGNOSIS — E03.9 HYPOTHYROIDISM, UNSPECIFIED TYPE: ICD-10-CM

## 2022-06-07 DIAGNOSIS — R79.9 ABNORMAL FINDING OF BLOOD CHEMISTRY, UNSPECIFIED: ICD-10-CM

## 2022-06-07 DIAGNOSIS — R20.2 TINGLING OF BOTH FEET: Primary | ICD-10-CM

## 2022-06-07 DIAGNOSIS — E55.9 VITAMIN D DEFICIENCY, UNSPECIFIED: ICD-10-CM

## 2022-06-07 DIAGNOSIS — E78.5 HYPERLIPIDEMIA, UNSPECIFIED HYPERLIPIDEMIA TYPE: ICD-10-CM

## 2022-06-07 DIAGNOSIS — F41.1 GENERALIZED ANXIETY DISORDER: ICD-10-CM

## 2022-06-07 DIAGNOSIS — Z00.00 ANNUAL PHYSICAL EXAM: ICD-10-CM

## 2022-06-07 DIAGNOSIS — F17.200 CURRENT EVERY DAY SMOKER: ICD-10-CM

## 2022-06-07 DIAGNOSIS — F17.210 NICOTINE DEPENDENCE, CIGARETTES, UNCOMPLICATED: ICD-10-CM

## 2022-06-07 PROCEDURE — 99214 OFFICE O/P EST MOD 30 MIN: CPT | Performed by: INTERNAL MEDICINE

## 2022-06-07 RX ORDER — LEVOTHYROXINE SODIUM 112 UG/1
112 TABLET ORAL DAILY
Qty: 90 TABLET | Refills: 3 | Status: SHIPPED | OUTPATIENT
Start: 2022-06-07 | End: 2022-06-08 | Stop reason: SDUPTHER

## 2022-06-07 RX ORDER — ALPRAZOLAM 0.25 MG/1
0.25 TABLET ORAL NIGHTLY PRN
Qty: 30 TABLET | Refills: 2 | Status: ON HOLD | OUTPATIENT
Start: 2022-06-07 | End: 2023-02-16 | Stop reason: SDUPTHER

## 2022-06-08 ENCOUNTER — HOSPITAL ENCOUNTER (OUTPATIENT)
Dept: INFUSION THERAPY | Facility: HOSPITAL | Age: 77
Discharge: HOME | End: 2022-06-08
Admitting: INTERNAL MEDICINE

## 2022-06-08 VITALS
DIASTOLIC BLOOD PRESSURE: 56 MMHG | TEMPERATURE: 98 F | SYSTOLIC BLOOD PRESSURE: 131 MMHG | RESPIRATION RATE: 18 BRPM | OXYGEN SATURATION: 97 % | HEART RATE: 60 BPM

## 2022-06-08 DIAGNOSIS — E03.9 HYPOTHYROIDISM, UNSPECIFIED TYPE: ICD-10-CM

## 2022-06-08 DIAGNOSIS — M81.0 SENILE OSTEOPOROSIS: Primary | ICD-10-CM

## 2022-06-08 PROCEDURE — 25010000002 DENOSUMAB 60 MG/ML SOLUTION PREFILLED SYRINGE: Performed by: INTERNAL MEDICINE

## 2022-06-08 PROCEDURE — 96401 CHEMO ANTI-NEOPL SQ/IM: CPT

## 2022-06-08 PROCEDURE — 96372 THER/PROPH/DIAG INJ SC/IM: CPT

## 2022-06-08 RX ORDER — LEVOTHYROXINE SODIUM 0.1 MG/1
TABLET ORAL
Qty: 30 TABLET | Refills: 1 | Status: SHIPPED | OUTPATIENT
Start: 2022-06-08 | End: 2022-08-01

## 2022-06-08 RX ADMIN — DENOSUMAB 60 MG: 60 INJECTION SUBCUTANEOUS at 11:11

## 2022-06-08 NOTE — CODE DOCUMENTATION
Patient verbalizes taking calcium and vitamin d supplements. Patient given med. Guide given to patient, educated patient and patient verbalizes understanding.

## 2022-06-13 DIAGNOSIS — E03.9 HYPOTHYROIDISM, UNSPECIFIED TYPE: Primary | ICD-10-CM

## 2022-06-13 LAB
25(OH)D3+25(OH)D2 SERPL-MCNC: 109 NG/ML (ref 30–100)
ALBUMIN SERPL-MCNC: 4.6 G/DL (ref 3.7–4.7)
ALBUMIN/GLOB SERPL: 1.8 {RATIO} (ref 1.2–2.2)
ALP SERPL-CCNC: 81 IU/L (ref 44–121)
ALT SERPL-CCNC: 25 IU/L (ref 0–32)
ANA SER QL: POSITIVE
AST SERPL-CCNC: 27 IU/L (ref 0–40)
BASOPHILS # BLD AUTO: 0.1 X10E3/UL (ref 0–0.2)
BASOPHILS NFR BLD AUTO: 1 %
BILIRUB SERPL-MCNC: 0.2 MG/DL (ref 0–1.2)
BUN SERPL-MCNC: 14 MG/DL (ref 8–27)
BUN/CREAT SERPL: 19 (ref 12–28)
CALCIUM SERPL-MCNC: 10.1 MG/DL (ref 8.7–10.3)
CHLORIDE SERPL-SCNC: 104 MMOL/L (ref 96–106)
CHOLEST SERPL-MCNC: 202 MG/DL (ref 100–199)
CO2 SERPL-SCNC: 25 MMOL/L (ref 20–29)
CREAT SERPL-MCNC: 0.75 MG/DL (ref 0.57–1)
DSDNA AB SER-ACNC: <1 IU/ML (ref 0–9)
EGFRCR SERPLBLD CKD-EPI 2021: 83 ML/MIN/1.73
EOSINOPHIL # BLD AUTO: 0.1 X10E3/UL (ref 0–0.4)
EOSINOPHIL NFR BLD AUTO: 1 %
ERYTHROCYTE [DISTWIDTH] IN BLOOD BY AUTOMATED COUNT: 12.2 % (ref 11.7–15.4)
GLOBULIN SER CALC-MCNC: 2.6 G/DL (ref 1.5–4.5)
GLUCOSE SERPL-MCNC: 90 MG/DL (ref 65–99)
HBA1C MFR BLD: 5.7 % (ref 4.8–5.6)
HCT VFR BLD AUTO: 41.9 % (ref 34–46.6)
HDLC SERPL-MCNC: 66 MG/DL
HGB BLD-MCNC: 14.1 G/DL (ref 11.1–15.9)
IMM GRANULOCYTES # BLD AUTO: 0 X10E3/UL (ref 0–0.1)
IMM GRANULOCYTES NFR BLD AUTO: 0 %
LDLC SERPL CALC-MCNC: 117 MG/DL (ref 0–99)
LYMPHOCYTES # BLD AUTO: 2.5 X10E3/UL (ref 0.7–3.1)
LYMPHOCYTES NFR BLD AUTO: 30 %
Lab: NORMAL
MCH RBC QN AUTO: 32 PG (ref 26.6–33)
MCHC RBC AUTO-ENTMCNC: 33.7 G/DL (ref 31.5–35.7)
MCV RBC AUTO: 95 FL (ref 79–97)
MONOCYTES # BLD AUTO: 0.5 X10E3/UL (ref 0.1–0.9)
MONOCYTES NFR BLD AUTO: 6 %
NEUTROPHILS # BLD AUTO: 5.2 X10E3/UL (ref 1.4–7)
NEUTROPHILS NFR BLD AUTO: 62 %
PLATELET # BLD AUTO: 221 X10E3/UL (ref 150–450)
POTASSIUM SERPL-SCNC: 4 MMOL/L (ref 3.5–5.2)
PROT SERPL-MCNC: 7.2 G/DL (ref 6–8.5)
RBC # BLD AUTO: 4.4 X10E6/UL (ref 3.77–5.28)
SODIUM SERPL-SCNC: 142 MMOL/L (ref 134–144)
TRIGL SERPL-MCNC: 109 MG/DL (ref 0–149)
TSH SERPL DL<=0.005 MIU/L-ACNC: 0.12 UIU/ML (ref 0.45–4.5)
VIT B1 BLD-SCNC: 160.6 NMOL/L (ref 66.5–200)
VIT B12 SERPL-MCNC: 866 PG/ML (ref 232–1245)
VLDLC SERPL CALC-MCNC: 19 MG/DL (ref 5–40)
WBC # BLD AUTO: 8.3 X10E3/UL (ref 3.4–10.8)

## 2022-07-12 ENCOUNTER — OFFICE VISIT (OUTPATIENT)
Dept: INTERNAL MEDICINE | Facility: CLINIC | Age: 77
End: 2022-07-12

## 2022-07-12 ENCOUNTER — HOSPITAL ENCOUNTER (OUTPATIENT)
Dept: GENERAL RADIOLOGY | Facility: HOSPITAL | Age: 77
Discharge: HOME | End: 2022-07-12
Admitting: INTERNAL MEDICINE

## 2022-07-12 VITALS
OXYGEN SATURATION: 94 % | BODY MASS INDEX: 22.34 KG/M2 | WEIGHT: 139 LBS | HEART RATE: 67 BPM | DIASTOLIC BLOOD PRESSURE: 76 MMHG | HEIGHT: 66 IN | TEMPERATURE: 97.5 F | SYSTOLIC BLOOD PRESSURE: 120 MMHG

## 2022-07-12 DIAGNOSIS — R20.2 TINGLING OF BOTH FEET: ICD-10-CM

## 2022-07-12 DIAGNOSIS — R09.89 CAROTID BRUIT, UNSPECIFIED LATERALITY: ICD-10-CM

## 2022-07-12 DIAGNOSIS — R51.9 NONINTRACTABLE HEADACHE, UNSPECIFIED CHRONICITY PATTERN, UNSPECIFIED HEADACHE TYPE: ICD-10-CM

## 2022-07-12 DIAGNOSIS — F17.210 NICOTINE DEPENDENCE, CIGARETTES, UNCOMPLICATED: ICD-10-CM

## 2022-07-12 DIAGNOSIS — K21.9 GASTROESOPHAGEAL REFLUX DISEASE WITHOUT ESOPHAGITIS: ICD-10-CM

## 2022-07-12 DIAGNOSIS — R10.32 LEFT LOWER QUADRANT ABDOMINAL PAIN: ICD-10-CM

## 2022-07-12 DIAGNOSIS — E55.9 VITAMIN D DEFICIENCY, UNSPECIFIED: ICD-10-CM

## 2022-07-12 DIAGNOSIS — T78.40XD ALLERGY, SUBSEQUENT ENCOUNTER: Primary | ICD-10-CM

## 2022-07-12 DIAGNOSIS — K59.04 CHRONIC IDIOPATHIC CONSTIPATION: Chronic | ICD-10-CM

## 2022-07-12 DIAGNOSIS — M79.7 FIBROMYALGIA: ICD-10-CM

## 2022-07-12 DIAGNOSIS — E03.9 HYPOTHYROIDISM, UNSPECIFIED TYPE: ICD-10-CM

## 2022-07-12 DIAGNOSIS — Z79.899 CONTROLLED SUBSTANCE AGREEMENT SIGNED: ICD-10-CM

## 2022-07-12 DIAGNOSIS — R76.8 ANA POSITIVE: ICD-10-CM

## 2022-07-12 DIAGNOSIS — R19.5 DARK STOOLS: ICD-10-CM

## 2022-07-12 DIAGNOSIS — E78.5 HYPERLIPIDEMIA, UNSPECIFIED HYPERLIPIDEMIA TYPE: ICD-10-CM

## 2022-07-12 DIAGNOSIS — M81.0 SENILE OSTEOPOROSIS: ICD-10-CM

## 2022-07-12 PROBLEM — Z86.010 PERSONAL HISTORY OF COLONIC POLYPS: Status: RESOLVED | Noted: 2021-11-11 | Resolved: 2022-07-12

## 2022-07-12 PROBLEM — N39.41 URGE INCONTINENCE OF URINE: Status: RESOLVED | Noted: 2020-11-25 | Resolved: 2022-07-12

## 2022-07-12 PROBLEM — Z86.0100 PERSONAL HISTORY OF COLONIC POLYPS: Status: RESOLVED | Noted: 2021-11-11 | Resolved: 2022-07-12

## 2022-07-12 PROBLEM — F17.200 CURRENT EVERY DAY SMOKER: Status: RESOLVED | Noted: 2017-01-19 | Resolved: 2022-07-12

## 2022-07-12 PROBLEM — K21.00 GASTROESOPHAGEAL REFLUX DISEASE WITH ESOPHAGITIS: Chronic | Status: RESOLVED | Noted: 2020-07-21 | Resolved: 2022-07-12

## 2022-07-12 PROCEDURE — 1125F AMNT PAIN NOTED PAIN PRSNT: CPT | Performed by: INTERNAL MEDICINE

## 2022-07-12 PROCEDURE — 74018 RADEX ABDOMEN 1 VIEW: CPT

## 2022-07-12 PROCEDURE — 1159F MED LIST DOCD IN RCRD: CPT | Performed by: INTERNAL MEDICINE

## 2022-07-12 PROCEDURE — 99214 OFFICE O/P EST MOD 30 MIN: CPT | Performed by: INTERNAL MEDICINE

## 2022-07-12 PROCEDURE — 1170F FXNL STATUS ASSESSED: CPT | Performed by: INTERNAL MEDICINE

## 2022-07-12 PROCEDURE — G0439 PPPS, SUBSEQ VISIT: HCPCS | Performed by: INTERNAL MEDICINE

## 2022-07-12 PROCEDURE — 99397 PER PM REEVAL EST PAT 65+ YR: CPT | Performed by: INTERNAL MEDICINE

## 2022-07-12 NOTE — PROGRESS NOTES
Subjective   Yamileth De Guzman is a 76 y.o. female and is here for a comprehensive physical exam.     Do you take any herbs or supplements that were not prescribed by a doctor? no  Are you taking calcium supplements? no  Are you taking aspirin daily? no      The following portions of the patient's history were reviewed and updated as appropriate: allergies, current medications, past family history, past medical history, past social history, past surgical history and problem list.      Review of Systems   Constitutional: Negative.    HENT: Negative.    Eyes: Negative.    Respiratory: Negative.    Cardiovascular: Negative.    Gastrointestinal: Negative.    Endocrine: Negative.    Genitourinary: Negative.    Musculoskeletal: Negative.    Skin: Negative.    Allergic/Immunologic: Negative.    Neurological: Negative.    Hematological: Negative.    Psychiatric/Behavioral: Negative.          Physical Exam  Constitutional:       Appearance: Normal appearance. She is well-developed and normal weight.   HENT:      Head: Normocephalic and atraumatic.      Right Ear: External ear normal.      Left Ear: External ear normal.      Nose: Nose normal.   Eyes:      Conjunctiva/sclera: Conjunctivae normal.      Pupils: Pupils are equal, round, and reactive to light.   Cardiovascular:      Rate and Rhythm: Normal rate and regular rhythm.      Heart sounds: Normal heart sounds.   Pulmonary:      Effort: Pulmonary effort is normal.      Breath sounds: Normal breath sounds.   Abdominal:      General: Bowel sounds are normal.      Palpations: Abdomen is soft.   Genitourinary:     Vagina: Normal.   Musculoskeletal:         General: Normal range of motion.      Cervical back: Normal range of motion and neck supple.   Skin:     General: Skin is warm and dry.   Neurological:      Mental Status: She is alert and oriented to person, place, and time.      Deep Tendon Reflexes: Reflexes are normal and symmetric.   Psychiatric:         Behavior:  Behavior normal.         Thought Content: Thought content normal.         Judgment: Judgment normal.         All  tests have been reviewed.    Assessment & Plan          1. Patient Counseling:  --Nutrition: Stressed importance of moderation in sodium/caffeine intake, saturated fat and cholesterol, caloric balance, sufficient intake of fresh fruits, vegetables, fiber, calcium and iron.  --Exercise: Stressed the importance of regular exercise.   --Injury prevention: Discussed safety belts, safety helmets, smoke detector, smoking near bedding or upholstery.   --Dental health: Discussed importance of regular tooth brushing, flossing, and dental visits.  --Immunizations reviewed.  --Discussed benefits of screening colonoscopy.  --After hours service discussed with patient    2. Discussed the patient's BMI with her.

## 2022-07-12 NOTE — PROGRESS NOTES
The ABCs of the Annual Wellness Visit  Subsequent Medicare Wellness Visit    Chief Complaint   Patient presents with   • Medicare Wellness-subsequent   • Abdominal Cramping     2-3 months; lower abdomen; cramping sensation; intermittent; worse with cough       Subjective    History of Present Illness:  Yamileth De Guzman is a 76 y.o. female who presents for a Subsequent Medicare Wellness Visit.  2 mo LLQ abd pain off and on, also constipation,, BM may helps with the pain, no n/v/d, appetite good, dark stool after colonoscopy in 3/2022. Pain is cramping in nature, cough make it worse. . Patient still has some tingling sensation in both feet. Patient is still smoking. Vitamin D level is too high. Thyroid function normal after adjusting medication. Still has significant constipation. Osteoporosis on medication improved. Fibromyalgia stable ADAL positive.    The following portions of the patient's history were reviewed and   updated as appropriate: allergies, current medications, past family history, past medical history, past social history, past surgical history and problem list.    Compared to one year ago, the patient feels her physical   health is the same.    Compared to one year ago, the patient feels her mental   health is the same.    Recent Hospitalizations:  She was not admitted to the hospital during the last year.       Current Medical Providers:  Patient Care Team:  Da Dong MD as PCP - General (Internal Medicine)    Outpatient Medications Prior to Visit   Medication Sig Dispense Refill   • ALPRAZolam (XANAX) 0.25 MG tablet Take 1 tablet by mouth At Night As Needed for Anxiety. 30 tablet 2   • baclofen (LIORESAL) 10 MG tablet Take 1 tablet by mouth 2 (Two) Times a Day. 50 tablet 0   • calcium (OS-STIVEN) 600 MG tablet Take 600 mg by mouth 2 (Two) Times a Day.     • cetirizine (zyrTEC) 10 MG tablet Take 10 mg by mouth Daily.     • denosumab (PROLIA) 60 MG/ML solution syringe Inject 60 mg under the skin  into the appropriate area as directed Every 6 (Six) Months.     • Docusate Sodium 100 MG capsule Take 1 - 2 tablets daily. Adjust to have 1-2 soft stools daily. 60 tablet 2   • fluticasone (FLONASE) 50 MCG/ACT nasal spray 2 sprays by Each Nare route Daily. 48 g 5   • Garlic 400 MG tablet Take  by mouth Daily.     • levothyroxine (Euthyrox) 100 MCG tablet 1 daily po 30 tablet 1   • Multiple Vitamin (MULTI VITAMIN DAILY PO) Take 1 tablet by mouth Daily.     • Omega-3 Fatty Acids (FISH OIL) 1000 MG capsule capsule Take 1,000 mg by mouth Daily With Breakfast.     • omeprazole (priLOSEC) 20 MG capsule Take 1 capsule by mouth twice daily 180 capsule 0   • vitamin C (ASCORBIC ACID) 250 MG tablet Take 250 mg by mouth Daily.       No facility-administered medications prior to visit.       No opioid medication identified on active medication list. I have reviewed chart for other potential  high risk medication/s and harmful drug interactions in the elderly.          Aspirin is not on active medication list.  Aspirin use is not indicated based on review of current medical condition/s. Risk of harm outweighs potential benefits.  .    Patient Active Problem List   Diagnosis   • Rheumatoid arthritis (HCC)   • Gastroesophageal reflux disease without esophagitis   • Fibromyalgia   • Generalized anxiety disorder   • Hyperlipidemia   • Hypothyroidism   • Vitamin D deficiency, unspecified   • Senile osteoporosis   • Carotid bruit   • Hematuria   • Lung mass   • Adenomatous polyps   • Nicotine dependence, cigarettes, uncomplicated    • Allergies   • Chronic idiopathic constipation   • Nonintractable headache   • Tingling of both feet   • ADAL positive     Advance Care Planning  Advance Directive is on file.  ACP discussion was held with the patient during this visit. Patient has an advance directive in EMR which is still valid.     Review of Systems   Constitutional: Negative.    Respiratory: Negative.    Cardiovascular: Negative.   "  Gastrointestinal: Positive for abdominal pain.   Musculoskeletal: Negative.    Neurological: Negative.    Psychiatric/Behavioral: Negative.         Objective    Vitals:    07/12/22 0810   BP: 120/76   Pulse: 67   Temp: 97.5 °F (36.4 °C)   SpO2: 94%   Weight: 63 kg (139 lb)   Height: 167.6 cm (65.98\")   PainSc:   4     Estimated body mass index is 22.45 kg/m² as calculated from the following:    Height as of this encounter: 167.6 cm (65.98\").    Weight as of this encounter: 63 kg (139 lb).    BMI is within normal parameters. No other follow-up for BMI required.      Does the patient have evidence of cognitive impairment? No    Physical Exam  Constitutional:       Appearance: Normal appearance.   Cardiovascular:      Rate and Rhythm: Normal rate and regular rhythm.      Heart sounds: Normal heart sounds.   Pulmonary:      Effort: Pulmonary effort is normal.      Breath sounds: Normal breath sounds.   Abdominal:      General: Bowel sounds are normal.      Tenderness: There is abdominal tenderness ( LLQ mild tender ).   Musculoskeletal:      Cervical back: Neck supple.   Skin:     General: Skin is warm.   Neurological:      Mental Status: She is alert and oriented to person, place, and time.   Psychiatric:         Behavior: Behavior normal.       Lab Results   Component Value Date    CHLPL 202 (H) 06/07/2022    TRIG 109 06/07/2022    HDL 66 06/07/2022     (H) 06/07/2022    VLDL 19 06/07/2022    HGBA1C 5.7 (H) 06/07/2022            HEALTH RISK ASSESSMENT    Smoking Status:  Social History     Tobacco Use   Smoking Status Current Every Day Smoker   • Packs/day: 1.00   • Years: 40.00   • Pack years: 40.00   • Types: Cigarettes   Smokeless Tobacco Never Used     Alcohol Consumption:  Social History     Substance and Sexual Activity   Alcohol Use Not Currently    Comment: moderate     Fall Risk Screen:    STEADI Fall Risk Assessment was completed, and patient is at LOW risk for falls.Assessment completed " "on:6/7/2022    Depression Screening:  PHQ-2/PHQ-9 Depression Screening 7/12/2022   Retired PHQ-9 Total Score -   Retired Total Score -   Little Interest or Pleasure in Doing Things 0-->not at all   Feeling Down, Depressed or Hopeless 0-->not at all   PHQ-9: Brief Depression Severity Measure Score 0       Health Habits and Functional and Cognitive Screening:  Functional & Cognitive Status 7/12/2022   Do you have difficulty preparing food and eating? No   Do you have difficulty bathing yourself, getting dressed or grooming yourself? No   Do you have difficulty using the toilet? No   Do you have difficulty moving around from place to place? No   Do you have trouble with steps or getting out of a bed or a chair? No   Current Diet Well Balanced Diet   Dental Exam Unknown        Dental Exam Comment Wears dentures   Eye Exam Not up to date        Eye Exam Comment \"probably 10 years\"   Exercise (times per week) 0 times per week   Current Exercises Include No Regular Exercise   Current Exercise Activities Include -   Do you need help using the phone?  No   Are you deaf or do you have serious difficulty hearing?  Yes   Do you need help with transportation? No   Do you need help shopping? No   Do you need help preparing meals?  No   Do you need help with housework?  No   Do you need help with laundry? No   Do you need help taking your medications? No   Do you need help managing money? No   Do you ever drive or ride in a car without wearing a seat belt? No   Have you felt unusual stress, anger or loneliness in the last month? No   Who do you live with? Spouse   If you need help, do you have trouble finding someone available to you? No   Have you been bothered in the last four weeks by sexual problems? No   Do you have difficulty concentrating, remembering or making decisions? No       Age-appropriate Screening Schedule:  Refer to the list below for future screening recommendations based on patient's age, sex and/or medical " conditions. Orders for these recommended tests are listed in the plan section. The patient has been provided with a written plan.    Health Maintenance   Topic Date Due   • ZOSTER VACCINE (2 of 2) 07/09/2026 (Originally 6/11/2015)   • INFLUENZA VACCINE  10/01/2022   • LIPID PANEL  06/07/2023   • DXA SCAN  06/09/2023   • MAMMOGRAM  09/20/2023   • TDAP/TD VACCINES (3 - Td or Tdap) 04/15/2025              Assessment & Plan   CMS Preventative Services Quick Reference  Risk Factors Identified During Encounter  Tobacco Use/Dependance (use dotphrase .tobaccocessation for documentation)  The above risks/problems have been discussed with the patient.  Follow up actions/plans if indicated are seen below in the Assessment/Plan Section.  Pertinent information has been shared with the patient in the After Visit Summary.    Diagnoses and all orders for this visit:    1. Allergy, subsequent encounter (Primary) continue medication over-the-counter    2. Hyperlipidemia, continue present management    3. Carotid bruit, unspecified laterality ultrasound mild blockages     4. Vitamin D deficiency, hold vitamin D supplement now    5. Hypothyroidism, TSH normal after adjusting thyroid medication    6. Gastroesophageal reflux disease without esophagitis stable on omeprazole 40 mg daily continue medication    7. Chronic idiopathic constipation initiate medication  -     linaclotide (Linzess) 145 MCG capsule capsule; Take 1 capsule by mouth Every Morning Before Breakfast.  Dispense: 30 capsule; Refill: 2    8. Nicotine dependence, cigarettes, uncomplicated   -      CT Chest Low Dose Cancer Screening WO    9. Tingling of both feet continue to watch prediabetic    10. Senile osteoporosis continue medication follow-up with rheumatology    11. Left lower quadrant abdominal pain possible related to constipation will do ultrasound and KUB  -     XR Abdomen KUB  -     US Pelvis Complete    12. Fibromyalgia continue follow-up with rheumatology  stable now    13. Nonintractable headache, unspecified chronicity pattern, unspecified headache type    14. ADAL positive follow-up with rheumatology    Dark stool do heme        Follow Up:   Return in about 27 days (around 8/8/2022) for Recheck.     An After Visit Summary and PPPS were made available to the patient.

## 2022-07-14 LAB
ANA SER QL: POSITIVE
DSDNA AB SER-ACNC: <1 IU/ML (ref 0–9)
Lab: NORMAL
TSH SERPL DL<=0.005 MIU/L-ACNC: 0.64 UIU/ML (ref 0.45–4.5)
VIT B1 BLD-SCNC: 128 NMOL/L (ref 66.5–200)

## 2022-07-18 LAB — DRUGS UR: NORMAL

## 2022-08-01 DIAGNOSIS — E03.9 HYPOTHYROIDISM, UNSPECIFIED TYPE: ICD-10-CM

## 2022-08-01 RX ORDER — LEVOTHYROXINE SODIUM 100 UG/1
TABLET ORAL
Qty: 90 TABLET | Refills: 3 | Status: SHIPPED | OUTPATIENT
Start: 2022-08-01

## 2022-08-01 NOTE — TELEPHONE ENCOUNTER
Rx Refill Note  Requested Prescriptions     Pending Prescriptions Disp Refills   • Euthyrox 100 MCG tablet [Pharmacy Med Name: Euthyrox 100 MCG Oral Tablet] 30 tablet 0     Sig: Take 1 tablet by mouth once daily      Last office visit with prescribing clinician: 7/12/2022      Next office visit with prescribing clinician: 8/11/2022            Tracy Del Castillo MA  08/01/22, 09:43 EDT     Last labs 07/07/2022   TSH::0.641

## 2022-08-12 ENCOUNTER — HOSPITAL ENCOUNTER (OUTPATIENT)
Dept: ULTRASOUND IMAGING | Facility: HOSPITAL | Age: 77
Discharge: HOME | End: 2022-08-12

## 2022-08-12 ENCOUNTER — HOSPITAL ENCOUNTER (OUTPATIENT)
Dept: CT IMAGING | Facility: HOSPITAL | Age: 77
Discharge: HOME | End: 2022-08-12

## 2022-08-12 PROCEDURE — 76856 US EXAM PELVIC COMPLETE: CPT

## 2022-08-12 PROCEDURE — 71271 CT THORAX LUNG CANCER SCR C-: CPT

## 2022-08-23 ENCOUNTER — TRANSCRIBE ORDERS (OUTPATIENT)
Dept: ADMINISTRATIVE | Facility: HOSPITAL | Age: 77
End: 2022-08-23

## 2022-08-23 DIAGNOSIS — Z12.31 VISIT FOR SCREENING MAMMOGRAM: Primary | ICD-10-CM

## 2022-08-23 RX ORDER — FLUTICASONE PROPIONATE 50 MCG
SPRAY, SUSPENSION (ML) NASAL
Qty: 48 G | Refills: 0 | Status: SHIPPED | OUTPATIENT
Start: 2022-08-23 | End: 2022-10-25

## 2022-08-23 NOTE — TELEPHONE ENCOUNTER
Rx Refill Note  Requested Prescriptions     Pending Prescriptions Disp Refills   • fluticasone (FLONASE) 50 MCG/ACT nasal spray [Pharmacy Med Name: Fluticasone Propionate 50 MCG/ACT Nasal Suspension] 48 g 0     Sig: Use 2 spray(s) in each nostril once daily      Last office visit with prescribing clinician: 7/12/2022      Next office visit with prescribing clinician: Visit date not found            Tracy Del Castillo MA  08/23/22, 09:24 EDT

## 2022-10-25 RX ORDER — FLUTICASONE PROPIONATE 50 MCG
SPRAY, SUSPENSION (ML) NASAL
Qty: 48 G | Refills: 0 | Status: SHIPPED | OUTPATIENT
Start: 2022-10-25 | End: 2023-02-09

## 2022-11-03 ENCOUNTER — HOSPITAL ENCOUNTER (OUTPATIENT)
Dept: MAMMOGRAPHY | Facility: HOSPITAL | Age: 77
Discharge: HOME | End: 2022-11-03
Admitting: INTERNAL MEDICINE

## 2022-11-03 DIAGNOSIS — Z12.31 VISIT FOR SCREENING MAMMOGRAM: ICD-10-CM

## 2022-11-03 PROCEDURE — 77067 SCR MAMMO BI INCL CAD: CPT

## 2022-11-03 PROCEDURE — 77063 BREAST TOMOSYNTHESIS BI: CPT

## 2022-11-23 DIAGNOSIS — K21.00 GASTROESOPHAGEAL REFLUX DISEASE WITH ESOPHAGITIS WITHOUT HEMORRHAGE: ICD-10-CM

## 2022-11-23 RX ORDER — OMEPRAZOLE 20 MG/1
CAPSULE, DELAYED RELEASE ORAL
Qty: 180 CAPSULE | Refills: 0 | Status: SHIPPED | OUTPATIENT
Start: 2022-11-23

## 2022-11-23 NOTE — TELEPHONE ENCOUNTER
Rx Refill Note  Requested Prescriptions     Pending Prescriptions Disp Refills   • omeprazole (priLOSEC) 20 MG capsule [Pharmacy Med Name: Omeprazole 20 MG Oral Capsule Delayed Release] 180 capsule 0     Sig: Take 1 capsule by mouth twice daily      Last office visit with prescribing clinician: 7/12/2022      Next office visit with prescribing clinician: Visit date not found            Luisa Dumont MA  11/23/22, 11:42 EST

## 2022-12-09 ENCOUNTER — APPOINTMENT (OUTPATIENT)
Dept: INFUSION THERAPY | Facility: HOSPITAL | Age: 77
End: 2022-12-09

## 2022-12-13 ENCOUNTER — HOSPITAL ENCOUNTER (OUTPATIENT)
Dept: INFUSION THERAPY | Facility: HOSPITAL | Age: 77
Discharge: HOME | End: 2022-12-13
Admitting: INTERNAL MEDICINE

## 2022-12-13 VITALS
DIASTOLIC BLOOD PRESSURE: 59 MMHG | TEMPERATURE: 97.3 F | OXYGEN SATURATION: 96 % | HEART RATE: 61 BPM | SYSTOLIC BLOOD PRESSURE: 124 MMHG | RESPIRATION RATE: 16 BRPM

## 2022-12-13 DIAGNOSIS — M81.0 SENILE OSTEOPOROSIS: Primary | ICD-10-CM

## 2022-12-13 PROCEDURE — 25010000002 DENOSUMAB 60 MG/ML SOLUTION PREFILLED SYRINGE: Performed by: INTERNAL MEDICINE

## 2022-12-13 PROCEDURE — 96401 CHEMO ANTI-NEOPL SQ/IM: CPT

## 2022-12-13 PROCEDURE — 96372 THER/PROPH/DIAG INJ SC/IM: CPT

## 2022-12-13 RX ADMIN — DENOSUMAB 60 MG: 60 INJECTION SUBCUTANEOUS at 10:08

## 2023-02-09 RX ORDER — FLUTICASONE PROPIONATE 50 MCG
SPRAY, SUSPENSION (ML) NASAL
Qty: 48 G | Refills: 0 | Status: SHIPPED | OUTPATIENT
Start: 2023-02-09

## 2023-02-09 NOTE — TELEPHONE ENCOUNTER
Rx Refill Note  Requested Prescriptions     Pending Prescriptions Disp Refills   • fluticasone (FLONASE) 50 MCG/ACT nasal spray [Pharmacy Med Name: Fluticasone Propionate 50 MCG/ACT Nasal Suspension] 48 g 0     Sig: Use 2 spray(s) in each nostril once daily      Last office visit with prescribing clinician: 7/12/2022     Next office visit with prescribing clinician: Visit date not found                         Would you like a call back once the refill request has been completed: [] Yes [] No    If the office needs to give you a call back, can they leave a voicemail: [] Yes [] No    Tracy Del Castillo MA  02/09/23, 09:25 EST

## 2023-02-12 ENCOUNTER — APPOINTMENT (OUTPATIENT)
Dept: GENERAL RADIOLOGY | Facility: HOSPITAL | Age: 78
DRG: 481 | End: 2023-02-12
Payer: MEDICARE

## 2023-02-12 ENCOUNTER — HOSPITAL ENCOUNTER (INPATIENT)
Facility: HOSPITAL | Age: 78
LOS: 4 days | Discharge: NURSING, REHAB OR LONG-TERM CARE | DRG: 481 | End: 2023-02-16
Attending: EMERGENCY MEDICINE | Admitting: INTERNAL MEDICINE
Payer: MEDICARE

## 2023-02-12 DIAGNOSIS — F41.1 GENERALIZED ANXIETY DISORDER: ICD-10-CM

## 2023-02-12 DIAGNOSIS — S72.002A CLOSED FRACTURE OF LEFT HIP, INITIAL ENCOUNTER: Primary | ICD-10-CM

## 2023-02-12 DIAGNOSIS — S72.001A CLOSED RIGHT HIP FRACTURE, INITIAL ENCOUNTER: ICD-10-CM

## 2023-02-12 PROBLEM — M80.80XA OSTEOPOROSIS WITH FRACTURE: Status: ACTIVE | Noted: 2017-01-19

## 2023-02-12 LAB
ALBUMIN SERPL-MCNC: 4.2 G/DL (ref 3.5–5.2)
ALBUMIN/GLOB SERPL: 1.6 G/DL
ALP SERPL-CCNC: 65 U/L (ref 39–117)
ALT SERPL W P-5'-P-CCNC: 16 U/L (ref 1–33)
ANION GAP SERPL CALCULATED.3IONS-SCNC: 11.5 MMOL/L (ref 5–15)
AST SERPL-CCNC: 20 U/L (ref 1–32)
BASOPHILS # BLD AUTO: 0.03 10*3/MM3 (ref 0–0.2)
BASOPHILS NFR BLD AUTO: 0.2 % (ref 0–1.5)
BILIRUB SERPL-MCNC: 0.3 MG/DL (ref 0–1.2)
BUN SERPL-MCNC: 16 MG/DL (ref 8–23)
BUN/CREAT SERPL: 20.3 (ref 7–25)
CALCIUM SPEC-SCNC: 9.4 MG/DL (ref 8.6–10.5)
CHLORIDE SERPL-SCNC: 103 MMOL/L (ref 98–107)
CO2 SERPL-SCNC: 23.5 MMOL/L (ref 22–29)
CREAT SERPL-MCNC: 0.79 MG/DL (ref 0.57–1)
DEPRECATED RDW RBC AUTO: 41.1 FL (ref 37–54)
EGFRCR SERPLBLD CKD-EPI 2021: 78.1 ML/MIN/1.73
EOSINOPHIL # BLD AUTO: 0 10*3/MM3 (ref 0–0.4)
EOSINOPHIL NFR BLD AUTO: 0 % (ref 0.3–6.2)
ERYTHROCYTE [DISTWIDTH] IN BLOOD BY AUTOMATED COUNT: 12.5 % (ref 12.3–15.4)
GEN 5 2HR TROPONIN T REFLEX: 10 NG/L
GLOBULIN UR ELPH-MCNC: 2.6 GM/DL
GLUCOSE SERPL-MCNC: 104 MG/DL (ref 65–99)
HCT VFR BLD AUTO: 38.2 % (ref 34–46.6)
HGB BLD-MCNC: 13.3 G/DL (ref 12–15.9)
IMM GRANULOCYTES # BLD AUTO: 0.07 10*3/MM3 (ref 0–0.05)
IMM GRANULOCYTES NFR BLD AUTO: 0.4 % (ref 0–0.5)
LYMPHOCYTES # BLD AUTO: 1.12 10*3/MM3 (ref 0.7–3.1)
LYMPHOCYTES NFR BLD AUTO: 7.2 % (ref 19.6–45.3)
MCH RBC QN AUTO: 31.4 PG (ref 26.6–33)
MCHC RBC AUTO-ENTMCNC: 34.8 G/DL (ref 31.5–35.7)
MCV RBC AUTO: 90.3 FL (ref 79–97)
MONOCYTES # BLD AUTO: 0.66 10*3/MM3 (ref 0.1–0.9)
MONOCYTES NFR BLD AUTO: 4.2 % (ref 5–12)
NEUTROPHILS NFR BLD AUTO: 13.68 10*3/MM3 (ref 1.7–7)
NEUTROPHILS NFR BLD AUTO: 88 % (ref 42.7–76)
NRBC BLD AUTO-RTO: 0 /100 WBC (ref 0–0.2)
PLATELET # BLD AUTO: 203 10*3/MM3 (ref 140–450)
PMV BLD AUTO: 10.3 FL (ref 6–12)
POTASSIUM SERPL-SCNC: 3.5 MMOL/L (ref 3.5–5.2)
PROT SERPL-MCNC: 6.8 G/DL (ref 6–8.5)
RBC # BLD AUTO: 4.23 10*6/MM3 (ref 3.77–5.28)
SODIUM SERPL-SCNC: 138 MMOL/L (ref 136–145)
TROPONIN T DELTA: 0 NG/L
TROPONIN T SERPL HS-MCNC: 10 NG/L
WBC NRBC COR # BLD: 15.56 10*3/MM3 (ref 3.4–10.8)

## 2023-02-12 PROCEDURE — 25010000002 HYDROMORPHONE 1 MG/ML SOLUTION: Performed by: EMERGENCY MEDICINE

## 2023-02-12 PROCEDURE — 80053 COMPREHEN METABOLIC PANEL: CPT | Performed by: EMERGENCY MEDICINE

## 2023-02-12 PROCEDURE — 25010000002 MORPHINE PER 10 MG: Performed by: INTERNAL MEDICINE

## 2023-02-12 PROCEDURE — 85025 COMPLETE CBC W/AUTO DIFF WBC: CPT | Performed by: EMERGENCY MEDICINE

## 2023-02-12 PROCEDURE — 84484 ASSAY OF TROPONIN QUANT: CPT | Performed by: EMERGENCY MEDICINE

## 2023-02-12 PROCEDURE — 25010000002 MORPHINE PER 10 MG: Performed by: EMERGENCY MEDICINE

## 2023-02-12 PROCEDURE — 99284 EMERGENCY DEPT VISIT MOD MDM: CPT

## 2023-02-12 PROCEDURE — 36415 COLL VENOUS BLD VENIPUNCTURE: CPT

## 2023-02-12 PROCEDURE — 25010000002 ONDANSETRON PER 1 MG: Performed by: INTERNAL MEDICINE

## 2023-02-12 PROCEDURE — 99222 1ST HOSP IP/OBS MODERATE 55: CPT | Performed by: INTERNAL MEDICINE

## 2023-02-12 PROCEDURE — 71045 X-RAY EXAM CHEST 1 VIEW: CPT

## 2023-02-12 PROCEDURE — 93005 ELECTROCARDIOGRAM TRACING: CPT | Performed by: EMERGENCY MEDICINE

## 2023-02-12 PROCEDURE — 73502 X-RAY EXAM HIP UNI 2-3 VIEWS: CPT

## 2023-02-12 PROCEDURE — 25010000002 ENOXAPARIN PER 10 MG: Performed by: INTERNAL MEDICINE

## 2023-02-12 RX ORDER — ONDANSETRON 2 MG/ML
4 INJECTION INTRAMUSCULAR; INTRAVENOUS EVERY 6 HOURS PRN
Status: DISCONTINUED | OUTPATIENT
Start: 2023-02-12 | End: 2023-02-16 | Stop reason: HOSPADM

## 2023-02-12 RX ORDER — SODIUM CHLORIDE 0.9 % (FLUSH) 0.9 %
10 SYRINGE (ML) INJECTION EVERY 12 HOURS SCHEDULED
Status: DISCONTINUED | OUTPATIENT
Start: 2023-02-12 | End: 2023-02-16 | Stop reason: HOSPADM

## 2023-02-12 RX ORDER — ENOXAPARIN SODIUM 100 MG/ML
40 INJECTION SUBCUTANEOUS NIGHTLY
Status: DISCONTINUED | OUTPATIENT
Start: 2023-02-12 | End: 2023-02-13 | Stop reason: ALTCHOICE

## 2023-02-12 RX ORDER — PANTOPRAZOLE SODIUM 40 MG/1
40 TABLET, DELAYED RELEASE ORAL
Status: DISCONTINUED | OUTPATIENT
Start: 2023-02-13 | End: 2023-02-16 | Stop reason: HOSPADM

## 2023-02-12 RX ORDER — ASCORBIC ACID 500 MG
250 TABLET ORAL DAILY
Status: DISCONTINUED | OUTPATIENT
Start: 2023-02-13 | End: 2023-02-16 | Stop reason: HOSPADM

## 2023-02-12 RX ORDER — SODIUM CHLORIDE 0.9 % (FLUSH) 0.9 %
10 SYRINGE (ML) INJECTION AS NEEDED
Status: DISCONTINUED | OUTPATIENT
Start: 2023-02-12 | End: 2023-02-16 | Stop reason: HOSPADM

## 2023-02-12 RX ORDER — SODIUM CHLORIDE 9 MG/ML
40 INJECTION, SOLUTION INTRAVENOUS AS NEEDED
Status: DISCONTINUED | OUTPATIENT
Start: 2023-02-12 | End: 2023-02-16 | Stop reason: HOSPADM

## 2023-02-12 RX ORDER — ALPRAZOLAM 0.25 MG/1
0.25 TABLET ORAL NIGHTLY PRN
Status: DISCONTINUED | OUTPATIENT
Start: 2023-02-12 | End: 2023-02-16 | Stop reason: HOSPADM

## 2023-02-12 RX ORDER — NICOTINE 21 MG/24HR
1 PATCH, TRANSDERMAL 24 HOURS TRANSDERMAL
Status: DISCONTINUED | OUTPATIENT
Start: 2023-02-13 | End: 2023-02-16 | Stop reason: HOSPADM

## 2023-02-12 RX ORDER — BACLOFEN 10 MG/1
10 TABLET ORAL 2 TIMES DAILY
Status: DISCONTINUED | OUTPATIENT
Start: 2023-02-12 | End: 2023-02-16 | Stop reason: HOSPADM

## 2023-02-12 RX ORDER — MORPHINE SULFATE 4 MG/ML
4 INJECTION, SOLUTION INTRAMUSCULAR; INTRAVENOUS EVERY 4 HOURS PRN
Status: DISCONTINUED | OUTPATIENT
Start: 2023-02-12 | End: 2023-02-14 | Stop reason: SDUPTHER

## 2023-02-12 RX ORDER — LEVOTHYROXINE SODIUM 0.1 MG/1
100 TABLET ORAL DAILY
Status: DISCONTINUED | OUTPATIENT
Start: 2023-02-13 | End: 2023-02-16 | Stop reason: HOSPADM

## 2023-02-12 RX ADMIN — BACLOFEN 10 MG: 10 TABLET ORAL at 21:55

## 2023-02-12 RX ADMIN — MORPHINE SULFATE 4 MG: 4 INJECTION, SOLUTION INTRAMUSCULAR; INTRAVENOUS at 21:30

## 2023-02-12 RX ADMIN — HYDROMORPHONE HYDROCHLORIDE 1 MG: 1 INJECTION, SOLUTION INTRAMUSCULAR; INTRAVENOUS; SUBCUTANEOUS at 14:25

## 2023-02-12 RX ADMIN — ONDANSETRON 4 MG: 2 INJECTION INTRAMUSCULAR; INTRAVENOUS at 21:30

## 2023-02-12 RX ADMIN — Medication 10 ML: at 21:54

## 2023-02-12 RX ADMIN — ALPRAZOLAM 0.25 MG: 0.25 TABLET ORAL at 21:55

## 2023-02-12 RX ADMIN — ENOXAPARIN SODIUM 40 MG: 100 INJECTION SUBCUTANEOUS at 21:54

## 2023-02-12 RX ADMIN — MORPHINE SULFATE 4 MG: 4 INJECTION, SOLUTION INTRAMUSCULAR; INTRAVENOUS at 13:19

## 2023-02-12 RX ADMIN — HYDROMORPHONE HYDROCHLORIDE 1 MG: 1 INJECTION, SOLUTION INTRAMUSCULAR; INTRAVENOUS; SUBCUTANEOUS at 18:36

## 2023-02-12 NOTE — ED NOTES
Called and requested Med Surg bed at this time from [facility] Supervisor. She stated she would call back.

## 2023-02-12 NOTE — ED PROVIDER NOTES
TRIAGE CHIEF COMPLAINT:     Nursing and triage notes reviewed    Chief Complaint   Patient presents with   • Fall     Pt c/o pain in R hip. Pt states she fell at home while putting shoes on. Pt states she did not lose consciousness.        HPI: Yamileth De Guzman is a 77 y.o. female who presents to the emergency department complaining of right hip pain.  Patient states she was try to put her shoes on and when she fell over and landed on her right hip.  She did not hit her head or lose consciousness.  She denies a syncopal episode or dizziness prior to the event.  She states she has not been able to walk or move her legs since the event occurred.    REVIEW OF SYSTEMS: All other systems reviewed and are negative     PAST MEDICAL HISTORY:   Past Medical History:   Diagnosis Date   • Abdominal bloating    • Acid reflux    • Adenomatous polyp of colon 08/17/2020   • Arthritis    • Carotid bruit    • Colon polyp 2012   • Constipation    • Disease of thyroid gland    • Fibromyalgia    • Full dentures    • Hematuria    • Lung nodule     benign   • Osteoporosis    • Seasonal allergies    • Sinusitis    • Wears glasses     READING GLASSES ONLY        FAMILY HISTORY:   Family History   Problem Relation Age of Onset   • Hearing loss Father    • Arthritis Father    • Cancer Father    • GI problems Father    • Breast cancer Sister    • Colon cancer Neg Hx         SOCIAL HISTORY:   Social History     Socioeconomic History   • Marital status:    Tobacco Use   • Smoking status: Every Day     Packs/day: 1.00     Years: 40.00     Pack years: 40.00     Types: Cigarettes   • Smokeless tobacco: Never   Vaping Use   • Vaping Use: Never used   Substance and Sexual Activity   • Alcohol use: Not Currently     Comment: moderate   • Drug use: No   • Sexual activity: Defer        SURGICAL HISTORY:   Past Surgical History:   Procedure Laterality Date   • APPENDECTOMY     • COLONOSCOPY  2012   • COLONOSCOPY N/A 08/17/2020    Procedure:  COLONOSCOPY WITH COLD SNARE POLYPECTOMY, COLD FORCEP POLYPECTOMY, SUBMUCOSAL INJECTION OF NORMAL SALINE, ENDOSCOPIC MUCOSAL RESECTION, HOT SNARE POLYPECTOMY, THERMAL ABLATION, QUICK CLIP PLACEMENT TIMES 4 AND BARBIE INK INJECTION;  Surgeon: Scar Rogers MD;  Location: [facility] ENDOSCOPY;  Service: Gastroenterology;  Laterality: N/A;   • COLONOSCOPY N/A 03/01/2021    Procedure: COLONOSCOPY WITH BIOPSY AND POLYPECTOMY;  Surgeon: Scar Rogers MD;  Location: [facility] ENDOSCOPY;  Service: Gastroenterology;  Laterality: N/A;   • COLONOSCOPY N/A 3/18/2022    Procedure: COLONOSCOPY with polypectomy x2;  Surgeon: Scar Rogers MD;  Location: [facility] ENDOSCOPY;  Service: Gastroenterology;  Laterality: N/A;   • HYSTERECTOMY      PARTIAL STILL HAS OVARIES   • MULTIPLE TOOTH EXTRACTIONS     • TUBAL ABDOMINAL LIGATION     • UPPER GASTROINTESTINAL ENDOSCOPY  2012   • VAGINAL DELIVERY      X1        CURRENT MEDICATIONS:      Medication List      ASK your doctor about these medications    ALPRAZolam 0.25 MG tablet  Commonly known as: XANAX  Take 1 tablet by mouth At Night As Needed for Anxiety.     baclofen 10 MG tablet  Commonly known as: LIORESAL  Take 1 tablet by mouth 2 (Two) Times a Day.     calcium 600 MG tablet  Commonly known as: OS-STIVEN     cetirizine 10 MG tablet  Commonly known as: zyrTEC     denosumab 60 MG/ML solution syringe  Commonly known as: PROLIA     Euthyrox 100 MCG tablet  Generic drug: levothyroxine  Take 1 tablet by mouth once daily     fish oil 1000 MG capsule capsule     fluticasone 50 MCG/ACT nasal spray  Commonly known as: FLONASE  Use 2 spray(s) in each nostril once daily     Garlic 400 MG tablet     linaclotide 145 MCG capsule capsule  Commonly known as: Linzess  Take 1 capsule by mouth Every Morning Before Breakfast.     multivitamin tablet tablet  Commonly known as: THERAGRAN     omeprazole 20 MG capsule  Commonly known as: priLOSEC  Take 1 capsule by mouth twice daily     vitamin  C 250 MG tablet  Commonly known as: ASCORBIC ACID             ALLERGIES: Patient has no known allergies.     PHYSICAL EXAM:   VITAL SIGNS:   Vitals:    02/12/23 1315   BP:    Pulse:    Resp:    Temp:    SpO2: 99%      CONSTITUTIONAL: Awake, oriented, appears nontoxic   HENT: Atraumatic, normocephalic, oral mucosa pink and moist, airway patent. Nares patent without drainage. External ears normal.   EYES: Conjunctivae clear   NECK: Trachea midline   CARDIOVASCULAR: Normal heart rate, Normal rhythm, No murmurs, rubs, gallops   PULMONARY/CHEST: Clear to auscultation, no rhonchi, wheezes, or rales. Symmetrical breath sounds.  ABDOMINAL: Nondistended, soft, nontender - no rebound or guarding.   NEUROLOGIC: Nonfocal, moving all four extremities, no gross sensory or motor deficits.   EXTREMITIES: No clubbing, cyanosis.  Significant tenderness over the right greater trochanter.  Patient resists any movement of her right lower extremity due to the discomfort.  Distal pulses are intact.  SKIN: Warm, Dry, No erythema, No rash     ED COURSE / MEDICAL DECISION MAKING:   Yamileth De Guzman is a 77 y.o. female who presents to the emergency department for evaluation of right hip discomfort.  Patient is nondistressed on arrival in the emergency department but does not appear uncomfortable.  Exam does reveal neurovascularly intact extremity but it is tender to palpation over the greater trochanter.    Differential diagnosis includes fracture, contusion, dislocation among other etiologies.    X-ray of the right hip was ordered for further evaluation of the patient's presentation.    Diagnostic information from other sources:     Interventions: Patient given pain medication on arrival.    Narrative: Given how tender patient was there was concern for possible fracture.  This was confirmed on x-ray which per my interpretation reveals a right intertrochanteric fracture.  Patient given further pain medication.  Further ordered a  chest x-ray, EKG, CBC, CMP, cardiac enzymes.  Chest x-ray per radiology interpretation does not reveal any obvious acute process.  I did consult the orthopedist on-call who will plan on surgical fixation tomorrow.    Re-evaluation: Resting more comfortably    Plan for disposition is admission    DECISION TO DISCHARGE/ADMIT: see ED care timeline     FINAL IMPRESSION:   1 --hip fracture  2 --   3 --     Electronically signed by: Leanne Headley MD, 2/12/2023 14:39 Leanne Cormier MD  02/12/23 7018

## 2023-02-12 NOTE — ED NOTES
Called [facility] at this time per MD Kayode request. Call unanswered. Left voicemail requesting call back.

## 2023-02-13 ENCOUNTER — APPOINTMENT (OUTPATIENT)
Dept: GENERAL RADIOLOGY | Facility: HOSPITAL | Age: 78
DRG: 481 | End: 2023-02-13
Payer: MEDICARE

## 2023-02-13 ENCOUNTER — ANESTHESIA EVENT CONVERTED (OUTPATIENT)
Dept: ANESTHESIOLOGY | Facility: HOSPITAL | Age: 78
DRG: 481 | End: 2023-02-13
Payer: MEDICARE

## 2023-02-13 ENCOUNTER — ANESTHESIA EVENT (OUTPATIENT)
Dept: PERIOP | Facility: HOSPITAL | Age: 78
DRG: 481 | End: 2023-02-13
Payer: MEDICARE

## 2023-02-13 ENCOUNTER — ANESTHESIA (OUTPATIENT)
Dept: PERIOP | Facility: HOSPITAL | Age: 78
DRG: 481 | End: 2023-02-13
Payer: MEDICARE

## 2023-02-13 LAB
ALBUMIN SERPL-MCNC: 4 G/DL (ref 3.5–5.2)
ALBUMIN/GLOB SERPL: 1.5 G/DL
ALP SERPL-CCNC: 59 U/L (ref 39–117)
ALT SERPL W P-5'-P-CCNC: 14 U/L (ref 1–33)
ANION GAP SERPL CALCULATED.3IONS-SCNC: 9 MMOL/L (ref 5–15)
AST SERPL-CCNC: 18 U/L (ref 1–32)
BASOPHILS # BLD AUTO: 0.03 10*3/MM3 (ref 0–0.2)
BASOPHILS NFR BLD AUTO: 0.2 % (ref 0–1.5)
BILIRUB SERPL-MCNC: 0.5 MG/DL (ref 0–1.2)
BUN SERPL-MCNC: 19 MG/DL (ref 8–23)
BUN/CREAT SERPL: 27.9 (ref 7–25)
CALCIUM SPEC-SCNC: 9 MG/DL (ref 8.6–10.5)
CHLORIDE SERPL-SCNC: 105 MMOL/L (ref 98–107)
CO2 SERPL-SCNC: 26 MMOL/L (ref 22–29)
CREAT SERPL-MCNC: 0.68 MG/DL (ref 0.57–1)
DEPRECATED RDW RBC AUTO: 47.2 FL (ref 37–54)
EGFRCR SERPLBLD CKD-EPI 2021: 91 ML/MIN/1.73
EOSINOPHIL # BLD AUTO: 0.01 10*3/MM3 (ref 0–0.4)
EOSINOPHIL NFR BLD AUTO: 0.1 % (ref 0.3–6.2)
ERYTHROCYTE [DISTWIDTH] IN BLOOD BY AUTOMATED COUNT: 13.2 % (ref 12.3–15.4)
GLOBULIN UR ELPH-MCNC: 2.7 GM/DL
GLUCOSE SERPL-MCNC: 122 MG/DL (ref 65–99)
HCT VFR BLD AUTO: 40.1 % (ref 34–46.6)
HGB BLD-MCNC: 13.3 G/DL (ref 12–15.9)
IMM GRANULOCYTES # BLD AUTO: 0.05 10*3/MM3 (ref 0–0.05)
IMM GRANULOCYTES NFR BLD AUTO: 0.4 % (ref 0–0.5)
LYMPHOCYTES # BLD AUTO: 1.38 10*3/MM3 (ref 0.7–3.1)
LYMPHOCYTES NFR BLD AUTO: 10.4 % (ref 19.6–45.3)
MCH RBC QN AUTO: 32.4 PG (ref 26.6–33)
MCHC RBC AUTO-ENTMCNC: 33.2 G/DL (ref 31.5–35.7)
MCV RBC AUTO: 97.6 FL (ref 79–97)
MONOCYTES # BLD AUTO: 1.42 10*3/MM3 (ref 0.1–0.9)
MONOCYTES NFR BLD AUTO: 10.7 % (ref 5–12)
NEUTROPHILS NFR BLD AUTO: 10.44 10*3/MM3 (ref 1.7–7)
NEUTROPHILS NFR BLD AUTO: 78.2 % (ref 42.7–76)
NRBC BLD AUTO-RTO: 0 /100 WBC (ref 0–0.2)
PLATELET # BLD AUTO: 193 10*3/MM3 (ref 140–450)
PMV BLD AUTO: 10.7 FL (ref 6–12)
POTASSIUM SERPL-SCNC: 3.9 MMOL/L (ref 3.5–5.2)
PROT SERPL-MCNC: 6.7 G/DL (ref 6–8.5)
RBC # BLD AUTO: 4.11 10*6/MM3 (ref 3.77–5.28)
SODIUM SERPL-SCNC: 140 MMOL/L (ref 136–145)
WBC NRBC COR # BLD: 13.33 10*3/MM3 (ref 3.4–10.8)

## 2023-02-13 PROCEDURE — 25010000002 PROPOFOL 200 MG/20ML EMULSION: Performed by: NURSE ANESTHETIST, CERTIFIED REGISTERED

## 2023-02-13 PROCEDURE — 25010000002 FENTANYL CITRATE (PF) 100 MCG/2ML SOLUTION: Performed by: NURSE ANESTHETIST, CERTIFIED REGISTERED

## 2023-02-13 PROCEDURE — C1713 ANCHOR/SCREW BN/BN,TIS/BN: HCPCS | Performed by: ORTHOPAEDIC SURGERY

## 2023-02-13 PROCEDURE — 25010000002 MIDAZOLAM PER 1MG: Performed by: NURSE ANESTHETIST, CERTIFIED REGISTERED

## 2023-02-13 PROCEDURE — 0QS634Z REPOSITION RIGHT UPPER FEMUR WITH INTERNAL FIXATION DEVICE, PERCUTANEOUS APPROACH: ICD-10-PCS | Performed by: ORTHOPAEDIC SURGERY

## 2023-02-13 PROCEDURE — 25010000002 LORAZEPAM PER 2 MG: Performed by: FAMILY MEDICINE

## 2023-02-13 PROCEDURE — 25010000002 MORPHINE PER 10 MG: Performed by: INTERNAL MEDICINE

## 2023-02-13 PROCEDURE — 25010000002 ONDANSETRON PER 1 MG: Performed by: INTERNAL MEDICINE

## 2023-02-13 PROCEDURE — 25010000002 PHENYLEPHRINE 10 MG/ML SOLUTION: Performed by: NURSE ANESTHETIST, CERTIFIED REGISTERED

## 2023-02-13 PROCEDURE — 25010000002 ONDANSETRON PER 1 MG: Performed by: NURSE ANESTHETIST, CERTIFIED REGISTERED

## 2023-02-13 PROCEDURE — 85025 COMPLETE CBC W/AUTO DIFF WBC: CPT | Performed by: INTERNAL MEDICINE

## 2023-02-13 PROCEDURE — 0 CEFAZOLIN SODIUM-DEXTROSE 2-3 GM-%(50ML) RECONSTITUTED SOLUTION: Performed by: ORTHOPAEDIC SURGERY

## 2023-02-13 PROCEDURE — 25010000002 MORPHINE PER 10 MG: Performed by: ORTHOPAEDIC SURGERY

## 2023-02-13 PROCEDURE — 25010000002 HYDROMORPHONE 1 MG/ML SOLUTION: Performed by: NURSE ANESTHETIST, CERTIFIED REGISTERED

## 2023-02-13 PROCEDURE — 25010000002 DEXAMETHASONE PER 1 MG: Performed by: NURSE ANESTHETIST, CERTIFIED REGISTERED

## 2023-02-13 PROCEDURE — 76000 FLUOROSCOPY <1 HR PHYS/QHP: CPT

## 2023-02-13 PROCEDURE — 99232 SBSQ HOSP IP/OBS MODERATE 35: CPT | Performed by: INTERNAL MEDICINE

## 2023-02-13 PROCEDURE — 80053 COMPREHEN METABOLIC PANEL: CPT | Performed by: INTERNAL MEDICINE

## 2023-02-13 DEVICE — LAG SCREW, TI
Type: IMPLANTABLE DEVICE | Site: HIP | Status: FUNCTIONAL
Brand: GAMMA

## 2023-02-13 DEVICE — LOCKING SCREW, FULLY THREADED: Type: IMPLANTABLE DEVICE | Site: HIP | Status: FUNCTIONAL

## 2023-02-13 DEVICE — TROCHANTERIC NAIL KIT, TI
Type: IMPLANTABLE DEVICE | Site: HIP | Status: FUNCTIONAL
Brand: GAMMA

## 2023-02-13 RX ORDER — ENOXAPARIN SODIUM 100 MG/ML
40 INJECTION SUBCUTANEOUS DAILY
Status: DISCONTINUED | OUTPATIENT
Start: 2023-02-14 | End: 2023-02-16 | Stop reason: HOSPADM

## 2023-02-13 RX ORDER — PHENYLEPHRINE HYDROCHLORIDE 10 MG/ML
INJECTION INTRAVENOUS AS NEEDED
Status: DISCONTINUED | OUTPATIENT
Start: 2023-02-13 | End: 2023-02-13 | Stop reason: SURG

## 2023-02-13 RX ORDER — FENTANYL CITRATE 50 UG/ML
INJECTION, SOLUTION INTRAMUSCULAR; INTRAVENOUS AS NEEDED
Status: DISCONTINUED | OUTPATIENT
Start: 2023-02-13 | End: 2023-02-13 | Stop reason: SURG

## 2023-02-13 RX ORDER — HYDROCODONE BITARTRATE AND ACETAMINOPHEN 7.5; 325 MG/1; MG/1
2 TABLET ORAL EVERY 4 HOURS PRN
Status: DISCONTINUED | OUTPATIENT
Start: 2023-02-13 | End: 2023-02-16 | Stop reason: HOSPADM

## 2023-02-13 RX ORDER — CEFAZOLIN SODIUM 2 G/50ML
2 SOLUTION INTRAVENOUS EVERY 8 HOURS
Status: COMPLETED | OUTPATIENT
Start: 2023-02-14 | End: 2023-02-14

## 2023-02-13 RX ORDER — ONDANSETRON 2 MG/ML
4 INJECTION INTRAMUSCULAR; INTRAVENOUS ONCE AS NEEDED
Status: DISCONTINUED | OUTPATIENT
Start: 2023-02-13 | End: 2023-02-13 | Stop reason: HOSPADM

## 2023-02-13 RX ORDER — SODIUM CHLORIDE, SODIUM LACTATE, POTASSIUM CHLORIDE, CALCIUM CHLORIDE 600; 310; 30; 20 MG/100ML; MG/100ML; MG/100ML; MG/100ML
1000 INJECTION, SOLUTION INTRAVENOUS CONTINUOUS
Status: DISCONTINUED | OUTPATIENT
Start: 2023-02-13 | End: 2023-02-13

## 2023-02-13 RX ORDER — CEFAZOLIN SODIUM 2 G/50ML
2 SOLUTION INTRAVENOUS
Status: COMPLETED | OUTPATIENT
Start: 2023-02-13 | End: 2023-02-13

## 2023-02-13 RX ORDER — MAGNESIUM HYDROXIDE 1200 MG/15ML
LIQUID ORAL AS NEEDED
Status: DISCONTINUED | OUTPATIENT
Start: 2023-02-13 | End: 2023-02-13 | Stop reason: HOSPADM

## 2023-02-13 RX ORDER — MORPHINE SULFATE 2 MG/ML
2 INJECTION, SOLUTION INTRAMUSCULAR; INTRAVENOUS EVERY 4 HOURS PRN
Status: DISCONTINUED | OUTPATIENT
Start: 2023-02-13 | End: 2023-02-16 | Stop reason: HOSPADM

## 2023-02-13 RX ORDER — LORAZEPAM 2 MG/ML
0.5 INJECTION INTRAMUSCULAR ONCE AS NEEDED
Status: COMPLETED | OUTPATIENT
Start: 2023-02-13 | End: 2023-02-13

## 2023-02-13 RX ORDER — HYDROCODONE BITARTRATE AND ACETAMINOPHEN 7.5; 325 MG/1; MG/1
1 TABLET ORAL EVERY 4 HOURS PRN
Status: DISCONTINUED | OUTPATIENT
Start: 2023-02-13 | End: 2023-02-16 | Stop reason: HOSPADM

## 2023-02-13 RX ORDER — BUPIVACAINE HYDROCHLORIDE AND EPINEPHRINE 5; 5 MG/ML; UG/ML
INJECTION, SOLUTION EPIDURAL; INTRACAUDAL; PERINEURAL AS NEEDED
Status: DISCONTINUED | OUTPATIENT
Start: 2023-02-13 | End: 2023-02-13 | Stop reason: SURG

## 2023-02-13 RX ORDER — MIDAZOLAM HYDROCHLORIDE 2 MG/2ML
INJECTION, SOLUTION INTRAMUSCULAR; INTRAVENOUS AS NEEDED
Status: DISCONTINUED | OUTPATIENT
Start: 2023-02-13 | End: 2023-02-13 | Stop reason: SURG

## 2023-02-13 RX ORDER — SODIUM CHLORIDE 9 MG/ML
100 INJECTION, SOLUTION INTRAVENOUS CONTINUOUS
Status: DISCONTINUED | OUTPATIENT
Start: 2023-02-13 | End: 2023-02-14

## 2023-02-13 RX ORDER — NALOXONE HCL 0.4 MG/ML
0.4 VIAL (ML) INJECTION
Status: DISCONTINUED | OUTPATIENT
Start: 2023-02-13 | End: 2023-02-16 | Stop reason: HOSPADM

## 2023-02-13 RX ORDER — ONDANSETRON 2 MG/ML
4 INJECTION INTRAMUSCULAR; INTRAVENOUS EVERY 6 HOURS PRN
Status: DISCONTINUED | OUTPATIENT
Start: 2023-02-13 | End: 2023-02-16 | Stop reason: HOSPADM

## 2023-02-13 RX ORDER — LIDOCAINE HYDROCHLORIDE 20 MG/ML
INJECTION, SOLUTION INTRAVENOUS AS NEEDED
Status: DISCONTINUED | OUTPATIENT
Start: 2023-02-13 | End: 2023-02-13 | Stop reason: SURG

## 2023-02-13 RX ORDER — ONDANSETRON 2 MG/ML
INJECTION INTRAMUSCULAR; INTRAVENOUS AS NEEDED
Status: DISCONTINUED | OUTPATIENT
Start: 2023-02-13 | End: 2023-02-13 | Stop reason: SURG

## 2023-02-13 RX ORDER — DEXAMETHASONE SODIUM PHOSPHATE 4 MG/ML
INJECTION, SOLUTION INTRA-ARTICULAR; INTRALESIONAL; INTRAMUSCULAR; INTRAVENOUS; SOFT TISSUE AS NEEDED
Status: DISCONTINUED | OUTPATIENT
Start: 2023-02-13 | End: 2023-02-13 | Stop reason: SURG

## 2023-02-13 RX ORDER — PROPOFOL 10 MG/ML
INJECTION, EMULSION INTRAVENOUS AS NEEDED
Status: DISCONTINUED | OUTPATIENT
Start: 2023-02-13 | End: 2023-02-13 | Stop reason: SURG

## 2023-02-13 RX ORDER — SODIUM CHLORIDE 0.9 % (FLUSH) 0.9 %
10 SYRINGE (ML) INJECTION AS NEEDED
Status: DISCONTINUED | OUTPATIENT
Start: 2023-02-13 | End: 2023-02-13 | Stop reason: HOSPADM

## 2023-02-13 RX ADMIN — Medication 10 ML: at 08:10

## 2023-02-13 RX ADMIN — LEVOTHYROXINE SODIUM 100 MCG: 100 TABLET ORAL at 06:01

## 2023-02-13 RX ADMIN — MORPHINE SULFATE 4 MG: 4 INJECTION, SOLUTION INTRAMUSCULAR; INTRAVENOUS at 10:23

## 2023-02-13 RX ADMIN — FENTANYL CITRATE 100 MCG: 50 INJECTION INTRAMUSCULAR; INTRAVENOUS at 19:13

## 2023-02-13 RX ADMIN — PHENYLEPHRINE HYDROCHLORIDE 300 MCG: 10 INJECTION INTRAVENOUS at 19:17

## 2023-02-13 RX ADMIN — MORPHINE SULFATE 4 MG: 4 INJECTION, SOLUTION INTRAMUSCULAR; INTRAVENOUS at 06:03

## 2023-02-13 RX ADMIN — ONDANSETRON 4 MG: 2 INJECTION INTRAMUSCULAR; INTRAVENOUS at 19:37

## 2023-02-13 RX ADMIN — BUPIVACAINE HYDROCHLORIDE AND EPINEPHRINE 7.5 ML: 5; 5 INJECTION, SOLUTION EPIDURAL; INTRACAUDAL; PERINEURAL at 19:25

## 2023-02-13 RX ADMIN — MORPHINE SULFATE 4 MG: 4 INJECTION, SOLUTION INTRAMUSCULAR; INTRAVENOUS at 14:49

## 2023-02-13 RX ADMIN — DEXAMETHASONE SODIUM PHOSPHATE 4 MG: 4 INJECTION, SOLUTION INTRAMUSCULAR; INTRAVENOUS at 19:18

## 2023-02-13 RX ADMIN — BACLOFEN 10 MG: 10 TABLET ORAL at 08:09

## 2023-02-13 RX ADMIN — HYDROMORPHONE HYDROCHLORIDE 0.5 MG: 1 INJECTION, SOLUTION INTRAMUSCULAR; INTRAVENOUS; SUBCUTANEOUS at 18:22

## 2023-02-13 RX ADMIN — MORPHINE SULFATE 4 MG: 4 INJECTION, SOLUTION INTRAMUSCULAR; INTRAVENOUS at 22:07

## 2023-02-13 RX ADMIN — MIDAZOLAM HYDROCHLORIDE 2 MG: 1 INJECTION, SOLUTION INTRAMUSCULAR; INTRAVENOUS at 19:13

## 2023-02-13 RX ADMIN — SODIUM CHLORIDE, POTASSIUM CHLORIDE, SODIUM LACTATE AND CALCIUM CHLORIDE 1000 ML: 600; 310; 30; 20 INJECTION, SOLUTION INTRAVENOUS at 18:00

## 2023-02-13 RX ADMIN — OXYCODONE HYDROCHLORIDE AND ACETAMINOPHEN 250 MG: 500 TABLET ORAL at 08:09

## 2023-02-13 RX ADMIN — LORAZEPAM 0.5 MG: 2 INJECTION INTRAMUSCULAR; INTRAVENOUS at 23:33

## 2023-02-13 RX ADMIN — Medication 10 ML: at 06:03

## 2023-02-13 RX ADMIN — MORPHINE SULFATE 4 MG: 4 INJECTION, SOLUTION INTRAMUSCULAR; INTRAVENOUS at 02:10

## 2023-02-13 RX ADMIN — PHENYLEPHRINE HYDROCHLORIDE 300 MCG: 10 INJECTION INTRAVENOUS at 19:15

## 2023-02-13 RX ADMIN — ONDANSETRON 4 MG: 2 INJECTION INTRAMUSCULAR; INTRAVENOUS at 10:27

## 2023-02-13 RX ADMIN — PROPOFOL 50 MG: 10 INJECTION, EMULSION INTRAVENOUS at 19:13

## 2023-02-13 RX ADMIN — Medication 10 ML: at 21:47

## 2023-02-13 RX ADMIN — PANTOPRAZOLE SODIUM 40 MG: 40 TABLET, DELAYED RELEASE ORAL at 06:01

## 2023-02-13 RX ADMIN — BUPIVACAINE HYDROCHLORIDE AND EPINEPHRINE 17.5 ML: 5; 5 INJECTION, SOLUTION EPIDURAL; INTRACAUDAL; PERINEURAL at 19:23

## 2023-02-13 RX ADMIN — CEFAZOLIN SODIUM 2 G: 2 SOLUTION INTRAVENOUS at 19:10

## 2023-02-13 RX ADMIN — LIDOCAINE HYDROCHLORIDE 60 MG: 20 INJECTION, SOLUTION INTRAVENOUS at 19:13

## 2023-02-13 NOTE — CONSULTS
Referring Provider: Hospitalist service  Reason for Consultation: Right hip fracture    Patient Care Team:  Da Dong MD as PCP - General (Internal Medicine)      History of present illness: 77-year-old female presents today with a mechanical fall sustained at home on Sunday.  She tripped and fell from a door step.  She landed on her right side.  She had difficulty bearing weight thereafter and could not walk.  She presented to the emergency room where x-rays revealed she had a right hip intertrochanteric mildly displaced fracture.  She was admitted to the hospital for pain control and workup for consideration of orthopedic surgery.  She notes no head trauma or loss of consciousness.  She notes no dizziness.  She notes no other injury other than her right hip.  She does have a history of rheumatoid arthritis, but is not on biologic therapy.  She also has a history of chronic constipation, GERD, anxiety disorder and hypothyroidism.      Review of Systems  Pertinent items are noted in HPI, all other systems reviewed and negative    History  Past Medical History:   Diagnosis Date   • Abdominal bloating    • Acid reflux    • Adenomatous polyp of colon 08/17/2020   • Arthritis    • Carotid bruit    • Colon polyp 2012   • Constipation    • Disease of thyroid gland    • Fibromyalgia    • Full dentures    • Hematuria    • Lung nodule     benign   • Osteoporosis    • Seasonal allergies    • Sinusitis    • Wears glasses     READING GLASSES ONLY   ,   Past Surgical History:   Procedure Laterality Date   • APPENDECTOMY     • COLONOSCOPY  2012   • COLONOSCOPY N/A 08/17/2020    Procedure: COLONOSCOPY WITH COLD SNARE POLYPECTOMY, COLD FORCEP POLYPECTOMY, SUBMUCOSAL INJECTION OF NORMAL SALINE, ENDOSCOPIC MUCOSAL RESECTION, HOT SNARE POLYPECTOMY, THERMAL ABLATION, QUICK CLIP PLACEMENT TIMES 4 AND BARBIE INK INJECTION;  Surgeon: Scar Rogers MD;  Location: [facility] ENDOSCOPY;  Service: Gastroenterology;  Laterality:  N/A;   • COLONOSCOPY N/A 03/01/2021    Procedure: COLONOSCOPY WITH BIOPSY AND POLYPECTOMY;  Surgeon: Scar Rogers MD;  Location: [facility] ENDOSCOPY;  Service: Gastroenterology;  Laterality: N/A;   • COLONOSCOPY N/A 3/18/2022    Procedure: COLONOSCOPY with polypectomy x2;  Surgeon: Scar Rogers MD;  Location: [facility] ENDOSCOPY;  Service: Gastroenterology;  Laterality: N/A;   • HYSTERECTOMY      PARTIAL STILL HAS OVARIES   • MULTIPLE TOOTH EXTRACTIONS     • TUBAL ABDOMINAL LIGATION     • UPPER GASTROINTESTINAL ENDOSCOPY  2012   • VAGINAL DELIVERY      X1   ,   Family History   Problem Relation Age of Onset   • Hearing loss Father    • Arthritis Father    • Cancer Father    • GI problems Father    • Breast cancer Sister    • Colon cancer Neg Hx    ,   Social History     Socioeconomic History   • Marital status:    Tobacco Use   • Smoking status: Every Day     Packs/day: 1.00     Years: 40.00     Pack years: 40.00     Types: Cigarettes   • Smokeless tobacco: Never   Vaping Use   • Vaping Use: Never used   Substance and Sexual Activity   • Alcohol use: Not Currently     Comment: moderate   • Drug use: No   • Sexual activity: Defer     E-cigarette/Vaping   • E-cigarette/Vaping Use Never User      E-cigarette/Vaping Substances     E-cigarette/Vaping Devices       , Scheduled Meds:  vitamin C, 250 mg, Oral, Daily  baclofen, 10 mg, Oral, BID  enoxaparin, 40 mg, Subcutaneous, Nightly  levothyroxine, 100 mcg, Oral, Daily  nicotine, 1 patch, Transdermal, Q24H  pantoprazole, 40 mg, Oral, Q AM  sodium chloride, 10 mL, Intravenous, Q12H     and Allergies:  Patient has no known allergies.    Objective     Vital Signs   Temp:  [99 °F (37.2 °C)-99.3 °F (37.4 °C)] 99.3 °F (37.4 °C)  Heart Rate:  [72-76] 72  Resp:  [12-18] 12  BP: (130-161)/(56-66) 134/56    Physical Exam:     General: Patient is alert and oriented x 3.  No acute distress.  HEENT: Head is atraumatic, normocephalic.  Pupils are equal reactive to  light and accommodating.  Neck is supple with no JVD.  Cranial nerve testing intact 2 through 12.   Cardiovascular: Intact distal pulses in all 4 extremities without edema.  Respiratory: Breaths are easy and unlabored.  No chest wall pain to palpation.  Abdomen: Soft, nontender, nondistended.  No guarding or rebound.  Neurologic: Intact sensation in all 4 extremities with intact DTRs.  Skin: Intact with no acute lesions noted.  Right hip: Patient notes pain at the right hip to palpation.  Positive logroll test.  Mild shortening of the right lower extremity.  Intact active range of motion the foot and ankle.  Intact sensation.  Intact capillary refill.  No open wounds.  There is no pain to palpation of the right knee or lower leg.  Intact capillary refill.  Intact distal pulses.  Negative Homans.        Results Review:   I reviewed the patient's new clinical results.  I reviewed the patient's new imaging results and agree with the interpretation.      Assessment & Plan       Closed right hip fracture, initial encounter (HCC)    Rheumatoid arthritis (HCC)    Gastroesophageal reflux disease without esophagitis    Osteoporosis with fracture    Nicotine dependence, cigarettes, uncomplicated     Chronic idiopathic constipation  Right hip intertrochanteric mildly displaced fracture.    Recommendation is for ORIF.Discussion was had with the patient today, regarding recommendation for surgery.  Patient is currently nothing by mouth.  Will obtain informed consent.  Surgery will likely be performed later today by Dr. Flores, who reviewed this case and recommended course of treatment.  Patient will be seen by medicine as well.    I discussed the patients findings and my recommendations with patient, nursing staff and consulting provider    Kenny Kenny PA-C  02/13/23  08:21 EST

## 2023-02-13 NOTE — PROGRESS NOTES
[facility] HOSPITALIST    PROGRESS NOTE    Name:  Yamileth De Guzman   Age:  77 y.o.  Sex:  female  :  1945  MRN:  [id number]   Visit Number:  [id number]  Admission Date:  2023  Date Of Service:  23  Primary Care Physician:  Da Dong MD     LOS: 1 day :    Chief Complaint:      Right hip fracture    Subjective:    Patient seen and evaluated,  at bedside.  Resting comfortably.  States that pain is well controlled but she will have significant pain with even slight movement.  Awaiting surgery this afternoon.  Denies nausea/vomiting, chest pain shortness of breath.    Hospital Course:    Patient is a pleasant 77-year-old white female with a history of chronic constipation, GERD, anxiety disorder, hypothyroidism, and rheumatoid arthritis who presented to the emergency room after she tripped and fell from a doorstep at home.  Patient denied any head trauma, loss of consciousness or presyncopal symptoms prior to her fall.  Evaluation in the ER revealed right intertrochanteric mildly displaced fracture.  Orthopedic surgery consulted.  Hospitalist asked to admit.    Review of Systems:     All systems were reviewed and negative except as mentioned in subjective, assessment and plan.    Vital Signs:    Temp:  [99.1 °F (37.3 °C)-99.3 °F (37.4 °C)] 99.1 °F (37.3 °C)  Heart Rate:  [72-76] 74  Resp:  [12-18] 14  BP: (130-137)/(56-60) 137/60    Intake and output:    I/O last 3 completed shifts:  In: 100 [P.O.:100]  Out: 450 [Urine:450]  I/O this shift:  In: -   Out: 125 [Urine:125]    Physical Examination:    General Appearance:  Alert and cooperative.    Head:  Atraumatic and normocephalic.   Eyes: Conjunctivae and sclerae normal, no icterus. No pallor.   Throat: No oral lesions, no thrush, oral mucosa moist.   Neck: Supple, trachea midline, no thyromegaly.   Lungs:   Breath sounds heard bilaterally equally.  No wheezing or crackles.    Heart:  Normal S1 and S2, no murmur,  No  JVD.   Abdomen:   Normal bowel sounds, Soft, nontender, nondistended, no rebound tenderness.   Extremities:  Tender and shortened right lower extremity   Skin: No bleeding or rash.   Neurologic: Alert and oriented x 3. No facial asymmetry. Moves all four limbs. No tremors.      Laboratory results:    Results from last 7 days   Lab Units 02/13/23  0645 02/12/23  1425   SODIUM mmol/L 140 138   POTASSIUM mmol/L 3.9 3.5   CHLORIDE mmol/L 105 103   CO2 mmol/L 26.0 23.5   BUN mg/dL 19 16   CREATININE mg/dL 0.68 0.79   CALCIUM mg/dL 9.0 9.4   BILIRUBIN mg/dL 0.5 0.3   ALK PHOS U/L 59 65   ALT (SGPT) U/L 14 16   AST (SGOT) U/L 18 20   GLUCOSE mg/dL 122* 104*     Results from last 7 days   Lab Units 02/13/23  0645 02/12/23  1425   WBC 10*3/mm3 13.33* 15.56*   HEMOGLOBIN g/dL 13.3 13.3   HEMATOCRIT % 40.1 38.2   PLATELETS 10*3/mm3 193 203         Results from last 7 days   Lab Units 02/12/23  1706 02/12/23  1425   HSTROP T ng/L 10* 10*         No results for input(s): PHART, RDU2JDX, PO2ART, RLF7FJG, BASEEXCESS in the last 8760 hours.   I have reviewed the patient's laboratory results.    Radiology results:    XR Chest 1 View    Result Date: 2/12/2023  PROCEDURE: XR CHEST 1 VW-  HISTORY: medical clearance  COMPARISON: None.  FINDINGS: The heart is normal in size. The lungs are clear. i. There is no pneumothorax.  There are no acute osseous abnormalities. Calcified granuloma noted on the left.  Bilateral nipple shadows noted.      Impression: No acute cardiopulmonary process.      This report was signed and finalized on 2/12/2023 2:48 PM by Maria Fernanda Mendez MD.    XR Hip With or Without Pelvis 2 - 3 View Right    Result Date: 2/12/2023  PROCEDURE: XR HIP W OR WO PELVIS 2-3 VIEW RIGHT-  HISTORY: fall, pain  FINDINGS: A 2 view exam demonstrates minimally displaced right intertrochanteric fracture. Mild osteopenia noted. Several metallic surgical pelvic clips noted..  No other fracture identified.      Impression: Acute fracture as  above.      This report was signed and finalized on 2/12/2023 2:56 PM by Maria Fernanda Mendez MD.    I have reviewed the patient's radiology reports.    Medication Review:     I have reviewed the patient's active and prn medications.     Problem List:      Closed right hip fracture, initial encounter ([facility])    Rheumatoid arthritis (HCC)    Gastroesophageal reflux disease without esophagitis    Osteoporosis with fracture    Nicotine dependence, cigarettes, uncomplicated     Chronic idiopathic constipation      Assessment:    1. Closed right hip fracture status post mechanical fall  2. Rheumatoid arthritis  3. Covid  4. Tobacco abuse  5. Hypothyroidism  6. Anxiety    Plan:    Closed right hip fracture  - Continue supportive care, pain control as needed  -Plan for surgery this afternoon with Dr. Flores  -Lieberman catheter has been placed for temporary use until patient regains mobility.  -PT/OT     Chronic constipation  - Monitor for symptoms while in the hospital.  Bowel regimen as needed.     Hypothyroidism  - Continue Synthroid home regimen 100 mcg daily.     GERD  - Normally on omeprazole 20 mg daily at home.  Continue 40 mg pantoprazole in hospital.     Anxiety  - Continue Xanax nightly as needed.  Konrad reviewed.     Smoking  - 1 pack/day history.  Start nicotine patch 21 mg  -Counseled on cessation    DVT Prophylaxis: Lovenox  Code Status: Full  Diet: NPO, may have regular diet after surgery  Discharge Plan: Pending    Michael Iglesias DO  02/13/23  13:15 EST    Dictated utilizing Dragon dictation.

## 2023-02-13 NOTE — THERAPY EVALUATION
Hold PT eval this date d/t patient having a surgical repair of hip fx later today.PT will follow up with patient tomorrow and proceed with eval as tolerated and appropriate.

## 2023-02-13 NOTE — PLAN OF CARE
Goal Outcome Evaluation:  Plan of Care Reviewed With: patient        Progress: no change  Outcome Evaluation: NEW ADMISSION TO [facility] OVERFLOW UNIT.  PT ALERT, ORIENTED X3,   PT HAS SEVERE PAIN WITH ANY MOVEMENT.  F/C ANCHORED AS ORDERED,  PT NPO AFTER MIDNIGHT FOR POSSIBLE SURGERY TOMORROW,   PAIN MANAGED WITH PRN MORPHINE.

## 2023-02-13 NOTE — H&P
[facility]   HISTORY AND PHYSICAL      Name:  Yamileth De Guzman   Age:  77 y.o.  Sex:  female  :  1945  MRN:  [id number]   Visit Number:  [id number]  Admission Date:  2023  Date Of Service:  23  Primary Care Physician:  Da Dong MD    Chief Complaint:   Right hip fracture    History Of Presenting Illness:    Patient is a pleasant 77-year-old white female with a history of chronic constipation, GERD, anxiety disorder, hypothyroidism, and rheumatoid arthritis who presented to the emergency room after she tripped and fell from a doorstep at home.  She fell on her right hip and has had significant pain and has not been able to put weight on the leg since that time.  Patient denies any loss of consciousness, head injury, or dizziness at the time of the fall.  In the emergency room, patient was evaluated and noted to have right intertrochanteric mildly displaced fracture.  WBC was elevated at 15.  Orthopedics was notified and recommendations were given for admission to hospital with plans for surgery the next day.    Since admission, patient had not yet had Lieberman catheter placed however she did state that she felt like she needed to urinate.  Pain was modestly controlled since she was given a couple of doses of morphine followed by Dilaudid.    Review Of Systems:  Review of Systems   Constitutional: Negative for chills, fatigue and fever.   HENT: Negative for congestion, ear pain, rhinorrhea, sinus pressure and sore throat.    Eyes: Negative for visual disturbance.   Respiratory: Negative for cough, chest tightness, shortness of breath and wheezing.    Cardiovascular: Negative for chest pain, palpitations and leg swelling.   Gastrointestinal: Negative for abdominal pain, blood in stool, constipation, diarrhea, nausea and vomiting.   Endocrine: Negative for polydipsia and polyuria.   Genitourinary: Negative for dysuria and hematuria.   Musculoskeletal: Negative for  arthralgias and back pain.   Skin: Negative for rash.   Neurological: Negative for dizziness, light-headedness, numbness and headaches.   Psychiatric/Behavioral: Negative for dysphoric mood and sleep disturbance. The patient is not nervous/anxious.         Past Medical History:    Past Medical History:   Diagnosis Date   • Abdominal bloating    • Acid reflux    • Adenomatous polyp of colon 08/17/2020   • Arthritis    • Carotid bruit    • Colon polyp 2012   • Constipation    • Disease of thyroid gland    • Fibromyalgia    • Full dentures    • Hematuria    • Lung nodule     benign   • Osteoporosis    • Seasonal allergies    • Sinusitis    • Wears glasses     READING GLASSES ONLY       Past Surgical history:    Past Surgical History:   Procedure Laterality Date   • APPENDECTOMY     • COLONOSCOPY  2012   • COLONOSCOPY N/A 08/17/2020    Procedure: COLONOSCOPY WITH COLD SNARE POLYPECTOMY, COLD FORCEP POLYPECTOMY, SUBMUCOSAL INJECTION OF NORMAL SALINE, ENDOSCOPIC MUCOSAL RESECTION, HOT SNARE POLYPECTOMY, THERMAL ABLATION, QUICK CLIP PLACEMENT TIMES 4 AND BARBIE INK INJECTION;  Surgeon: Scar Rogers MD;  Location: [facility] ENDOSCOPY;  Service: Gastroenterology;  Laterality: N/A;   • COLONOSCOPY N/A 03/01/2021    Procedure: COLONOSCOPY WITH BIOPSY AND POLYPECTOMY;  Surgeon: Scar Rogers MD;  Location: [facility] ENDOSCOPY;  Service: Gastroenterology;  Laterality: N/A;   • COLONOSCOPY N/A 3/18/2022    Procedure: COLONOSCOPY with polypectomy x2;  Surgeon: Scar Rogers MD;  Location: [facility] ENDOSCOPY;  Service: Gastroenterology;  Laterality: N/A;   • HYSTERECTOMY      PARTIAL STILL HAS OVARIES   • MULTIPLE TOOTH EXTRACTIONS     • TUBAL ABDOMINAL LIGATION     • UPPER GASTROINTESTINAL ENDOSCOPY  2012   • VAGINAL DELIVERY      X1       Social History:    Social History     Socioeconomic History   • Marital status:    Tobacco Use   • Smoking status: Every Day     Packs/day: 1.00     Years: 40.00      Pack years: 40.00     Types: Cigarettes   • Smokeless tobacco: Never   Vaping Use   • Vaping Use: Never used   Substance and Sexual Activity   • Alcohol use: Not Currently     Comment: moderate   • Drug use: No   • Sexual activity: Defer         Family History:    Family History   Problem Relation Age of Onset   • Hearing loss Father    • Arthritis Father    • Cancer Father    • GI problems Father    • Breast cancer Sister    • Colon cancer Neg Hx        Allergies:      Patient has no known allergies.    Home Medications:    Prior to Admission Medications     Prescriptions Last Dose Informant Patient Reported? Taking?    ALPRAZolam (XANAX) 0.25 MG tablet Past Week  No Yes    Take 1 tablet by mouth At Night As Needed for Anxiety.    baclofen (LIORESAL) 10 MG tablet 2/12/2023  No Yes    Take 1 tablet by mouth 2 (Two) Times a Day.    Patient taking differently:  Take 10 mg by mouth Daily.    calcium (OS-STIVEN) 600 MG tablet 2/12/2023 Self Yes Yes    Take 600 mg by mouth 2 (Two) Times a Day.    cetirizine (zyrTEC) 10 MG tablet 2/11/2023 Self Yes Yes    Take 10 mg by mouth Every Night.    fluticasone (FLONASE) 50 MCG/ACT nasal spray 2/11/2023  No Yes    Use 2 spray(s) in each nostril once daily    Garlic 400 MG tablet 2/12/2023 Self Yes Yes    Take  by mouth Daily.    Multiple Vitamin (MULTI VITAMIN DAILY PO) 2/12/2023 Self Yes Yes    Take 1 tablet by mouth Daily.    Omega-3 Fatty Acids (FISH OIL) 1000 MG capsule capsule 2/12/2023 Self Yes Yes    Take 1,000 mg by mouth Daily With Breakfast.    omeprazole (priLOSEC) 20 MG capsule 2/12/2023  No Yes    Take 1 capsule by mouth twice daily    vitamin C (ASCORBIC ACID) 250 MG tablet 2/12/2023 Self Yes Yes    Take 250 mg by mouth Daily.    denosumab (PROLIA) 60 MG/ML solution syringe  Self Yes No    Inject 60 mg under the skin into the appropriate area as directed Every 6 (Six) Months.    Euthyrox 100 MCG tablet   No No    Take 1 tablet by mouth once daily    linaclotide  (Linzess) 145 MCG capsule capsule Not Taking  No No    Take 1 capsule by mouth Every Morning Before Breakfast.    Patient not taking:  Reported on 2/12/2023             ED Medications:    Medications   ALPRAZolam (XANAX) tablet 0.25 mg (has no administration in time range)   baclofen (LIORESAL) tablet 10 mg (has no administration in time range)   levothyroxine (SYNTHROID, LEVOTHROID) tablet 100 mcg (has no administration in time range)   pantoprazole (PROTONIX) EC tablet 40 mg (has no administration in time range)   ascorbic acid (VITAMIN C) tablet 250 mg (has no administration in time range)   sodium chloride 0.9 % flush 10 mL (has no administration in time range)   sodium chloride 0.9 % flush 10 mL (has no administration in time range)   sodium chloride 0.9 % infusion 40 mL (has no administration in time range)   Enoxaparin Sodium (LOVENOX) syringe 40 mg (has no administration in time range)   ondansetron (ZOFRAN) injection 4 mg (has no administration in time range)   morphine injection 4 mg (4 mg Intravenous Given 2/12/23 1319)   HYDROmorphone (DILAUDID) injection 1 mg (1 mg Intravenous Given 2/12/23 1425)   HYDROmorphone (DILAUDID) injection 1 mg (1 mg Intravenous Given 2/12/23 1836)       Vital Signs:    Temp:  [99 °F (37.2 °C)-99.2 °F (37.3 °C)] 99.2 °F (37.3 °C)  Heart Rate:  [74-76] 76  Resp:  [18] 18  BP: (134-161)/(57-66) 134/57        02/12/23  1211 02/12/23  1906   Weight: 64.9 kg (143 lb) 64.8 kg (142 lb 13.7 oz)       Body mass index is 23.06 kg/m².    Physical Exam:  Physical Exam  Vitals and nursing note reviewed.   Constitutional:       Appearance: Normal appearance. She is well-developed.      Comments: Elderly female laying comfortably in bed   HENT:      Head: Normocephalic and atraumatic.      Nose: Nose normal.      Mouth/Throat:      Mouth: Mucous membranes are moist.      Pharynx: No oropharyngeal exudate.   Eyes:      General: No scleral icterus.     Conjunctiva/sclera: Conjunctivae normal.       Pupils: Pupils are equal, round, and reactive to light.   Neck:      Thyroid: No thyromegaly.   Cardiovascular:      Rate and Rhythm: Normal rate and regular rhythm.      Heart sounds: Normal heart sounds. No murmur heard.    No friction rub. No gallop.   Pulmonary:      Effort: Pulmonary effort is normal. No respiratory distress.      Breath sounds: Normal breath sounds. No wheezing.   Abdominal:      General: Bowel sounds are normal. There is no distension.      Palpations: Abdomen is soft.      Tenderness: There is no abdominal tenderness.   Musculoskeletal:         General: No deformity or signs of injury.      Cervical back: Normal range of motion and neck supple.      Comments: Right hip externally rotated.  Pain with any form of movement.   Lymphadenopathy:      Cervical: No cervical adenopathy.   Skin:     General: Skin is warm and dry.      Findings: No rash.   Neurological:      Mental Status: She is alert and oriented to person, place, and time.   Psychiatric:         Mood and Affect: Mood normal.         Behavior: Behavior normal.         EKG:      Reviewed from ER records, sinus rhythm with low QRS voltage.    Labs:    Lab Results (last 24 hours)     Procedure Component Value Units Date/Time    High Sensitivity Troponin T 2Hr [[id number]]  (Abnormal) Collected: 02/12/23 1706    Specimen: Blood Updated: 02/12/23 1732     HS Troponin T 10 ng/L      Troponin T Delta 0 ng/L     Narrative:      High Sensitive Troponin T Reference Range:  <10.0 ng/L- Negative Female for AMI  <15.0 ng/L- Negative Male for AMI  >=10 - Abnormal Female indicating possible myocardial injury.  >=15 - Abnormal Male indicating possible myocardial injury.   Clinicians would have to utilize clinical acumen, EKG, Troponin, and serial changes to determine if it is an Acute Myocardial Infarction or myocardial injury due to an underlying chronic condition.         High Sensitivity Troponin T [[id number]]  (Abnormal) Collected: 02/12/23  1425    Specimen: Blood Updated: 02/12/23 1452     HS Troponin T 10 ng/L     Narrative:      High Sensitive Troponin T Reference Range:  <10.0 ng/L- Negative Female for AMI  <15.0 ng/L- Negative Male for AMI  >=10 - Abnormal Female indicating possible myocardial injury.  >=15 - Abnormal Male indicating possible myocardial injury.   Clinicians would have to utilize clinical acumen, EKG, Troponin, and serial changes to determine if it is an Acute Myocardial Infarction or myocardial injury due to an underlying chronic condition.         Comprehensive Metabolic Panel [[id number]]  (Abnormal) Collected: 02/12/23 1425    Specimen: Blood Updated: 02/12/23 1450     Glucose 104 mg/dL      BUN 16 mg/dL      Creatinine 0.79 mg/dL      Sodium 138 mmol/L      Potassium 3.5 mmol/L      Chloride 103 mmol/L      CO2 23.5 mmol/L      Calcium 9.4 mg/dL      Total Protein 6.8 g/dL      Albumin 4.2 g/dL      ALT (SGPT) 16 U/L      AST (SGOT) 20 U/L      Alkaline Phosphatase 65 U/L      Total Bilirubin 0.3 mg/dL      Globulin 2.6 gm/dL      A/G Ratio 1.6 g/dL      BUN/Creatinine Ratio 20.3     Anion Gap 11.5 mmol/L      eGFR 78.1 mL/min/1.73     Narrative:      GFR Normal >60  Chronic Kidney Disease <60  Kidney Failure <15    The GFR formula is only valid for adults with stable renal function between ages 18 and 70.    CBC & Differential [[id number]]  (Abnormal) Collected: 02/12/23 1425    Specimen: Blood Updated: 02/12/23 1432    Narrative:      The following orders were created for panel order CBC & Differential.  Procedure                               Abnormality         Status                     ---------                               -----------         ------                     CBC Auto Differential[[id number]]        Abnormal            Final result                 Please view results for these tests on the individual orders.    CBC Auto Differential [[id number]]  (Abnormal) Collected: 02/12/23 1425    Specimen: Blood Updated:  02/12/23 1432     WBC 15.56 10*3/mm3      RBC 4.23 10*6/mm3      Hemoglobin 13.3 g/dL      Hematocrit 38.2 %      MCV 90.3 fL      MCH 31.4 pg      MCHC 34.8 g/dL      RDW 12.5 %      RDW-SD 41.1 fl      MPV 10.3 fL      Platelets 203 10*3/mm3      Neutrophil % 88.0 %      Lymphocyte % 7.2 %      Monocyte % 4.2 %      Eosinophil % 0.0 %      Basophil % 0.2 %      Immature Grans % 0.4 %      Neutrophils, Absolute 13.68 10*3/mm3      Lymphocytes, Absolute 1.12 10*3/mm3      Monocytes, Absolute 0.66 10*3/mm3      Eosinophils, Absolute 0.00 10*3/mm3      Basophils, Absolute 0.03 10*3/mm3      Immature Grans, Absolute 0.07 10*3/mm3      nRBC 0.0 /100 WBC           Radiology:    Imaging Results (Last 72 Hours)     Procedure Component Value Units Date/Time    XR Hip With or Without Pelvis 2 - 3 View Right [[id number]] Collected: 02/12/23 1449     Updated: 02/12/23 1458    Narrative:      PROCEDURE: XR HIP W OR WO PELVIS 2-3 VIEW RIGHT-     HISTORY: fall, pain     FINDINGS: A 2 view exam demonstrates minimally displaced right  intertrochanteric fracture. Mild osteopenia noted. Several metallic  surgical pelvic clips noted..  No other fracture identified.       Impression:      Acute fracture as above.                  This report was signed and finalized on 2/12/2023 2:56 PM by Maria Fernanda Mendez MD.    XR Chest 1 View [[id number]] Collected: 02/12/23 1446     Updated: 02/12/23 1456    Narrative:      PROCEDURE: XR CHEST 1 VW-     HISTORY: medical clearance     COMPARISON: None.     FINDINGS: The heart is normal in size. The lungs are clear. i. There is  no pneumothorax.  There are no acute osseous abnormalities. Calcified  granuloma noted on the left.  Bilateral nipple shadows noted.       Impression:      No acute cardiopulmonary process.                 This report was signed and finalized on 2/12/2023 2:48 PM by Maria Fernanda Mendez MD.          Assessment:    Closed right hip fracture, initial encounter (formerly [facility])    Rheumatoid arthritis  (HCC)    Gastroesophageal reflux disease without esophagitis    Osteoporosis with fracture    Nicotine dependence, cigarettes, uncomplicated     Chronic idiopathic constipation      Plan:     Closed right hip fracture  - Start pain control with morphine IV 4mg Q4H PRN  -Plan for surgical intervention with Dr. Flores tomorrow morning.  -Lieberman catheter has been placed for temporary use until patient regains mobility.  -N.p.o. after midnight    Chronic constipation  - Monitor for symptoms while in the hospital.  Bowel regimen as needed.    Osteoporosis  - Noted that patient has been on Prolia in the past.    Hypothyroidism  - Continue Synthroid home regimen 100 mcg daily.    GERD  - Normally on omeprazole 20 mg daily at home.  Continue 40 mg pantoprazole in hospital.    Anxiety  - Continue Xanax nightly as needed.    Smoking  - 1 pack/day history.  Start nicotine patch 21 mg      Garett Villagran DO  02/12/23  21:10 EST

## 2023-02-13 NOTE — THERAPY EVALUATION
OT evaluation held today as pt is scheduled for surgery later today.  Will follow up with pt tomorrow.

## 2023-02-13 NOTE — CASE MANAGEMENT/SOCIAL WORK
Discharge Planning Assessment   [facility]     Patient Name: Yamileth De Guzman  MRN: [id number]  Today's Date: 2/13/2023    Admit Date: 2/12/2023    Plan: DC pt home with hh due to s/p ORIF. Pt has no preferred HH agency. Spouse to transport home   Discharge Needs Assessment     Row Name 02/13/23 1505       Discharge Needs Assessment    Equipment Currently Used at Home bp cuff;scales    Row Name 02/13/23 1458       Living Environment    People in Home spouse    Name(s) of People in Home [place]    Current Living Arrangements home    Duration at Residence 35 yrs    Potentially Unsafe Housing Conditions unable to assess    Primary Care Provided by self    Provides Primary Care For no one    Family Caregiver if Needed spouse    Family Caregiver Names [place]    Quality of Family Relationships unable to assess    Able to Return to Prior Arrangements yes    Living Arrangement Comments no stairs into or insie of home       Resource/Environmental Concerns    Resource/Environmental Concerns none    Transportation Concerns none       Food Insecurity    Within the past 12 months, you worried that your food would run out before you got the money to buy more. Never true    Within the past 12 months, the food you bought just didn't last and you didn't have money to get more. Never true       Transition Planning    Patient/Family Anticipates Transition to home with family    Patient/Family Anticipated Services at Transition home health care    Transportation Anticipated family or friend will provide       Discharge Needs Assessment    Readmission Within the Last 30 Days no previous admission in last 30 days    Current Outpatient/Agency/Support Group homecare agency    Equipment Currently Used at Home none  pt unsure if she has a walker. States she has a BSC    Concerns to be Addressed discharge planning    Concerns Comments will need HH s/p ORIF    Anticipated Changes Related to Illness other (see comments)  activity  restrictions/hip precautions    Equipment Needed After Discharge other (see comments)  TBD by what equipment pt yury has. Pt was unsure if she had walker. Will need PT rec for equip after sx    Outpatient/Agency/Support Group Needs homecare agency    Provided Post Acute Provider List? N/A    Provided Post Acute Provider Quality & Resource List? N/A    Current Discharge Risk physical impairment               Discharge Plan     Row Name 23 1504       Plan    Plan Comments Pt lives in single level home with spouse and functions independently at baseline. Pt says she is unsure if she has a walker. She thinks there might be one inthe house somewhere. She states she has a BSC, scale, BP cuff. No home oxygen. Spoke with pt about need for therapy after DC. She is agreeable to HH. She has no preferred agency. Cm was going to verify info with spouse , however, pt states he is out in the car asleep because he didnt get any last night. Pt denies financial concerns at this time. Spouse will likely trnaport pt home. Equipment needs pending therapy evaluation after surgery.    Row Name 23 7546       Plan    Plan DC pt home with hh due to s/p ORIF. Pt has no preferred HH agency. Spouse to transport home    Plan Comments Cm spoke with pt briefly to complete DCP. Pt freqently grimacing, confirms she is in pain. Spoke with nurse about pain who confirms pt is receiving pain meds. Pt confimrs her name,  phone number and address. LW in docs.    Final Discharge Disposition Code 06 - home with home health care              Continued Care and Services - Admitted Since 2023    Coordination has not been started for this encounter.       Expected Discharge Date and Time     Expected Discharge Date Expected Discharge Time    2023          Demographic Summary     Row Name 23 [id number]       General Information    Admission Type inpatient    Arrived From home    Referral Source admission list    Reason for  Consult discharge planning    Preferred Language English       Contact Information    Permission Granted to Share Info With family/designee    Contact Information Obtained for     Contact Information Jessica De Guzman-spouse-[phone]               Functional Status     Row Name 02/13/23 1458       Functional Status    Usual Activity Tolerance good    Current Activity Tolerance poor       Functional Status, IADL    Medications independent    Meal Preparation independent    Housekeeping independent    Laundry independent    Shopping independent       Employment/    Current or Previous Occupation not applicable               Psychosocial    No documentation.                Abuse/Neglect    No documentation.                Legal    No documentation.                Substance Abuse    No documentation.                Patient Forms    No documentation.                   Genny Peraza, APRN

## 2023-02-14 LAB
ANION GAP SERPL CALCULATED.3IONS-SCNC: 10.2 MMOL/L (ref 5–15)
BUN SERPL-MCNC: 20 MG/DL (ref 8–23)
BUN/CREAT SERPL: 28.6 (ref 7–25)
CALCIUM SPEC-SCNC: 8.5 MG/DL (ref 8.6–10.5)
CHLORIDE SERPL-SCNC: 107 MMOL/L (ref 98–107)
CO2 SERPL-SCNC: 27.8 MMOL/L (ref 22–29)
CREAT SERPL-MCNC: 0.7 MG/DL (ref 0.57–1)
DEPRECATED RDW RBC AUTO: 48.1 FL (ref 37–54)
EGFRCR SERPLBLD CKD-EPI 2021: 90.3 ML/MIN/1.73
ERYTHROCYTE [DISTWIDTH] IN BLOOD BY AUTOMATED COUNT: 13.1 % (ref 12.3–15.4)
GLUCOSE SERPL-MCNC: 145 MG/DL (ref 65–99)
HCT VFR BLD AUTO: 36.1 % (ref 34–46.6)
HGB BLD-MCNC: 11.8 G/DL (ref 12–15.9)
MCH RBC QN AUTO: 32.8 PG (ref 26.6–33)
MCHC RBC AUTO-ENTMCNC: 32.7 G/DL (ref 31.5–35.7)
MCV RBC AUTO: 100.3 FL (ref 79–97)
PLATELET # BLD AUTO: 151 10*3/MM3 (ref 140–450)
PMV BLD AUTO: 10.8 FL (ref 6–12)
POTASSIUM SERPL-SCNC: 3.9 MMOL/L (ref 3.5–5.2)
RBC # BLD AUTO: 3.6 10*6/MM3 (ref 3.77–5.28)
SODIUM SERPL-SCNC: 145 MMOL/L (ref 136–145)
WBC NRBC COR # BLD: 13.55 10*3/MM3 (ref 3.4–10.8)

## 2023-02-14 PROCEDURE — 80048 BASIC METABOLIC PNL TOTAL CA: CPT | Performed by: ORTHOPAEDIC SURGERY

## 2023-02-14 PROCEDURE — 25010000002 MORPHINE PER 10 MG: Performed by: ORTHOPAEDIC SURGERY

## 2023-02-14 PROCEDURE — 25010000002 ENOXAPARIN PER 10 MG: Performed by: ORTHOPAEDIC SURGERY

## 2023-02-14 PROCEDURE — 0 CEFAZOLIN SODIUM-DEXTROSE 2-3 GM-%(50ML) RECONSTITUTED SOLUTION: Performed by: ORTHOPAEDIC SURGERY

## 2023-02-14 PROCEDURE — 99232 SBSQ HOSP IP/OBS MODERATE 35: CPT | Performed by: FAMILY MEDICINE

## 2023-02-14 PROCEDURE — 97161 PT EVAL LOW COMPLEX 20 MIN: CPT

## 2023-02-14 PROCEDURE — 97165 OT EVAL LOW COMPLEX 30 MIN: CPT

## 2023-02-14 PROCEDURE — 25010000002 ONDANSETRON PER 1 MG: Performed by: ORTHOPAEDIC SURGERY

## 2023-02-14 PROCEDURE — 85027 COMPLETE CBC AUTOMATED: CPT | Performed by: ORTHOPAEDIC SURGERY

## 2023-02-14 RX ORDER — BISACODYL 10 MG
10 SUPPOSITORY, RECTAL RECTAL DAILY PRN
Status: DISCONTINUED | OUTPATIENT
Start: 2023-02-14 | End: 2023-02-16 | Stop reason: HOSPADM

## 2023-02-14 RX ORDER — BISACODYL 5 MG/1
5 TABLET, DELAYED RELEASE ORAL DAILY PRN
Status: DISCONTINUED | OUTPATIENT
Start: 2023-02-14 | End: 2023-02-16 | Stop reason: HOSPADM

## 2023-02-14 RX ORDER — CETIRIZINE HYDROCHLORIDE 10 MG/1
10 TABLET ORAL NIGHTLY
Status: DISCONTINUED | OUTPATIENT
Start: 2023-02-14 | End: 2023-02-16 | Stop reason: HOSPADM

## 2023-02-14 RX ORDER — AMOXICILLIN 250 MG
2 CAPSULE ORAL 2 TIMES DAILY
Status: DISCONTINUED | OUTPATIENT
Start: 2023-02-14 | End: 2023-02-16 | Stop reason: HOSPADM

## 2023-02-14 RX ORDER — POLYETHYLENE GLYCOL 3350 17 G/17G
17 POWDER, FOR SOLUTION ORAL DAILY PRN
Status: DISCONTINUED | OUTPATIENT
Start: 2023-02-14 | End: 2023-02-16 | Stop reason: HOSPADM

## 2023-02-14 RX ADMIN — ENOXAPARIN SODIUM 40 MG: 100 INJECTION SUBCUTANEOUS at 09:13

## 2023-02-14 RX ADMIN — LEVOTHYROXINE SODIUM 100 MCG: 100 TABLET ORAL at 06:02

## 2023-02-14 RX ADMIN — MORPHINE SULFATE 2 MG: 2 INJECTION, SOLUTION INTRAMUSCULAR; INTRAVENOUS at 06:05

## 2023-02-14 RX ADMIN — CETIRIZINE HYDROCHLORIDE 10 MG: 10 TABLET, FILM COATED ORAL at 21:27

## 2023-02-14 RX ADMIN — PANTOPRAZOLE SODIUM 40 MG: 40 TABLET, DELAYED RELEASE ORAL at 06:02

## 2023-02-14 RX ADMIN — BACLOFEN 10 MG: 10 TABLET ORAL at 09:13

## 2023-02-14 RX ADMIN — OXYCODONE HYDROCHLORIDE AND ACETAMINOPHEN 250 MG: 500 TABLET ORAL at 09:13

## 2023-02-14 RX ADMIN — Medication 10 ML: at 21:01

## 2023-02-14 RX ADMIN — ONDANSETRON 4 MG: 2 INJECTION INTRAMUSCULAR; INTRAVENOUS at 16:49

## 2023-02-14 RX ADMIN — CEFAZOLIN SODIUM 2 G: 2 SOLUTION INTRAVENOUS at 12:12

## 2023-02-14 RX ADMIN — Medication 1 PATCH: at 09:13

## 2023-02-14 RX ADMIN — SODIUM CHLORIDE 100 ML/HR: 9 INJECTION, SOLUTION INTRAVENOUS at 01:08

## 2023-02-14 RX ADMIN — CEFAZOLIN SODIUM 2 G: 2 SOLUTION INTRAVENOUS at 03:19

## 2023-02-14 RX ADMIN — BACLOFEN 10 MG: 10 TABLET ORAL at 21:02

## 2023-02-14 RX ADMIN — SENNOSIDES AND DOCUSATE SODIUM 2 TABLET: 50; 8.6 TABLET ORAL at 21:02

## 2023-02-14 RX ADMIN — HYDROCODONE BITARTRATE AND ACETAMINOPHEN 1 TABLET: 7.5; 325 TABLET ORAL at 13:54

## 2023-02-14 NOTE — PLAN OF CARE
Goal Outcome Evaluation:  Plan of Care Reviewed With: (P) patient        Progress: (P) no change  Outcome Evaluation: (P) Pt was agreeable and participated in IE this date. Pt reports she lives w/ her  and was independent w/ ADL's, driving, and ambulation at baseline. Pt reports she did not use an AD for ambulation at baseline but has the following DME: BSC, cane, walker. Pt reported R hip pain of 5/10 at the start of the IE. Pt performed supine to sit w/ Mod A., VC's, bedrails, HOB elevated, draw sheets, and increased time to complete task. Pt performed sit to stand 2x w/ Min A, RW, and VC's for handplacement. While standing pt performed weight shifting to increase tolerance of weight on the RLE. Pt ambulated 3' from the bed to the recliner w/ Min A, RW, and VC's for sequencing w/ RW. Pt would benefit from skilled physical therapy during inpatient stay in order to increase LE strength, endurance, and functional mobility. Pt would benefit from STR at discharge.

## 2023-02-14 NOTE — ANESTHESIA PROCEDURE NOTES
Pericapsular Nerve Group (PENG) Block - Right      Patient reassessed immediately prior to procedure    Patient location during procedure: OR  Start time: 2/13/2023 7:24 PM  Stop time: 2/13/2023 7:26 PM  Reason for block: at surgeon's request and post-op pain management  Performed by  CRNA/CAA: Bhavik Guallpa CRNA  Preanesthetic Checklist  Completed: patient identified, IV checked, site marked, risks and benefits discussed, surgical consent, monitors and equipment checked, pre-op evaluation and timeout performed  Prep:  Pt Position: supine  Sterile barriers:gloves, mask and cap  Prep: ChloraPrep  Patient monitoring: blood pressure monitoring, continuous pulse oximetry and EKG  Procedure  Performed under: general  Guidance:ultrasound guided    Laterality:right  Block Type:PENG  Injection Technique:single-shot  Needle Type:echogenic and Tuohy            Post Assessment  Injection Assessment: no paresthesia on injection, negative aspiration for heme and incremental injection  Patient Tolerance:comfortable throughout block  Complications:no

## 2023-02-14 NOTE — DISCHARGE PLACEMENT REQUEST
"STR referral  ; Silvia Deshpande [phone]      Margot De Guzman (77 y.o. Female)     Date of Birth   1945    Social Security Number       Address   [address] [place] [address]    Home Phone   [phone]    MRN   [id number]       [place]   [employer]ism    Marital Status                               Admission Date   2/12/23    Admission Type   Emergency    Admitting Provider   Garett Villagran DO    Attending Provider   Liudmila John DO    Department, Room/Bed   [facility] MED SURG  4, 432/1       Discharge Date       Discharge Disposition       Discharge Destination                               Attending Provider: Liudmila John DO    Allergies: No Known Allergies    Isolation: None   Infection: None   Code Status: CPR    Ht: 167.6 cm (66\")   Wt: 64.8 kg (142 lb 13.7 oz)    Admission Cmt: None   Principal Problem: Closed right hip fracture, initial encounter ([facility]) [S72.001A]                 Active Insurance as of 2/12/2023     Primary Coverage     Payor Plan Insurance Group Employer/Plan Group    MEDICARE MEDICARE A & B      Payor Plan Address Payor Plan Phone Number Payor Plan Fax Number Effective Dates    [address] [phone]  12/1/2004 - None Entered    [facility] [id number]       Subscriber Name Subscriber Birth Date Member ID       MARGOT DE GUZMAN 1945 [id number]           Secondary Coverage     Payor Plan Insurance Group Employer/Plan Group    [employer] [place] [employer] [place]      Payor Plan Address Payor Plan Phone Number Payor Plan Fax Number Effective Dates    [address] [employer] [place] [address] [phone]  1/1/2020 - None Entered    [place] [address]       Subscriber Name Subscriber Birth Date Member ID       MARGOT DE GUZMAN 1945 [id number]                 Emergency Contacts      (Rel.) Home Phone Work Phone Mobile Phone    Brooks De Guzman (Spouse) [phone] -- [phone]               History & " Physical      Garett Villagran DO at 23 2101              [facility]   HISTORY AND PHYSICAL      Name:  Yamileth De Guzman   Age:  77 y.o.  Sex:  female  :  1945  MRN:  [id number]   Visit Number:  [id number]  Admission Date:  2023  Date Of Service:  23  Primary Care Physician:  Da Dong MD    Chief Complaint:   Right hip fracture    History Of Presenting Illness:    Patient is a pleasant 77-year-old white female with a history of chronic constipation, GERD, anxiety disorder, hypothyroidism, and rheumatoid arthritis who presented to the emergency room after she tripped and fell from a doorstep at home.  She fell on her right hip and has had significant pain and has not been able to put weight on the leg since that time.  Patient denies any loss of consciousness, head injury, or dizziness at the time of the fall.  In the emergency room, patient was evaluated and noted to have right intertrochanteric mildly displaced fracture.  WBC was elevated at 15.  Orthopedics was notified and recommendations were given for admission to hospital with plans for surgery the next day.    Since admission, patient had not yet had Lieberman catheter placed however she did state that she felt like she needed to urinate.  Pain was modestly controlled since she was given a couple of doses of morphine followed by Dilaudid.    Review Of Systems:  Review of Systems   Constitutional: Negative for chills, fatigue and fever.   HENT: Negative for congestion, ear pain, rhinorrhea, sinus pressure and sore throat.    Eyes: Negative for visual disturbance.   Respiratory: Negative for cough, chest tightness, shortness of breath and wheezing.    Cardiovascular: Negative for chest pain, palpitations and leg swelling.   Gastrointestinal: Negative for abdominal pain, blood in stool, constipation, diarrhea, nausea and vomiting.   Endocrine: Negative for polydipsia and polyuria.   Genitourinary:  Negative for dysuria and hematuria.   Musculoskeletal: Negative for arthralgias and back pain.   Skin: Negative for rash.   Neurological: Negative for dizziness, light-headedness, numbness and headaches.   Psychiatric/Behavioral: Negative for dysphoric mood and sleep disturbance. The patient is not nervous/anxious.         Past Medical History:    Past Medical History:   Diagnosis Date   • Abdominal bloating    • Acid reflux    • Adenomatous polyp of colon 08/17/2020   • Arthritis    • Carotid bruit    • Colon polyp 2012   • Constipation    • Disease of thyroid gland    • Fibromyalgia    • Full dentures    • Hematuria    • Lung nodule     benign   • Osteoporosis    • Seasonal allergies    • Sinusitis    • Wears glasses     READING GLASSES ONLY       Past Surgical history:    Past Surgical History:   Procedure Laterality Date   • APPENDECTOMY     • COLONOSCOPY  2012   • COLONOSCOPY N/A 08/17/2020    Procedure: COLONOSCOPY WITH COLD SNARE POLYPECTOMY, COLD FORCEP POLYPECTOMY, SUBMUCOSAL INJECTION OF NORMAL SALINE, ENDOSCOPIC MUCOSAL RESECTION, HOT SNARE POLYPECTOMY, THERMAL ABLATION, QUICK CLIP PLACEMENT TIMES 4 AND BARBIE INK INJECTION;  Surgeon: Scar Rogers MD;  Location: [facility] ENDOSCOPY;  Service: Gastroenterology;  Laterality: N/A;   • COLONOSCOPY N/A 03/01/2021    Procedure: COLONOSCOPY WITH BIOPSY AND POLYPECTOMY;  Surgeon: Scar Rogers MD;  Location: [facility] ENDOSCOPY;  Service: Gastroenterology;  Laterality: N/A;   • COLONOSCOPY N/A 3/18/2022    Procedure: COLONOSCOPY with polypectomy x2;  Surgeon: Scar Rogers MD;  Location: [facility] ENDOSCOPY;  Service: Gastroenterology;  Laterality: N/A;   • HYSTERECTOMY      PARTIAL STILL HAS OVARIES   • MULTIPLE TOOTH EXTRACTIONS     • TUBAL ABDOMINAL LIGATION     • UPPER GASTROINTESTINAL ENDOSCOPY  2012   • VAGINAL DELIVERY      X1       Social History:    Social History     Socioeconomic History   • Marital status:    Tobacco Use   •  Smoking status: Every Day     Packs/day: 1.00     Years: 40.00     Pack years: 40.00     Types: Cigarettes   • Smokeless tobacco: Never   Vaping Use   • Vaping Use: Never used   Substance and Sexual Activity   • Alcohol use: Not Currently     Comment: moderate   • Drug use: No   • Sexual activity: Defer         Family History:    Family History   Problem Relation Age of Onset   • Hearing loss Father    • Arthritis Father    • Cancer Father    • GI problems Father    • Breast cancer Sister    • Colon cancer Neg Hx        Allergies:      Patient has no known allergies.    Home Medications:    Prior to Admission Medications     Prescriptions Last Dose Informant Patient Reported? Taking?    ALPRAZolam (XANAX) 0.25 MG tablet Past Week  No Yes    Take 1 tablet by mouth At Night As Needed for Anxiety.    baclofen (LIORESAL) 10 MG tablet 2/12/2023  No Yes    Take 1 tablet by mouth 2 (Two) Times a Day.    Patient taking differently:  Take 10 mg by mouth Daily.    calcium (OS-STIVEN) 600 MG tablet 2/12/2023 Self Yes Yes    Take 600 mg by mouth 2 (Two) Times a Day.    cetirizine (zyrTEC) 10 MG tablet 2/11/2023 Self Yes Yes    Take 10 mg by mouth Every Night.    fluticasone (FLONASE) 50 MCG/ACT nasal spray 2/11/2023  No Yes    Use 2 spray(s) in each nostril once daily    Garlic 400 MG tablet 2/12/2023 Self Yes Yes    Take  by mouth Daily.    Multiple Vitamin (MULTI VITAMIN DAILY PO) 2/12/2023 Self Yes Yes    Take 1 tablet by mouth Daily.    Omega-3 Fatty Acids (FISH OIL) 1000 MG capsule capsule 2/12/2023 Self Yes Yes    Take 1,000 mg by mouth Daily With Breakfast.    omeprazole (priLOSEC) 20 MG capsule 2/12/2023  No Yes    Take 1 capsule by mouth twice daily    vitamin C (ASCORBIC ACID) 250 MG tablet 2/12/2023 Self Yes Yes    Take 250 mg by mouth Daily.    denosumab (PROLIA) 60 MG/ML solution syringe  Self Yes No    Inject 60 mg under the skin into the appropriate area as directed Every 6 (Six) Months.    Euthyrox 100 MCG tablet    No No    Take 1 tablet by mouth once daily    linaclotide (Linzess) 145 MCG capsule capsule Not Taking  No No    Take 1 capsule by mouth Every Morning Before Breakfast.    Patient not taking:  Reported on 2/12/2023             ED Medications:    Medications   ALPRAZolam (XANAX) tablet 0.25 mg (has no administration in time range)   baclofen (LIORESAL) tablet 10 mg (has no administration in time range)   levothyroxine (SYNTHROID, LEVOTHROID) tablet 100 mcg (has no administration in time range)   pantoprazole (PROTONIX) EC tablet 40 mg (has no administration in time range)   ascorbic acid (VITAMIN C) tablet 250 mg (has no administration in time range)   sodium chloride 0.9 % flush 10 mL (has no administration in time range)   sodium chloride 0.9 % flush 10 mL (has no administration in time range)   sodium chloride 0.9 % infusion 40 mL (has no administration in time range)   Enoxaparin Sodium (LOVENOX) syringe 40 mg (has no administration in time range)   ondansetron (ZOFRAN) injection 4 mg (has no administration in time range)   morphine injection 4 mg (4 mg Intravenous Given 2/12/23 1319)   HYDROmorphone (DILAUDID) injection 1 mg (1 mg Intravenous Given 2/12/23 1425)   HYDROmorphone (DILAUDID) injection 1 mg (1 mg Intravenous Given 2/12/23 1836)       Vital Signs:    Temp:  [99 °F (37.2 °C)-99.2 °F (37.3 °C)] 99.2 °F (37.3 °C)  Heart Rate:  [74-76] 76  Resp:  [18] 18  BP: (134-161)/(57-66) 134/57        02/12/23  1211 02/12/23  1906   Weight: 64.9 kg (143 lb) 64.8 kg (142 lb 13.7 oz)       Body mass index is 23.06 kg/m².    Physical Exam:  Physical Exam  Vitals and nursing note reviewed.   Constitutional:       Appearance: Normal appearance. She is well-developed.      Comments: Elderly female laying comfortably in bed   HENT:      Head: Normocephalic and atraumatic.      Nose: Nose normal.      Mouth/Throat:      Mouth: Mucous membranes are moist.      Pharynx: No oropharyngeal exudate.   Eyes:      General: No  scleral icterus.     Conjunctiva/sclera: Conjunctivae normal.      Pupils: Pupils are equal, round, and reactive to light.   Neck:      Thyroid: No thyromegaly.   Cardiovascular:      Rate and Rhythm: Normal rate and regular rhythm.      Heart sounds: Normal heart sounds. No murmur heard.    No friction rub. No gallop.   Pulmonary:      Effort: Pulmonary effort is normal. No respiratory distress.      Breath sounds: Normal breath sounds. No wheezing.   Abdominal:      General: Bowel sounds are normal. There is no distension.      Palpations: Abdomen is soft.      Tenderness: There is no abdominal tenderness.   Musculoskeletal:         General: No deformity or signs of injury.      Cervical back: Normal range of motion and neck supple.      Comments: Right hip externally rotated.  Pain with any form of movement.   Lymphadenopathy:      Cervical: No cervical adenopathy.   Skin:     General: Skin is warm and dry.      Findings: No rash.   Neurological:      Mental Status: She is alert and oriented to person, place, and time.   Psychiatric:         Mood and Affect: Mood normal.         Behavior: Behavior normal.         EKG:      Reviewed from ER records, sinus rhythm with low QRS voltage.    Labs:    Lab Results (last 24 hours)     Procedure Component Value Units Date/Time    High Sensitivity Troponin T 2Hr [[id number]]  (Abnormal) Collected: 02/12/23 1706    Specimen: Blood Updated: 02/12/23 1732     HS Troponin T 10 ng/L      Troponin T Delta 0 ng/L     Narrative:      High Sensitive Troponin T Reference Range:  <10.0 ng/L- Negative Female for AMI  <15.0 ng/L- Negative Male for AMI  >=10 - Abnormal Female indicating possible myocardial injury.  >=15 - Abnormal Male indicating possible myocardial injury.   Clinicians would have to utilize clinical acumen, EKG, Troponin, and serial changes to determine if it is an Acute Myocardial Infarction or myocardial injury due to an underlying chronic condition.         High  Sensitivity Troponin T [[id number]]  (Abnormal) Collected: 02/12/23 1425    Specimen: Blood Updated: 02/12/23 1452     HS Troponin T 10 ng/L     Narrative:      High Sensitive Troponin T Reference Range:  <10.0 ng/L- Negative Female for AMI  <15.0 ng/L- Negative Male for AMI  >=10 - Abnormal Female indicating possible myocardial injury.  >=15 - Abnormal Male indicating possible myocardial injury.   Clinicians would have to utilize clinical acumen, EKG, Troponin, and serial changes to determine if it is an Acute Myocardial Infarction or myocardial injury due to an underlying chronic condition.         Comprehensive Metabolic Panel [[id number]]  (Abnormal) Collected: 02/12/23 1425    Specimen: Blood Updated: 02/12/23 1450     Glucose 104 mg/dL      BUN 16 mg/dL      Creatinine 0.79 mg/dL      Sodium 138 mmol/L      Potassium 3.5 mmol/L      Chloride 103 mmol/L      CO2 23.5 mmol/L      Calcium 9.4 mg/dL      Total Protein 6.8 g/dL      Albumin 4.2 g/dL      ALT (SGPT) 16 U/L      AST (SGOT) 20 U/L      Alkaline Phosphatase 65 U/L      Total Bilirubin 0.3 mg/dL      Globulin 2.6 gm/dL      A/G Ratio 1.6 g/dL      BUN/Creatinine Ratio 20.3     Anion Gap 11.5 mmol/L      eGFR 78.1 mL/min/1.73     Narrative:      GFR Normal >60  Chronic Kidney Disease <60  Kidney Failure <15    The GFR formula is only valid for adults with stable renal function between ages 18 and 70.    CBC & Differential [[id number]]  (Abnormal) Collected: 02/12/23 1425    Specimen: Blood Updated: 02/12/23 1432    Narrative:      The following orders were created for panel order CBC & Differential.  Procedure                               Abnormality         Status                     ---------                               -----------         ------                     CBC Auto Differential[[id number]]        Abnormal            Final result                 Please view results for these tests on the individual orders.    CBC Auto Differential [[id number]]   (Abnormal) Collected: 02/12/23 1425    Specimen: Blood Updated: 02/12/23 1432     WBC 15.56 10*3/mm3      RBC 4.23 10*6/mm3      Hemoglobin 13.3 g/dL      Hematocrit 38.2 %      MCV 90.3 fL      MCH 31.4 pg      MCHC 34.8 g/dL      RDW 12.5 %      RDW-SD 41.1 fl      MPV 10.3 fL      Platelets 203 10*3/mm3      Neutrophil % 88.0 %      Lymphocyte % 7.2 %      Monocyte % 4.2 %      Eosinophil % 0.0 %      Basophil % 0.2 %      Immature Grans % 0.4 %      Neutrophils, Absolute 13.68 10*3/mm3      Lymphocytes, Absolute 1.12 10*3/mm3      Monocytes, Absolute 0.66 10*3/mm3      Eosinophils, Absolute 0.00 10*3/mm3      Basophils, Absolute 0.03 10*3/mm3      Immature Grans, Absolute 0.07 10*3/mm3      nRBC 0.0 /100 WBC           Radiology:    Imaging Results (Last 72 Hours)     Procedure Component Value Units Date/Time    XR Hip With or Without Pelvis 2 - 3 View Right [[id number]] Collected: 02/12/23 1449     Updated: 02/12/23 1458    Narrative:      PROCEDURE: XR HIP W OR WO PELVIS 2-3 VIEW RIGHT-     HISTORY: fall, pain     FINDINGS: A 2 view exam demonstrates minimally displaced right  intertrochanteric fracture. Mild osteopenia noted. Several metallic  surgical pelvic clips noted..  No other fracture identified.       Impression:      Acute fracture as above.                  This report was signed and finalized on 2/12/2023 2:56 PM by Maria Fernanda Mendez MD.    XR Chest 1 View [[id number]] Collected: 02/12/23 1446     Updated: 02/12/23 1456    Narrative:      PROCEDURE: XR CHEST 1 VW-     HISTORY: medical clearance     COMPARISON: None.     FINDINGS: The heart is normal in size. The lungs are clear. i. There is  no pneumothorax.  There are no acute osseous abnormalities. Calcified  granuloma noted on the left.  Bilateral nipple shadows noted.       Impression:      No acute cardiopulmonary process.                 This report was signed and finalized on 2/12/2023 2:48 PM by Maria Fernanda Mendez MD.          Assessment:    Closed  right hip fracture, initial encounter (HCC)    Rheumatoid arthritis (HCC)    Gastroesophageal reflux disease without esophagitis    Osteoporosis with fracture    Nicotine dependence, cigarettes, uncomplicated     Chronic idiopathic constipation      Plan:     Closed right hip fracture  - Start pain control with morphine IV 4mg Q4H PRN  -Plan for surgical intervention with Dr. Flores tomorrow morning.  -Lieberman catheter has been placed for temporary use until patient regains mobility.  -N.p.o. after midnight    Chronic constipation  - Monitor for symptoms while in the hospital.  Bowel regimen as needed.    Osteoporosis  - Noted that patient has been on Prolia in the past.    Hypothyroidism  - Continue Synthroid home regimen 100 mcg daily.    GERD  - Normally on omeprazole 20 mg daily at home.  Continue 40 mg pantoprazole in hospital.    Anxiety  - Continue Xanax nightly as needed.    Smoking  - 1 pack/day history.  Start nicotine patch 21 mg      Garett Villagran DO  02/12/23  21:10 EST      Electronically signed by Garett Villagran DO at 02/12/23 2115          Emergency Department Notes      Leanne Headley MD at 02/12/23 1439          TRIAGE CHIEF COMPLAINT:     Nursing and triage notes reviewed    Chief Complaint   Patient presents with   • Fall     Pt c/o pain in R hip. Pt states she fell at home while putting shoes on. Pt states she did not lose consciousness.        HPI: Yamileth De Guzman is a 77 y.o. female who presents to the emergency department complaining of right hip pain.  Patient states she was try to put her shoes on and when she fell over and landed on her right hip.  She did not hit her head or lose consciousness.  She denies a syncopal episode or dizziness prior to the event.  She states she has not been able to walk or move her legs since the event occurred.    REVIEW OF SYSTEMS: All other systems reviewed and are negative     PAST MEDICAL HISTORY:   Past Medical History:   Diagnosis Date    • Abdominal bloating    • Acid reflux    • Adenomatous polyp of colon 08/17/2020   • Arthritis    • Carotid bruit    • Colon polyp 2012   • Constipation    • Disease of thyroid gland    • Fibromyalgia    • Full dentures    • Hematuria    • Lung nodule     benign   • Osteoporosis    • Seasonal allergies    • Sinusitis    • Wears glasses     READING GLASSES ONLY        FAMILY HISTORY:   Family History   Problem Relation Age of Onset   • Hearing loss Father    • Arthritis Father    • Cancer Father    • GI problems Father    • Breast cancer Sister    • Colon cancer Neg Hx         SOCIAL HISTORY:   Social History     Socioeconomic History   • Marital status:    Tobacco Use   • Smoking status: Every Day     Packs/day: 1.00     Years: 40.00     Pack years: 40.00     Types: Cigarettes   • Smokeless tobacco: Never   Vaping Use   • Vaping Use: Never used   Substance and Sexual Activity   • Alcohol use: Not Currently     Comment: moderate   • Drug use: No   • Sexual activity: Defer        SURGICAL HISTORY:   Past Surgical History:   Procedure Laterality Date   • APPENDECTOMY     • COLONOSCOPY  2012   • COLONOSCOPY N/A 08/17/2020    Procedure: COLONOSCOPY WITH COLD SNARE POLYPECTOMY, COLD FORCEP POLYPECTOMY, SUBMUCOSAL INJECTION OF NORMAL SALINE, ENDOSCOPIC MUCOSAL RESECTION, HOT SNARE POLYPECTOMY, THERMAL ABLATION, QUICK CLIP PLACEMENT TIMES 4 AND BARBIE INK INJECTION;  Surgeon: Scar Rogers MD;  Location: [facility] ENDOSCOPY;  Service: Gastroenterology;  Laterality: N/A;   • COLONOSCOPY N/A 03/01/2021    Procedure: COLONOSCOPY WITH BIOPSY AND POLYPECTOMY;  Surgeon: Scar Rogers MD;  Location: [facility] ENDOSCOPY;  Service: Gastroenterology;  Laterality: N/A;   • COLONOSCOPY N/A 3/18/2022    Procedure: COLONOSCOPY with polypectomy x2;  Surgeon: Scar Rogers MD;  Location: [facility] ENDOSCOPY;  Service: Gastroenterology;  Laterality: N/A;   • HYSTERECTOMY      PARTIAL STILL HAS OVARIES   • MULTIPLE  TOOTH EXTRACTIONS     • TUBAL ABDOMINAL LIGATION     • UPPER GASTROINTESTINAL ENDOSCOPY  2012   • VAGINAL DELIVERY      X1        CURRENT MEDICATIONS:      Medication List      ASK your doctor about these medications    ALPRAZolam 0.25 MG tablet  Commonly known as: XANAX  Take 1 tablet by mouth At Night As Needed for Anxiety.     baclofen 10 MG tablet  Commonly known as: LIORESAL  Take 1 tablet by mouth 2 (Two) Times a Day.     calcium 600 MG tablet  Commonly known as: OS-STIVEN     cetirizine 10 MG tablet  Commonly known as: zyrTEC     denosumab 60 MG/ML solution syringe  Commonly known as: PROLIA     Euthyrox 100 MCG tablet  Generic drug: levothyroxine  Take 1 tablet by mouth once daily     fish oil 1000 MG capsule capsule     fluticasone 50 MCG/ACT nasal spray  Commonly known as: FLONASE  Use 2 spray(s) in each nostril once daily     Garlic 400 MG tablet     linaclotide 145 MCG capsule capsule  Commonly known as: Linzess  Take 1 capsule by mouth Every Morning Before Breakfast.     multivitamin tablet tablet  Commonly known as: THERAGRAN     omeprazole 20 MG capsule  Commonly known as: priLOSEC  Take 1 capsule by mouth twice daily     vitamin C 250 MG tablet  Commonly known as: ASCORBIC ACID             ALLERGIES: Patient has no known allergies.     PHYSICAL EXAM:   VITAL SIGNS:   Vitals:    02/12/23 1315   BP:    Pulse:    Resp:    Temp:    SpO2: 99%      CONSTITUTIONAL: Awake, oriented, appears nontoxic   HENT: Atraumatic, normocephalic, oral mucosa pink and moist, airway patent. Nares patent without drainage. External ears normal.   EYES: Conjunctivae clear   NECK: Trachea midline   CARDIOVASCULAR: Normal heart rate, Normal rhythm, No murmurs, rubs, gallops   PULMONARY/CHEST: Clear to auscultation, no rhonchi, wheezes, or rales. Symmetrical breath sounds.  ABDOMINAL: Nondistended, soft, nontender - no rebound or guarding.   NEUROLOGIC: Nonfocal, moving all four extremities, no gross sensory or motor deficits.    EXTREMITIES: No clubbing, cyanosis.  Significant tenderness over the right greater trochanter.  Patient resists any movement of her right lower extremity due to the discomfort.  Distal pulses are intact.  SKIN: Warm, Dry, No erythema, No rash     ED COURSE / MEDICAL DECISION MAKING:   Yamileth De Guzman is a 77 y.o. female who presents to the emergency department for evaluation of right hip discomfort.  Patient is nondistressed on arrival in the emergency department but does not appear uncomfortable.  Exam does reveal neurovascularly intact extremity but it is tender to palpation over the greater trochanter.    Differential diagnosis includes fracture, contusion, dislocation among other etiologies.    X-ray of the right hip was ordered for further evaluation of the patient's presentation.    Diagnostic information from other sources:     Interventions: Patient given pain medication on arrival.    Narrative: Given how tender patient was there was concern for possible fracture.  This was confirmed on x-ray which per my interpretation reveals a right intertrochanteric fracture.  Patient given further pain medication.  Further ordered a chest x-ray, EKG, CBC, CMP, cardiac enzymes.  Chest x-ray per radiology interpretation does not reveal any obvious acute process.  I did consult the orthopedist on-call who will plan on surgical fixation tomorrow.    Re-evaluation: Resting more comfortably    Plan for disposition is admission    DECISION TO DISCHARGE/ADMIT: see ED care timeline     FINAL IMPRESSION:   1 --hip fracture  2 --   3 --     Electronically signed by: Leanne Headley MD, 2/12/2023 14:39 EST       Leanne Headley MD  02/12/23 1720      Electronically signed by Leanne Headley MD at 02/12/23 1720     Lo Brown PCT at 02/12/23 1647     Summary:Hospitalist             Called [facility] at this time per MD Kayode request. Call unanswered. Left voicemail requesting call back.      Electronically signed by Lo Brown PCT at 02/12/23 1647     Lo Brown PCT at 02/12/23 1731     Summary:Bed Request             Called and requested Med Surg bed at this time from Manish Paredes Supervisor. She stated she would call back.     Electronically signed by Lo Brown PCT at 02/12/23 1732     Lo Brown PCT at 02/12/23 1734     Summary:Bed Assignment             Pt has been assigned to [facility].    Electronically signed by Lo Brown PCT at 02/12/23 1735         Current Facility-Administered Medications   Medication Dose Route Frequency Provider Last Rate Last Admin   • ALPRAZolam (XANAX) tablet 0.25 mg  0.25 mg Oral Nightly PRN Miguel Angel Flores MD   0.25 mg at 02/12/23 2155   • ascorbic acid (VITAMIN C) tablet 250 mg  250 mg Oral Daily Miguel Angel Flores MD   250 mg at 02/14/23 0913   • baclofen (LIORESAL) tablet 10 mg  10 mg Oral BID Miguel Angel Flores MD   10 mg at 02/14/23 0913   • ceFAZolin Sodium-Dextrose (ANCEF) IVPB (duplex) 2 g  2 g Intravenous Q8H Miguel Angel Flores MD   2 g at 02/14/23 0319   • Enoxaparin Sodium (LOVENOX) syringe 40 mg  40 mg Subcutaneous Daily Miguel Angel Flores MD   40 mg at 02/14/23 0913   • HYDROcodone-acetaminophen (NORCO) 7.5-325 MG per tablet 1 tablet  1 tablet Oral Q4H PRN Miguel Angel Flores MD       • HYDROcodone-acetaminophen (NORCO) 7.5-325 MG per tablet 2 tablet  2 tablet Oral Q4H PRN Miguel Angel Flores MD       • levothyroxine (SYNTHROID, LEVOTHROID) tablet 100 mcg  100 mcg Oral Daily Miguel Angel Flores MD   100 mcg at 02/14/23 0602   • Morphine sulfate (PF) injection 2 mg  2 mg Intravenous Q4H PRN Miguel Angel Flores MD   2 mg at 02/14/23 0605    And   • naloxone (NARCAN) injection 0.4 mg  0.4 mg Intravenous Q5 Min PRN Miguel Angel Flores MD       • nicotine (NICODERM CQ) 21 MG/24HR patch 1 patch  1 patch Transdermal Q24H Miguel Angel Flores MD   1 patch at 02/14/23 0913   • ondansetron (ZOFRAN) injection 4 mg  4 mg Intravenous Q6H PRN Miguel Angel Flores MD   4  mg at 02/13/23 1027   • ondansetron (ZOFRAN) injection 4 mg  4 mg Intravenous Q6H PRN Miguel Angel Flores MD       • pantoprazole (PROTONIX) EC tablet 40 mg  40 mg Oral Q AM Miguel Angel Flores MD   40 mg at 02/14/23 0602   • sodium chloride 0.9 % flush 10 mL  10 mL Intravenous Q12H Miguel Angel Flores MD   10 mL at 02/13/23 2147   • sodium chloride 0.9 % flush 10 mL  10 mL Intravenous PRN Miguel Angel Flores MD   10 mL at 02/13/23 0603   • sodium chloride 0.9 % infusion 40 mL  40 mL Intravenous PRN Miguel Angel Flores MD       • sodium chloride 0.9 % infusion  100 mL/hr Intravenous Continuous Miguel Angel Flores  mL/hr at 02/14/23 0108 100 mL/hr at 02/14/23 0108        Physician Progress Notes (last 24 hours)      Kenny Kenny PA-C at 02/14/23 0834          Orthopedic  Progress Note    Assessment & Plan     Status post right hip ORIF.    Patient can be weightbearing as tolerated.  No hip precautions required.  Continue DVT prophylaxis.  Continue pain control measures.  Physical therapy progression as tolerated with fall precautions discussed.  Recommend rehab placement.  Follow-up in 2-3 weeks with Dr. Flores.     LOS: 2 days     Subjective     Pain is well controlled.  No new complaints.    Objective     Vital signs in last 24 hours:  Temp:  [97.4 °F (36.3 °C)-99.7 °F (37.6 °C)] 98.7 °F (37.1 °C)  Heart Rate:  [74-90] 78  Resp:  [8-20] 18  BP: (121-167)/() 124/45    General: Patient is resting comfortably.  Right lower extremity: Incisions are stable.  No signs of infection.  Minimal swelling.  No signs of DVT.  Negative Homans.  Neurovascular status intact right lower extremity.    Data Review  CBC:  Results from last 7 days   Lab Units 02/14/23  0521   WBC 10*3/mm3 13.55*   RBC 10*6/mm3 3.60*   HEMOGLOBIN g/dL 11.8*   HEMATOCRIT % 36.1   PLATELETS 10*3/mm3 151           Kenny Kenny PA-C    Date: 2/14/2023  Time: 08:34 EST    Electronically signed by Kenny Kenny PA-C at 02/14/23 0835     Michael Iglesias DO  at 23 1315              [facility]    Name:  Yamileth De Guzman   Age:  77 y.o.  Sex:  female  :  1945  MRN:  [id number]   Visit Number:  [id number]  Admission Date:  2023  Date Of Service:  23  Primary Care Physician:  Da Dong MD     LOS: 1 day :    Chief Complaint:      Right hip fracture    Subjective:    Patient seen and evaluated,  at bedside.  Resting comfortably.  States that pain is well controlled but she will have significant pain with even slight movement.  Awaiting surgery this afternoon.  Denies nausea/vomiting, chest pain shortness of breath.    Hospital Course:    Patient is a pleasant 77-year-old white female with a history of chronic constipation, GERD, anxiety disorder, hypothyroidism, and rheumatoid arthritis who presented to the emergency room after she tripped and fell from a doorstep at home.  Patient denied any head trauma, loss of consciousness or presyncopal symptoms prior to her fall.  Evaluation in the ER revealed right intertrochanteric mildly displaced fracture.  Orthopedic surgery consulted.  Hospitalist asked to admit.    Review of Systems:     All systems were reviewed and negative except as mentioned in subjective, assessment and plan.    Vital Signs:    Temp:  [99.1 °F (37.3 °C)-99.3 °F (37.4 °C)] 99.1 °F (37.3 °C)  Heart Rate:  [72-76] 74  Resp:  [12-18] 14  BP: (130-137)/(56-60) 137/60    Intake and output:    I/O last 3 completed shifts:  In: 100 [P.O.:100]  Out: 450 [Urine:450]  I/O this shift:  In: -   Out: 125 [Urine:125]    Physical Examination:    General Appearance:  Alert and cooperative.    Head:  Atraumatic and normocephalic.   Eyes: Conjunctivae and sclerae normal, no icterus. No pallor.   Throat: No oral lesions, no thrush, oral mucosa moist.   Neck: Supple, trachea midline, no thyromegaly.   Lungs:   Breath sounds heard bilaterally equally.  No wheezing or crackles.    Heart:  Normal S1  and S2, no murmur,  No JVD.   Abdomen:   Normal bowel sounds, Soft, nontender, nondistended, no rebound tenderness.   Extremities:  Tender and shortened right lower extremity   Skin: No bleeding or rash.   Neurologic: Alert and oriented x 3. No facial asymmetry. Moves all four limbs. No tremors.      Laboratory results:    Results from last 7 days   Lab Units 02/13/23  0645 02/12/23  1425   SODIUM mmol/L 140 138   POTASSIUM mmol/L 3.9 3.5   CHLORIDE mmol/L 105 103   CO2 mmol/L 26.0 23.5   BUN mg/dL 19 16   CREATININE mg/dL 0.68 0.79   CALCIUM mg/dL 9.0 9.4   BILIRUBIN mg/dL 0.5 0.3   ALK PHOS U/L 59 65   ALT (SGPT) U/L 14 16   AST (SGOT) U/L 18 20   GLUCOSE mg/dL 122* 104*     Results from last 7 days   Lab Units 02/13/23  0645 02/12/23  1425   WBC 10*3/mm3 13.33* 15.56*   HEMOGLOBIN g/dL 13.3 13.3   HEMATOCRIT % 40.1 38.2   PLATELETS 10*3/mm3 193 203         Results from last 7 days   Lab Units 02/12/23  1706 02/12/23  1425   HSTROP T ng/L 10* 10*         No results for input(s): PHART, BDQ9TNC, PO2ART, WHM0KOW, BASEEXCESS in the last 8760 hours.   I have reviewed the patient's laboratory results.    Radiology results:    XR Chest 1 View    Result Date: 2/12/2023  PROCEDURE: XR CHEST 1 VW-  HISTORY: medical clearance  COMPARISON: None.  FINDINGS: The heart is normal in size. The lungs are clear. i. There is no pneumothorax.  There are no acute osseous abnormalities. Calcified granuloma noted on the left.  Bilateral nipple shadows noted.      Impression: No acute cardiopulmonary process.      This report was signed and finalized on 2/12/2023 2:48 PM by Maria Fernanda Mendez MD.    XR Hip With or Without Pelvis 2 - 3 View Right    Result Date: 2/12/2023  PROCEDURE: XR HIP W OR WO PELVIS 2-3 VIEW RIGHT-  HISTORY: fall, pain  FINDINGS: A 2 view exam demonstrates minimally displaced right intertrochanteric fracture. Mild osteopenia noted. Several metallic surgical pelvic clips noted..  No other fracture identified.       Impression: Acute fracture as above.      This report was signed and finalized on 2/12/2023 2:56 PM by Maria Fernanda Mendez MD.    I have reviewed the patient's radiology reports.    Medication Review:     I have reviewed the patient's active and prn medications.     Problem List:      Closed right hip fracture, initial encounter (HCC)    Rheumatoid arthritis (HCC)    Gastroesophageal reflux disease without esophagitis    Osteoporosis with fracture    Nicotine dependence, cigarettes, uncomplicated     Chronic idiopathic constipation      Assessment:    1. Closed right hip fracture status post mechanical fall  2. Rheumatoid arthritis  3. Covid  4. Tobacco abuse  5. Hypothyroidism  6. Anxiety    Plan:    Closed right hip fracture  - Continue supportive care, pain control as needed  -Plan for surgery this afternoon with Dr. Flores  -Lieberman catheter has been placed for temporary use until patient regains mobility.  -PT/OT     Chronic constipation  - Monitor for symptoms while in the hospital.  Bowel regimen as needed.     Hypothyroidism  - Continue Synthroid home regimen 100 mcg daily.     GERD  - Normally on omeprazole 20 mg daily at home.  Continue 40 mg pantoprazole in hospital.     Anxiety  - Continue Xanax nightly as needed.  Konrad reviewed.     Smoking  - 1 pack/day history.  Start nicotine patch 21 mg  -Counseled on cessation    DVT Prophylaxis: Lovenox  Code Status: Full  Diet: NPO, may have regular diet after surgery  Discharge Plan: Pending    Michael Iglesias DO  02/13/23  13:15 EST    Dictated utilizing Dragon dictation.        Electronically signed by Michael Iglesias DO at 02/13/23 1319          Physical Therapy Notes (last 24 hours)      Sheyla Yoon, PT Student at 02/14/23 1206  Version 1 of 1       Goal Outcome Evaluation:  Plan of Care Reviewed With: (P) patient        Progress: (P) no change  Outcome Evaluation: (P) Pt was agreeable and participated in IE this date. Pt reports she lives w/ her   and was independent w/ ADL's, driving, and ambulation at baseline. Pt reports she did not use an AD for ambulation at baseline but has the following DME: BSC, cane, walker. Pt reported R hip pain of 5/10 at the start of the IE. Pt performed supine to sit w/ Mod A., VC's, bedrails, HOB elevated, draw sheets, and increased time to complete task. Pt performed sit to stand 2x w/ Min A, RW, and VC's for handplacement. While standing pt performed weight shifting to increase tolerance of weight on the RLE. Pt ambulated 3' from the bed to the recliner w/ Min A, RW, and VC's for sequencing w/ RW. Pt would benefit from skilled physical therapy during inpatient stay in order to increase LE strength, endurance, and functional mobility. Pt would benefit from STR at discharge.    Electronically signed by Sheyla Yoon, PT Student at 23 1206     Sheyla Yoon, PT Student at 23 1207  Version 1 of 1         Patient Name: Yamileth De Guzman  : 1945    MRN: [id number]                              Today's Date: 2023       Admit Date: 2023    Visit Dx:     ICD-10-CM ICD-9-CM   1. Closed fracture of left hip, initial encounter ([facility])  S72.002A 820.8     Patient Active Problem List   Diagnosis   • Rheumatoid arthritis ([facility])   • Gastroesophageal reflux disease without esophagitis   • Fibromyalgia   • Generalized anxiety disorder   • Hyperlipidemia   • Hypothyroidism   • Osteoporosis with fracture   • Vitamin D deficiency, unspecified   • Senile osteoporosis   • Carotid bruit   • Hematuria   • Lung mass   • Adenomatous polyps   • Nicotine dependence, cigarettes, uncomplicated    • Allergies   • Chronic idiopathic constipation   • Nonintractable headache   • Tingling of both feet   • ADAL positive   • Closed right hip fracture, initial encounter ([facility])     Past Medical History:   Diagnosis Date   • Abdominal bloating    • Acid reflux    • Adenomatous polyp of colon 2020   • Arthritis    • Carotid bruit     • Colon polyp 2012   • Constipation    • Disease of thyroid gland    • Fibromyalgia    • Full dentures    • Hematuria    • Impaired mobility    • Lung nodule     benign   • Osteoporosis    • Seasonal allergies    • Sinusitis    • Wears glasses     READING GLASSES ONLY     Past Surgical History:   Procedure Laterality Date   • APPENDECTOMY     • COLONOSCOPY  2012   • COLONOSCOPY N/A 08/17/2020    Procedure: COLONOSCOPY WITH COLD SNARE POLYPECTOMY, COLD FORCEP POLYPECTOMY, SUBMUCOSAL INJECTION OF NORMAL SALINE, ENDOSCOPIC MUCOSAL RESECTION, HOT SNARE POLYPECTOMY, THERMAL ABLATION, QUICK CLIP PLACEMENT TIMES 4 AND BARBIE INK INJECTION;  Surgeon: Scar Rogers MD;  Location: [facility] ENDOSCOPY;  Service: Gastroenterology;  Laterality: N/A;   • COLONOSCOPY N/A 03/01/2021    Procedure: COLONOSCOPY WITH BIOPSY AND POLYPECTOMY;  Surgeon: Scar Rogers MD;  Location: [facility] ENDOSCOPY;  Service: Gastroenterology;  Laterality: N/A;   • COLONOSCOPY N/A 3/18/2022    Procedure: COLONOSCOPY with polypectomy x2;  Surgeon: Scar Rogers MD;  Location: [facility] ENDOSCOPY;  Service: Gastroenterology;  Laterality: N/A;   • HIP TROCHANTERIC NAILING WITH INTRAMEDULLARY HIP SCREW Right 2/13/2023    Procedure: HIP TROCHANTERIC NAILING WITH INTRAMEDULLARY HIP SCREW-SHORT NAIL;  Surgeon: Miguel Angel Flores MD;  Location: [facility] OR;  Service: Orthopedics;  Laterality: Right;   • HYSTERECTOMY      PARTIAL STILL HAS OVARIES   • MULTIPLE TOOTH EXTRACTIONS     • TUBAL ABDOMINAL LIGATION     • UPPER GASTROINTESTINAL ENDOSCOPY  2012   • VAGINAL DELIVERY      X1      General Information     Row Name 02/14/23 1148          Physical Therapy Time and Intention    Document Type evaluation (P)   -LG     Mode of Treatment physical therapy (P)   -LG     Row Name 02/14/23 1148          General Information    Patient Profile Reviewed yes (P)   -LG     Prior Level of Function independent:;ADL's;driving;community mobility;all household  mobility (P)   -LG     Existing Precautions/Restrictions fall;other (see comments) (P)   RLE WBAT  -     Row Name 02/14/23 1148          Living Environment    People in Home spouse (P)   -LG     Row Name 02/14/23 1148          Home Main Entrance    Number of Stairs, Main Entrance none (P)   -     Row Name 02/14/23 1148          Stairs Within Home, Primary    Stairs, Within Home, Primary Pt reports she has a basement but does not have to go into the basement. (P)   -LG     Number of Stairs, Within Home, Primary twelve (P)   -LG     Row Name 02/14/23 1148          Cognition    Orientation Status (Cognition) oriented x 4 (P)   -     Row Name 02/14/23 1148          Safety Issues, Functional Mobility    Safety Issues Affecting Function (Mobility) sequencing abilities;awareness of need for assistance (P)   -LG     Impairments Affecting Function (Mobility) balance;range of motion (ROM);pain;strength;endurance/activity tolerance;postural/trunk control (P)   -LG           User Key  (r) = Recorded By, (t) = Taken By, (c) = Cosigned By    Initials Name Provider Type    Sheyla Morales, PT Student PT Student               Mobility     Row Name 02/14/23 1152          Bed Mobility    Bed Mobility supine-sit (P)   -LG     Supine-Sit [place] (Bed Mobility) moderate assist (50% patient effort);verbal cues (P)   -LG     Assistive Device (Bed Mobility) draw sheet;bed rails (P)   -     Row Name 02/14/23 1152          Bed-Chair Transfer    Bed-Chair [place] (Transfers) minimum assist (75% patient effort);verbal cues (P)   -LG     Assistive Device (Bed-Chair Transfers) walker, front-wheeled (P)   -     Row Name 02/14/23 1152          Sit-Stand Transfer    Sit-Stand [place] (Transfers) minimum assist (75% patient effort);verbal cues (P)   -LG     Assistive Device (Sit-Stand Transfers) walker, front-wheeled (P)   -     Row Name 02/14/23 1152          Gait/Stairs (Locomotion)    [place] Level (Gait)  minimum assist (75% patient effort);verbal cues (P)   -LG     Assistive Device (Gait) walker, front-wheeled (P)   -LG     Distance in Feet (Gait) 3' from bed to recliner (P)   -LG     Deviations/Abnormal Patterns (Gait) antalgic (P)   -LG     Row Name 02/14/23 1152          Mobility    Extremity Weight-bearing Status right lower extremity (P)   -LG     Right Upper Extremity (Weight-bearing Status) -- (P)   -LG     Right Lower Extremity (Weight-bearing Status) weight-bearing as tolerated (WBAT) (P)   -LG           User Key  (r) = Recorded By, (t) = Taken By, (c) = Cosigned By    Initials Name Provider Type    LG Sheyla Yoon, PT Student PT Student               Obj/Interventions     Row Name 02/14/23 1154          Range of Motion Comprehensive    General Range of Motion lower extremity range of motion deficits identified (P)   -LG     Row Name 02/14/23 1154          Strength Comprehensive (MMT)    Comment, General Manual Muscle Testing (MMT) Assessment BLE -4/5 MMT (P)   -LG     Row Name 02/14/23 1154          Balance    Balance Assessment sitting static balance;standing dynamic balance (P)   -LG     Static Sitting Balance independent (P)   -LG     Position, Sitting Balance unsupported;sitting edge of bed (P)   -LG     Dynamic Standing Balance minimal assist (P)   -LG     Position/Device Used, Standing Balance supported;walker, front-wheeled (P)   -LG           User Key  (r) = Recorded By, (t) = Taken By, (c) = Cosigned By    Initials Name Provider Type    Sheyla Morales, PT Student PT Student               Goals/Plan     Row Name 02/14/23 1202          Bed Mobility Goal 1 (PT)    Activity/Assistive Device (Bed Mobility Goal 1, PT) bed mobility activities, all (P)   -LG     [place] Level/Cues Needed (Bed Mobility Goal 1, PT) independent (P)   -LG     Time Frame (Bed Mobility Goal 1, PT) long term goal (LTG);2 weeks (P)   -LG     Row Name 02/14/23 1202          Transfer Goal 1 (PT)    Activity/Assistive  Device (Transfer Goal 1, PT) sit-to-stand/stand-to-sit;bed-to-chair/chair-to-bed;walker, rolling (P)   -LG     [place] Level/Cues Needed (Transfer Goal 1, PT) modified independence (P)   -LG     Time Frame (Transfer Goal 1, PT) long term goal (LTG);2 weeks (P)   -LG     Row Name 02/14/23 1202          Gait Training Goal 1 (PT)    Activity/Assistive Device (Gait Training Goal 1, PT) gait (walking locomotion);assistive device use;increase endurance/gait distance;walker, rolling (P)   -LG     [place] Level (Gait Training Goal 1, PT) modified independence (P)   -LG     Distance (Gait Training Goal 1, PT) 150' (P)   -LG     Time Frame (Gait Training Goal 1, PT) long term goal (LTG);2 weeks (P)   -LG     Row Name 02/14/23 1202          Patient Education Goal (PT)    Activity (Patient Education Goal, PT) BLE ther ex AROM 15 reps (P)   -LG     [place]/Cues/Accuracy (Memory Goal 2, PT) demonstrates adequately (P)   -LG     Time Frame (Patient Education Goal, PT) short term goal (STG);1 week (P)   -LG     Row Name 02/14/23 1202          Therapy Assessment/Plan (PT)    Planned Therapy Interventions (PT) transfer training;balance training;bed mobility training;gait training;home exercise program;patient/family education;neuromuscular re-education;ROM (range of motion);strengthening;stair training;stretching;postural re-education (P)   -LG           User Key  (r) = Recorded By, (t) = Taken By, (c) = Cosigned By    Initials Name Provider Type    LG Sheyla Yoon, PT Student PT Student               Clinical Impression     Row Name 02/14/23 0565          Pain    Pretreatment Pain Rating 5/10 (P)   -LG     Posttreatment Pain Rating 3/10 (P)   -LG     Pain Location - Side/Orientation Right (P)   -LG     Pain Location - hip (P)   -LG     Pain Intervention(s) Ambulation/increased activity;Repositioned (P)   -LG     Row Name 02/14/23 9442          Plan of Care Review    Plan of Care Reviewed With patient (P)   -LG      Progress no change (P)   -LG     Outcome Evaluation Pt was agreeable and participated in IE this date. Pt reports she lives w/ her  and was independent w/ ADL's, driving, and ambulation at baseline. Pt reports she did not use an AD for ambulation at baseline but has the following DME: BSC, cane, walker. Pt reported R hip pain of 5/10 at the start of the IE. Pt performed supine to sit w/ Mod A., VC's, bedrails, HOB elevated, draw sheets, and increased time to complete task. Pt performed sit to stand 2x w/ Min A, RW, and VC's for handplacement. While standing pt performed weight shifting to increase tolerance of weight on the RLE. Pt ambulated 3' from the bed to the recliner w/ Min A, RW, and VC's for sequencing w/ RW. Pt would benefit from skilled physical therapy during inpatient stay in order to increase LE strength, endurance, and functional mobility. Pt would benefit from STR at discharge. (P)   -LG     Row Name 02/14/23 1155          Therapy Assessment/Plan (PT)    Patient/Family Therapy Goals Statement (PT) To get stronger (P)   -LG     Rehab Potential (PT) good, to achieve stated therapy goals (P)   -LG     Criteria for Skilled Interventions Met (PT) yes;meets criteria;skilled treatment is necessary (P)   -LG     Therapy Frequency (PT) daily (P)   -LG     Row Name 02/14/23 1155          Vital Signs    O2 Delivery Pre Treatment room air (P)   -LG     O2 Delivery Intra Treatment room air (P)   -LG     O2 Delivery Post Treatment room air (P)   -LG     Pre Patient Position Supine (P)   -LG     Intra Patient Position Standing (P)   -LG     Post Patient Position Sitting (P)   -LG     Row Name 02/14/23 1155          Positioning and Restraints    Pre-Treatment Position in bed (P)   -LG     Post Treatment Position chair (P)   -LG     In Chair reclined;sitting;call light within reach;encouraged to call for assist (P)   -LG           User Key  (r) = Recorded By, (t) = Taken By, (c) = Cosigned By    Initials Name  Provider Type    LG Sheyla Yoon, PT Student PT Student               Outcome Measures     Row Name 02/14/23 1205          How much help from another person do you currently need...    Turning from your back to your side while in flat bed without using bedrails? 2 (P)   -LG     Moving from lying on back to sitting on the side of a flat bed without bedrails? 2 (P)   -LG     Moving to and from a bed to a chair (including a wheelchair)? 3 (P)   -LG     Standing up from a chair using your arms (e.g., wheelchair, bedside chair)? 3 (P)   -LG     Climbing 3-5 steps with a railing? 2 (P)   -LG     To walk in hospital room? 3 (P)   -LG     AM-PAC 6 Clicks Score (PT) 15 (P)   -LG     Highest level of mobility 4 --> Transferred to chair/commode (P)   -LG     Row Name 02/14/23 1205          Functional Assessment    Outcome Measure Options AM-PAC 6 Clicks Basic Mobility (PT) (P)   -LG           User Key  (r) = Recorded By, (t) = Taken By, (c) = Cosigned By    Initials Name Provider Type    LG Sheyla Yoon, PT Student PT Student                             Physical Therapy Education     Title: PT OT SLP Therapies (In Progress)     Topic: Physical Therapy (In Progress)     Point: Mobility training (Done)     Learning Progress Summary           Patient Acceptance, E, VU by  at 2/14/2023 1205    Comment: Importance of mobility                   Point: Home exercise program (Done)     Learning Progress Summary           Patient Acceptance, E, VU by  at 2/14/2023 1205    Comment: Importance of mobility                   Point: Body mechanics (Not Started)     Learner Progress:  Not documented in this visit.          Point: Precautions (Not Started)     Learner Progress:  Not documented in this visit.                      User Key     Initials Effective Dates Name Provider Type Discipline     12/29/22 -  Sheyla Yoon, PT Student PT Student PT              PT Recommendation and Plan  Planned Therapy Interventions (PT):  (P) transfer training, balance training, bed mobility training, gait training, home exercise program, patient/family education, neuromuscular re-education, ROM (range of motion), strengthening, stair training, stretching, postural re-education  Plan of Care Reviewed With: (P) patient  Progress: (P) no change  Outcome Evaluation: (P) Pt was agreeable and participated in IE this date. Pt reports she lives w/ her  and was independent w/ ADL's, driving, and ambulation at baseline. Pt reports she did not use an AD for ambulation at baseline but has the following DME: BSC, cane, walker. Pt reported R hip pain of 5/10 at the start of the IE. Pt performed supine to sit w/ Mod A., VC's, bedrails, HOB elevated, draw sheets, and increased time to complete task. Pt performed sit to stand 2x w/ Min A, RW, and VC's for handplacement. While standing pt performed weight shifting to increase tolerance of weight on the RLE. Pt ambulated 3' from the bed to the recliner w/ Min A, RW, and VC's for sequencing w/ RW. Pt would benefit from skilled physical therapy during inpatient stay in order to increase LE strength, endurance, and functional mobility. Pt would benefit from STR at discharge.     Time Calculation:    PT Charges     Row Name 02/14/23 1206             Time Calculation    Start Time 0945 (P)   -LG      PT Received On 02/14/23 (P)   -LG      PT Goal Re-Cert Due Date 02/24/23 (P)   -LG         Untimed Charges    PT Eval/Re-eval Minutes 45 (P)   -LG         Total Minutes    Untimed Charges Total Minutes 45 (P)   -LG       Total Minutes 45 (P)   -LG            User Key  (r) = Recorded By, (t) = Taken By, (c) = Cosigned By    Initials Name Provider Type    LG Sheyla Yoon, PT Student PT Student              Therapy Charges for Today     Code Description Service Date Service Provider Modifiers Qty    48182328749 HC PT EVAL LOW COMPLEXITY 3 2/14/2023 Sheyla Yoon, PT Student GP 1          PT G-Codes  Outcome Measure  Options: (P) AM-PAC 6 Clicks Basic Mobility (PT)  AM-PAC 6 Clicks Score (PT): (P) 15  PT Discharge Summary  Anticipated Discharge Disposition (PT): (P) skilled nursing facility, inpatient rehabilitation facility    Sheyla Yoon, PT Student  2/14/2023      Electronically signed by Sheyla Yoon, PT Student at 02/14/23 7986

## 2023-02-14 NOTE — CASE MANAGEMENT/SOCIAL WORK
Continued Stay Note  ELIDA Swenson     Patient Name: Yamileth De Guzman  MRN: [id number]  Today's Date: 2/14/2023    Admit Date: 2/12/2023    Plan: pt resting in chair; discussed possible STR; given list; stated would possibly go to one in [place]/lives in [place]; stated i could send to those and she would disuss with family   Discharge Plan     Row Name 02/14/23 1206       Plan    Plan pt resting in chair; discussed possible STR; given list; stated would possibly go to one in [place]/lives in [place]; stated i could send to those and she would disuss with family  12:10 EST  Faxed referrals to [facility] and [place] [facility]               Discharge Codes    No documentation.               Expected Discharge Date and Time     Expected Discharge Date Expected Discharge Time    Feb 16, 2023             Silvia Deshpande RN

## 2023-02-14 NOTE — PROGRESS NOTES
Patient: Yamileth De Guzman  Procedure(s):  HIP TROCHANTERIC NAILING WITH INTRAMEDULLARY HIP SCREW-SHORT NAIL  Anesthesia type: general with block    Patient location: [facility] Floor  Last vitals:   Vitals:    02/14/23 0700   BP: 124/45   Pulse: 78   Resp: 18   Temp: 98.7 °F (37.1 °C)   SpO2:      Level of consciousness: awake, alert and oriented    Post-anesthesia pain: adequate analgesia  Airway patency: patent  Respiratory: unassisted  Cardiovascular: stable and blood pressure at baseline  Hydration: euvolemic    Anesthetic complications: no

## 2023-02-14 NOTE — PROGRESS NOTES
[facility]    Name:  Yamileth De Guzman   Age:  77 y.o.  Sex:  female  :  1945  MRN:  [id number]   Visit Number:  [id number]  Admission Date:  2023  Date Of Service:  23  Primary Care Physician:  Da Dong MD     LOS: 2 days :    Chief Complaint:      Follow-up hip fracture    Subjective:    Patient feels better this afternoon.  Awake and alert.  She notes she likely does need to go to short-term rehab.  Pain has been fairly well controlled.  Appetite is somewhat low.    Hospital Course:    77-year-old female with medical history of GERD, anxiety, hypothyroidism, RA, constipation, who presented from home due to complaints of hip pain.  She apparently had a trip and fall on the doorstep at her home.  Significant pain of the right hip area thereafter.  ER work-up was concerning for right intertrochanteric displaced femur fracture.  Orthopedics was consulted.  Dr. Flores performed an open reduction internal fixation of the right proximal femur on 2023.    Review of Systems:     All systems were reviewed and negative except as mentioned in subjective, assessment and plan.    Vital Signs:    Temp:  [97.4 °F (36.3 °C)-99.7 °F (37.6 °C)] 98.2 °F (36.8 °C)  Heart Rate:  [76-90] 81  Resp:  [8-20] 16  BP: (112-167)/() 112/47    Intake and output:    I/O last 3 completed shifts:  In: 1490 [P.O.:340; I.V.:1100; IV Piggyback:50]  Out: 1175 [Urine:1175]  I/O this shift:  In: 120 [P.O.:120]  Out: -     Physical Examination:    General Appearance:  Alert and cooperative.  NAD, anxious   Head:  Atraumatic and normocephalic.   Eyes: Conjunctivae and sclerae normal, no icterus. No pallor.   Throat: No oral lesions, no thrush, oral mucosa moist.   Neck: Supple, trachea midline, no thyromegaly.   Lungs:   Breath sounds heard bilaterally equally.  No wheezing or crackles. No Pleural rub or bronchial breathing.   Heart:  Normal S1 and S2, no murmur, no gallop,  no rub. No JVD.   Abdomen:   Normal bowel sounds, no masses, no organomegaly. Soft, nontender, nondistended, no rebound tenderness.   Extremities:  Mild right lower extremity edema.  Pulses felt distally.  Bandages intact and dry.  No significant drainage noted.   Skin: No bleeding or rash.   Neurologic: Alert and oriented x 3. No facial asymmetry. Moves all four limbs. No tremors.      Laboratory results:    Results from last 7 days   Lab Units 02/14/23  0521 02/13/23  0645 02/12/23  1425   SODIUM mmol/L 145 140 138   POTASSIUM mmol/L 3.9 3.9 3.5   CHLORIDE mmol/L 107 105 103   CO2 mmol/L 27.8 26.0 23.5   BUN mg/dL 20 19 16   CREATININE mg/dL 0.70 0.68 0.79   CALCIUM mg/dL 8.5* 9.0 9.4   BILIRUBIN mg/dL  --  0.5 0.3   ALK PHOS U/L  --  59 65   ALT (SGPT) U/L  --  14 16   AST (SGOT) U/L  --  18 20   GLUCOSE mg/dL 145* 122* 104*     Results from last 7 days   Lab Units 02/14/23  0521 02/13/23  0645 02/12/23  1425   WBC 10*3/mm3 13.55* 13.33* 15.56*   HEMOGLOBIN g/dL 11.8* 13.3 13.3   HEMATOCRIT % 36.1 40.1 38.2   PLATELETS 10*3/mm3 151 193 203         Results from last 7 days   Lab Units 02/12/23  1706 02/12/23  1425   HSTROP T ng/L 10* 10*         No results for input(s): PHART, DNH4EKS, PO2ART, BBC3TNN, BASEEXCESS in the last 8760 hours.   I have reviewed the patient's laboratory results.    Radiology results:    XR Chest 1 View    Result Date: 2/12/2023  PROCEDURE: XR CHEST 1 VW-  HISTORY: medical clearance  COMPARISON: None.  FINDINGS: The heart is normal in size. The lungs are clear. i. There is no pneumothorax.  There are no acute osseous abnormalities. Calcified granuloma noted on the left.  Bilateral nipple shadows noted.      Impression: No acute cardiopulmonary process.      This report was signed and finalized on 2/12/2023 2:48 PM by Maria Fernanda Mendez MD.    Pericapsular Nerve Group (PENG) Block - Right    Result Date: 2/13/2023  Bhavik Guallpa CRNA     2/13/2023  7:30 PM Pericapsular Nerve Group (PENG)  Block - Right Patient reassessed immediately prior to procedure Patient location during procedure: OR Start time: 2/13/2023 7:24 PM Stop time: 2/13/2023 7:26 PM Reason for block: at surgeon's request and post-op pain management Performed by CRNA/CAA: Bhavik Guallpa CRNA Preanesthetic Checklist Completed: patient identified, IV checked, site marked, risks and benefits discussed, surgical consent, monitors and equipment checked, pre-op evaluation and timeout performed Prep: Pt Position: supine Sterile barriers:gloves, mask and cap Prep: ChloraPrep Patient monitoring: blood pressure monitoring, continuous pulse oximetry and EKG Procedure Performed under: general Guidance:ultrasound guided Laterality:right Block Type:PENG Injection Technique:single-shot Needle Type:echogenic and Tuohy Post Assessment Injection Assessment: no paresthesia on injection, negative aspiration for heme and incremental injection Patient Tolerance:comfortable throughout block Complications:no     Fascia Iliaca Compartment Block - Right    Result Date: 2/13/2023  Bhavik Guallpa CRNA     2/13/2023  7:29 PM Fascia Iliaca Compartment Block - Right Patient reassessed immediately prior to procedure Patient location during procedure: OR Start time: 2/13/2023 7:20 PM Stop time: 2/13/2023 7:23 PM Reason for block: at surgeon's request and post-op pain management Performed by CRNA/CAA: Bhavik Guallpa CRNA Preanesthetic Checklist Completed: patient identified, IV checked, site marked, risks and benefits discussed, surgical consent, monitors and equipment checked, pre-op evaluation and timeout performed Prep: Pt Position: supine Sterile barriers:cap, gloves and mask Prep: ChloraPrep Patient monitoring: EKG, continuous pulse oximetry and blood pressure monitoring Procedure Sedation: yes Performed under: general Guidance:ultrasound guided Images:still images obtained Laterality:right Block Type:fascia iliaca compartment Injection  Technique:single-shot Needle Type:echogenic and Tuohy Resistance on Injection: none Post Assessment Injection Assessment: negative aspiration for heme, no paresthesia on injection and incremental injection Patient Tolerance:comfortable throughout block Complications:no Additional Notes Procedure:          FASCIA ILIACA BLOCK                             Patient analgesia was achieved with General Anesthesia   Pt was placed in the supine position.   The insertion site was prepped in sterile fashion with Chloraprep.  A BBraun echogenic needle was advance In-plane under ultrasound guidance. The Anterior superior Iliac crest was initially visualized and the probe was directed slightly medially and slightly towards the umbilicus.  The course of the needle was tracked over the sartorius muscle through the fascia Iliacus and into the anterior portion of the Iliacus muscle.  Major vessels where identified and avoided as were structures of the peritoneal cavity.  LA injection was made incrementally in 1-5 ml amounts and spread was visualized superiorly below the fascia iliacus.  Injection was completed with negative aspiration of blood and negative intravascular injection.  Injection pressures were normal or minimal resistance.     FL C Arm During Surgery    Result Date: 2/13/2023  This procedure was auto-finalized with no dictation required.    I have reviewed the patient's radiology reports.    Medication Review:     I have reviewed the patient's active and prn medications.     Problem List:      Closed right hip fracture, initial encounter (HCC)    Rheumatoid arthritis (HCC)    Gastroesophageal reflux disease without esophagitis    Osteoporosis with fracture    Nicotine dependence, cigarettes, uncomplicated     Chronic idiopathic constipation      Assessment:    1. Closed right hip fracture status post mechanical fall  2. Rheumatoid arthritis  3. Covid  4. Tobacco abuse  5. Hypothyroidism  6. Anxiety    Plan:    Closed  right hip fracture  She underwent operative intervention on 2/13/2023 with Dr. Spencer.  PT and OT thereafter.  I suspect will need short-term rehab     Chronic constipation  - Monitor for symptoms while in the hospital.  Bowel regimen as needed.     Hypothyroidism  - Continue Synthroid home regimen 100 mcg daily.     GERD  - Normally on omeprazole 20 mg daily at home.  Continue 40 mg pantoprazole in hospital.     Anxiety  Continue Xanax     Tobacco abuse  - 1 pack/day history.  Start nicotine patch 21 mg  -Counseled on cessation    PT and OT today.  Recommending likely short-term rehab.  Patient talking with case management    DVT Prophylaxis: Lovenox  Code Status: Full as tolerated  Diet: As tolerated  Discharge Plan: GERALD John DO  02/14/23  14:01 EST    Dictated utilizing Dragon dictation.

## 2023-02-14 NOTE — OP NOTE
[place] [address] [facility],  [address]  [place]  [address]  [phone]      OPERATIVE REPORT      PATIENT NAME:  Yamileth De Guzman                            YOB: 1945        PREOP DIAGNOSIS:   Right hip comminuted displaced and unstable intertrochanteric fracture.    POSTOP DIAGNOSIS:   Same.    PROCEDURE:   Open reduction and internal fixation of Right proximal femur fracture.    SURGEON:     Rod Flores MD    OPERATIVE TEAM:   Circulator: Diamond Levi RN; Lizeth Bryan RN  Scrub Person: Lynette Quinn; Juanito Yepez    ANESTHETIST:  CRNA: Bhavik Guallpa CRNA    ANESTHESIA:   General    FLUIDS:      800 ml crystalloid    ESTIM BLOOD LOSS:   100 ml      FINDINGS:     Comminuted displaced unstable proximal femoral fracture.    IMPLANTS:     Hague gamma     DRAINS:     Lieberman to gravity.    COMPLICATIONS    None    DISPOSITION:    Stable to recovery.    INDICATIONS:   Painful, displaced unstable proximal femur fracture on     clinical exam and imaging.    NARRATIVE:    Risks, benefits and alternatives discussed and informed consent for surgical procedure obtained.  Risks discussed including but not limited to anesthesia, infection, nerve/vessel/tendon injury, fracture, prosthetic or implant loosening, wear or dislocation, morbidities from any chronic medical conditions, DVT, PE and death.  Goals include pain relief, earlier mobilization and rehabilitation to improve ambulatory and functional level.  Patient presents for planned fracture stabilization surgery.     Antibiotic prophylaxis was given.  Surgeon site marking was performed.  A time out was performed prior to the procedure.  Anesthesia was effective and well tolerated.  The patient was placed onto the fracture table in the supine position with care taken to pad all areas of the body and observe skin precautions.  Also, care was taken to provide multimodal DVT/PE prophylaxis.  The away leg was kept in a  comfortable position in the padded carey and to provide room for the C-arm image.  The involved leg was placed in a padded fracture boot after performing a gentle closed reduction and placed in mild traction.  At this point, C-arm AP and lateral views of the hip showed a good reduction of the comminuted fracture.  The hip and leg were prepped and draped in the usual sterile fashion.    After sterile prep and drape, a small incision was made starting at the tip of the greater trochanter and extending proximally.  A small opening was made in the tensor fascia dot and a cannulated hand awl was passed down the trochanteric aspect into the proximal femur and across the fracture into the femoral canal.  C-arm image showed good position in AP and lateral planes.  The guide wire was passed down the femoral canal via the cannulated awl and the awl was removed.  Over the guidewire, proximal reaming was performed appropriate for the nail size and in good position.    Over the wire, the actual trochanteric nail was passed across the fracture site and down the canal.  The nail provided a neck angle slot to place the planned lag screw based on both pre-operative and intraoperative radiographic measurements.  The nail entry handle was used to place the lag screw into the femoral head with usual trochar placement, threaded pin passage, C-arm image guidance, depth gauge measurement, reaming over the pin and lag screw placement.  Good position noted on C-arm AP and lateral view.  The compression set screw was then placed into the top of the nail with good purchase.  Lastly, with the distal interlock slot of the entry handle, the distal interlock screw was placed with standard trochar placement, drilling, depth gauge measure, and screw placement.  The entry handle assembly was removed as the final nail construct was complete.    Final C-arm views were saved showing good fracture reduction considering the degree of comminution and  good hardware position.  The small wounds were irrigated copiously.  Routine closure was performed and a sterile dressing applied.  The patient was gently removed from the fracture table and supine on the hospital bed.  Anesthesia was effective and well tolerated.  There were no complications of the procedure.  The patient was transferred stable to recovery.

## 2023-02-14 NOTE — THERAPY EVALUATION
Patient Name: Yamileth De Guzman  : 1945    MRN: [id number]                              Today's Date: 2023       Admit Date: 2023    Visit Dx:     ICD-10-CM ICD-9-CM   1. Closed fracture of left hip, initial encounter ([facility])  S72.002A 820.8     Patient Active Problem List   Diagnosis   • Rheumatoid arthritis ([facility])   • Gastroesophageal reflux disease without esophagitis   • Fibromyalgia   • Generalized anxiety disorder   • Hyperlipidemia   • Hypothyroidism   • Osteoporosis with fracture   • Vitamin D deficiency, unspecified   • Senile osteoporosis   • Carotid bruit   • Hematuria   • Lung mass   • Adenomatous polyps   • Nicotine dependence, cigarettes, uncomplicated    • Allergies   • Chronic idiopathic constipation   • Nonintractable headache   • Tingling of both feet   • ADAL positive   • Closed right hip fracture, initial encounter ([facility])     Past Medical History:   Diagnosis Date   • Abdominal bloating    • Acid reflux    • Adenomatous polyp of colon 2020   • Arthritis    • Carotid bruit    • Colon polyp    • Constipation    • Disease of thyroid gland    • Fibromyalgia    • Full dentures    • Hematuria    • Impaired mobility    • Lung nodule     benign   • Osteoporosis    • Seasonal allergies    • Sinusitis    • Wears glasses     READING GLASSES ONLY     Past Surgical History:   Procedure Laterality Date   • APPENDECTOMY     • COLONOSCOPY     • COLONOSCOPY N/A 2020    Procedure: COLONOSCOPY WITH COLD SNARE POLYPECTOMY, COLD FORCEP POLYPECTOMY, SUBMUCOSAL INJECTION OF NORMAL SALINE, ENDOSCOPIC MUCOSAL RESECTION, HOT SNARE POLYPECTOMY, THERMAL ABLATION, QUICK CLIP PLACEMENT TIMES 4 AND BARBIE INK INJECTION;  Surgeon: Scar Rogers MD;  Location: [facility] ENDOSCOPY;  Service: Gastroenterology;  Laterality: N/A;   • COLONOSCOPY N/A 2021    Procedure: COLONOSCOPY WITH BIOPSY AND POLYPECTOMY;  Surgeon: Scar Rogers MD;  Location: [facility] ENDOSCOPY;  Service:  Gastroenterology;  Laterality: N/A;   • COLONOSCOPY N/A 3/18/2022    Procedure: COLONOSCOPY with polypectomy x2;  Surgeon: Scar Rogers MD;  Location: [facility] ENDOSCOPY;  Service: Gastroenterology;  Laterality: N/A;   • HIP TROCHANTERIC NAILING WITH INTRAMEDULLARY HIP SCREW Right 2/13/2023    Procedure: HIP TROCHANTERIC NAILING WITH INTRAMEDULLARY HIP SCREW-SHORT NAIL;  Surgeon: Miguel Angel Flores MD;  Location: [facility] OR;  Service: Orthopedics;  Laterality: Right;   • HYSTERECTOMY      PARTIAL STILL HAS OVARIES   • MULTIPLE TOOTH EXTRACTIONS     • TUBAL ABDOMINAL LIGATION     • UPPER GASTROINTESTINAL ENDOSCOPY  2012   • VAGINAL DELIVERY      X1      General Information     Row Name 02/14/23 1238          OT Time and Intention    Document Type evaluation  -     Mode of Treatment occupational therapy  -     Row Name 02/14/23 1238          General Information    Patient Profile Reviewed yes  -     Prior Level of Function independent:;ADL's;community mobility  -     Existing Precautions/Restrictions fall  -     Row Name 02/14/23 1238          Occupational Profile    Reason for Services/Referral (Occupational Profile) ADL decline  -[facility] Name 02/14/23 1238          Living Environment    People in Home spouse  -     Row Name 02/14/23 1238          Home Main Entrance    Number of Stairs, Main Entrance none  -     Row Name 02/14/23 1238          Stairs Within Home, Primary    Stairs, Within Home, Primary Pt states she has a basement, but is able to stay on main level  -     Number of Stairs, Within Home, Primary other (see comments)  full flight  -     Row Name 02/14/23 1238          Cognition    Orientation Status (Cognition) oriented x 4  -     Row Name 02/14/23 1238          Safety Issues, Functional Mobility    Safety Issues Affecting Function (Mobility) insight into deficits/self-awareness;awareness of need for assistance;sequencing abilities  -     Impairments Affecting Function  (Mobility) balance;endurance/activity tolerance;strength;pain;range of motion (ROM)  -           User Key  (r) = Recorded By, (t) = Taken By, (c) = Cosigned By    Initials Name Provider Type    Sandra Frazier Occupational Therapist                 Mobility/ADL's     Row Name 02/14/23 1240          Bed Mobility    Bed Mobility supine-sit  -     Supine-Sit [place] (Bed Mobility) moderate assist (50% patient effort);verbal cues  -     Assistive Device (Bed Mobility) draw sheet;bed rails  -AH     Row Name 02/14/23 1240          Bed-Chair Transfer    Bed-Chair [place] (Transfers) minimum assist (75% patient effort);verbal cues  -     Assistive Device (Bed-Chair Transfers) walker, front-wheeled  -AH     Row Name 02/14/23 124          Sit-Stand Transfer    Sit-Stand [place] (Transfers) minimum assist (75% patient effort);verbal cues  -     Assistive Device (Sit-Stand Transfers) walker, front-wheeled  -AH     Row Name 02/14/23 1240          Functional Mobility    Functional Mobility- Ind. Level minimum assist (75% patient effort)  -     Functional Mobility- Device walker, front-wheeled  -     Functional Mobility-Distance (Feet) 3  -AH     Row Name 02/14/23 1240          Activities of Daily Living    BADL Assessment/Intervention bathing;upper body dressing;lower body dressing;grooming;feeding;toileting  -AH     Row Name 02/14/23 1240          Mobility    Extremity Weight-bearing Status right lower extremity  -     Right Lower Extremity (Weight-bearing Status) weight-bearing as tolerated (WBAT)  -AH     Row Name 02/14/23 1240          Bathing Assessment/Intervention    [place] Level (Bathing) moderate assist (50% patient effort)  -AH     Row Name 02/14/23 124          Upper Body Dressing Assessment/Training    [place] Level (Upper Body Dressing) independent  -AH     Row Name 02/14/23 1240          Lower Body Dressing Assessment/Training    [place] Level (Lower Body Dressing)  moderate assist (50% patient effort)  -AH     Row Name 02/14/23 1240          Grooming Assessment/Training    [place] Level (Grooming) set up  -AH     Row Name 02/14/23 1240          Self-Feeding Assessment/Training    [place] Level (Feeding) independent  -AH     Row Name 02/14/23 1240          Toileting Assessment/Training    [place] Level (Toileting) moderate assist (50% patient effort)  -           User Key  (r) = Recorded By, (t) = Taken By, (c) = Cosigned By    Initials Name Provider Type     Sandra Antunez Occupational Therapist               Obj/Interventions     Row Name 02/14/23 1245          Sensory Assessment (Somatosensory)    Sensory Assessment (Somatosensory) sensation intact  -AH     Row Name 02/14/23 1245          Vision Assessment/Intervention    Visual Impairment/Limitations WFL  -AH     Row Name 02/14/23 1245          Range of Motion Comprehensive    General Range of Motion bilateral upper extremity ROM WFL  -AH     Row Name 02/14/23 1245          Strength Comprehensive (MMT)    Comment, General Manual Muscle Testing (MMT) Assessment [facility]           User Key  (r) = Recorded By, (t) = Taken By, (c) = Cosigned By    Initials Name Provider Type     Sandra Antunez Occupational Therapist               Goals/Plan     Row Name 02/14/23 1250          Bed Mobility Goal 1 (OT)    Activity/Assistive Device (Bed Mobility Goal 1, OT) bed mobility activities, all  -     [place] Level/Cues Needed (Bed Mobility Goal 1, OT) minimum assist (75% or more patient effort)  -     Time Frame (Bed Mobility Goal 1, OT) by discharge  -     Progress/Outcomes (Bed Mobility Goal 1, OT) goal ongoing  -AH     Row Name 02/14/23 1250          Transfer Goal 1 (OT)    Activity/Assistive Device (Transfer Goal 1, OT) sit-to-stand/stand-to-sit;walker, rolling  -     [place] Level/Cues Needed (Transfer Goal 1, OT) contact guard required  -     Time Frame (Transfer Goal 1, OT) long term  goal (LTG);5 days  -     Progress/Outcome (Transfer Goal 1, OT) goal ongoing  -[facility] Name 02/14/23 1250          Dressing Goal 1 (OT)    Activity/Device (Dressing Goal 1, OT) lower body dressing;reacher;sock-aid  -     [place]/Cues Needed (Dressing Goal 1, OT) minimum assist (75% or more patient effort)  -     Time Frame (Dressing Goal 1, OT) by discharge  -     Progress/Outcome (Dressing Goal 1, OT) goal ongoing  -[facility] Name 02/14/23 1250          Toileting Goal 1 (OT)    Activity/Device (Toileting Goal 1, OT) adjust/manage clothing;perform perineal hygiene  -     [place] Level/Cues Needed (Toileting Goal 1, OT) minimum assist (75% or more patient effort)  -     Time Frame (Toileting Goal 1, OT) long term goal (LTG);5 days  -     Progress/Outcome (Toileting Goal 1, OT) goal ongoing  -AH     Row Name 02/14/23 1250          Strength Goal 1 (OT)    Strength Goal 1 (OT) Pt will perform UB strengthening ex using theraband for resistance.  -     Time Frame (Strength Goal 1, OT) by discharge  -     Progress/Outcome (Strength Goal 1, OT) goal ongoing  -AH     Row Name 02/14/23 1250          Therapy Assessment/Plan (OT)    Planned Therapy Interventions (OT) activity tolerance training;BADL retraining;patient/caregiver education/training;transfer/mobility retraining;strengthening exercise  -           User Key  (r) = Recorded By, (t) = Taken By, (c) = Cosigned By    Initials Name Provider Type     Sandra Antunez Occupational Therapist               Clinical Impression     [facility] Name 02/14/23 1245          Pain Assessment    Pretreatment Pain Rating 5/10  -     Posttreatment Pain Rating 3/10  -     Pain Location - Side/Orientation Right  -     Pain Location - hip  -     Pain Intervention(s) Repositioned;Ambulation/increased activity  -AH     Row Name 02/14/23 1245          Plan of Care Review    Plan of Care Reviewed With patient  -     Progress no change  -     Outcome  Evaluation Pt seen for OT evaluation today.  Pt presents with pain, weakness and decreased independence with self care and functional mobility tasks.  Pt mod assist to sit eob, min assist to stand and min assist to walk 3' using RW.  Pt mod assist for LB dressing and bathing.  Pt is expected to benefit from skilled OT to improve her strength and independence with ADL tasks.  Pt would benefit from inpatient rehab upon d/c.  -     Row Name 02/14/23 1241          Therapy Assessment/Plan (OT)    Patient/Family Therapy Goal Statement (OT) d/c home with home health  -     Rehab Potential (OT) good, to achieve stated therapy goals  -     Criteria for Skilled Therapeutic Interventions Met (OT) yes;skilled treatment is necessary  -     Therapy Frequency (OT) 3 times/wk  -     Row Name 02/14/23 124          Therapy Plan Review/Discharge Plan (OT)    Equipment Needs Upon Discharge (OT) walker, rolling;raised toilet seat  -     Anticipated Discharge Disposition (OT) inpatient rehabilitation facility  -     Row Name 02/14/23 5627          Positioning and Restraints    Pre-Treatment Position in bed  -     Post Treatment Position chair  -     In Chair reclined;call light within reach;encouraged to call for assist;exit alarm on  -           User Key  (r) = Recorded By, (t) = Taken By, (c) = Cosigned By    Initials Name Provider Type    Sandra Frazier Occupational Therapist               Outcome Measures     Row Name 02/14/23 1250          How much help from another is currently needed...    Putting on and taking off regular lower body clothing? 2  -AH     Bathing (including washing, rinsing, and drying) 2  -AH     Toileting (which includes using toilet bed pan or urinal) 2  -AH     Putting on and taking off regular upper body clothing 4  -AH     Taking care of personal grooming (such as brushing teeth) 3  -AH     Eating meals 4  -AH     AM-PAC 6 Clicks Score (OT) 17  -     Row Name 02/14/23 120           How much help from another person do you currently need...    Turning from your back to your side while in flat bed without using bedrails? 2  -LM (r) LG (t) LM (c)     Moving from lying on back to sitting on the side of a flat bed without bedrails? 2  -LM (r) LG (t) LM (c)     Moving to and from a bed to a chair (including a wheelchair)? 3  -LM (r) LG (t) LM (c)     Standing up from a chair using your arms (e.g., wheelchair, bedside chair)? 3  -LM (r) LG (t) LM (c)     Climbing 3-5 steps with a railing? 2  -LM (r) LG (t) LM (c)     To walk in hospital room? 3  -LM (r) LG (t) LM (c)     AM-PAC 6 Clicks Score (PT) 15  -LM (r) LG (t)     Highest level of mobility 4 --> Transferred to chair/commode  -LM (r) LG (t)     Row Name 02/14/23 1251 02/14/23 1205       Functional Assessment    Outcome Measure Options AM-PAC 6 Clicks Daily Activity (OT)  - AM-PAC 6 Clicks Basic Mobility (PT)  -LM (r) LG (t) LM (c)          User Key  (r) = Recorded By, (t) = Taken By, (c) = Cosigned By    Initials Name Provider Type     Sandra Antunez Occupational Therapist    Jaquelin Chamberlain, PT Physical Therapist    Sheyla Morales, PT Student PT Student                Occupational Therapy Education     Title: PT OT SLP Therapies (In Progress)     Topic: Occupational Therapy (In Progress)     Point: ADL training (Done)     Description:   Instruct learner(s) on proper safety adaptation and remediation techniques during self care or transfers.   Instruct in proper use of assistive devices.              Learning Progress Summary           Patient Acceptance, E,TB, VU by  at 2/14/2023 1252    Comment: Role of OT/POC                   Point: Home exercise program (Not Started)     Description:   Instruct learner(s) on appropriate technique for monitoring, assisting and/or progressing therapeutic exercises/activities.              Learner Progress:  Not documented in this visit.          Point: Precautions (Not Started)     Description:    Instruct learner(s) on prescribed precautions during self-care and functional transfers.              Learner Progress:  Not documented in this visit.          Point: Body mechanics (Not Started)     Description:   Instruct learner(s) on proper positioning and spine alignment during self-care, functional mobility activities and/or exercises.              Learner Progress:  Not documented in this visit.                      User Key     Initials Effective Dates Name Provider Type [facility] 06/16/21 -  Sandra Antunez Occupational Therapist OT              OT Recommendation and Plan  Planned Therapy Interventions (OT): activity tolerance training, BADL retraining, patient/caregiver education/training, transfer/mobility retraining, strengthening exercise  Therapy Frequency (OT): 3 times/wk  Plan of Care Review  Plan of Care Reviewed With: patient  Progress: no change  Outcome Evaluation: Pt seen for OT evaluation today.  Pt presents with pain, weakness and decreased independence with self care and functional mobility tasks.  Pt mod assist to sit eob, min assist to stand and min assist to walk 3' using RW.  Pt mod assist for LB dressing and bathing.  Pt is expected to benefit from skilled OT to improve her strength and independence with ADL tasks.  Pt would benefit from inpatient rehab upon d/c.     Time Calculation:    Time Calculation- OT     Row Name 02/14/23 1252             Time Calculation- OT    OT Start Time 0945  -      OT Received On 02/14/23  -      OT Goal Re-Cert Due Date 02/24/23  -         Untimed Charges    OT Eval/Re-eval Minutes 43  -AH         Total Minutes    Untimed Charges Total Minutes 43  -       Total Minutes 43  -            User Key  (r) = Recorded By, (t) = Taken By, (c) = Cosigned By    Initials Name Provider Type     Sandra Antunez Occupational Therapist              Therapy Charges for Today     Code Description Service Date Service Provider Modifiers Qty    [id number]  HC OT EVAL LOW COMPLEXITY 3 2/14/2023 Queen, Sandra SAUCEDA 1               Sandra  Harmony  2/14/2023

## 2023-02-14 NOTE — ANESTHESIA PROCEDURE NOTES
Airway  Urgency: elective    Date/Time: 2/13/2023 7:14 PM  Airway not difficult    General Information and Staff    Patient location during procedure: OR  CRNA/CAA: Bhavik Guallpa CRNA    Indications and Patient Condition  Indications for airway management: airway protection    Preoxygenated: yes  Mask difficulty assessment: 1 - vent by mask    Final Airway Details  Final airway type: supraglottic airway      Successful airway: unique  Size 4     Number of attempts at approach: 1  Assessment: lips, teeth, and gum same as pre-op and atraumatic intubation

## 2023-02-14 NOTE — PLAN OF CARE
Goal Outcome Evaluation:  Plan of Care Reviewed With: patient           Outcome Evaluation: Patient had no acute issues.

## 2023-02-14 NOTE — PROGRESS NOTES
Orthopedic  Progress Note    Assessment & Plan     Status post right hip ORIF.    Patient can be weightbearing as tolerated.  No hip precautions required.  Continue DVT prophylaxis.  Continue pain control measures.  Physical therapy progression as tolerated with fall precautions discussed.  Recommend rehab placement.  Follow-up in 2-3 weeks with Dr. Flores.     LOS: 2 days     Subjective     Pain is well controlled.  No new complaints.    Objective     Vital signs in last 24 hours:  Temp:  [97.4 °F (36.3 °C)-99.7 °F (37.6 °C)] 98.7 °F (37.1 °C)  Heart Rate:  [74-90] 78  Resp:  [8-20] 18  BP: (121-167)/() 124/45    General: Patient is resting comfortably.  Right lower extremity: Incisions are stable.  No signs of infection.  Minimal swelling.  No signs of DVT.  Negative Homans.  Neurovascular status intact right lower extremity.    Data Review  CBC:  Results from last 7 days   Lab Units 02/14/23  0521   WBC 10*3/mm3 13.55*   RBC 10*6/mm3 3.60*   HEMOGLOBIN g/dL 11.8*   HEMATOCRIT % 36.1   PLATELETS 10*3/mm3 151           Kenny Kenny PA-C    Date: 2/14/2023  Time: 08:34 EST

## 2023-02-14 NOTE — PLAN OF CARE
Goal Outcome Evaluation:  Plan of Care Reviewed With: patient        Progress: no change  Outcome Evaluation: Pt seen for OT evaluation today.  Pt presents with pain, weakness and decreased independence with self care and functional mobility tasks.  Pt mod assist to sit eob, min assist to stand and min assist to walk 3' using RW.  Pt mod assist for LB dressing and bathing.  Pt is expected to benefit from skilled OT to improve her strength and independence with ADL tasks.  Pt would benefit from inpatient rehab upon d/c.

## 2023-02-14 NOTE — ANESTHESIA PROCEDURE NOTES
Fascia Iliaca Compartment Block - Right      Patient reassessed immediately prior to procedure    Patient location during procedure: OR  Start time: 2/13/2023 7:20 PM  Stop time: 2/13/2023 7:23 PM  Reason for block: at surgeon's request and post-op pain management  Performed by  CRNA/CAA: Bhavik Guallpa CRNA  Preanesthetic Checklist  Completed: patient identified, IV checked, site marked, risks and benefits discussed, surgical consent, monitors and equipment checked, pre-op evaluation and timeout performed  Prep:  Pt Position: supine  Sterile barriers:cap, gloves and mask  Prep: ChloraPrep  Patient monitoring: EKG, continuous pulse oximetry and blood pressure monitoring  Procedure    Sedation: yes  Performed under: general  Guidance:ultrasound guided  Images:still images obtained    Laterality:right  Block Type:fascia iliaca compartment  Injection Technique:single-shot  Needle Type:echogenic and Tuohy  Resistance on Injection: none          Post Assessment  Injection Assessment: negative aspiration for heme, no paresthesia on injection and incremental injection  Patient Tolerance:comfortable throughout block  Complications:no  Additional Notes  Procedure:          FASCIA ILIACA BLOCK                                Patient analgesia was achieved with General Anesthesia      Pt was placed in the supine position.   The insertion site was prepped in sterile fashion with Chloraprep.  A BBraun echogenic needle was advance In-plane under ultrasound guidance. The Anterior superior Iliac crest was initially visualized and the probe was directed slightly medially and slightly towards the umbilicus.  The course of the needle was tracked over the sartorius muscle through the fascia Iliacus and into the anterior portion of the Iliacus muscle.  Major vessels where identified and avoided as were structures of the peritoneal cavity.  LA injection was made incrementally in 1-5 ml amounts and spread was visualized superiorly  below the fascia iliacus.  Injection was completed with negative aspiration of blood and negative intravascular injection.  Injection pressures were normal or minimal resistance.

## 2023-02-15 LAB
ANION GAP SERPL CALCULATED.3IONS-SCNC: 4 MMOL/L (ref 5–15)
BUN SERPL-MCNC: 13 MG/DL (ref 8–23)
BUN/CREAT SERPL: 22 (ref 7–25)
CALCIUM SPEC-SCNC: 8 MG/DL (ref 8.6–10.5)
CHLORIDE SERPL-SCNC: 103 MMOL/L (ref 98–107)
CO2 SERPL-SCNC: 30 MMOL/L (ref 22–29)
CREAT SERPL-MCNC: 0.59 MG/DL (ref 0.57–1)
EGFRCR SERPLBLD CKD-EPI 2021: 94.1 ML/MIN/1.73
GLUCOSE SERPL-MCNC: 98 MG/DL (ref 65–99)
HCT VFR BLD AUTO: 28.4 % (ref 34–46.6)
HGB BLD-MCNC: 9.4 G/DL (ref 12–15.9)
POTASSIUM SERPL-SCNC: 3.3 MMOL/L (ref 3.5–5.2)
SODIUM SERPL-SCNC: 137 MMOL/L (ref 136–145)

## 2023-02-15 PROCEDURE — 80048 BASIC METABOLIC PNL TOTAL CA: CPT | Performed by: FAMILY MEDICINE

## 2023-02-15 PROCEDURE — 85014 HEMATOCRIT: CPT | Performed by: FAMILY MEDICINE

## 2023-02-15 PROCEDURE — 97535 SELF CARE MNGMENT TRAINING: CPT

## 2023-02-15 PROCEDURE — 25010000002 ENOXAPARIN PER 10 MG: Performed by: ORTHOPAEDIC SURGERY

## 2023-02-15 PROCEDURE — 85018 HEMOGLOBIN: CPT | Performed by: FAMILY MEDICINE

## 2023-02-15 PROCEDURE — 99232 SBSQ HOSP IP/OBS MODERATE 35: CPT | Performed by: FAMILY MEDICINE

## 2023-02-15 PROCEDURE — 97530 THERAPEUTIC ACTIVITIES: CPT

## 2023-02-15 PROCEDURE — 97110 THERAPEUTIC EXERCISES: CPT

## 2023-02-15 RX ADMIN — OXYCODONE HYDROCHLORIDE AND ACETAMINOPHEN 250 MG: 500 TABLET ORAL at 09:16

## 2023-02-15 RX ADMIN — Medication 10 ML: at 09:20

## 2023-02-15 RX ADMIN — HYDROCODONE BITARTRATE AND ACETAMINOPHEN 1 TABLET: 7.5; 325 TABLET ORAL at 09:45

## 2023-02-15 RX ADMIN — ALPRAZOLAM 0.25 MG: 0.25 TABLET ORAL at 21:01

## 2023-02-15 RX ADMIN — CETIRIZINE HYDROCHLORIDE 10 MG: 10 TABLET, FILM COATED ORAL at 21:01

## 2023-02-15 RX ADMIN — ENOXAPARIN SODIUM 40 MG: 100 INJECTION SUBCUTANEOUS at 09:14

## 2023-02-15 RX ADMIN — Medication 1 PATCH: at 09:17

## 2023-02-15 RX ADMIN — PANTOPRAZOLE SODIUM 40 MG: 40 TABLET, DELAYED RELEASE ORAL at 06:31

## 2023-02-15 RX ADMIN — SENNOSIDES AND DOCUSATE SODIUM 2 TABLET: 50; 8.6 TABLET ORAL at 09:15

## 2023-02-15 RX ADMIN — BACLOFEN 10 MG: 10 TABLET ORAL at 21:01

## 2023-02-15 RX ADMIN — Medication 10 ML: at 21:00

## 2023-02-15 RX ADMIN — HYDROCODONE BITARTRATE AND ACETAMINOPHEN 1 TABLET: 7.5; 325 TABLET ORAL at 21:00

## 2023-02-15 RX ADMIN — SENNOSIDES AND DOCUSATE SODIUM 2 TABLET: 50; 8.6 TABLET ORAL at 21:01

## 2023-02-15 RX ADMIN — LEVOTHYROXINE SODIUM 100 MCG: 100 TABLET ORAL at 06:31

## 2023-02-15 RX ADMIN — BACLOFEN 10 MG: 10 TABLET ORAL at 09:16

## 2023-02-15 NOTE — PLAN OF CARE
Goal Outcome Evaluation:  Plan of Care Reviewed With: (P) patient        Progress: (P) improving  Outcome Evaluation: (P) Pt was agreeable to therapy session this date. Pt reported no pain in the R hip at the start of the session. Pt performed the following supine bed exercises w/ rest breaks: Hip flexion, abduction and adduction, quad set, glut set, heel slide, ankle pumps. Pt performed supine to sit w/ Min A for managing RLE off of bed slowly. Pt performed sit to stand w/ Min A, RW, and VC's for hand placement. Pt ambulated 80' w/ RW, CGA, standing rest breaks, and VC's for sequencing. Pt had an episode of nausea during ambulation. Pt's nausea resolved w/ rest. PT plan to continue POC.

## 2023-02-15 NOTE — PLAN OF CARE
Goal Outcome Evaluation:  Plan of Care Reviewed With: patient        Progress: improving  Outcome Evaluation: Pt able to get oob with min assist, stood with min assist and walked ~80' with cga/min assist.  Toward the end of her walk, pt began feeling hot and nauseated, but was able to walk to the chair where she was reclined and given a cold washcloth.  Pt was able perform grooming tasks and ther ex without difficulty.  Cont OT per POC.

## 2023-02-15 NOTE — PLAN OF CARE
Goal Outcome Evaluation:  Plan of Care Reviewed With: patient           Outcome Evaluation: Patient had no acute events today. Patient alert oriented with no issues.

## 2023-02-15 NOTE — ANESTHESIA POSTPROCEDURE EVALUATION
Patient: Yamileth De Guzman    Procedure Summary     Date: 02/13/23 Room / Location: [facility] OR  /  [facility] OR    Anesthesia Start: 1908 Anesthesia Stop: 1958    Procedure: HIP TROCHANTERIC NAILING WITH INTRAMEDULLARY HIP SCREW-SHORT NAIL (Right: Hip) Diagnosis:     Surgeons: Miguel Angel Flores MD Provider: Bhavik Guallpa CRNA    Anesthesia Type: general with block ASA Status: 3          Anesthesia Type: general with block    Vitals  Vitals Value Taken Time   /69 02/13/23 2050   Temp 99.7 °F (37.6 °C) 02/13/23 2050   Pulse 83 02/13/23 2056   Resp 14 02/13/23 2050   SpO2 93 % 02/13/23 2056   Vitals shown include unvalidated device data.        Post Anesthesia Care and Evaluation    Patient location during evaluation: PACU  Patient participation: complete - patient participated  Level of consciousness: awake and alert  Pain score: 1  Pain management: adequate    Airway patency: patent  Anesthetic complications: No anesthetic complications  PONV Status: none  Cardiovascular status: acceptable  Respiratory status: acceptable  Hydration status: acceptable  No anesthesia care post op

## 2023-02-15 NOTE — PROGRESS NOTES
Patient is status post right hip nailing doing well    Pain controlled    Exam shows intact EHL FHL gastroc tib ant    Benign-appearing wound    Plan is for physical therapy  Weightbearing as tolerated  Discharge planning  Follow-up in 2 weeks

## 2023-02-15 NOTE — THERAPY TREATMENT NOTE
Patient Name: Yamileth De Guzman  : 1945    MRN: [id number]                              Today's Date: 2/15/2023       Admit Date: 2023    Visit Dx:     ICD-10-CM ICD-9-CM   1. Closed fracture of left hip, initial encounter (Formerly [facility])  S72.002A 820.8     Patient Active Problem List   Diagnosis   • Rheumatoid arthritis ([facility])   • Gastroesophageal reflux disease without esophagitis   • Fibromyalgia   • Generalized anxiety disorder   • Hyperlipidemia   • Hypothyroidism   • Osteoporosis with fracture   • Vitamin D deficiency, unspecified   • Senile osteoporosis   • Carotid bruit   • Hematuria   • Lung mass   • Adenomatous polyps   • Nicotine dependence, cigarettes, uncomplicated    • Allergies   • Chronic idiopathic constipation   • Nonintractable headache   • Tingling of both feet   • ADAL positive   • Closed right hip fracture, initial encounter ([facility])     Past Medical History:   Diagnosis Date   • Abdominal bloating    • Acid reflux    • Adenomatous polyp of colon 2020   • Arthritis    • Carotid bruit    • Colon polyp    • Constipation    • Disease of thyroid gland    • Fibromyalgia    • Full dentures    • Hematuria    • Impaired mobility    • Lung nodule     benign   • Osteoporosis    • Seasonal allergies    • Sinusitis    • Wears glasses     READING GLASSES ONLY     Past Surgical History:   Procedure Laterality Date   • APPENDECTOMY     • COLONOSCOPY     • COLONOSCOPY N/A 2020    Procedure: COLONOSCOPY WITH COLD SNARE POLYPECTOMY, COLD FORCEP POLYPECTOMY, SUBMUCOSAL INJECTION OF NORMAL SALINE, ENDOSCOPIC MUCOSAL RESECTION, HOT SNARE POLYPECTOMY, THERMAL ABLATION, QUICK CLIP PLACEMENT TIMES 4 AND BARBIE INK INJECTION;  Surgeon: Scar Rogers MD;  Location: [facility] ENDOSCOPY;  Service: Gastroenterology;  Laterality: N/A;   • COLONOSCOPY N/A 2021    Procedure: COLONOSCOPY WITH BIOPSY AND POLYPECTOMY;  Surgeon: Scar Rogers MD;  Location: [facility] ENDOSCOPY;  Service:  Gastroenterology;  Laterality: N/A;   • COLONOSCOPY N/A 3/18/2022    Procedure: COLONOSCOPY with polypectomy x2;  Surgeon: Scar Rogers MD;  Location: [facility] ENDOSCOPY;  Service: Gastroenterology;  Laterality: N/A;   • HIP TROCHANTERIC NAILING WITH INTRAMEDULLARY HIP SCREW Right 2/13/2023    Procedure: HIP TROCHANTERIC NAILING WITH INTRAMEDULLARY HIP SCREW-SHORT NAIL;  Surgeon: Miguel Angel Flores MD;  Location: [facility] OR;  Service: Orthopedics;  Laterality: Right;   • HYSTERECTOMY      PARTIAL STILL HAS OVARIES   • MULTIPLE TOOTH EXTRACTIONS     • TUBAL ABDOMINAL LIGATION     • UPPER GASTROINTESTINAL ENDOSCOPY  2012   • VAGINAL DELIVERY      X1      General Information     Row Name 02/15/23 1222          Physical Therapy Time and Intention    Document Type therapy note (daily note) (P)   -LG     Mode of Treatment physical therapy (P)   -LG     Row Name 02/15/23 1222          General Information    Patient Profile Reviewed yes (P)   -LG     Existing Precautions/Restrictions fall (P)   -     Row Name 02/15/23 1222          Safety Issues, Functional Mobility    Impairments Affecting Function (Mobility) balance;endurance/activity tolerance;strength;pain;range of motion (ROM) (P)   -LG           User Key  (r) = Recorded By, (t) = Taken By, (c) = Cosigned By    Initials Name Provider Type    LG Sheyla Yoon, PT Student PT Student               Mobility     Row Name 02/15/23 1222          Bed Mobility    Bed Mobility supine-sit (P)   -LG     Supine-Sit [place] (Bed Mobility) minimum assist (75% patient effort) (P)   -LG     Assistive Device (Bed Mobility) draw sheet;bed rails (P)   -LG     Row Name 02/15/23 1222          Bed-Chair Transfer    Bed-Chair [place] (Transfers) not tested (P)   -LG     Row Name 02/15/23 1222          Sit-Stand Transfer    Sit-Stand [place] (Transfers) 1 person assist;minimum assist (75% patient effort) (P)   -LG     Assistive Device (Sit-Stand Transfers) walker,  front-wheeled (P)   -LG     Row Name 02/15/23 1222          Gait/Stairs (Locomotion)    [place] Level (Gait) contact guard;verbal cues (P)   -LG     Assistive Device (Gait) walker, front-wheeled (P)   -LG     Distance in Feet (Gait) 80' (P)   -LG     Deviations/Abnormal Patterns (Gait) antalgic (P)   -LG     Row Name 02/15/23 1222          Mobility    Extremity Weight-bearing Status right lower extremity (P)   -LG     Right Lower Extremity (Weight-bearing Status) weight-bearing as tolerated (WBAT) (P)   -LG           User Key  (r) = Recorded By, (t) = Taken By, (c) = Cosigned By    Initials Name Provider Type    LG Sheyla Yoon PT Student PT Student               Obj/Interventions     Row Name 02/15/23 1224          Motor Skills    Therapeutic Exercise hip;ankle;knee (P)   -     Row Name 02/15/23 1224          Hip (Therapeutic Exercise)    Hip (Therapeutic Exercise) AAROM (active assistive range of motion);AROM (active range of motion) (P)   -LG     Hip AROM (Therapeutic Exercise) bilateral;aBduction;aDduction;flexion;15 repititions (P)   -LG     Hip AAROM (Therapeutic Exercise) right;flexion;aBduction;aDduction;10 repetitions (P)   -     Row Name 02/15/23 1224          Knee (Therapeutic Exercise)    Knee (Therapeutic Exercise) AROM (active range of motion);isometric exercises (P)   -LG     Knee AROM (Therapeutic Exercise) bilateral;heel slides (P)   -LG     Knee Isometrics (Therapeutic Exercise) quad sets;gluteal sets;10 repetitions (P)   -     Row Name 02/15/23 1224          Ankle (Therapeutic Exercise)    Ankle (Therapeutic Exercise) AROM (active range of motion) (P)   -LG     Ankle AROM (Therapeutic Exercise) bilateral;15 repititions;dorsiflexion;plantarflexion (P)   -     Row Name 02/15/23 1224          Balance    Static Sitting Balance independent (P)   -LG     Position, Sitting Balance unsupported;sitting edge of bed (P)   -LG     Dynamic Standing Balance contact guard (P)   -LG      Position/Device Used, Standing Balance supported;walker, rolling (P)   -LG           User Key  (r) = Recorded By, (t) = Taken By, (c) = Cosigned By    Initials Name Provider Type    Sheyla Morales, PT Student PT Student               Goals/Plan    No documentation.                Clinical Impression     Row Name 02/15/23 1226          Pain    Pretreatment Pain Rating 0/10 - no pain (P)   -LG     Posttreatment Pain Rating 0/10 - no pain (P)   -LG     Pain Location - Side/Orientation Right (P)   -LG     Pain Location - hip (P)   -LG     Pain Intervention(s) Ambulation/increased activity;Repositioned (P)   -LG     Row Name 02/15/23 1226          Plan of Care Review    Plan of Care Reviewed With patient (P)   -LG     Progress improving (P)   -LG     Outcome Evaluation Pt was agreeable to therapy session this date. Pt reported no pain in the R hip at the start of the session. Pt performed the following supine bed exercises w/ rest breaks: Hip flexion, abduction and adduction, quad set, glut set, heel slide, ankle pumps. Pt performed supine to sit w/ Min A for managing RLE off of bed slowly. Pt performed sit to stand w/ Min A, RW, and VC's for hand placement. Pt ambulated 80' w/ RW, CGA, standing rest breaks, and VC's for sequencing. Pt had an episode of nausea during ambulation. Pt's nausea resolved w/ rest. PT plan to continue POC. (P)   -LG     Row Name 02/15/23 1226          Vital Signs    O2 Delivery Pre Treatment room air (P)   -LG     O2 Delivery Intra Treatment room air (P)   -LG     O2 Delivery Post Treatment room air (P)   -LG     Pre Patient Position Supine (P)   -LG     Intra Patient Position Standing (P)   -LG     Post Patient Position Sitting (P)   -LG     Row Name 02/15/23 1226          Positioning and Restraints    Pre-Treatment Position in bed (P)   -LG     Post Treatment Position chair (P)   -LG     In Chair reclined;sitting;call light within reach;encouraged to call for assist;exit alarm on;with OT  (P)   -LG           User Key  (r) = Recorded By, (t) = Taken By, (c) = Cosigned By    Initials Name Provider Type    Sheyla Morales, PT Student PT Student               Outcome Measures     Row Name 02/15/23 1232          How much help from another person do you currently need...    Turning from your back to your side while in flat bed without using bedrails? 3 (P)   -LG     Moving from lying on back to sitting on the side of a flat bed without bedrails? 3 (P)   -LG     Moving to and from a bed to a chair (including a wheelchair)? 3 (P)   -LG     Standing up from a chair using your arms (e.g., wheelchair, bedside chair)? 3 (P)   -LG     Climbing 3-5 steps with a railing? 2 (P)   -LG     To walk in hospital room? 3 (P)   -LG     AM-PAC 6 Clicks Score (PT) 17 (P)   -LG     Highest level of mobility 5 --> Static standing (P)   -           User Key  (r) = Recorded By, (t) = Taken By, (c) = Cosigned By    Initials Name Provider Type    Sheyla Morales, PT Student PT Student                             Physical Therapy Education     Title: PT OT SLP Therapies (In Progress)     Topic: Physical Therapy (In Progress)     Point: Mobility training (Done)     Learning Progress Summary           Patient Acceptance, E, VU,DU by  at 2/15/2023 1233    Comment: PT educated pt on bed exercises to perform to continue to progress AROM    Acceptance, E, VU by  at 2/14/2023 1205    Comment: Importance of mobility                   Point: Home exercise program (Done)     Learning Progress Summary           Patient Acceptance, E, VU,DU by  at 2/15/2023 1233    Comment: PT educated pt on bed exercises to perform to continue to progress AROM    Acceptance, E, VU by  at 2/14/2023 1205    Comment: Importance of mobility                   Point: Body mechanics (Not Started)     Learner Progress:  Not documented in this visit.          Point: Precautions (Not Started)     Learner Progress:  Not documented in this visit.                       User Key     Initials Effective Dates Name Provider Type Discipline    LG 12/29/22 -  Sheyla Yoon, PT Student PT Student PT              PT Recommendation and Plan  Planned Therapy Interventions (PT): transfer training, balance training, bed mobility training, gait training, home exercise program, patient/family education, neuromuscular re-education, ROM (range of motion), strengthening, stair training, stretching, postural re-education  Plan of Care Reviewed With: (P) patient  Progress: (P) improving  Outcome Evaluation: (P) Pt was agreeable to therapy session this date. Pt reported no pain in the R hip at the start of the session. Pt performed the following supine bed exercises w/ rest breaks: Hip flexion, abduction and adduction, quad set, glut set, heel slide, ankle pumps. Pt performed supine to sit w/ Min A for managing RLE off of bed slowly. Pt performed sit to stand w/ Min A, RW, and VC's for hand placement. Pt ambulated 80' w/ RW, CGA, standing rest breaks, and VC's for sequencing. Pt had an episode of nausea during ambulation. Pt's nausea resolved w/ rest. PT plan to continue POC.     Time Calculation:    PT Charges     Row Name 02/15/23 1234             Time Calculation    Start Time 1030 (P)   -      Stop Time 1108 (P)   -      Time Calculation (min) 38 min (P)   -LG      PT Received On 02/15/23 (P)   -         Timed Charges    61353 - PT Therapeutic Exercise Minutes 20 (P)   -LG      35539 - PT Therapeutic Activity Minutes 18 (P)   -LG         Total Minutes    Timed Charges Total Minutes 38 (P)   -LG       Total Minutes 38 (P)   -LG            User Key  (r) = Recorded By, (t) = Taken By, (c) = Cosigned By    Initials Name Provider Type    LG Sheyla Yoon, PT Student PT Student              Therapy Charges for Today     Code Description Service Date Service Provider Modifiers Qty    03209124815 HC PT EVAL LOW COMPLEXITY 3 2/14/2023 Sheyla Yoon, PT Student GP 1     [id number]  PT THER PROC EA 15 MIN 2/15/2023 Sheyla Yoon, PT Student GP 2    [id number]  PT THERAPEUTIC ACT EA 15 MIN 2/15/2023 Sheyla Yoon, PT Student GP 1          PT G-Codes  Outcome Measure Options: AM-PAC 6 Clicks Daily Activity (OT)  AM-PAC 6 Clicks Score (PT): (P) 17  AM-PAC 6 Clicks Score (OT): 17  PT Discharge Summary  Anticipated Discharge Disposition (PT): skilled nursing facility, inpatient rehabilitation facility    Sheyla Yoon, PT Student  2/15/2023

## 2023-02-15 NOTE — THERAPY TREATMENT NOTE
Patient Name: Yamileth De Guzman  : 1945    MRN: [id number]                              Today's Date: 2/15/2023       Admit Date: 2023    Visit Dx:     ICD-10-CM ICD-9-CM   1. Closed fracture of left hip, initial encounter ([facility])  S72.002A 820.8     Patient Active Problem List   Diagnosis   • Rheumatoid arthritis ([facility])   • Gastroesophageal reflux disease without esophagitis   • Fibromyalgia   • Generalized anxiety disorder   • Hyperlipidemia   • Hypothyroidism   • Osteoporosis with fracture   • Vitamin D deficiency, unspecified   • Senile osteoporosis   • Carotid bruit   • Hematuria   • Lung mass   • Adenomatous polyps   • Nicotine dependence, cigarettes, uncomplicated    • Allergies   • Chronic idiopathic constipation   • Nonintractable headache   • Tingling of both feet   • ADAL positive   • Closed right hip fracture, initial encounter ([facility])     Past Medical History:   Diagnosis Date   • Abdominal bloating    • Acid reflux    • Adenomatous polyp of colon 2020   • Arthritis    • Carotid bruit    • Colon polyp    • Constipation    • Disease of thyroid gland    • Fibromyalgia    • Full dentures    • Hematuria    • Impaired mobility    • Lung nodule     benign   • Osteoporosis    • Seasonal allergies    • Sinusitis    • Wears glasses     READING GLASSES ONLY     Past Surgical History:   Procedure Laterality Date   • APPENDECTOMY     • COLONOSCOPY     • COLONOSCOPY N/A 2020    Procedure: COLONOSCOPY WITH COLD SNARE POLYPECTOMY, COLD FORCEP POLYPECTOMY, SUBMUCOSAL INJECTION OF NORMAL SALINE, ENDOSCOPIC MUCOSAL RESECTION, HOT SNARE POLYPECTOMY, THERMAL ABLATION, QUICK CLIP PLACEMENT TIMES 4 AND BARBIE INK INJECTION;  Surgeon: Scar Rogers MD;  Location: [facility] ENDOSCOPY;  Service: Gastroenterology;  Laterality: N/A;   • COLONOSCOPY N/A 2021    Procedure: COLONOSCOPY WITH BIOPSY AND POLYPECTOMY;  Surgeon: Scar Rogers MD;  Location: [facility] ENDOSCOPY;  Service:  "Gastroenterology;  Laterality: N/A;   • COLONOSCOPY N/A 3/18/2022    Procedure: COLONOSCOPY with polypectomy x2;  Surgeon: Scar Rogers MD;  Location: [facility] ENDOSCOPY;  Service: Gastroenterology;  Laterality: N/A;   • HIP TROCHANTERIC NAILING WITH INTRAMEDULLARY HIP SCREW Right 2/13/2023    Procedure: HIP TROCHANTERIC NAILING WITH INTRAMEDULLARY HIP SCREW-SHORT NAIL;  Surgeon: Miguel Angel Flores MD;  Location: [facility] OR;  Service: Orthopedics;  Laterality: Right;   • HYSTERECTOMY      PARTIAL STILL HAS OVARIES   • MULTIPLE TOOTH EXTRACTIONS     • TUBAL ABDOMINAL LIGATION     • UPPER GASTROINTESTINAL ENDOSCOPY  2012   • VAGINAL DELIVERY      X1      General Information     [facility] Name 02/15/23 1401          OT Time and Intention    Document Type therapy note (daily note)  -     Mode of Treatment occupational therapy  -[facility] Name 02/15/23 1401          General Information    Existing Precautions/Restrictions fall  -           User Key  (r) = Recorded By, (t) = Taken By, (c) = Cosigned By    Initials Name Provider Type     Sandra Antunez Occupational Therapist                 Mobility/ADL's     Row Name 02/15/23 1402          Bed Mobility    Supine-Sit [place] (Bed Mobility) minimum assist (75% patient effort)  -[facility] Name 02/15/23 1402          Sit-Stand Transfer    Sit-Stand [place] (Transfers) minimum assist (75% patient effort)  -     Assistive Device (Sit-Stand Transfers) walker, front-wheeled  -     Row Name 02/15/23 1402          Functional Mobility    Functional Mobility- Ind. Level contact guard assist  -     Functional Mobility- Device walker, front-wheeled  -     Functional Mobility-Distance (Feet) 80  -     Functional Mobility- Comment pt reports getting \"hot\" and feeling nauseated toward the end of her walk  -     Row Name 02/15/23 1402          Activities of Daily Living    BADL Assessment/Intervention grooming  -     Row Name 02/15/23 1402          Mobility "    Extremity Weight-bearing Status right lower extremity  -     Right Lower Extremity (Weight-bearing Status) weight-bearing as tolerated (WBAT)  -     Row Name 02/15/23 1402          Grooming Assessment/Training    [place] Level (Grooming) oral care regimen;set up  -           User Key  (r) = Recorded By, (t) = Taken By, (c) = Cosigned By    Initials Name Provider Type    Sandra Frazier Occupational Therapist               Obj/Interventions     Row Name 02/15/23 1408          Shoulder (Therapeutic Exercise)    Shoulder (Therapeutic Exercise) strengthening exercise  -     Shoulder Strengthening (Therapeutic Exercise) bilateral;horizontal aBduction/aDduction;resistance band;red;10 repetitions  -     Row Name 02/15/23 1408          Elbow/Forearm (Therapeutic Exercise)    Elbow/Forearm (Therapeutic Exercise) strengthening exercise  -     Elbow/Forearm Strengthening (Therapeutic Exercise) bilateral;flexion;extension;resistance band;red;10 repetitions  -     Row Name 02/15/23 1408          Motor Skills    Therapeutic Exercise shoulder;elbow/forearm  -           User Key  (r) = Recorded By, (t) = Taken By, (c) = Cosigned By    Initials Name Provider Type     Sandra Antunez Occupational Therapist               Goals/Plan    No documentation.                Clinical Impression     [facility] Name 02/15/23 1409          Pain Assessment    Pretreatment Pain Rating 0/10 - no pain  -     Posttreatment Pain Rating 0/10 - no pain  -     Pain Location - Side/Orientation Right  -     Pain Location - hip  -     Pain Intervention(s) Repositioned;Ambulation/increased activity  -     Row Name 02/15/23 1400          Plan of Care Review    Plan of Care Reviewed With patient  -     Progress improving  -     Outcome Evaluation Pt able to get oob with min assist, stood with min assist and walked ~80' with cga/min assist.  Toward the end of her walk, pt began feeling hot and nauseated, but was able to walk to  the chair where she was reclined and given a cold washcloth.  Pt was able perform grooming tasks and ther ex without difficulty.  Cont OT per POC.  -     Row Name 02/15/23 1409          Positioning and Restraints    Post Treatment Position chair  -AH     In Chair reclined;call light within reach;encouraged to call for assist;exit alarm on  -           User Key  (r) = Recorded By, (t) = Taken By, (c) = Cosigned By    Initials Name Provider Type    Sandra Frazier Occupational Therapist               Outcome Measures     Row Name 02/15/23 1414          How much help from another is currently needed...    Putting on and taking off regular lower body clothing? 2  -AH     Bathing (including washing, rinsing, and drying) 2  -AH     Toileting (which includes using toilet bed pan or urinal) 2  -AH     Putting on and taking off regular upper body clothing 4  -AH     Taking care of personal grooming (such as brushing teeth) 3  -AH     Eating meals 4  -AH     AM-PAC 6 Clicks Score (OT) 17  -AH     Row Name 02/15/23 1232          How much help from another person do you currently need...    Turning from your back to your side while in flat bed without using bedrails? 3  -MS (r) LG (t) MS (c)     Moving from lying on back to sitting on the side of a flat bed without bedrails? 3  -MS (r) LG (t) MS (c)     Moving to and from a bed to a chair (including a wheelchair)? 3  -MS (r) LG (t) MS (c)     Standing up from a chair using your arms (e.g., wheelchair, bedside chair)? 3  -MS (r) LG (t) MS (c)     Climbing 3-5 steps with a railing? 2  -MS (r) LG (t) MS (c)     To walk in hospital room? 3  -MS (r) LG (t) MS (c)     AM-PAC 6 Clicks Score (PT) 17  -MS (r) LG (t)     Highest level of mobility 5 --> Static standing  -MS (r) LG (t)     Row Name 02/15/23 1414          Functional Assessment    Outcome Measure Options AM-PAC 6 Clicks Daily Activity (OT)  -           User Key  (r) = Recorded By, (t) = Taken By, (c) = Cosigned By     Initials Name Provider Type     Sandra Antunez Occupational Therapist    MS Andrae Pastor, PT Physical Therapist    LG Sheyla Yoon, PT Student PT Student                Occupational Therapy Education     Title: PT OT SLP Therapies (In Progress)     Topic: Occupational Therapy (In Progress)     Point: ADL training (Done)     Description:   Instruct learner(s) on proper safety adaptation and remediation techniques during self care or transfers.   Instruct in proper use of assistive devices.              Learning Progress Summary           Patient Acceptance, E,TB, VU by  at 2/15/2023 1414    Comment: safety with transfers, benefit of working with therapy    Acceptance, E,TB, VU by  at 2/14/2023 1252    Comment: Role of OT/POC                   Point: Home exercise program (Done)     Description:   Instruct learner(s) on appropriate technique for monitoring, assisting and/or progressing therapeutic exercises/activities.              Learning Progress Summary           Patient Acceptance, E,TB, VU by  at 2/15/2023 1414    Comment: safety with transfers, benefit of working with therapy                   Point: Precautions (Done)     Description:   Instruct learner(s) on prescribed precautions during self-care and functional transfers.              Learning Progress Summary           Patient Acceptance, E,TB, VU by  at 2/15/2023 1414    Comment: safety with transfers, benefit of working with therapy                   Point: Body mechanics (Not Started)     Description:   Instruct learner(s) on proper positioning and spine alignment during self-care, functional mobility activities and/or exercises.              Learner Progress:  Not documented in this visit.                      User Key     Initials Effective Dates Name Provider Type [facility] 06/16/21 -  Sandra Antunez Occupational Therapist OT              OT Recommendation and Plan  Planned Therapy Interventions (OT): activity tolerance  training, BADL retraining, patient/caregiver education/training, transfer/mobility retraining, strengthening exercise  Therapy Frequency (OT): daily (Mon-Fri)  Plan of Care Review  Plan of Care Reviewed With: patient  Progress: improving  Outcome Evaluation: Pt able to get oob with min assist, stood with min assist and walked ~80' with cga/min assist.  Toward the end of her walk, pt began feeling hot and nauseated, but was able to walk to the chair where she was reclined and given a cold washcloth.  Pt was able perform grooming tasks and ther ex without difficulty.  Cont OT per POC.     Time Calculation:    Time Calculation- OT     Row Name 02/15/23 1415             Time Calculation- OT    OT Start Time 1045  -      OT Stop Time 1125  -      OT Time Calculation (min) 40 min  -      OT Received On 02/15/23  -      OT Goal Re-Cert Due Date 02/24/23  -         Timed Charges    93106 - OT Therapeutic Exercise Minutes 12  -      04799 - OT Therapeutic Activity Minutes 16  -      94905 - OT Self Care/Mgmt Minutes 12  -         Total Minutes    Timed Charges Total Minutes 40  -       Total Minutes 40  -AH            User Key  (r) = Recorded By, (t) = Taken By, (c) = Cosigned By    Initials Name Provider Type    Sandra Frazier Occupational Therapist              Therapy Charges for Today     Code Description Service Date Service Provider Modifiers Qty    20412371714 HC OT EVAL LOW COMPLEXITY 3 2/14/2023 Sandra Antunez 1    58144763555 HC OT THER PROC EA 15 MIN 2/15/2023 Sandra Antunez 1    78788740990 HC OT THERAPEUTIC ACT EA 15 MIN 2/15/2023 Sandra Antunez 1    16741249152 HC OT SELF CARE/MGMT/TRAIN EA 15 MIN 2/15/2023 Sandra Antunez 1               Sandra Antunez  2/15/2023

## 2023-02-15 NOTE — CASE MANAGEMENT/SOCIAL WORK
Continued Stay Note  ELIDA Swenson     Patient Name: Yamileth De Guzman  MRN: [id number]  Today's Date: 2/15/2023    Admit Date: 2/12/2023    Plan: pt resting in chair; discussed possible STR; given list; stated would possibly go to one in [place]/lives in [place]; stated i could send to those and she would disuss with family   Discharge Plan     Row Name 02/15/23 1509       Plan    Plan Comments [facility] can offer a bed [facility] is looking at referral  .Pt was sleeping soundly will need to discuss later               Discharge Codes    No documentation.               Expected Discharge Date and Time     Expected Discharge Date Expected Discharge Time    Feb 16, 2023             Latoya Sifuentes RN

## 2023-02-15 NOTE — PROGRESS NOTES
[facility]    Name:  Yamileth De Guzman   Age:  77 y.o.  Sex:  female  :  1945  MRN:  [id number]   Visit Number:  [id number]  Admission Date:  2023  Date Of Service:  02/15/23  Primary Care Physician:  Da Dong MD     LOS: 3 days :    Chief Complaint:      Follow-up hip fracture    Subjective:    Patient continues to progress.  Actually evaluated her walking throughout the halls with PT and was doing fairly well.  She is agreeable to rehab.  Her Lieberman catheter was removed and she has voided.    Hospital Course:    77-year-old female with medical history of GERD, anxiety, hypothyroidism, RA, constipation, who presented from home due to complaints of hip pain.  She apparently had a trip and fall on the doorstep at her home.  Significant pain of the right hip area thereafter.  ER work-up was concerning for right intertrochanteric displaced femur fracture.  Orthopedics was consulted.  Dr. Flores performed an open reduction internal fixation of the right proximal femur on 2023.  Has been seen by PT and OT.  We will be recommending short-term rehab.    Review of Systems:     All systems were reviewed and negative except as mentioned in subjective, assessment and plan.    Vital Signs:    Temp:  [97.5 °F (36.4 °C)-98.6 °F (37 °C)] 97.6 °F (36.4 °C)  Heart Rate:  [70-80] 70  Resp:  [3-18] 3  BP: (101-117)/(40-49) 106/49    Intake and output:    I/O last 3 completed shifts:  In: 1630 [P.O.:480; I.V.:1100; IV Piggyback:50]  Out: 1175 [Urine:1175]  I/O this shift:  In: 360 [P.O.:360]  Out: 750 [Urine:750]    Physical Examination:    General Appearance:  Alert and cooperative.  NAD, anxious   Head:  Atraumatic and normocephalic.   Eyes: Conjunctivae and sclerae normal, no icterus. No pallor.   Throat: No oral lesions, no thrush, oral mucosa moist.   Neck: Supple, trachea midline, no thyromegaly.   Lungs:   Breath sounds heard bilaterally equally.  No wheezing  or crackles. No Pleural rub or bronchial breathing.   Heart:  Normal S1 and S2, no murmur, no gallop, no rub. No JVD.   Abdomen:   Normal bowel sounds, no masses, no organomegaly. Soft, nontender, nondistended, no rebound tenderness.   Extremities:  Mild right lower extremity edema.  Pulses felt distally.  Bandages intact and dry.  No significant drainage noted.  Doing well   Skin: No bleeding or rash.   Neurologic: Alert and oriented x 3. No facial asymmetry. Moves all four limbs. No tremors.      Laboratory results:    Results from last 7 days   Lab Units 02/15/23  0503 02/14/23  0521 02/13/23  0645 02/12/23  1425   SODIUM mmol/L 137 145 140 138   POTASSIUM mmol/L 3.3* 3.9 3.9 3.5   CHLORIDE mmol/L 103 107 105 103   CO2 mmol/L 30.0* 27.8 26.0 23.5   BUN mg/dL 13 20 19 16   CREATININE mg/dL 0.59 0.70 0.68 0.79   CALCIUM mg/dL 8.0* 8.5* 9.0 9.4   BILIRUBIN mg/dL  --   --  0.5 0.3   ALK PHOS U/L  --   --  59 65   ALT (SGPT) U/L  --   --  14 16   AST (SGOT) U/L  --   --  18 20   GLUCOSE mg/dL 98 145* 122* 104*     Results from last 7 days   Lab Units 02/15/23  0503 02/14/23  0521 02/13/23  0645 02/12/23  1425   WBC 10*3/mm3  --  13.55* 13.33* 15.56*   HEMOGLOBIN g/dL 9.4* 11.8* 13.3 13.3   HEMATOCRIT % 28.4* 36.1 40.1 38.2   PLATELETS 10*3/mm3  --  151 193 203         Results from last 7 days   Lab Units 02/12/23  1706 02/12/23  1425   HSTROP T ng/L 10* 10*         No results for input(s): PHART, ZIM4HTT, PO2ART, JZQ4NYZ, BASEEXCESS in the last 8760 hours.   I have reviewed the patient's laboratory results.    Radiology results:    Pericapsular Nerve Group (PENG) Block - Right    Result Date: 2/13/2023  Bhavik Guallpa CRNA     2/13/2023  7:30 PM Pericapsular Nerve Group (PENG) Block - Right Patient reassessed immediately prior to procedure Patient location during procedure: OR Start time: 2/13/2023 7:24 PM Stop time: 2/13/2023 7:26 PM Reason for block: at surgeon's request and post-op pain management Performed  by CRNA/CAA: Bhavik Guallpa CRNA Preanesthetic Checklist Completed: patient identified, IV checked, site marked, risks and benefits discussed, surgical consent, monitors and equipment checked, pre-op evaluation and timeout performed Prep: Pt Position: supine Sterile barriers:gloves, mask and cap Prep: ChloraPrep Patient monitoring: blood pressure monitoring, continuous pulse oximetry and EKG Procedure Performed under: general Guidance:ultrasound guided Laterality:right Block Type:PENG Injection Technique:single-shot Needle Type:echogenic and Tuohy Post Assessment Injection Assessment: no paresthesia on injection, negative aspiration for heme and incremental injection Patient Tolerance:comfortable throughout block Complications:no     Fascia Iliaca Compartment Block - Right    Result Date: 2/13/2023  Bhavik Guallpa CRNA     2/13/2023  7:29 PM Fascia Iliaca Compartment Block - Right Patient reassessed immediately prior to procedure Patient location during procedure: OR Start time: 2/13/2023 7:20 PM Stop time: 2/13/2023 7:23 PM Reason for block: at surgeon's request and post-op pain management Performed by CRNA/CAA: Bhavik uGallpa CRNA Preanesthetic Checklist Completed: patient identified, IV checked, site marked, risks and benefits discussed, surgical consent, monitors and equipment checked, pre-op evaluation and timeout performed Prep: Pt Position: supine Sterile barriers:cap, gloves and mask Prep: ChloraPrep Patient monitoring: EKG, continuous pulse oximetry and blood pressure monitoring Procedure Sedation: yes Performed under: general Guidance:ultrasound guided Images:still images obtained Laterality:right Block Type:fascia iliaca compartment Injection Technique:single-shot Needle Type:echogenic and Tuohy Resistance on Injection: none Post Assessment Injection Assessment: negative aspiration for heme, no paresthesia on injection and incremental injection Patient Tolerance:comfortable  throughout block Complications:no Additional Notes Procedure:          FASCIA ILIACA BLOCK                             Patient analgesia was achieved with General Anesthesia   Pt was placed in the supine position.   The insertion site was prepped in sterile fashion with Chloraprep.  A BBraun echogenic needle was advance In-plane under ultrasound guidance. The Anterior superior Iliac crest was initially visualized and the probe was directed slightly medially and slightly towards the umbilicus.  The course of the needle was tracked over the sartorius muscle through the fascia Iliacus and into the anterior portion of the Iliacus muscle.  Major vessels where identified and avoided as were structures of the peritoneal cavity.  LA injection was made incrementally in 1-5 ml amounts and spread was visualized superiorly below the fascia iliacus.  Injection was completed with negative aspiration of blood and negative intravascular injection.  Injection pressures were normal or minimal resistance.     FL C Arm During Surgery    Result Date: 2/13/2023  This procedure was auto-finalized with no dictation required.    I have reviewed the patient's radiology reports.    Medication Review:     I have reviewed the patient's active and prn medications.     Problem List:      Closed right hip fracture, initial encounter ([facility])    Rheumatoid arthritis ([facility])    Gastroesophageal reflux disease without esophagitis    Osteoporosis with fracture    Nicotine dependence, cigarettes, uncomplicated     Chronic idiopathic constipation      Assessment:    1. Closed right hip fracture status post mechanical fall  2. Rheumatoid arthritis  3. Covid  4. Tobacco abuse  5. Hypothyroidism  6. Anxiety    Plan:    Closed right hip fracture  She underwent operative intervention on 2/13/2023 with Dr. Flores.  PT and OT thereafter.  I suspect will need short-term rehab.  Doing better with PT     Chronic constipation  - Monitor for symptoms while in the  hospital.  Bowel regimen as needed.     Hypothyroidism  - Continue Synthroid home regimen 100 mcg daily.     GERD  - Normally on omeprazole 20 mg daily at home.  Continue 40 mg pantoprazole in hospital.     Anxiety  Continue Xanax     Tobacco abuse  - 1 pack/day history.  Start nicotine patch 21 mg  -Counseled on cessation    PT and OT recommending short-term rehab    DVT Prophylaxis: Lovenox  Code Status: Full as tolerated  Diet: As tolerated  Discharge Plan: Short-term rehab once arranged    Liudmila John DO  02/15/23  12:41 EST    Dictated utilizing Dragon dictation.

## 2023-02-16 VITALS
TEMPERATURE: 98.3 F | BODY MASS INDEX: 22.75 KG/M2 | SYSTOLIC BLOOD PRESSURE: 102 MMHG | WEIGHT: 141.54 LBS | DIASTOLIC BLOOD PRESSURE: 52 MMHG | RESPIRATION RATE: 16 BRPM | HEART RATE: 66 BPM | HEIGHT: 66 IN | OXYGEN SATURATION: 95 %

## 2023-02-16 PROCEDURE — 97530 THERAPEUTIC ACTIVITIES: CPT

## 2023-02-16 PROCEDURE — 99239 HOSP IP/OBS DSCHRG MGMT >30: CPT | Performed by: FAMILY MEDICINE

## 2023-02-16 PROCEDURE — 25010000002 ENOXAPARIN PER 10 MG: Performed by: ORTHOPAEDIC SURGERY

## 2023-02-16 RX ORDER — ALPRAZOLAM 0.25 MG/1
0.25 TABLET ORAL NIGHTLY PRN
Qty: 3 TABLET | Refills: 0 | Status: SHIPPED | OUTPATIENT
Start: 2023-02-16 | End: 2023-02-19

## 2023-02-16 RX ORDER — AMOXICILLIN 250 MG
2 CAPSULE ORAL 2 TIMES DAILY
Start: 2023-02-16

## 2023-02-16 RX ORDER — HYDROCODONE BITARTRATE AND ACETAMINOPHEN 7.5; 325 MG/1; MG/1
1 TABLET ORAL EVERY 4 HOURS PRN
Qty: 12 TABLET | Refills: 0 | Status: SHIPPED | OUTPATIENT
Start: 2023-02-16 | End: 2023-02-20 | Stop reason: SDUPTHER

## 2023-02-16 RX ORDER — ENOXAPARIN SODIUM 100 MG/ML
40 INJECTION SUBCUTANEOUS DAILY
Qty: 5.6 ML | Refills: 0
Start: 2023-02-17 | End: 2023-03-03

## 2023-02-16 RX ADMIN — PANTOPRAZOLE SODIUM 40 MG: 40 TABLET, DELAYED RELEASE ORAL at 05:40

## 2023-02-16 RX ADMIN — BACLOFEN 10 MG: 10 TABLET ORAL at 10:40

## 2023-02-16 RX ADMIN — LEVOTHYROXINE SODIUM 100 MCG: 100 TABLET ORAL at 05:40

## 2023-02-16 RX ADMIN — SENNOSIDES AND DOCUSATE SODIUM 2 TABLET: 50; 8.6 TABLET ORAL at 10:40

## 2023-02-16 RX ADMIN — BISACODYL 10 MG: 10 SUPPOSITORY RECTAL at 11:46

## 2023-02-16 RX ADMIN — OXYCODONE HYDROCHLORIDE AND ACETAMINOPHEN 250 MG: 500 TABLET ORAL at 10:40

## 2023-02-16 RX ADMIN — Medication 10 ML: at 10:42

## 2023-02-16 RX ADMIN — ENOXAPARIN SODIUM 40 MG: 100 INJECTION SUBCUTANEOUS at 10:41

## 2023-02-16 RX ADMIN — Medication 1 PATCH: at 10:41

## 2023-02-16 RX ADMIN — HYDROCODONE BITARTRATE AND ACETAMINOPHEN 1 TABLET: 7.5; 325 TABLET ORAL at 10:40

## 2023-02-16 NOTE — PLAN OF CARE
Goal Outcome Evaluation:  Plan of Care Reviewed With: (P) patient        Progress: (P) improving  Outcome Evaluation: (P) Pt was agreeable and participated in therapy session this date. Pt was sitting up in the recliner and reported pain 8/10 at the start of the session. Pt performed sit to stand w/ Min A x1 and RW. Pt ambulated ~80' w/ RW and CGA. Pt displays a step to gait pattern while ambulating w/ RW. Pt will continue to benefit from skilled PT. PT to continue POC.

## 2023-02-16 NOTE — DISCHARGE SUMMARY
[facility]      Name:  Yamileth De Guzman   Age:  77 y.o.  Sex:  female  :  1945  MRN:  [id number]   Visit Number:  [id number]    Admission Date:  2023  Date of Discharge:  2023  Primary Care Physician:  Da Dong MD    Important issues to note:    Patient had follow-up with orthopedics in 2 weeks.  Continue on Lovenox for VTE prophylaxis for 2 weeks  Monitor surgical wound  Encouraged tobacco cessation    Discharge Diagnoses:     1. Closed right hip fracture status post mechanical fall  2. Rheumatoid arthritis  3. COPD  4. Tobacco abuse  5. Hypothyroidism  6. Anxiety    Problem List:     Active Hospital Problems    Diagnosis  POA   • **Closed right hip fracture, initial encounter ([facility]) [S72.001A]  Unknown   • Chronic idiopathic constipation [K59.04]  Yes   • Nicotine dependence, cigarettes, uncomplicated  [F17.210]  Yes   • Rheumatoid arthritis (HCC) [M06.9]  Yes   • Gastroesophageal reflux disease without esophagitis [K21.9]  Yes   • Osteoporosis with fracture [M80.80XA]  Unknown      Resolved Hospital Problems   No resolved problems to display.     Presenting Problem:    Chief Complaint   Patient presents with   • Fall     Pt c/o pain in R hip. Pt states she fell at home while putting shoes on. Pt states she did not lose consciousness.        Consults:     Consulting Physician(s)     Provider Relationship Specialty    Miguel Angel Flores MD Consulting Physician Orthopedic Surgery        Procedures Performed:    Procedure(s):  HIP TROCHANTERIC NAILING WITH INTRAMEDULLARY HIP SCREW-SHORT NAIL    History of presenting illness/Hospital Course:    77-year-old female with medical history of GERD, anxiety, hypothyroidism, RA, constipation, who presented from home due to complaints of hip pain.  She apparently had a trip and fall on the doorstep at her home.  Significant pain of the right hip area thereafter.  ER work-up was concerning for right  intertrochanteric displaced femur fracture.  Orthopedics was consulted.  Dr. Flores performed an open reduction internal fixation of the right proximal femur on 2/13/2023.  Has been seen by PT and OT and overall doing fairly well.  Recommendation for short-term rehab and patient is agreeable.  She has been eating and drinking well.  Her vital signs otherwise remained stable.  Mild drop in hemoglobin, likely postop related, no evidence of hematoma or any significant drainage from her hip wound.    Today, patient is agreeable for discharge to [facility] and rehab.  Confirmed case management has been arranged.  She has voided multiple times since her Lieberman catheter was removed yesterday.  All questions answered day of discharge.    Vital Signs:    Temp:  [97.6 °F (36.4 °C)-98.4 °F (36.9 °C)] 98.3 °F (36.8 °C)  Heart Rate:  [66-76] 66  Resp:  [3-18] 16  BP: ()/(34-52) 102/52    Physical Exam:    General Appearance:  Alert and cooperative.  NAD   Head:  Atraumatic and normocephalic.   Eyes: Conjunctivae and sclerae normal, no icterus. No pallor.   Ears:  Ears with no abnormalities noted.   Throat: No oral lesions, no thrush, oral mucosa moist.   Neck: Supple, trachea midline, no thyromegaly.   Back:   No kyphoscoliosis present. No tenderness to palpation.   Lungs:   Breath sounds heard bilaterally equally.  No crackles or wheezing. No Pleural rub or bronchial breathing.   Heart:  Normal S1 and S2, no murmur, no gallop, no rub. No JVD.   Abdomen:   Normal bowel sounds, no masses, no organomegaly. Soft, nontender, nondistended, no rebound tenderness.   Extremities:  Right hip wound is intact and dry, very mild edema, no drainage from dressing noted   Pulses: Pulses palpable bilaterally.   Skin: No bleeding or rash.   Neurologic: Alert and oriented x 3. No facial asymmetry. Moves all four limbs. No tremors.     Pertinent Lab Results:     Results from last 7 days   Lab Units 02/15/23  0503 02/14/23  0521 02/13/23  0645  02/12/23  1425   SODIUM mmol/L 137 145 140 138   POTASSIUM mmol/L 3.3* 3.9 3.9 3.5   CHLORIDE mmol/L 103 107 105 103   CO2 mmol/L 30.0* 27.8 26.0 23.5   BUN mg/dL 13 20 19 16   CREATININE mg/dL 0.59 0.70 0.68 0.79   CALCIUM mg/dL 8.0* 8.5* 9.0 9.4   BILIRUBIN mg/dL  --   --  0.5 0.3   ALK PHOS U/L  --   --  59 65   ALT (SGPT) U/L  --   --  14 16   AST (SGOT) U/L  --   --  18 20   GLUCOSE mg/dL 98 145* 122* 104*     Results from last 7 days   Lab Units 02/15/23  0503 02/14/23  0521 02/13/23  0645 02/12/23  1425   WBC 10*3/mm3  --  13.55* 13.33* 15.56*   HEMOGLOBIN g/dL 9.4* 11.8* 13.3 13.3   HEMATOCRIT % 28.4* 36.1 40.1 38.2   PLATELETS 10*3/mm3  --  151 193 203         Results from last 7 days   Lab Units 02/12/23  1706 02/12/23  1425   HSTROP T ng/L 10* 10*                           Pertinent Radiology Results:    Imaging Results (All)     Procedure Component Value Units Date/Time    FL C Arm During Surgery [[id number]] Resulted: 02/13/23 1956     Updated: 02/13/23 1956    Narrative:      This procedure was auto-finalized with no dictation required.    XR Hip With or Without Pelvis 2 - 3 View Right [[id number]] Collected: 02/12/23 1449     Updated: 02/12/23 1458    Narrative:      PROCEDURE: XR HIP W OR WO PELVIS 2-3 VIEW RIGHT-     HISTORY: fall, pain     FINDINGS: A 2 view exam demonstrates minimally displaced right  intertrochanteric fracture. Mild osteopenia noted. Several metallic  surgical pelvic clips noted..  No other fracture identified.       Impression:      Acute fracture as above.                  This report was signed and finalized on 2/12/2023 2:56 PM by Maria Fernanda Mendez MD.    XR Chest 1 View [[id number]] Collected: 02/12/23 1446     Updated: 02/12/23 1456    Narrative:      PROCEDURE: XR CHEST 1 VW-     HISTORY: medical clearance     COMPARISON: None.     FINDINGS: The heart is normal in size. The lungs are clear. i. There is  no pneumothorax.  There are no acute osseous abnormalities.  Calcified  granuloma noted on the left.  Bilateral nipple shadows noted.       Impression:      No acute cardiopulmonary process.                 This report was signed and finalized on 2/12/2023 2:48 PM by Maria Fernanda Mendez MD.          Echo:      Condition on Discharge:      Stable.    Code status during the hospital stay:    Code Status and Medical Interventions:   Ordered at: 02/12/23 2101     Level Of Support Discussed With:    Patient     Code Status (Patient has no pulse and is not breathing):    CPR (Attempt to Resuscitate)     Medical Interventions (Patient has pulse or is breathing):    Full Support     Discharge Disposition:    Rehab Facility or Unit (DC - External)    Discharge Medications:       Discharge Medications      New Medications      Instructions Start Date   Enoxaparin Sodium 40 MG/0.4ML solution prefilled syringe syringe  Commonly known as: LOVENOX   40 mg, Subcutaneous, Daily   Start Date: February 17, 2023     HYDROcodone-acetaminophen 7.5-325 MG per tablet  Commonly known as: NORCO   1 tablet, Oral, Every 4 Hours PRN      sennosides-docusate 8.6-50 MG per tablet  Commonly known as: PERICOLACE   2 tablets, Oral, 2 Times Daily         Changes to Medications      Instructions Start Date   baclofen 10 MG tablet  Commonly known as: LIORESAL  What changed: when to take this   10 mg, Oral, 2 Times Daily         Continue These Medications      Instructions Start Date   ALPRAZolam 0.25 MG tablet  Commonly known as: XANAX   0.25 mg, Oral, Nightly PRN      cetirizine 10 MG tablet  Commonly known as: zyrTEC   10 mg, Oral, Nightly      denosumab 60 MG/ML solution syringe  Commonly known as: PROLIA   60 mg, Subcutaneous, Every 6 Months      Euthyrox 100 MCG tablet  Generic drug: levothyroxine   Take 1 tablet by mouth once daily      fish oil 1000 MG capsule capsule   1,000 mg, Oral, Daily With Breakfast      fluticasone 50 MCG/ACT nasal spray  Commonly known as: FLONASE   Use 2 spray(s) in each nostril once  daily      Garlic 400 MG tablet   Oral, Daily      multivitamin tablet tablet  Commonly known as: THERAGRAN   1 tablet, Oral, Daily      omeprazole 20 MG capsule  Commonly known as: priLOSEC   Take 1 capsule by mouth twice daily      vitamin C 250 MG tablet  Commonly known as: ASCORBIC ACID   250 mg, Oral, Daily           Discharge Diet:   As tolerated    Activity at Discharge:   As tolerated per orthopedics    Follow-up Appointments:     Follow-up Information     Miugel Angel Flores MD Follow up.    Specialty: Orthopedic Surgery  Contact information:  [address]  [address]  [facility] [place] [address]  [phone]             Da Dong MD .    Specialty: Internal Medicine  Contact information:  [address]  [address]  [facility] [place] [address]  [phone]                       Future Appointments   Date Time Provider Department Center   6/13/2023  9:30 AM  1 - CHAIR 1 [facility] OP INF [facility]     Test Results Pending at Discharge:           Liudmila John DO  02/16/23  11:12 EST    Time: I spent 32 minutes on this discharge activity which included: face-to-face encounter with the patient, reviewing the data in the system, coordination of the care with the nursing staff as well as consultants, documentation, and entering orders.     Dictated utilizing Dragon dictation.

## 2023-02-16 NOTE — CASE MANAGEMENT/SOCIAL WORK
Continued Stay Note  ELIDA Swenson     Patient Name: Yamileth De Guzman  MRN: [id number]  Today's Date: 2/16/2023    Admit Date: 2/12/2023    Plan: pt resting in chair; discussed possible STR; given list; stated would possibly go to one in [place]/lives in [place]; stated i could send to those and she would disuss with family   Discharge Plan     Row Name 02/16/23 1100       Plan    Plan Comments spoke to pt she is agreement to [facility]  Nursing to call report to [phone] 2 nd floor  they will get DC summary  from [facility] will not need a COVID test               Discharge Codes    No documentation.               Expected Discharge Date and Time     Expected Discharge Date Expected Discharge Time    Feb 16, 2023             Latoya Sifuentes RN

## 2023-02-16 NOTE — CASE MANAGEMENT/SOCIAL WORK
Case Management Discharge Note           Provided Post Acute Provider List?: N/A  Provided Post Acute Provider Quality & Resource List?: N/A    Selected Continued Care - Admitted Since 2/12/2023     Destination    No services have been selected for the patient.              Durable Medical Equipment    No services have been selected for the patient.              Dialysis/Infusion    No services have been selected for the patient.              Home Medical Care    No services have been selected for the patient.              Therapy    No services have been selected for the patient.              Community Resources    No services have been selected for the patient.              [facility]    No services have been selected for the patient.                  Transportation Services  Ambulance: [facility]    Final Discharge Disposition Code: 03 - skilled nursing facility (SNF)

## 2023-02-16 NOTE — THERAPY TREATMENT NOTE
Patient Name: Yamileth De Guzman  : 1945    MRN: [id number]                              Today's Date: 2023       Admit Date: 2023    Visit Dx:     ICD-10-CM ICD-9-CM   1. Closed fracture of left hip, initial encounter ([facility])  S72.002A 820.8   2. Generalized anxiety disorder  F41.1 300.02   3. Closed right hip fracture, initial encounter ([facility])  S72.001A 820.8     Patient Active Problem List   Diagnosis   • Rheumatoid arthritis ([facility])   • Gastroesophageal reflux disease without esophagitis   • Fibromyalgia   • Generalized anxiety disorder   • Hyperlipidemia   • Hypothyroidism   • Osteoporosis with fracture   • Vitamin D deficiency, unspecified   • Senile osteoporosis   • Carotid bruit   • Hematuria   • Lung mass   • Adenomatous polyps   • Nicotine dependence, cigarettes, uncomplicated    • Allergies   • Chronic idiopathic constipation   • Nonintractable headache   • Tingling of both feet   • ADAL positive   • Closed right hip fracture, initial encounter ([facility])     Past Medical History:   Diagnosis Date   • Abdominal bloating    • Acid reflux    • Adenomatous polyp of colon 2020   • Arthritis    • Carotid bruit    • Colon polyp    • Constipation    • Disease of thyroid gland    • Fibromyalgia    • Full dentures    • Hematuria    • Impaired mobility    • Lung nodule     benign   • Osteoporosis    • Seasonal allergies    • Sinusitis    • Wears glasses     READING GLASSES ONLY     Past Surgical History:   Procedure Laterality Date   • APPENDECTOMY     • COLONOSCOPY     • COLONOSCOPY N/A 2020    Procedure: COLONOSCOPY WITH COLD SNARE POLYPECTOMY, COLD FORCEP POLYPECTOMY, SUBMUCOSAL INJECTION OF NORMAL SALINE, ENDOSCOPIC MUCOSAL RESECTION, HOT SNARE POLYPECTOMY, THERMAL ABLATION, QUICK CLIP PLACEMENT TIMES 4 AND BARBIE INK INJECTION;  Surgeon: Scar Rogers MD;  Location: [facility] ENDOSCOPY;  Service: Gastroenterology;  Laterality: N/A;   • COLONOSCOPY N/A 2021    Procedure:  COLONOSCOPY WITH BIOPSY AND POLYPECTOMY;  Surgeon: Scar Rogers MD;  Location: [facility] ENDOSCOPY;  Service: Gastroenterology;  Laterality: N/A;   • COLONOSCOPY N/A 3/18/2022    Procedure: COLONOSCOPY with polypectomy x2;  Surgeon: Scar Rogers MD;  Location: [facility] ENDOSCOPY;  Service: Gastroenterology;  Laterality: N/A;   • HIP TROCHANTERIC NAILING WITH INTRAMEDULLARY HIP SCREW Right 2/13/2023    Procedure: HIP TROCHANTERIC NAILING WITH INTRAMEDULLARY HIP SCREW-SHORT NAIL;  Surgeon: Miguel Angel Flores MD;  Location: [facility] OR;  Service: Orthopedics;  Laterality: Right;   • HYSTERECTOMY      PARTIAL STILL HAS OVARIES   • MULTIPLE TOOTH EXTRACTIONS     • TUBAL ABDOMINAL LIGATION     • UPPER GASTROINTESTINAL ENDOSCOPY  2012   • VAGINAL DELIVERY      X1      General Information     Row Name 02/16/23 1245          Physical Therapy Time and Intention    Document Type therapy note (daily note) (P)   -LG     Mode of Treatment physical therapy (P)   -LG     Row Name 02/16/23 1245          General Information    Patient Profile Reviewed yes (P)   -LG     Existing Precautions/Restrictions fall (P)   -LG     Row Name 02/16/23 1245          Safety Issues, Functional Mobility    Impairments Affecting Function (Mobility) balance;endurance/activity tolerance;strength;pain;range of motion (ROM) (P)   -LG           User Key  (r) = Recorded By, (t) = Taken By, (c) = Cosigned By    Initials Name Provider Type    LG Sheyla Yoon, PT Student PT Student               Mobility     Row Name 02/16/23 1245          Sit-Stand Transfer    Sit-Stand [place] (Transfers) minimum assist (75% patient effort) (P)   -LG     Assistive Device (Sit-Stand Transfers) walker, front-wheeled (P)   -LG     Row Name 02/16/23 1245          Gait/Stairs (Locomotion)    [place] Level (Gait) contact guard;verbal cues (P)   -LG     Assistive Device (Gait) walker, front-wheeled (P)   -LG     Deviations/Abnormal Patterns (Gait) antalgic  (P)   -LG     Row Name 02/16/23 1245          Mobility    Extremity Weight-bearing Status right lower extremity (P)   -LG     Right Lower Extremity (Weight-bearing Status) weight-bearing as tolerated (WBAT) (P)   -LG           User Key  (r) = Recorded By, (t) = Taken By, (c) = Cosigned By    Initials Name Provider Type     Sheyla Yoon, PT Student PT Student               Obj/Interventions     Row Name 02/16/23 1246          Balance    Balance Assessment sitting static balance;sit to stand dynamic balance;standing dynamic balance (P)   -LG     Static Sitting Balance independent (P)   -LG     Position, Sitting Balance sitting in chair (P)   -LG     Sit to Stand Dynamic Balance minimal assist (P)   -LG     Dynamic Standing Balance contact guard (P)   -LG     Position/Device Used, Standing Balance supported;walker, rolling (P)   -LG           User Key  (r) = Recorded By, (t) = Taken By, (c) = Cosigned By    Initials Name Provider Type     Sheyla Yoon, PT Student PT Student               Goals/Plan    No documentation.                Clinical Impression     Row Name 02/16/23 1247          Pain    Pretreatment Pain Rating 8/10 (P)   -LG     Posttreatment Pain Rating 5/10 (P)   -LG     Pain Location - Side/Orientation Right (P)   -LG     Pain Location - hip (P)   -LG     Pain Intervention(s) Ambulation/increased activity;Repositioned (P)   -LG     Row Name 02/16/23 1247          Plan of Care Review    Plan of Care Reviewed With patient (P)   -LG     Progress improving (P)   -LG     Outcome Evaluation Pt was agreeable and participated in therapy session this date. Pt was sitting up in the recliner and reported pain 8/10 at the start of the session. Pt performed sit to stand w/ Min A x1 and RW. Pt ambulated ~80' w/ RW and CGA. Pt displays a step to gait pattern while ambulating w/ RW. Pt will continue to benefit from skilled PT. PT to continue POC. (P)   -LG     Row Name 02/16/23 1247          Vital Signs    O2  Delivery Pre Treatment room air (P)   -LG     O2 Delivery Intra Treatment room air (P)   -LG     O2 Delivery Post Treatment room air (P)   -LG     Pre Patient Position Sitting (P)   -LG     Intra Patient Position Standing (P)   -LG     Post Patient Position Sitting (P)   -LG     Row Name 02/16/23 1247          Positioning and Restraints    In Chair reclined;sitting;call light within reach;encouraged to call for assist (P)   -LG           User Key  (r) = Recorded By, (t) = Taken By, (c) = Cosigned By    Initials Name Provider Type    Sheyla Morales, PT Student PT Student               Outcome Measures     Row Name 02/16/23 1250 02/16/23 1040       How much help from another person do you currently need...    Turning from your back to your side while in flat bed without using bedrails? 3 (P)   -LG 3  -HN    Moving from lying on back to sitting on the side of a flat bed without bedrails? 3 (P)   -LG 3  -HN    Moving to and from a bed to a chair (including a wheelchair)? 3 (P)   -LG 3  -HN    Standing up from a chair using your arms (e.g., wheelchair, bedside chair)? 3 (P)   -LG 3  -HN    Climbing 3-5 steps with a railing? 2 (P)   -LG 2  -HN    To walk in hospital room? 3 (P)   -LG 3  -HN    AM-PAC 6 Clicks Score (PT) 17 (P)   -LG 17  -HN    Highest level of mobility 5 --> Static standing (P)   -LG 5 --> Static standing  -HN    Row Name 02/16/23 1250          Functional Assessment    Outcome Measure Options AM-PAC 6 Clicks Basic Mobility (PT) (P)   -LG           User Key  (r) = Recorded By, (t) = Taken By, (c) = Cosigned By    Initials Name Provider Type    Brenda Mcghee, RN Registered Nurse    Sheyla Morales, PT Student PT Student                             Physical Therapy Education     Title: PT OT SLP Therapies (Done)     Topic: Physical Therapy (Done)     Point: Mobility training (Done)     Learning Progress Summary           Patient Acceptance, E, VU by GANGA at 2/16/2023 1251    Comment: Pt educated on  ambulation w/ RW    Acceptance, E, VU,DU by  at 2/15/2023 1233    Comment: PT educated pt on bed exercises to perform to continue to progress AROM    Acceptance, E, VU by  at 2/14/2023 1205    Comment: Importance of mobility                   Point: Home exercise program (Done)     Learning Progress Summary           Patient Acceptance, E, VU by  at 2/16/2023 1251    Comment: Pt educated on ambulation w/ RW    Acceptance, E, VU,DU by  at 2/15/2023 1233    Comment: PT educated pt on bed exercises to perform to continue to progress AROM    Acceptance, E, VU by  at 2/14/2023 1205    Comment: Importance of mobility                   Point: Body mechanics (Resolved)     Learner Progress:  Not documented in this visit.          Point: Precautions (Resolved)     Learner Progress:  Not documented in this visit.                      User Key     Initials Effective Dates Name Provider Type Discipline     12/29/22 -  Sheyla Yoon, PT Student PT Student PT              PT Recommendation and Plan  Planned Therapy Interventions (PT): transfer training, balance training, bed mobility training, gait training, home exercise program, patient/family education, neuromuscular re-education, ROM (range of motion), strengthening, stair training, stretching, postural re-education  Plan of Care Reviewed With: (P) patient  Progress: (P) improving  Outcome Evaluation: (P) Pt was agreeable and participated in therapy session this date. Pt was sitting up in the recliner and reported pain 8/10 at the start of the session. Pt performed sit to stand w/ Min A x1 and RW. Pt ambulated ~80' w/ RW and CGA. Pt displays a step to gait pattern while ambulating w/ RW. Pt will continue to benefit from skilled PT. PT to continue POC.     Time Calculation:    PT Charges     Row Name 02/16/23 1251             Time Calculation    Start Time 1115 (P)   -      Stop Time 1138 (P)   -      Time Calculation (min) 23 min (P)   -         Timed  Charges    15493 - PT Therapeutic Activity Minutes 23 (P)   -LG         Total Minutes    Timed Charges Total Minutes 23 (P)   -LG       Total Minutes 23 (P)   -LG            User Key  (r) = Recorded By, (t) = Taken By, (c) = Cosigned By    Initials Name Provider Type    Sheyla Morales, PT Student PT Student              Therapy Charges for Today     Code Description Service Date Service Provider Modifiers Qty    00016470372 HC PT THER PROC EA 15 MIN 2/15/2023 Sheyla Yoon, PT Student GP 2    14963614134 HC PT THERAPEUTIC ACT EA 15 MIN 2/15/2023 Sheyla Yoon, PT Student GP 1    94060925620 HC PT THERAPEUTIC ACT EA 15 MIN 2/16/2023 Sheyla Yoon, PT Student GP 2          PT G-Codes  Outcome Measure Options: (P) AM-PAC 6 Clicks Basic Mobility (PT)  AM-PAC 6 Clicks Score (PT): (P) 17  AM-PAC 6 Clicks Score (OT): 17  PT Discharge Summary  Anticipated Discharge Disposition (PT): skilled nursing facility, inpatient rehabilitation facility    Sheyla Yoon, PT Student  2/16/2023

## 2023-02-16 NOTE — PLAN OF CARE
Goal Outcome Evaluation:  Plan of Care Reviewed With: patient           Outcome Evaluation: Patient to discharge to [facility].

## 2023-02-20 DIAGNOSIS — S72.001A CLOSED RIGHT HIP FRACTURE, INITIAL ENCOUNTER: ICD-10-CM

## 2023-02-20 RX ORDER — HYDROCODONE BITARTRATE AND ACETAMINOPHEN 7.5; 325 MG/1; MG/1
1 TABLET ORAL EVERY 4 HOURS PRN
Qty: 120 TABLET | Refills: 0 | Status: SHIPPED | OUTPATIENT
Start: 2023-02-20 | End: 2023-02-24 | Stop reason: SDUPTHER

## 2023-02-20 NOTE — TELEPHONE ENCOUNTER
(NEW ADMIT)    JIMBO REQUESTING MED REFILL FOR NORCO 7.5-325 MG.    DIRECTIONS: NORCO 7.5-325 MG 1 TAB PO Q 4 HRS PRN.

## 2023-02-22 PROBLEM — Z74.09 IMPAIRED MOBILITY AND ADLS: Status: ACTIVE | Noted: 2023-02-22
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
## 2023-02-22 PROBLEM — M80.00XD AGE-RELATED OSTEOPOROSIS WITH CURRENT PATHOLOGICAL FRACTURE WITH ROUTINE HEALING: Chronic | Status: ACTIVE | Noted: 2018-10-15

## 2023-02-22 PROBLEM — R53.81 PHYSICAL DECONDITIONING: Status: ACTIVE | Noted: 2023-02-22

## 2023-02-22 PROBLEM — S72.001D CLOSED RIGHT HIP FRACTURE, WITH ROUTINE HEALING, SUBSEQUENT ENCOUNTER: Status: ACTIVE | Noted: 2023-02-12

## 2023-02-22 PROBLEM — E78.5 DYSLIPIDEMIA: Chronic | Status: ACTIVE | Noted: 2017-01-19

## 2023-02-22 PROBLEM — Z78.9 IMPAIRED MOBILITY AND ADLS: Status: ACTIVE | Noted: 2023-02-22

## 2023-02-24 DIAGNOSIS — S72.001A CLOSED RIGHT HIP FRACTURE, INITIAL ENCOUNTER: ICD-10-CM

## 2023-02-24 DIAGNOSIS — F41.1 GENERALIZED ANXIETY DISORDER: Primary | ICD-10-CM

## 2023-02-24 RX ORDER — HYDROCODONE BITARTRATE AND ACETAMINOPHEN 7.5; 325 MG/1; MG/1
1 TABLET ORAL EVERY 4 HOURS PRN
Qty: 60 TABLET | Refills: 0 | Status: SHIPPED | OUTPATIENT
Start: 2023-02-24 | End: 2023-02-27

## 2023-02-24 RX ORDER — ALPRAZOLAM 0.5 MG/1
TABLET ORAL
Qty: 30 TABLET | Refills: 0 | Status: SHIPPED | OUTPATIENT
Start: 2023-02-24

## 2023-02-27 ENCOUNTER — OFFICE VISIT (OUTPATIENT)
Dept: INTERNAL MEDICINE | Facility: CLINIC | Age: 78
End: 2023-02-27
Payer: MEDICARE

## 2023-02-27 VITALS
TEMPERATURE: 99.2 F | HEIGHT: 66 IN | HEART RATE: 64 BPM | WEIGHT: 143 LBS | BODY MASS INDEX: 22.98 KG/M2 | DIASTOLIC BLOOD PRESSURE: 80 MMHG | OXYGEN SATURATION: 98 % | SYSTOLIC BLOOD PRESSURE: 122 MMHG

## 2023-02-27 DIAGNOSIS — E87.6 HYPOKALEMIA: ICD-10-CM

## 2023-02-27 DIAGNOSIS — S72.001D CLOSED RIGHT HIP FRACTURE, WITH ROUTINE HEALING, SUBSEQUENT ENCOUNTER: ICD-10-CM

## 2023-02-27 DIAGNOSIS — S72.001S CLOSED RIGHT HIP FRACTURE, SEQUELA: Primary | ICD-10-CM

## 2023-02-27 DIAGNOSIS — D62 ANEMIA DUE TO ACUTE BLOOD LOSS: Primary | ICD-10-CM

## 2023-02-27 PROCEDURE — 99214 OFFICE O/P EST MOD 30 MIN: CPT | Performed by: INTERNAL MEDICINE

## 2023-02-27 RX ORDER — HYDROCODONE BITARTRATE AND ACETAMINOPHEN 5; 325 MG/1; MG/1
1 TABLET ORAL EVERY 4 HOURS PRN
Qty: 60 TABLET | Refills: 0 | Status: SHIPPED | OUTPATIENT
Start: 2023-02-27

## 2023-02-27 NOTE — PROGRESS NOTES
Subjective   Yamileth De Guzman is a 77 y.o. female.     Chief Complaint   Patient presents with   • Hospital Follow Up Visit   • Fall   • Hip Injury     Hip fracture       History of Present Illness       Current Outpatient Medications:   •  ALPRAZolam (XANAX) 0.5 MG tablet, QHS and daily prn, Disp: 30 tablet, Rfl: 0  •  baclofen (LIORESAL) 10 MG tablet, Take 1 tablet by mouth 2 (Two) Times a Day. (Patient taking differently: Take 10 mg by mouth Daily.), Disp: 50 tablet, Rfl: 0  •  cetirizine (zyrTEC) 10 MG tablet, Take 10 mg by mouth Every Night., Disp: , Rfl:   •  denosumab (PROLIA) 60 MG/ML solution syringe, Inject 60 mg under the skin into the appropriate area as directed Every 6 (Six) Months., Disp: , Rfl:   •  Euthyrox 100 MCG tablet, Take 1 tablet by mouth once daily, Disp: 90 tablet, Rfl: 3  •  fluticasone (FLONASE) 50 MCG/ACT nasal spray, Use 2 spray(s) in each nostril once daily, Disp: 48 g, Rfl: 0  •  Garlic 400 MG tablet, Take  by mouth Daily., Disp: , Rfl:   •  HYDROcodone-acetaminophen (NORCO) 5-325 MG per tablet, Take 1 tablet by mouth Every 4 (Four) Hours As Needed for Moderate Pain., Disp: 60 tablet, Rfl: 0  •  Multiple Vitamin (MULTI VITAMIN DAILY PO), Take 1 tablet by mouth Daily., Disp: , Rfl:   •  Omega-3 Fatty Acids (FISH OIL) 1000 MG capsule capsule, Take 1,000 mg by mouth Daily With Breakfast., Disp: , Rfl:   •  omeprazole (priLOSEC) 20 MG capsule, Take 1 capsule by mouth twice daily, Disp: 180 capsule, Rfl: 0  •  sennosides-docusate (PERICOLACE) 8.6-50 MG per tablet, Take 2 tablets by mouth 2 (Two) Times a Day., Disp: , Rfl:   •  vitamin C (ASCORBIC ACID) 250 MG tablet, Take 250 mg by mouth Daily., Disp: , Rfl:   •  Enoxaparin Sodium (LOVENOX) 40 MG/0.4ML solution prefilled syringe syringe, Inject 0.4 mL under the skin into the appropriate area as directed Daily for 14 days. Indications: Post Surgical - Hip, Disp: 5.6 mL, Rfl: 0    The following portions of the patient's history were  reviewed and updated as appropriate: allergies, current medications, past family history, past medical history, past social history, past surgical history and problem list.    Review of Systems    Objective   Physical Exam    All tests have been reviewed.    Assessment & Plan   There are no diagnoses linked to this encounter.

## 2023-02-27 NOTE — PROGRESS NOTES
Transitional Care Follow Up Visit  Subjective     Yamileth De Guzman is a 77 y.o. female who presents for a transitional care management visit.    Within 48 business hours after discharge our office contacted her via telephone to coordinate her care and needs.      I reviewed and discussed the details of that call along with the discharge summary, hospital problems, inpatient lab results, inpatient diagnostic studies, and consultation reports with Yamileth.     Current outpatient and discharge medications have been reconciled for the patient.  Reviewed by: Da Dong MD      No flowsheet data found.  Risk for Readmission (LACE) Score: 10 (2/16/2023  6:00 AM)      History of Present Illness   Course During Hospital Stay:  Patient here for hospital follow-up.  Patient was discharged from rehab center a week ago.  2 weeks ago patient suffered from hip fracture status post ORIF.  Patient still has local pain tenderness.  Patient started to have ambulating with a walker.  Patient is also using Lovenox.  Blood test that showed the patient is anemic and also has a low potassium.     The following portions of the patient's history were reviewed and updated as appropriate: allergies, current medications, past family history, past medical history, past social history, past surgical history and problem list.    Review of Systems   Constitutional: Negative.    Respiratory: Negative.    Cardiovascular: Negative.    Gastrointestinal: Negative.    Musculoskeletal: Positive for arthralgias.   Skin: Negative.    Neurological: Negative.    Psychiatric/Behavioral: Negative.        Objective   Physical Exam  Constitutional:       Appearance: She is well-developed.   Cardiovascular:      Rate and Rhythm: Normal rate and regular rhythm.      Heart sounds: Normal heart sounds.   Pulmonary:      Effort: Pulmonary effort is normal.      Breath sounds: Normal breath sounds.   Abdominal:      General: Bowel sounds are normal.       Palpations: Abdomen is soft.   Musculoskeletal:         General: Tenderness present.      Cervical back: Neck supple.   Neurological:      Mental Status: She is alert and oriented to person, place, and time.   Psychiatric:         Behavior: Behavior normal.         Assessment & Plan   Diagnoses and all orders for this visit:    1. Anemia due to acute blood loss (Primary) check lab  -     CBC & Differential    2. Hypokalemia check lab  -     Basic Metabolic Panel    3. Closed right hip fracture, with routine healing, subsequent encounter continue Lovenox and ambulating with a walker    Follow-up with orthopedist    Follow-up in 1 month

## 2023-02-28 LAB
BASOPHILS # BLD AUTO: 0 X10E3/UL (ref 0–0.2)
BASOPHILS NFR BLD AUTO: 0 %
BUN SERPL-MCNC: 14 MG/DL (ref 8–27)
BUN/CREAT SERPL: 20 (ref 12–28)
CALCIUM SERPL-MCNC: 8.4 MG/DL (ref 8.7–10.3)
CHLORIDE SERPL-SCNC: 105 MMOL/L (ref 96–106)
CO2 SERPL-SCNC: 26 MMOL/L (ref 20–29)
CREAT SERPL-MCNC: 0.7 MG/DL (ref 0.57–1)
EGFRCR SERPLBLD CKD-EPI 2021: 90 ML/MIN/1.73
EOSINOPHIL # BLD AUTO: 0.1 X10E3/UL (ref 0–0.4)
EOSINOPHIL NFR BLD AUTO: 1 %
ERYTHROCYTE [DISTWIDTH] IN BLOOD BY AUTOMATED COUNT: 13 % (ref 11.7–15.4)
GLUCOSE SERPL-MCNC: 93 MG/DL (ref 70–99)
HCT VFR BLD AUTO: 29.9 % (ref 34–46.6)
HGB BLD-MCNC: 10 G/DL (ref 11.1–15.9)
IMM GRANULOCYTES # BLD AUTO: 0.1 X10E3/UL (ref 0–0.1)
IMM GRANULOCYTES NFR BLD AUTO: 1 %
LYMPHOCYTES # BLD AUTO: 2.2 X10E3/UL (ref 0.7–3.1)
LYMPHOCYTES NFR BLD AUTO: 21 %
MCH RBC QN AUTO: 32.6 PG (ref 26.6–33)
MCHC RBC AUTO-ENTMCNC: 33.4 G/DL (ref 31.5–35.7)
MCV RBC AUTO: 97 FL (ref 79–97)
MONOCYTES # BLD AUTO: 0.7 X10E3/UL (ref 0.1–0.9)
MONOCYTES NFR BLD AUTO: 6 %
NEUTROPHILS # BLD AUTO: 7.3 X10E3/UL (ref 1.4–7)
NEUTROPHILS NFR BLD AUTO: 71 %
PLATELET # BLD AUTO: 437 X10E3/UL (ref 150–450)
POTASSIUM SERPL-SCNC: 3.8 MMOL/L (ref 3.5–5.2)
RBC # BLD AUTO: 3.07 X10E6/UL (ref 3.77–5.28)
SODIUM SERPL-SCNC: 142 MMOL/L (ref 134–144)
WBC # BLD AUTO: 10.4 X10E3/UL (ref 3.4–10.8)

## 2023-03-31 DIAGNOSIS — F41.1 GENERALIZED ANXIETY DISORDER: ICD-10-CM

## 2023-03-31 RX ORDER — ALPRAZOLAM 0.25 MG/1
0.25 TABLET ORAL NIGHTLY PRN
Qty: 30 TABLET | Refills: 0 | OUTPATIENT
Start: 2023-03-31

## 2023-03-31 NOTE — TELEPHONE ENCOUNTER
Rx Refill Note  Requested Prescriptions     Pending Prescriptions Disp Refills   • ALPRAZolam (XANAX) 0.25 MG tablet [Pharmacy Med Name: ALPRAZolam 0.25 MG Oral Tablet] 30 tablet 0     Sig: TAKE 1 TABLET BY MOUTH AT NIGHT AS NEEDED FOR ANXIETY      Will deny for reason of REFILL INAPPROPRIATE   Was signed 2/24/23 by a provider not in this office   {    Tyesha Xavier MA  03/31/23, 15:08 EDT

## 2023-04-11 ENCOUNTER — OFFICE VISIT (OUTPATIENT)
Dept: INTERNAL MEDICINE | Facility: CLINIC | Age: 78
End: 2023-04-11
Payer: MEDICARE

## 2023-04-11 VITALS
TEMPERATURE: 97.4 F | BODY MASS INDEX: 22.34 KG/M2 | DIASTOLIC BLOOD PRESSURE: 76 MMHG | OXYGEN SATURATION: 98 % | HEART RATE: 62 BPM | SYSTOLIC BLOOD PRESSURE: 116 MMHG | WEIGHT: 139 LBS | HEIGHT: 66 IN

## 2023-04-11 DIAGNOSIS — D50.0 ANEMIA, BLOOD LOSS: ICD-10-CM

## 2023-04-11 DIAGNOSIS — E83.51 HYPOCALCEMIA: ICD-10-CM

## 2023-04-11 DIAGNOSIS — Z79.899 CONTROLLED SUBSTANCE AGREEMENT SIGNED: ICD-10-CM

## 2023-04-11 DIAGNOSIS — Z98.890 HISTORY OF HIP SURGERY: Primary | ICD-10-CM

## 2023-04-11 DIAGNOSIS — F41.1 GENERALIZED ANXIETY DISORDER: ICD-10-CM

## 2023-04-11 DIAGNOSIS — E55.9 VITAMIN D DEFICIENCY, UNSPECIFIED: ICD-10-CM

## 2023-04-11 RX ORDER — ALPRAZOLAM 0.5 MG/1
TABLET ORAL
Qty: 30 TABLET | Refills: 0 | Status: SHIPPED | OUTPATIENT
Start: 2023-04-11

## 2023-04-11 NOTE — PROGRESS NOTES
Subjective   Yamileth De Guzman is a 77 y.o. female.     Chief Complaint   Patient presents with   • Follow-up   • Vitamin D Deficiency     Discuss medication- rehab prescribed medication but patient has not taken due to elevated levels in the past and being told to discontinue   • Hypothyroidism   • Anxiety       History of Present Illness   Patient here for follow-up history of a vitamin D deficiency patient recent right hip fracture status post surgery doing much better.  Hypothyroidism stable on medication.  Anxiety patient needs medicine refill.  Hemoglobin still low calcium still low    Current Outpatient Medications:   •  ALPRAZolam (XANAX) 0.5 MG tablet, QHS and daily prn, Disp: 30 tablet, Rfl: 0  •  baclofen (LIORESAL) 10 MG tablet, Take 1 tablet by mouth 2 (Two) Times a Day. (Patient taking differently: Take 1 tablet by mouth Daily.), Disp: 50 tablet, Rfl: 0  •  cetirizine (zyrTEC) 10 MG tablet, Take 1 tablet by mouth Every Night., Disp: , Rfl:   •  denosumab (PROLIA) 60 MG/ML solution syringe, Inject 1 mL under the skin into the appropriate area as directed Every 6 (Six) Months., Disp: , Rfl:   •  Euthyrox 100 MCG tablet, Take 1 tablet by mouth once daily, Disp: 90 tablet, Rfl: 3  •  fluticasone (FLONASE) 50 MCG/ACT nasal spray, Use 2 spray(s) in each nostril once daily, Disp: 48 g, Rfl: 0  •  Garlic 400 MG tablet, Take  by mouth Daily., Disp: , Rfl:   •  Multiple Vitamin (MULTI VITAMIN DAILY PO), Take 1 tablet by mouth Daily., Disp: , Rfl:   •  Omega-3 Fatty Acids (FISH OIL) 1000 MG capsule capsule, Take 1 capsule by mouth Daily With Breakfast., Disp: , Rfl:   •  omeprazole (priLOSEC) 20 MG capsule, Take 1 capsule by mouth twice daily, Disp: 180 capsule, Rfl: 0  •  sennosides-docusate (PERICOLACE) 8.6-50 MG per tablet, Take 2 tablets by mouth 2 (Two) Times a Day., Disp: , Rfl:   •  vitamin C (ASCORBIC ACID) 250 MG tablet, Take 1 tablet by mouth Daily., Disp: , Rfl:     The following portions of the  patient's history were reviewed and updated as appropriate: allergies, current medications, past family history, past medical history, past social history, past surgical history and problem list.    Review of Systems   Constitutional: Negative.    Respiratory: Negative.    Cardiovascular: Negative.    Gastrointestinal: Negative.    Musculoskeletal: Positive for arthralgias.   Skin: Negative.    Neurological: Negative.    Psychiatric/Behavioral: The patient is nervous/anxious.        Objective   Physical Exam  Cardiovascular:      Rate and Rhythm: Normal rate and regular rhythm.      Heart sounds: Normal heart sounds.   Pulmonary:      Effort: Pulmonary effort is normal.      Breath sounds: Normal breath sounds.   Abdominal:      General: Bowel sounds are normal.   Musculoskeletal:      Cervical back: Neck supple.   Skin:     General: Skin is warm.   Neurological:      Mental Status: She is alert and oriented to person, place, and time.         All tests have been reviewed.    Assessment & Plan   Diagnoses and all orders for this visit:    History of hip surgery    Generalized anxiety disorder  -     ALPRAZolam (XANAX) 0.5 MG tablet; QHS and daily prn  -     TSH    Vitamin D deficiency, unspecified  -     Vitamin D,25-Hydroxy    Anemia, blood loss  -     CBC & Differential  -     Basic Metabolic Panel  -     Iron Profile    Hypocalcemia  -     Calcium, Ionized    Controlled substance agreement signed  -     Tox[employer] Flex 23, Ur -; Future

## 2023-04-12 DIAGNOSIS — E03.9 HYPOTHYROIDISM, UNSPECIFIED TYPE: ICD-10-CM

## 2023-04-12 LAB
25(OH)D3+25(OH)D2 SERPL-MCNC: 66 NG/ML (ref 30–100)
BASOPHILS # BLD AUTO: 0.04 10*3/MM3 (ref 0–0.2)
BASOPHILS NFR BLD AUTO: 0.6 % (ref 0–1.5)
BUN SERPL-MCNC: 15 MG/DL (ref 8–23)
BUN/CREAT SERPL: 21.7 (ref 7–25)
CA-I SERPL ISE-MCNC: 5 MG/DL (ref 4.5–5.6)
CALCIUM SERPL-MCNC: 9.8 MG/DL (ref 8.6–10.5)
CHLORIDE SERPL-SCNC: 105 MMOL/L (ref 98–107)
CO2 SERPL-SCNC: 27.1 MMOL/L (ref 22–29)
CREAT SERPL-MCNC: 0.69 MG/DL (ref 0.57–1)
EGFRCR SERPLBLD CKD-EPI 2021: 90.6 ML/MIN/1.73
EOSINOPHIL # BLD AUTO: 0.13 10*3/MM3 (ref 0–0.4)
EOSINOPHIL NFR BLD AUTO: 1.9 % (ref 0.3–6.2)
ERYTHROCYTE [DISTWIDTH] IN BLOOD BY AUTOMATED COUNT: 12.7 % (ref 12.3–15.4)
GLUCOSE SERPL-MCNC: 86 MG/DL (ref 65–99)
HCT VFR BLD AUTO: 38.9 % (ref 34–46.6)
HGB BLD-MCNC: 13.2 G/DL (ref 12–15.9)
IMM GRANULOCYTES # BLD AUTO: 0.02 10*3/MM3 (ref 0–0.05)
IMM GRANULOCYTES NFR BLD AUTO: 0.3 % (ref 0–0.5)
IRON SATN MFR SERPL: 36 % (ref 20–50)
IRON SERPL-MCNC: 134 MCG/DL (ref 37–145)
LYMPHOCYTES # BLD AUTO: 2.38 10*3/MM3 (ref 0.7–3.1)
LYMPHOCYTES NFR BLD AUTO: 34.1 % (ref 19.6–45.3)
MCH RBC QN AUTO: 33.4 PG (ref 26.6–33)
MCHC RBC AUTO-ENTMCNC: 33.9 G/DL (ref 31.5–35.7)
MCV RBC AUTO: 98.5 FL (ref 79–97)
MONOCYTES # BLD AUTO: 0.55 10*3/MM3 (ref 0.1–0.9)
MONOCYTES NFR BLD AUTO: 7.9 % (ref 5–12)
NEUTROPHILS # BLD AUTO: 3.85 10*3/MM3 (ref 1.7–7)
NEUTROPHILS NFR BLD AUTO: 55.2 % (ref 42.7–76)
NRBC BLD AUTO-RTO: 0 /100 WBC (ref 0–0.2)
PLATELET # BLD AUTO: 207 10*3/MM3 (ref 140–450)
POTASSIUM SERPL-SCNC: 3.8 MMOL/L (ref 3.5–5.2)
RBC # BLD AUTO: 3.95 10*6/MM3 (ref 3.77–5.28)
SODIUM SERPL-SCNC: 140 MMOL/L (ref 136–145)
TIBC SERPL-MCNC: 370 MCG/DL
TSH SERPL DL<=0.005 MIU/L-ACNC: 0.07 UIU/ML (ref 0.27–4.2)
UIBC SERPL-MCNC: 236 MCG/DL (ref 112–346)
WBC # BLD AUTO: 6.97 10*3/MM3 (ref 3.4–10.8)

## 2023-04-12 RX ORDER — LEVOTHYROXINE SODIUM 88 UG/1
TABLET ORAL
Qty: 30 TABLET | Refills: 1 | Status: SHIPPED | OUTPATIENT
Start: 2023-04-12

## 2023-04-18 LAB
1OH-MIDAZOLAM UR QL SCN: NOT DETECTED NG/MG CREAT
6MAM UR CFM-MCNC: NEGATIVE NG/ML
7AMINOCLONAZEPAM UR-MCNC: NOT DETECTED NG/MG CREAT
A-OH ALPRAZ/CREAT UR: 93 NG/MG CREAT
A-OH-TRIAZOLAM/CREAT UR CFM: NOT DETECTED NG/MG CREAT
ACP UR QL CFM: NOT DETECTED
ALPRAZ/CREAT UR CFM: 55 NG/MG CREAT
AMPHETAMINES UR SCN-MCNC: NEGATIVE NG/ML
APAP UR QL SCN: NEGATIVE UG/ML
BARBITURATES UR SCN-MCNC: NEGATIVE NG/ML
BENZODIAZ SCN METH UR: NORMAL
BUPRENORPHINE UR QL SCN: NEGATIVE
BUPRENORPHINE/CREAT UR: NOT DETECTED NG/MG CREAT
CANNABINOIDS UR SCN-MCNC: NEGATIVE NG/ML
CARISOPRODOL UR QL: NEGATIVE NG/ML
CLONAZEPAM CTO UR SCN-MCNC: NOT DETECTED NG/MG CREAT
COCAINE+BZE UR QL SCN: NEGATIVE NG/ML
CREAT UR-MCNC: 40 MG/DL
D-METHORPHAN UR-MCNC: NOT DETECTED NG/ML
D-METHORPHAN+LEVORPHANOL UR QL: NOT DETECTED
DESALKYLFLURAZ/CREAT UR: NOT DETECTED NG/MG CREAT
DIAZEPAM/CREAT UR: NOT DETECTED NG/MG CREAT
ETG ETS UR SCN-MCNC: NEGATIVE NG/ML
ETHANOL UR QL SCN: NEGATIVE G/DL
FENTANYL CTO UR SCN-MCNC: NEGATIVE NG/ML
FENTANYL/CREAT UR: NOT DETECTED NG/MG CREAT
FLUNITRAZEPAM UR QL SCN: NOT DETECTED NG/MG CREAT
GABAPENTIN UR-MCNC: NEGATIVE UG/ML
HYPNOTICS UR QL SCN: NEGATIVE
KETAMINE UR QL: NOT DETECTED
LORAZEPAM/CREAT UR: NOT DETECTED NG/MG CREAT
MEPERIDINE CTO UR SCN-MCNC: NEGATIVE NG/ML
METHADONE UR QL SCN>300 NG/ML: NEGATIVE NG/ML
METHADONE+METAB UR QL SCN: NEGATIVE NG/ML
MIDAZOLAM/CREAT UR CFM: NOT DETECTED NG/MG CREAT
MISCELLANEOUS, UR: NEGATIVE
NORBUPRENORPHINE/CREAT UR: NOT DETECTED NG/MG CREAT
NORDIAZEPAM/CREAT UR: NOT DETECTED NG/MG CREAT
NORFENTANYL/CREAT UR: NOT DETECTED NG/MG CREAT
NORFLUNITRAZEPAM UR-MCNC: NOT DETECTED NG/MG CREAT
NORKETAMINE UR-MCNC: NOT DETECTED UG/ML
OPIATES UR SCN-MCNC: NEGATIVE NG/ML
OTHER HALLUCINOGENS UR: NEGATIVE
OXAZEPAM/CREAT UR: NOT DETECTED NG/MG CREAT
OXYCODONE CTO UR SCN-MCNC: NEGATIVE NG/ML
PCP CTO UR SCN-MCNC: NEGATIVE NG/ML
PRESCRIBED MEDICATIONS: NORMAL
PRESCRIBED MEDICATIONS: NORMAL
PROPOXYPH UR QL SCN: NEGATIVE NG/ML
TAPENTADOL CTO UR SCN-MCNC: NEGATIVE NG/ML
TEMAZEPAM/CREAT UR: NOT DETECTED NG/MG CREAT
TRAMADOL CTO UR SCN-MCNC: NEGATIVE NG/ML
ZALEPLON UR-MCNC: NOT DETECTED NG/ML
ZOLPIDEM PHENYL-4-CARB UR QL SCN: NOT DETECTED
ZOLPIDEM UR QL SCN: NOT DETECTED
ZOPICLONE-N-OXIDE UR-MCNC: NOT DETECTED NG/ML

## 2023-05-25 LAB — TSH SERPL DL<=0.005 MIU/L-ACNC: 1.08 UIU/ML (ref 0.27–4.2)

## 2023-06-02 DIAGNOSIS — E03.9 HYPOTHYROIDISM, UNSPECIFIED TYPE: ICD-10-CM

## 2023-06-03 RX ORDER — LEVOTHYROXINE SODIUM 88 UG/1
TABLET ORAL
Qty: 90 TABLET | Refills: 3 | Status: SHIPPED | OUTPATIENT
Start: 2023-06-03

## 2023-06-03 NOTE — TELEPHONE ENCOUNTER
Rx Refill Note  Requested Prescriptions     Pending Prescriptions Disp Refills    levothyroxine (SYNTHROID, LEVOTHROID) 88 MCG tablet [Pharmacy Med Name: Levothyroxine Sodium 88 MCG Oral Tablet] 30 tablet 0     Sig: Take 1 tablet by mouth once daily      Last office visit with prescribing clinician: 4/11/2023   Next office visit with prescribing clinician: 7/13/2023    Last TSH 5/25/23  {    Tyesha Xavier MA  06/03/23, 08:55 EDT

## 2023-06-13 ENCOUNTER — HOSPITAL ENCOUNTER (OUTPATIENT)
Dept: INFUSION THERAPY | Facility: HOSPITAL | Age: 78
End: 2023-06-13
Payer: MEDICARE

## 2023-07-13 PROBLEM — S72.001D CLOSED RIGHT HIP FRACTURE, WITH ROUTINE HEALING, SUBSEQUENT ENCOUNTER: Status: RESOLVED | Noted: 2023-02-12 | Resolved: 2023-07-13

## 2023-07-13 PROBLEM — R76.8 ANA POSITIVE: Status: RESOLVED | Noted: 2022-07-12 | Resolved: 2023-07-13

## 2023-07-13 PROBLEM — M80.80XA PATHOLOGICAL FRACTURE DUE TO OSTEOPOROSIS: Status: RESOLVED | Noted: 2017-01-19 | Resolved: 2023-07-13

## 2023-07-13 PROBLEM — D50.0 ANEMIA, BLOOD LOSS: Status: RESOLVED | Noted: 2023-04-11 | Resolved: 2023-07-13

## 2023-07-13 PROBLEM — M79.7 FIBROMYALGIA: Status: RESOLVED | Noted: 2017-01-19 | Resolved: 2023-07-13

## 2023-07-13 PROBLEM — Z78.9 IMPAIRED MOBILITY AND ADLS: Status: RESOLVED | Noted: 2023-02-22 | Resolved: 2023-07-13
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
## 2023-07-13 PROBLEM — R20.2 TINGLING OF BOTH FEET: Status: RESOLVED | Noted: 2022-06-07 | Resolved: 2023-07-13

## 2023-07-13 PROBLEM — Z74.09 IMPAIRED MOBILITY AND ADLS: Status: RESOLVED | Noted: 2023-02-22 | Resolved: 2023-07-13

## 2023-07-13 PROBLEM — R51.9 NONINTRACTABLE HEADACHE: Status: RESOLVED | Noted: 2021-11-29 | Resolved: 2023-07-13

## 2023-07-13 PROBLEM — R53.81 PHYSICAL DECONDITIONING: Status: RESOLVED | Noted: 2023-02-22 | Resolved: 2023-07-13

## 2023-08-03 NOTE — TELEPHONE ENCOUNTER
Called patient to confirm colonoscopy for 03/18/22 at 7 am. Patient confirmed appointment   Operative Note    Carol Sullivan  8/1/2023    Pre-op Diagnosis:   Mastitis [N61.0]    Post-op Diagnosis:     Post-Op Diagnosis Codes:     * Mastitis [N61.0]    Procedure/CPTr Codes:      Procedure(s):  BREAST ABSCESS INCISION AND DRAINAGE    Surgeon(s):  Fox Roper MD    Anesthesia: Monitored Anesthesia Care    Staff:   Circulator: Trish Vásquez RN  Scrub Person: Monica Starks; Nai Costa RN  Assistant: Shelley Delcid    Estimated Blood Loss: minimal    Specimens:                ID Type Source Tests Collected by Time   1 : left breast anaerobic/aerobic culture Tissue Breast, Left ANAEROBIC CULTURE, TISSUE / BONE CULTURE Fox Roper MD 8/1/2023 1241         Drains:   External Urinary Catheter (Active)   Site Assessment Clean;Skin intact 08/03/23 0800   Application/Removal external catheter changed;skin care provided 08/03/23 0800   Collection Container Wall suction 08/03/23 0800   Wall suction (mmHG) 125 mmHG 08/02/23 2000   Securement Method Tape 08/03/23 0800   Catheter care complete Yes 08/03/23 0800   Output (mL) 400 mL 08/03/23 1300       Findings: Minimal purulent drainage from the left breast    Complications: None    Indication: 83-year-old woman who presented with left breast cellulitis.  She was treated with antibiotics but did not improve.  She received ultrasound which showed a possible early abscess.  Given her lack of improvement on antibiotics and the possibility of an abscess she was taken to the OR for incision and drainage.    Operative Note:    The patient was taken to the operating room and placed in the supine position.  She was prepped and draped in usual sterile fashion.  A timeout was performed indicating correct patient, procedure and positioning.  I began by injecting local anesthetic in the left breast.  A transverse incision was made over the breast just to the right of the areola.  I was able to express a small amount of purulent material.  The rest the fluid  appeared to be edema and blood.  This was all evacuated.  The wound was then packed with half-inch iodoform.  Patient tolerated procedure well and was extubated without issue.         Assistant: Shelley Delcid was responsible for performing the following activities: Retraction, Suction, Irrigation, Suturing, Closing, and Placing Dressing and their skilled assistance was necessary for the success of this case.    Fox Roper MD     Date: 8/3/2023  Time: 13:23 CDT

## 2023-08-14 ENCOUNTER — HOSPITAL ENCOUNTER (OUTPATIENT)
Dept: ULTRASOUND IMAGING | Facility: HOSPITAL | Age: 78
Discharge: HOME | End: 2023-08-14
Payer: MEDICARE

## 2023-08-14 ENCOUNTER — HOSPITAL ENCOUNTER (OUTPATIENT)
Dept: CT IMAGING | Facility: HOSPITAL | Age: 78
Discharge: HOME | End: 2023-08-14
Payer: MEDICARE

## 2023-08-14 DIAGNOSIS — R09.89 BILATERAL CAROTID BRUITS: ICD-10-CM

## 2023-08-14 PROCEDURE — 71271 CT THORAX LUNG CANCER SCR C-: CPT

## 2023-08-14 PROCEDURE — 93880 EXTRACRANIAL BILAT STUDY: CPT

## 2023-09-13 ENCOUNTER — OFFICE VISIT (OUTPATIENT)
Dept: INTERNAL MEDICINE | Facility: CLINIC | Age: 78
End: 2023-09-13
Payer: MEDICARE

## 2023-09-13 VITALS
TEMPERATURE: 98 F | WEIGHT: 134 LBS | HEIGHT: 66 IN | SYSTOLIC BLOOD PRESSURE: 110 MMHG | HEART RATE: 59 BPM | DIASTOLIC BLOOD PRESSURE: 58 MMHG | BODY MASS INDEX: 21.53 KG/M2 | OXYGEN SATURATION: 99 %

## 2023-09-13 DIAGNOSIS — K21.00 GASTROESOPHAGEAL REFLUX DISEASE WITH ESOPHAGITIS WITHOUT HEMORRHAGE: ICD-10-CM

## 2023-09-13 DIAGNOSIS — Z79.899 CONTROLLED SUBSTANCE AGREEMENT SIGNED: Primary | ICD-10-CM

## 2023-09-13 DIAGNOSIS — K21.9 GASTROESOPHAGEAL REFLUX DISEASE WITHOUT ESOPHAGITIS: ICD-10-CM

## 2023-09-13 DIAGNOSIS — F41.1 GENERALIZED ANXIETY DISORDER: ICD-10-CM

## 2023-09-13 DIAGNOSIS — E03.9 HYPOTHYROIDISM, UNSPECIFIED TYPE: ICD-10-CM

## 2023-09-13 DIAGNOSIS — E78.5 DYSLIPIDEMIA: Chronic | ICD-10-CM

## 2023-09-13 DIAGNOSIS — T78.40XD ALLERGY, SUBSEQUENT ENCOUNTER: ICD-10-CM

## 2023-09-13 RX ORDER — FLUTICASONE PROPIONATE 50 MCG
2 SPRAY, SUSPENSION (ML) NASAL DAILY
Qty: 48 G | Refills: 2 | Status: SHIPPED | OUTPATIENT
Start: 2023-09-13

## 2023-09-13 RX ORDER — OMEPRAZOLE 20 MG/1
20 CAPSULE, DELAYED RELEASE ORAL EVERY 12 HOURS SCHEDULED
Qty: 180 CAPSULE | Refills: 1 | Status: SHIPPED | OUTPATIENT
Start: 2023-09-13

## 2023-09-13 RX ORDER — LEVOTHYROXINE SODIUM 88 UG/1
TABLET ORAL
Qty: 90 TABLET | Refills: 3 | Status: SHIPPED | OUTPATIENT
Start: 2023-09-13

## 2023-09-13 NOTE — ASSESSMENT & PLAN NOTE
On cetirizine and occasional Flonase   stable on current medication and dosage. Will continue current management. Refill medication as necessary.

## 2023-09-13 NOTE — ASSESSMENT & PLAN NOTE
Stable on current medication and dosage. Will continue current management. Refill medication as necessary.  On levothyroxine 88 last labs 2 months ago TSH normal

## 2023-09-13 NOTE — ASSESSMENT & PLAN NOTE
Advised proper diet and exercise  On fish oil  Stable on current medication and dosage. Will continue current management. Refill medication as necessary.  Not need statin given patient's lack of risk factors and age

## 2023-09-13 NOTE — ASSESSMENT & PLAN NOTE
Patient has been on Xanax for several years now.  There is no documentation of previously tried antianxiety medication such as SSRIs, SNRIs or Wellbutrin.  I advised patient that given her history of long-term use of benzodiazepine, she will need reevaluation by behavioral health for proper care and management.  I advised her that I would be happy to refill alprazolam until she sees behavioral health and establishes care with them however, not be able to prescribe this any further after behavioral health appointment or if she misses appointment with them.  Discussed risks with taking benzodiazepine given her age include respiratory distress and death.  Patient agreed and showed complete understanding.

## 2023-09-13 NOTE — ASSESSMENT & PLAN NOTE
Stable on current medication and dosage. Will continue current management. Refill medication as necessary.  On omeprazole

## 2023-09-13 NOTE — PROGRESS NOTES
Office Note     Name: Yamileth De Guzman    : 1945     MRN: [id number]     Chief Complaint  Follow-up (To establish care with Dr. Montanez today, former Dr. Dong patient. ), Heartburn, Anxiety, and Hypothyroidism    Subjective     History of Present Illness:  Yamileth De Guzman is a 77 y.o. female who presents today for follow up on chronic conditions.     Anxiety: on alprazolam (since before ), she notes that she takes about 90 pills a year, admits to having to cut a pill in 1/2, about 2-3x/month  Hypothyroidism: stable on meds  GERD: daily omeprazole, occasionally takes a 2nd dose (about 3-4x/month)  Allergies: on zyrtec and flonase  HLD: on fish oil, high LDL   Trigger finger: Occasional on right fifth digit but does not bother her much right now, is tolerable    Smoker: cigarettes 1ppd x 50 years. LDCT 2023 lung nodules seen. Repeat in 1 year  Mammogram: scheduled on 23   DEXA scan: scheduled on 23      Family History:   Family History   Problem Relation Age of Onset    Hearing loss Father     Arthritis Father     Cancer Father     GI problems Father     Breast cancer Sister     Colon cancer Neg Hx        Social History:   Social History     Socioeconomic History    Marital status:    Tobacco Use    Smoking status: Every Day     Packs/day: 1.00     Years: 40.00     Pack years: 40.00     Types: Cigarettes    Smokeless tobacco: Never   Vaping Use    Vaping Use: Never used   Substance and Sexual Activity    Alcohol use: Not Currently     Comment: moderate    Drug use: No    Sexual activity: Defer       Health Maintenance   Topic Date Due    DXA SCAN  2023    COVID-19 Vaccine (1) 2025 (Originally 3/19/1946)    ZOSTER VACCINE (2 of 2) 2026 (Originally 2015)    INFLUENZA VACCINE  10/01/2023    ANNUAL WELLNESS VISIT  2024    LIPID PANEL  2024    LUNG CANCER SCREENING  2024    COLORECTAL CANCER SCREENING  2025    TDAP/TD VACCINES (3 - Td  "or Tdap) 04/15/2025    HEPATITIS C SCREENING  Completed    Pneumococcal Vaccine 65+  Completed       Objective     Vital Signs  /58   Pulse 59   Temp 98 °F (36.7 °C)   Ht 167.6 cm (65.98\")   Wt 60.8 kg (134 lb)   SpO2 99%   BMI 21.64 kg/m²   Estimated body mass index is 21.64 kg/m² as calculated from the following:    Height as of this encounter: 167.6 cm (65.98\").    Weight as of this encounter: 60.8 kg (134 lb).    BMI is within normal parameters. No other follow-up for BMI required.      Physical Exam  Vitals and nursing note reviewed.   Constitutional:       Appearance: Normal appearance.   HENT:      Head: Normocephalic and atraumatic.   Cardiovascular:      Rate and Rhythm: Normal rate and regular rhythm.      Pulses: Normal pulses.      Heart sounds: Normal heart sounds.   Pulmonary:      Effort: Pulmonary effort is normal. No respiratory distress.      Breath sounds: Normal breath sounds. No wheezing, rhonchi or rales.   Abdominal:      General: Abdomen is flat. Bowel sounds are normal.      Palpations: Abdomen is soft.      Tenderness: There is no abdominal tenderness. There is no guarding.   Musculoskeletal:      Cervical back: Neck supple.   Skin:     General: Skin is warm.      Capillary Refill: Capillary refill takes less than 2 seconds.   Neurological:      General: No focal deficit present.      Mental Status: She is alert. Mental status is at baseline.   Psychiatric:         Mood and Affect: Mood normal.         Behavior: Behavior normal.        Procedures     Assessment and Plan     Diagnoses and all orders for this visit:    1. Controlled substance agreement signed (Primary)  -     ToxAssure Flex 22, Urine - Urine, Random Void; Future    2. Hypothyroidism, unspecified type  Assessment & Plan:  Stable on current medication and dosage. Will continue current management. Refill medication as necessary.  On levothyroxine 88 last labs 2 months ago TSH normal    Orders:  -     levothyroxine " (SYNTHROID, LEVOTHROID) 88 MCG tablet; Take 1 tablet by mouth once daily  Dispense: 90 tablet; Refill: 3    3. Gastroesophageal reflux disease with esophagitis without hemorrhage  -     omeprazole (priLOSEC) 20 MG capsule; Take 1 capsule by mouth Every 12 (Twelve) Hours.  Dispense: 180 capsule; Refill: 1    4. Gastroesophageal reflux disease without esophagitis  Assessment & Plan:  Stable on current medication and dosage. Will continue current management. Refill medication as necessary.  On omeprazole      5. Generalized anxiety disorder  Assessment & Plan:  Patient has been on Xanax for several years now.  There is no documentation of previously tried antianxiety medication such as SSRIs, SNRIs or Wellbutrin.  I advised patient that given her history of long-term use of benzodiazepine, she will need reevaluation by behavioral health for proper care and management.  I advised her that I would be happy to refill alprazolam until she sees behavioral health and establishes care with them however, not be able to prescribe this any further after behavioral health appointment or if she misses appointment with them.  Discussed risks with taking benzodiazepine given her age include respiratory distress and death.  Patient agreed and showed complete understanding.    Orders:  -     Ambulatory Referral to Behavioral Health    6. Dyslipidemia  Assessment & Plan:  Advised proper diet and exercise  On fish oil  Stable on current medication and dosage. Will continue current management. Refill medication as necessary.  Not need statin given patient's lack of risk factors and age      7. Allergy, subsequent encounter  Assessment & Plan:  On cetirizine and occasional Flonase   stable on current medication and dosage. Will continue current management. Refill medication as necessary.        Other orders  -     fluticasone (FLONASE) 50 MCG/ACT nasal spray; 2 sprays into the nostril(s) as directed by provider Daily.  Dispense: 48 g;  Refill: 2         Counseling was given to patient for the following topics: instructions for management and importance of treatment compliance.    Follow Up  Return in about 3 months (around 12/13/2023).    MD ALBERTO [facility]  [address] [place] [id number] [phone]

## 2023-09-16 LAB
1OH-MIDAZOLAM UR QL SCN: NOT DETECTED NG/MG CREAT
6MAM UR QL SCN: NEGATIVE NG/ML
7AMINOCLONAZEPAM/CREAT UR: NOT DETECTED NG/MG CREAT
A-OH ALPRAZ/CREAT UR: NOT DETECTED NG/MG CREAT
A-OH-TRIAZOLAM/CREAT UR CFM: NOT DETECTED NG/MG CREAT
ACP UR QL CFM: NOT DETECTED
ALPRAZ/CREAT UR CFM: NOT DETECTED NG/MG CREAT
AMPHETAMINES UR QL SCN: NEGATIVE NG/ML
APAP UR QL SCN: NEGATIVE UG/ML
BARBITURATES UR QL SCN: NEGATIVE NG/ML
BENZODIAZ SCN METH UR: NEGATIVE
BUPRENORPHINE UR QL SCN: NEGATIVE
BUPRENORPHINE/CREAT UR: NOT DETECTED NG/MG CREAT
CARISOPRODOL UR QL: NEGATIVE NG/ML
CLONAZEPAM/CREAT UR CFM: NOT DETECTED NG/MG CREAT
COCAINE+BZE UR QL SCN: NEGATIVE NG/ML
CREAT UR-MCNC: 22 MG/DL
D-METHORPHAN UR-MCNC: NOT DETECTED NG/ML
D-METHORPHAN+LEVORPHANOL UR QL: NOT DETECTED
DESALKYLFLURAZ/CREAT UR: NOT DETECTED NG/MG CREAT
DIAZEPAM/CREAT UR: NOT DETECTED NG/MG CREAT
ETHANOL UR QL SCN: NEGATIVE G/DL
ETHANOL UR QL SCN: NEGATIVE NG/ML
FENTANYL CTO UR SCN-MCNC: NEGATIVE NG/ML
FENTANYL/CREAT UR: NOT DETECTED NG/MG CREAT
FLUNITRAZEPAM UR QL SCN: NOT DETECTED NG/MG CREAT
GABAPENTIN UR-MCNC: NEGATIVE UG/ML
HYPNOTICS UR QL SCN: NEGATIVE
KETAMINE UR QL: NOT DETECTED
LORAZEPAM/CREAT UR: NOT DETECTED NG/MG CREAT
MEPERIDINE UR QL SCN: NEGATIVE NG/ML
METHADONE UR QL SCN: NEGATIVE NG/ML
METHADONE+METAB UR QL SCN: NEGATIVE NG/ML
MIDAZOLAM/CREAT UR CFM: NOT DETECTED NG/MG CREAT
MISCELLANEOUS, UR: NEGATIVE
NORBUPRENORPHINE/CREAT UR: NOT DETECTED NG/MG CREAT
NORDIAZEPAM/CREAT UR: NOT DETECTED NG/MG CREAT
NORFENTANYL/CREAT UR: NOT DETECTED NG/MG CREAT
NORFLUNITRAZEPAM UR-MCNC: NOT DETECTED NG/MG CREAT
NORKETAMINE UR-MCNC: NOT DETECTED UG/ML
OPIATES UR SCN-MCNC: NEGATIVE NG/ML
OTHER HALLUCINOGENS UR: NEGATIVE
OXAZEPAM/CREAT UR: NOT DETECTED NG/MG CREAT
OXYCODONE CTO UR SCN-MCNC: NEGATIVE NG/ML
PCP UR QL SCN: NEGATIVE NG/ML
PRESCRIBED MEDICATIONS: NORMAL
PRESCRIBED MEDICATIONS: NORMAL
PROPOXYPH UR QL SCN: NEGATIVE NG/ML
TAPENTADOL CTO UR SCN-MCNC: NEGATIVE NG/ML
TEMAZEPAM/CREAT UR: NOT DETECTED NG/MG CREAT
TRAMADOL UR QL SCN: NEGATIVE NG/ML
ZALEPLON UR-MCNC: NOT DETECTED NG/ML
ZOLPIDEM PHENYL-4-CARB UR QL SCN: NOT DETECTED
ZOLPIDEM UR QL SCN: NOT DETECTED
ZOPICLONE-N-OXIDE UR-MCNC: NOT DETECTED NG/ML

## 2023-09-21 ENCOUNTER — PREP FOR SURGERY (OUTPATIENT)
Dept: OTHER | Facility: HOSPITAL | Age: 78
End: 2023-09-21
Payer: MEDICARE

## 2023-09-21 DIAGNOSIS — H25.11 NUCLEAR SCLEROTIC CATARACT OF RIGHT EYE: Primary | ICD-10-CM

## 2023-09-21 RX ORDER — CYCLOPENT/TROPIC/PHEN/KETR/WAT 1%-1%-2.5%
1 DROPS (EA) OPHTHALMIC (EYE)
Status: CANCELLED | OUTPATIENT
Start: 2023-09-21 | End: 2023-09-21

## 2023-09-21 RX ORDER — SODIUM CHLORIDE 0.9 % (FLUSH) 0.9 %
1-10 SYRINGE (ML) INJECTION AS NEEDED
Status: CANCELLED | OUTPATIENT
Start: 2023-09-21

## 2023-09-21 RX ORDER — TETRACAINE HYDROCHLORIDE 5 MG/ML
1 SOLUTION OPHTHALMIC SEE ADMIN INSTRUCTIONS
Status: CANCELLED | OUTPATIENT
Start: 2023-09-21

## 2023-09-21 RX ORDER — PREDNISOLONE ACETATE 10 MG/ML
1 SUSPENSION/ DROPS OPHTHALMIC SEE ADMIN INSTRUCTIONS
Status: CANCELLED | OUTPATIENT
Start: 2023-09-21

## 2023-09-21 RX ORDER — SODIUM CHLORIDE 9 MG/ML
40 INJECTION, SOLUTION INTRAVENOUS AS NEEDED
Status: CANCELLED | OUTPATIENT
Start: 2023-09-21

## 2023-09-21 RX ORDER — SODIUM CHLORIDE 0.9 % (FLUSH) 0.9 %
10 SYRINGE (ML) INJECTION EVERY 12 HOURS SCHEDULED
Status: CANCELLED | OUTPATIENT
Start: 2023-09-21

## 2023-09-27 PROBLEM — H25.11 NUCLEAR SCLEROTIC CATARACT OF RIGHT EYE: Status: ACTIVE | Noted: 2023-09-27

## 2023-09-27 NOTE — PRE-PROCEDURE INSTRUCTIONS
PAT phone history completed with pt for upcoming procedure on 10/2/23 with Dr. Avalos.      PAT PASS GIVEN/REVIEWED WITH PT.  VERBALIZED UNDERSTANDING OF THE FOLLOWING:  DO NOT EAT, DRINK, SMOKE, USE SMOKELESS TOBACCO OR CHEW GUM AFTER MIDNIGHT THE NIGHT BEFORE SURGERY.  THIS ALSO INCLUDES HARD CANDIES AND MINTS.    DO NOT SHAVE THE AREA TO BE OPERATED ON AT LEAST 48 HOURS PRIOR TO THE PROCEDURE.  DO NOT WEAR MAKE UP OR NAIL POLISH.  DO NOT LEAVE IN ANY PIERCING OR WEAR JEWELRY THE DAY OF SURGERY.      DO NOT USE ADHESIVES IF YOU WEAR DENTURES.    DO NOT WEAR EYE CONTACTS; BRING IN YOUR GLASSES.    ONLY TAKE MEDICATION THE MORNING OF YOUR PROCEDURE IF INSTRUCTED BY YOUR SURGEON WITH ENOUGH WATER TO SWALLOW THE MEDICATION.  IF YOUR SURGEON DID NOT SPECIFY WHICH MEDICATIONS TO TAKE, YOU WILL NEED TO CALL THEIR OFFICE FOR FURTHER INSTRUCTIONS AND DO AS THEY INSTRUCT.    LEAVE ANYTHING YOU CONSIDER VALUABLE AT HOME.    YOU WILL NEED TO ARRANGE FOR SOMEONE TO DRIVE YOU HOME AFTER SURGERY.  IT IS RECOMMENDED THAT YOU DO NOT DRIVE, WORK, DRINK ALCOHOL OR MAKE MAJOR DECISIONS FOR AT LEAST 24 HOURS AFTER YOUR PROCEDURE IS COMPLETE.      THE DAY OF YOUR PROCEDURE, BRING IN THE FOLLOWING IF APPLICABLE:   PICTURE ID AND INSURANCE/MEDICARE OR MEDICAID CARDS/ANY CO-PAY THAT MAY BE DUE   COPY OF ADVANCED DIRECTIVE/LIVING WILL/POWER OR    CPAP/BIPAP/INHALERS   SKIN PREP SHEET   YOUR PREADMISSION TESTING PASS (IF NOT A PHONE HISTORY)    Medication instructions given to pt by RN per anesthesia protocol.  Pt referred back to surgeon for further instructions if he/she is on any blood thinners.

## 2023-10-02 ENCOUNTER — ANESTHESIA EVENT (OUTPATIENT)
Dept: PERIOP | Facility: HOSPITAL | Age: 78
End: 2023-10-02
Payer: MEDICARE

## 2023-10-02 ENCOUNTER — HOSPITAL ENCOUNTER (OUTPATIENT)
Facility: HOSPITAL | Age: 78
Setting detail: HOSPITAL OUTPATIENT SURGERY
Discharge: HOME | End: 2023-10-02
Attending: OPHTHALMOLOGY | Admitting: OPHTHALMOLOGY
Payer: MEDICARE

## 2023-10-02 ENCOUNTER — ANESTHESIA (OUTPATIENT)
Dept: PERIOP | Facility: HOSPITAL | Age: 78
End: 2023-10-02
Payer: MEDICARE

## 2023-10-02 VITALS
HEIGHT: 66 IN | SYSTOLIC BLOOD PRESSURE: 137 MMHG | OXYGEN SATURATION: 97 % | RESPIRATION RATE: 18 BRPM | HEART RATE: 56 BPM | DIASTOLIC BLOOD PRESSURE: 62 MMHG | WEIGHT: 134 LBS | BODY MASS INDEX: 21.53 KG/M2 | TEMPERATURE: 97.2 F

## 2023-10-02 DIAGNOSIS — H25.11 NUCLEAR SCLEROTIC CATARACT OF RIGHT EYE: ICD-10-CM

## 2023-10-02 PROCEDURE — 25010000002 MIDAZOLAM PER 1MG: Performed by: NURSE ANESTHETIST, CERTIFIED REGISTERED

## 2023-10-02 PROCEDURE — 25010000002 FENTANYL CITRATE (PF) 50 MCG/ML SOLUTION: Performed by: NURSE ANESTHETIST, CERTIFIED REGISTERED

## 2023-10-02 PROCEDURE — 25010000002 PROPOFOL 10 MG/ML EMULSION: Performed by: NURSE ANESTHETIST, CERTIFIED REGISTERED

## 2023-10-02 PROCEDURE — V2632 POST CHMBR INTRAOCULAR LENS: HCPCS | Performed by: OPHTHALMOLOGY

## 2023-10-02 DEVICE — LENS MONOFOCAL IQ CC60WF205: Type: IMPLANTABLE DEVICE | Site: POSTERIOR CHAMBER | Status: FUNCTIONAL

## 2023-10-02 RX ORDER — PROPOFOL 10 MG/ML
VIAL (ML) INTRAVENOUS AS NEEDED
Status: DISCONTINUED | OUTPATIENT
Start: 2023-10-02 | End: 2023-10-02 | Stop reason: SURG

## 2023-10-02 RX ORDER — NEOMYCIN SULFATE, POLYMYXIN B SULFATE, AND DEXAMETHASONE 3.5; 10000; 1 MG/G; [USP'U]/G; MG/G
OINTMENT OPHTHALMIC AS NEEDED
Status: DISCONTINUED | OUTPATIENT
Start: 2023-10-02 | End: 2023-10-02 | Stop reason: HOSPADM

## 2023-10-02 RX ORDER — CYCLOPENT/TROPIC/PHEN/KETR/WAT 1%-1%-2.5%
1 DROPS (EA) OPHTHALMIC (EYE)
Status: COMPLETED | OUTPATIENT
Start: 2023-10-02 | End: 2023-10-02

## 2023-10-02 RX ORDER — MIDAZOLAM HYDROCHLORIDE 2 MG/2ML
INJECTION, SOLUTION INTRAMUSCULAR; INTRAVENOUS AS NEEDED
Status: DISCONTINUED | OUTPATIENT
Start: 2023-10-02 | End: 2023-10-02 | Stop reason: SURG

## 2023-10-02 RX ORDER — SODIUM CHLORIDE, SODIUM LACTATE, POTASSIUM CHLORIDE, CALCIUM CHLORIDE 600; 310; 30; 20 MG/100ML; MG/100ML; MG/100ML; MG/100ML
1000 INJECTION, SOLUTION INTRAVENOUS CONTINUOUS
Status: DISCONTINUED | OUTPATIENT
Start: 2023-10-02 | End: 2023-10-02 | Stop reason: HOSPADM

## 2023-10-02 RX ORDER — TETRACAINE HYDROCHLORIDE 5 MG/ML
SOLUTION OPHTHALMIC AS NEEDED
Status: DISCONTINUED | OUTPATIENT
Start: 2023-10-02 | End: 2023-10-02 | Stop reason: HOSPADM

## 2023-10-02 RX ORDER — SODIUM CHLORIDE 9 MG/ML
40 INJECTION, SOLUTION INTRAVENOUS AS NEEDED
Status: DISCONTINUED | OUTPATIENT
Start: 2023-10-02 | End: 2023-10-02 | Stop reason: HOSPADM

## 2023-10-02 RX ORDER — FENTANYL CITRATE 50 UG/ML
INJECTION, SOLUTION INTRAMUSCULAR; INTRAVENOUS AS NEEDED
Status: DISCONTINUED | OUTPATIENT
Start: 2023-10-02 | End: 2023-10-02 | Stop reason: SURG

## 2023-10-02 RX ORDER — LIDOCAINE HYDROCHLORIDE 40 MG/ML
INJECTION, SOLUTION RETROBULBAR; TOPICAL AS NEEDED
Status: DISCONTINUED | OUTPATIENT
Start: 2023-10-02 | End: 2023-10-02 | Stop reason: HOSPADM

## 2023-10-02 RX ORDER — BALANCED SALT SOLUTION 6.4; .75; .48; .3; 3.9; 1.7 MG/ML; MG/ML; MG/ML; MG/ML; MG/ML; MG/ML
SOLUTION OPHTHALMIC AS NEEDED
Status: DISCONTINUED | OUTPATIENT
Start: 2023-10-02 | End: 2023-10-02 | Stop reason: HOSPADM

## 2023-10-02 RX ORDER — PREDNISOLONE ACETATE 10 MG/ML
1 SUSPENSION/ DROPS OPHTHALMIC SEE ADMIN INSTRUCTIONS
Status: DISCONTINUED | OUTPATIENT
Start: 2023-10-02 | End: 2023-10-02 | Stop reason: HOSPADM

## 2023-10-02 RX ORDER — PREDNISOLONE ACETATE 10 MG/ML
SUSPENSION/ DROPS OPHTHALMIC AS NEEDED
Status: DISCONTINUED | OUTPATIENT
Start: 2023-10-02 | End: 2023-10-02 | Stop reason: HOSPADM

## 2023-10-02 RX ORDER — SODIUM CHLORIDE 0.9 % (FLUSH) 0.9 %
1-10 SYRINGE (ML) INJECTION AS NEEDED
Status: DISCONTINUED | OUTPATIENT
Start: 2023-10-02 | End: 2023-10-02 | Stop reason: HOSPADM

## 2023-10-02 RX ORDER — ACETAZOLAMIDE 500 MG/1
500 CAPSULE, EXTENDED RELEASE ORAL ONCE
Status: COMPLETED | OUTPATIENT
Start: 2023-10-02 | End: 2023-10-02

## 2023-10-02 RX ORDER — KETAMINE HYDROCHLORIDE 50 MG/ML
INJECTION, SOLUTION, CONCENTRATE INTRAMUSCULAR; INTRAVENOUS AS NEEDED
Status: DISCONTINUED | OUTPATIENT
Start: 2023-10-02 | End: 2023-10-02 | Stop reason: SURG

## 2023-10-02 RX ORDER — TETRACAINE HYDROCHLORIDE 5 MG/ML
1 SOLUTION OPHTHALMIC SEE ADMIN INSTRUCTIONS
Status: COMPLETED | OUTPATIENT
Start: 2023-10-02 | End: 2023-10-02

## 2023-10-02 RX ORDER — SODIUM CHLORIDE 0.9 % (FLUSH) 0.9 %
10 SYRINGE (ML) INJECTION EVERY 12 HOURS SCHEDULED
Status: DISCONTINUED | OUTPATIENT
Start: 2023-10-02 | End: 2023-10-02 | Stop reason: HOSPADM

## 2023-10-02 RX ADMIN — Medication 1 DROP: at 08:30

## 2023-10-02 RX ADMIN — PROPOFOL 25 MG: 10 INJECTION, EMULSION INTRAVENOUS at 09:38

## 2023-10-02 RX ADMIN — FENTANYL CITRATE 50 MCG: 50 INJECTION, SOLUTION INTRAMUSCULAR; INTRAVENOUS at 09:33

## 2023-10-02 RX ADMIN — KETAMINE HYDROCHLORIDE 20 MG: 50 INJECTION, SOLUTION INTRAMUSCULAR; INTRAVENOUS at 09:33

## 2023-10-02 RX ADMIN — ACETAZOLAMIDE 500 MG: 500 CAPSULE, EXTENDED RELEASE ORAL at 10:01

## 2023-10-02 RX ADMIN — Medication 1 DROP: at 08:25

## 2023-10-02 RX ADMIN — TETRACAINE HYDROCHLORIDE 1 DROP: 5 SOLUTION OPHTHALMIC at 08:19

## 2023-10-02 RX ADMIN — Medication 1 DROP: at 08:20

## 2023-10-02 RX ADMIN — TETRACAINE HYDROCHLORIDE 1 DROP: 5 SOLUTION OPHTHALMIC at 08:18

## 2023-10-02 RX ADMIN — SODIUM CHLORIDE, POTASSIUM CHLORIDE, SODIUM LACTATE AND CALCIUM CHLORIDE 1000 ML: 600; 310; 30; 20 INJECTION, SOLUTION INTRAVENOUS at 08:25

## 2023-10-02 RX ADMIN — PROPOFOL 50 MG: 10 INJECTION, EMULSION INTRAVENOUS at 09:33

## 2023-10-02 RX ADMIN — MIDAZOLAM HYDROCHLORIDE 1 MG: 1 INJECTION, SOLUTION INTRAMUSCULAR; INTRAVENOUS at 09:33

## 2023-10-02 NOTE — ANESTHESIA PREPROCEDURE EVALUATION
Anesthesia Evaluation     Patient summary reviewed and Nursing notes reviewed   no history of anesthetic complications:   NPO Solid Status: > 8 hours  NPO Liquid Status: > 8 hours           Airway   Mallampati: I  TM distance: >3 FB  Neck ROM: full  Possible difficult intubation  Dental - normal exam   (+) edentulous    Pulmonary - normal exam   (+) a smoker Current,  Cardiovascular   Exercise tolerance: good (4-7 METS)    ECG reviewed  Rhythm: regular  Rate: normal    (+) carotid bruits, hyperlipidemia,  carotid artery disease      Neuro/Psych  (+) psychiatric history Anxiety  GI/Hepatic/Renal/Endo    (+) GERD, thyroid problem hypothyroidism    Musculoskeletal     (+) arthralgias, chronic pain  Abdominal  - normal exam    Abdomen: soft.  Bowel sounds: normal.   Substance History      OB/GYN          Other   arthritis, autoimmune disease rheumatoid arthritis,     ROS/Med Hx Other: Assessment:    Closed right hip fracture, initial encounter ([facility])    Rheumatoid arthritis (HCC)    Gastroesophageal reflux disease without esophagitis    Osteoporosis with fracture    Nicotine dependence, cigarettes, uncomplicated     Chronic idiopathic constipation                  Anesthesia Plan    ASA 3     MAC     (Risks and benefits discussed including risk of aspiration, recall and dental damage. All patient questions answered. Will continue with POC.  )  intravenous induction     Anesthetic plan, risks, benefits, and alternatives have been provided, discussed and informed consent has been obtained with: patient.  Pre-procedure education provided    CODE STATUS:

## 2023-10-02 NOTE — OP NOTE
OPERATIVE NOTE    Date of Procedure: 10/2/2023  Patient Name: Yamileth De Guzman  Patient MRN: [id number]  YOB: 1945     Preoperative Diagnosis: Right nuclear sclerotic cataract.     Postoperative Diagnosis: Right nuclear sclerotic cataract.     Procedure Performed: Phacoemulsification with implantation of a  foldable posterior chamber intraocular lens, Right eye.     Surgeon: Jet Avalos MD    Anesthesia:  Monitored Anesthesia Care (MAC)      Brief History and Indication: The patient presents with a history of past progressive loss of vision.  Patient was diagnosed with cataract and requests removal for increased ability to read and see.     Operation Description: The patient was taken to the OR and prepped and draped in the usual sterile ophthalmic fashion. A lid speculum was placed in the eye.  A #75 blade was then used to make a stab incision two o’clock hours from the intended temporal clear cornea groove. The anterior chamber was then inflated with a Viscoelastic. A metal microkeratome blade was then used to enter the anterior chamber at the temporal clear cornea site. A three level tunnel incision was made. A curvilinear capsulorrhexis was then performed with a bent cystotome needle and capsulorrhexis forceps.  BSS on a 30 gauge bent cannula was used to hydro-dissect, and hydro-delineate the lens. Good fluid waves and hydro-delineation were noted. Phacoemulsification was then used to remove nuclear material without complications. The residual cortical and lenticular material was then removed with irrigation and aspiration. Viscoelastics were then used to inflate the bag in a soft shell technique. A PCIOL was injected into the bag. Post-implantation, there were no rents or tears in the bag and the lens was noted to be stable and centered. The residual Viscoelastic was then removed with irrigation and aspiration.  The wound was checked and found to be without leaks. Therefore a Polydex ointment  and one drop of Durezol eye drop was placed in the eye.     Implant Information:   Implant Name Type Inv. Item Serial No.  Lot No. LRB No. Used Action   LENS MONOFOCAL IQ [id number] - [id number] - [id number] Implant LENS MONOFOCAL IQ [id number] COLIN  Right 1 Implanted       Complications: None    Estimated Blood Loss:  Less than 1 cc.       []   Changed to complex procedure due to: []  hypermature, white cataract. Blue dye was used. []   small iris. A Malyugin Ring was used.    Discharge and Condition  The patient was transported to same day surgery in excellent condition and scheduled for follow-up tomorrow morning. The patient was given instructions on use of eye drops for the operative eye and was specifically instructed to call Dr. Avalos at his office or home for any nausea, vomiting, headache, decreased visual acuity, or pain, or if the patient had any general concerns.    Jet Avalos MD  10/2/2023

## 2023-10-02 NOTE — H&P
[facility]         History and Physical    Patient Name: Yamileth De Guzman  MRN: [id number]  : 1945  Gender: female     HPI: Patient complaint of PPLOV Right eye diagnosed with cataract. Patient requests PHACO PCIOL for Increase of VA/ADL.    History:    Past Medical History:   Diagnosis Date    Abdominal bloating     Acid reflux     Adenomatous polyp of colon 2020    Anxiety     Arthritis     Carotid bruit     Colon polyp     Constipation     Decreased hearing     Disease of thyroid gland     Fibromyalgia     Full dentures     Hematuria     Impaired mobility     Lung nodule     benign    Osteoporosis     Seasonal allergies     Sinusitis     Wears glasses     READING GLASSES ONLY       Past Surgical History:   Procedure Laterality Date    APPENDECTOMY      COLONOSCOPY      COLONOSCOPY N/A 2020    Procedure: COLONOSCOPY WITH COLD SNARE POLYPECTOMY, COLD FORCEP POLYPECTOMY, SUBMUCOSAL INJECTION OF NORMAL SALINE, ENDOSCOPIC MUCOSAL RESECTION, HOT SNARE POLYPECTOMY, THERMAL ABLATION, QUICK CLIP PLACEMENT TIMES 4 AND BARBIE INK INJECTION;  Surgeon: Scar Rogers MD;  Location: [facility] ENDOSCOPY;  Service: Gastroenterology;  Laterality: N/A;    COLONOSCOPY N/A 2021    Procedure: COLONOSCOPY WITH BIOPSY AND POLYPECTOMY;  Surgeon: Scar Rogers MD;  Location: [facility] ENDOSCOPY;  Service: Gastroenterology;  Laterality: N/A;    COLONOSCOPY N/A 2022    Procedure: COLONOSCOPY with polypectomy x2;  Surgeon: Scar Rogers MD;  Location: [facility] ENDOSCOPY;  Service: Gastroenterology;  Laterality: N/A;    HIP TROCHANTERIC NAILING WITH INTRAMEDULLARY HIP SCREW Right 2023    Procedure: HIP TROCHANTERIC NAILING WITH INTRAMEDULLARY HIP SCREW-SHORT NAIL;  Surgeon: Miguel Angel Flores MD;  Location: [facility] OR;  Service: Orthopedics;  Laterality: Right;    HYSTERECTOMY      PARTIAL STILL HAS OVARIES    MULTIPLE TOOTH EXTRACTIONS      TUBAL  ABDOMINAL LIGATION      UPPER GASTROINTESTINAL ENDOSCOPY  2012    VAGINAL DELIVERY      X1       Social History     Socioeconomic History    Marital status:    Tobacco Use    Smoking status: Every Day     Packs/day: 1.00     Years: 40.00     Pack years: 40.00     Types: Cigarettes    Smokeless tobacco: Never   Vaping Use    Vaping Use: Never used   Substance and Sexual Activity    Alcohol use: Not Currently    Drug use: No    Sexual activity: Defer       Family History   Problem Relation Age of Onset    Hearing loss Father     Arthritis Father     Cancer Father     GI problems Father     Breast cancer Sister     Colon cancer Neg Hx        Prior to Admission Medications:  Medications Prior to Admission   Medication Sig Dispense Refill Last Dose    ALPRAZolam (XANAX) 0.5 MG tablet QHS and daily prn 30 tablet 2 Past Month    baclofen (LIORESAL) 10 MG tablet Take 1 tablet by mouth 2 (Two) Times a Day. (Patient taking differently: Take 1 tablet by mouth Daily.) 50 tablet 0 10/1/2023 at 2000    cetirizine (zyrTEC) 10 MG tablet Take 1 tablet by mouth Every Night.   10/1/2023 at 2000    fluticasone (FLONASE) 50 MCG/ACT nasal spray 2 sprays into the nostril(s) as directed by provider Daily. 48 g 2 Past Week    Garlic 400 MG tablet Take  by mouth Daily.   10/1/2023 at 2000    levothyroxine (SYNTHROID, LEVOTHROID) 88 MCG tablet Take 1 tablet by mouth once daily 90 tablet 3 10/1/2023 at 0800    Multiple Vitamin (MULTI VITAMIN DAILY PO) Take 1 tablet by mouth Daily.   10/1/2023 at 2000    Omega-3 Fatty Acids (FISH OIL) 1000 MG capsule capsule Take 1 capsule by mouth Daily With Breakfast.   10/1/2023 at 2000    omeprazole (priLOSEC) 20 MG capsule Take 1 capsule by mouth Every 12 (Twelve) Hours. 180 capsule 1 10/1/2023 at 2000    vitamin C (ASCORBIC ACID) 250 MG tablet Take 1 tablet by mouth Daily.   10/1/2023 at 2000    denosumab (PROLIA) 60 MG/ML solution syringe Inject 1 mL under the skin into the appropriate area as  directed Every 6 (Six) Months.   More than a month    sennosides-docusate (PERICOLACE) 8.6-50 MG per tablet Take 2 tablets by mouth 2 (Two) Times a Day. (Patient not taking: Reported on 9/27/2023)   Not Taking       Allergies:  No Known Allergies     Vitals: Temp:  [97.5 °F (36.4 °C)] 97.5 °F (36.4 °C)  Heart Rate:  [62] 62  Resp:  [18] 18  BP: (135)/(67) 135/67    Review of Systems:   Within Normal Limits Abnormal   HEENT [x]    []     Cardiovascular [x]   []     Gastrointestinal [x]   []     Genitourinary [x]   []     Neurologic [x]   []     Pulmonary [x]   []       Physical Exam:   Within Normal Limits Abnormal   HEENT [x]    []     Heart [x]   []     Lungs [x]   []     Abdomen [x]   []     Extremities [x]   []       Impression: Right nuclear sclerotic cataract.     Plan: CATARACT PHACO EXTRACTION WITH INTRAOCULAR LENS IMPLANT RIGHT (Right)     Jet Avalos MD  10/2/2023

## 2023-10-02 NOTE — DISCHARGE INSTRUCTIONS
Post Operative Cataract Instructions     Right Eye  POST OPERATIVE INSTRUCTIONS  You have received anesthesia today. DO NOT drive, drink alcohol, sign legal documents.   After surgery, your eye will not hurt. It may feel scratchy, sticky or uncomfortable. Your eye will be sensitive to light.  Most people see better 1-3 days after the procedure, but it could take 3 weeks to get the full benefits and reach your visual potential. If your vision is blurry for a few days it is normal, and means you may have swelling outside or inside the eye. For some patients, a bubble is placed and there will be blurriness.   You should receive a post-op kit with tape and an eye shield. Wear the shield for the first 3 nights after surgery to keep you from rubbing the eye.  You may wear glasses or sunglasses during the day.  You must keep eye protected at all times.  Most people are able to return to their normal routine 1-3 days after surgery, however, due to the brain adjusting to your new vision you may have trouble judging distances and want to be careful when driving and going up and downstairs.   You can shower and wash your hair the day after surgery. Keep water, shampoo, hair spray and shaving lotion out of the eye, especially for the first week.   If eyes become stuck together after surgery you may soak with warm cloth.  During the first week, you should AVOID:   Rubbing or putting pressure on your eye.  Eye make-up, face cream or lotion, hair coloring or perms  Strenuous activities, such as running or lifting weights, as to avoid sweat from getting in the eye. Avoid swimming, hot tubs, fumes or juan pablo conditions.   Sleeping on operative side.  Excessive sneezing or coughing.  Hanging the head down for periods of time. Keep your head above your heart.    Some discomfort and blurred vision after surgery are normal, but if you have any unusual pain, swelling, bleeding or sudden decrease in vision, contact our office immediately.      Emergency assistance is available at any time by calling:    Dr.Mark Robbie Avalos  [phone]    If unable to reach call [facility] @ [phone]    You have been prescribed an eye drop to use after surgery, please follow these instructions and bring eye drops and instructions with you to post op appointment:    Prednisolone Acetate 1% (Pred Forte')  Shake well before use.    USE 1 DROP 4 (FOUR) TIMES A DAY FOR 1 WEEK THEN STARTING WEEK 2, ONLY USE 2 (TWO) TIMES A DAY UNTIL YOU RUN OUT OF DROPS.     PLACE A CARMEN IN THE DAY COLUMN EACH TIME YOU USE TO KEEP ON SCHEDULE    WEEK 1-USE 4  (FOUR) TIMES A DAY DAY 1  []   []    []   []     DAY 2  []   []    []   []  DAY 3  []   []    []   []  DAY 4  []   []    []   []  DAY 5  []   []    []   []  DAY 6  []   []    []   []  DAY 7  []   []    []   []      WEEK 2-USE 2 (TWO)  TIMES A DAY DAY 1  []   []  DAY 2  []   []  DAY 3  []   []  DAY 4  []   []  DAY 5  []   []  DAY 6  []   []  DAY 7  []   []      WEEK 3-USE 2 (TWO) TIMES A DAY DAY 1  []   []  DAY 2  []   []  DAY 3  []   []  DAY 4  []   []  DAY 5  []   []  DAY 6  []   []  DAY 7  []   []      WEEK 4-USE 2 (TWO)TIMES A DAY DAY 1  []   []  DAY 2  []   []  DAY 3  []   []  DAY 4  []   []  DAY 5  []   []  DAY 6  []   []  DAY 7  []   []

## 2023-10-02 NOTE — ANESTHESIA POSTPROCEDURE EVALUATION
Patient: Yamileth De Guzman    Procedure Summary       Date: 10/02/23 Room / Location: [facility] OR 3 / [facility] OR    Anesthesia Start: 0931 Anesthesia Stop: 0945    Procedure: CATARACT PHACO EXTRACTION WITH INTRAOCULAR LENS IMPLANT RIGHT (Right: Eye) Diagnosis:       Nuclear sclerotic cataract of right eye      (Nuclear sclerotic cataract of right eye [H25.11])    Surgeons: Jet Avalos MD Provider: Joe Madrigal CRNA    Anesthesia Type: MAC ASA Status: 3            Anesthesia Type: MAC    Vitals  HR 60  Sat 98  Resp 12  Temp 98  /44        Post Anesthesia Care and Evaluation    Patient location during evaluation: bedside  Patient participation: complete - patient participated  Level of consciousness: awake and alert and sleepy but conscious  Pain score: 0  Pain management: adequate    Airway patency: patent  Anesthetic complications: No anesthetic complications  PONV Status: none  Cardiovascular status: acceptable  Respiratory status: acceptable and nasal cannula  Hydration status: acceptable

## 2023-10-03 ENCOUNTER — PREP FOR SURGERY (OUTPATIENT)
Dept: OTHER | Facility: HOSPITAL | Age: 78
End: 2023-10-03
Payer: MEDICARE

## 2023-10-03 DIAGNOSIS — H25.12 NUCLEAR SCLEROTIC CATARACT OF LEFT EYE: Primary | ICD-10-CM

## 2023-10-03 RX ORDER — SODIUM CHLORIDE 9 MG/ML
40 INJECTION, SOLUTION INTRAVENOUS AS NEEDED
Status: CANCELLED | OUTPATIENT
Start: 2023-10-03

## 2023-10-03 RX ORDER — TETRACAINE HYDROCHLORIDE 5 MG/ML
1 SOLUTION OPHTHALMIC SEE ADMIN INSTRUCTIONS
Status: CANCELLED | OUTPATIENT
Start: 2023-10-03

## 2023-10-03 RX ORDER — SODIUM CHLORIDE 0.9 % (FLUSH) 0.9 %
1-10 SYRINGE (ML) INJECTION AS NEEDED
Status: CANCELLED | OUTPATIENT
Start: 2023-10-03

## 2023-10-03 RX ORDER — PREDNISOLONE ACETATE 10 MG/ML
1 SUSPENSION/ DROPS OPHTHALMIC SEE ADMIN INSTRUCTIONS
Status: CANCELLED | OUTPATIENT
Start: 2023-10-03

## 2023-10-03 RX ORDER — SODIUM CHLORIDE 0.9 % (FLUSH) 0.9 %
10 SYRINGE (ML) INJECTION EVERY 12 HOURS SCHEDULED
Status: CANCELLED | OUTPATIENT
Start: 2023-10-03

## 2023-10-03 RX ORDER — CYCLOPENT/TROPIC/PHEN/KETR/WAT 1%-1%-2.5%
1 DROPS (EA) OPHTHALMIC (EYE)
Status: CANCELLED | OUTPATIENT
Start: 2023-10-03 | End: 2023-10-03

## 2023-10-11 PROBLEM — H25.12 NUCLEAR SCLEROTIC CATARACT OF LEFT EYE: Status: ACTIVE | Noted: 2023-10-03

## 2023-10-16 ENCOUNTER — ANESTHESIA (OUTPATIENT)
Dept: PERIOP | Facility: HOSPITAL | Age: 78
End: 2023-10-16
Payer: MEDICARE

## 2023-10-16 ENCOUNTER — ANESTHESIA EVENT (OUTPATIENT)
Dept: PERIOP | Facility: HOSPITAL | Age: 78
End: 2023-10-16
Payer: MEDICARE

## 2023-10-16 ENCOUNTER — HOSPITAL ENCOUNTER (OUTPATIENT)
Facility: HOSPITAL | Age: 78
Setting detail: HOSPITAL OUTPATIENT SURGERY
Discharge: HOME | End: 2023-10-16
Attending: OPHTHALMOLOGY | Admitting: OPHTHALMOLOGY
Payer: MEDICARE

## 2023-10-16 VITALS
HEART RATE: 50 BPM | TEMPERATURE: 97.2 F | SYSTOLIC BLOOD PRESSURE: 99 MMHG | DIASTOLIC BLOOD PRESSURE: 52 MMHG | OXYGEN SATURATION: 95 % | RESPIRATION RATE: 15 BRPM | WEIGHT: 134 LBS | HEIGHT: 66 IN | BODY MASS INDEX: 21.53 KG/M2

## 2023-10-16 DIAGNOSIS — H25.12 NUCLEAR SCLEROTIC CATARACT OF LEFT EYE: ICD-10-CM

## 2023-10-16 PROCEDURE — 25810000003 LACTATED RINGERS PER 1000 ML: Performed by: OPHTHALMOLOGY

## 2023-10-16 PROCEDURE — V2632 POST CHMBR INTRAOCULAR LENS: HCPCS | Performed by: OPHTHALMOLOGY

## 2023-10-16 PROCEDURE — 25010000002 MIDAZOLAM PER 1MG: Performed by: NURSE ANESTHETIST, CERTIFIED REGISTERED

## 2023-10-16 PROCEDURE — 25010000002 PROPOFOL 10 MG/ML EMULSION: Performed by: NURSE ANESTHETIST, CERTIFIED REGISTERED

## 2023-10-16 RX ORDER — SODIUM CHLORIDE 9 MG/ML
40 INJECTION, SOLUTION INTRAVENOUS AS NEEDED
Status: DISCONTINUED | OUTPATIENT
Start: 2023-10-16 | End: 2023-10-16 | Stop reason: HOSPADM

## 2023-10-16 RX ORDER — PREDNISOLONE ACETATE 10 MG/ML
SUSPENSION/ DROPS OPHTHALMIC AS NEEDED
Status: DISCONTINUED | OUTPATIENT
Start: 2023-10-16 | End: 2023-10-16 | Stop reason: HOSPADM

## 2023-10-16 RX ORDER — LIDOCAINE HYDROCHLORIDE 20 MG/ML
INJECTION, SOLUTION INTRAVENOUS AS NEEDED
Status: DISCONTINUED | OUTPATIENT
Start: 2023-10-16 | End: 2023-10-16 | Stop reason: SURG

## 2023-10-16 RX ORDER — KETAMINE HYDROCHLORIDE 50 MG/ML
INJECTION, SOLUTION, CONCENTRATE INTRAMUSCULAR; INTRAVENOUS AS NEEDED
Status: DISCONTINUED | OUTPATIENT
Start: 2023-10-16 | End: 2023-10-16 | Stop reason: SURG

## 2023-10-16 RX ORDER — SODIUM CHLORIDE, SODIUM LACTATE, POTASSIUM CHLORIDE, CALCIUM CHLORIDE 600; 310; 30; 20 MG/100ML; MG/100ML; MG/100ML; MG/100ML
1000 INJECTION, SOLUTION INTRAVENOUS CONTINUOUS
Status: DISCONTINUED | OUTPATIENT
Start: 2023-10-16 | End: 2023-10-16 | Stop reason: HOSPADM

## 2023-10-16 RX ORDER — PREDNISOLONE ACETATE 10 MG/ML
1 SUSPENSION/ DROPS OPHTHALMIC SEE ADMIN INSTRUCTIONS
Status: DISCONTINUED | OUTPATIENT
Start: 2023-10-16 | End: 2023-10-16 | Stop reason: HOSPADM

## 2023-10-16 RX ORDER — TETRACAINE HYDROCHLORIDE 5 MG/ML
1 SOLUTION OPHTHALMIC SEE ADMIN INSTRUCTIONS
Status: COMPLETED | OUTPATIENT
Start: 2023-10-16 | End: 2023-10-16

## 2023-10-16 RX ORDER — PROPOFOL 10 MG/ML
VIAL (ML) INTRAVENOUS AS NEEDED
Status: DISCONTINUED | OUTPATIENT
Start: 2023-10-16 | End: 2023-10-16 | Stop reason: SURG

## 2023-10-16 RX ORDER — CYCLOPENT/TROPIC/PHEN/KETR/WAT 1%-1%-2.5%
1 DROPS (EA) OPHTHALMIC (EYE)
Status: COMPLETED | OUTPATIENT
Start: 2023-10-16 | End: 2023-10-16

## 2023-10-16 RX ORDER — SODIUM CHLORIDE 0.9 % (FLUSH) 0.9 %
1-10 SYRINGE (ML) INJECTION AS NEEDED
Status: DISCONTINUED | OUTPATIENT
Start: 2023-10-16 | End: 2023-10-16 | Stop reason: HOSPADM

## 2023-10-16 RX ORDER — BALANCED SALT SOLUTION 6.4; .75; .48; .3; 3.9; 1.7 MG/ML; MG/ML; MG/ML; MG/ML; MG/ML; MG/ML
SOLUTION OPHTHALMIC AS NEEDED
Status: DISCONTINUED | OUTPATIENT
Start: 2023-10-16 | End: 2023-10-16 | Stop reason: HOSPADM

## 2023-10-16 RX ORDER — SODIUM CHLORIDE 0.9 % (FLUSH) 0.9 %
10 SYRINGE (ML) INJECTION EVERY 12 HOURS SCHEDULED
Status: DISCONTINUED | OUTPATIENT
Start: 2023-10-16 | End: 2023-10-16 | Stop reason: HOSPADM

## 2023-10-16 RX ORDER — MIDAZOLAM HYDROCHLORIDE 2 MG/2ML
INJECTION, SOLUTION INTRAMUSCULAR; INTRAVENOUS AS NEEDED
Status: DISCONTINUED | OUTPATIENT
Start: 2023-10-16 | End: 2023-10-16 | Stop reason: SURG

## 2023-10-16 RX ORDER — DEXMEDETOMIDINE HYDROCHLORIDE 100 UG/ML
INJECTION, SOLUTION INTRAVENOUS AS NEEDED
Status: DISCONTINUED | OUTPATIENT
Start: 2023-10-16 | End: 2023-10-16 | Stop reason: SURG

## 2023-10-16 RX ORDER — TETRACAINE HYDROCHLORIDE 5 MG/ML
SOLUTION OPHTHALMIC AS NEEDED
Status: DISCONTINUED | OUTPATIENT
Start: 2023-10-16 | End: 2023-10-16 | Stop reason: HOSPADM

## 2023-10-16 RX ORDER — NEOMYCIN SULFATE, POLYMYXIN B SULFATE, AND DEXAMETHASONE 3.5; 10000; 1 MG/G; [USP'U]/G; MG/G
OINTMENT OPHTHALMIC AS NEEDED
Status: DISCONTINUED | OUTPATIENT
Start: 2023-10-16 | End: 2023-10-16 | Stop reason: HOSPADM

## 2023-10-16 RX ORDER — LIDOCAINE HYDROCHLORIDE 40 MG/ML
INJECTION, SOLUTION RETROBULBAR; TOPICAL AS NEEDED
Status: DISCONTINUED | OUTPATIENT
Start: 2023-10-16 | End: 2023-10-16 | Stop reason: HOSPADM

## 2023-10-16 RX ORDER — ACETAZOLAMIDE 500 MG/1
500 CAPSULE, EXTENDED RELEASE ORAL ONCE
Status: COMPLETED | OUTPATIENT
Start: 2023-10-16 | End: 2023-10-16

## 2023-10-16 RX ADMIN — LIDOCAINE HYDROCHLORIDE 60 MG: 20 INJECTION, SOLUTION INTRAVENOUS at 12:03

## 2023-10-16 RX ADMIN — Medication 1 DROP: at 11:03

## 2023-10-16 RX ADMIN — KETAMINE HYDROCHLORIDE 10 MG: 50 INJECTION, SOLUTION INTRAMUSCULAR; INTRAVENOUS at 12:03

## 2023-10-16 RX ADMIN — ACETAZOLAMIDE 500 MG: 500 CAPSULE, EXTENDED RELEASE ORAL at 12:32

## 2023-10-16 RX ADMIN — Medication 1 DROP: at 10:58

## 2023-10-16 RX ADMIN — PROPOFOL 10 MG: 10 INJECTION, EMULSION INTRAVENOUS at 12:12

## 2023-10-16 RX ADMIN — TETRACAINE HYDROCHLORIDE 1 DROP: 5 SOLUTION OPHTHALMIC at 10:56

## 2023-10-16 RX ADMIN — DEXMEDETOMIDINE 20 MCG: 100 INJECTION, SOLUTION, CONCENTRATE INTRAVENOUS at 12:03

## 2023-10-16 RX ADMIN — PROPOFOL 20 MG: 10 INJECTION, EMULSION INTRAVENOUS at 12:03

## 2023-10-16 RX ADMIN — SODIUM CHLORIDE, POTASSIUM CHLORIDE, SODIUM LACTATE AND CALCIUM CHLORIDE 1000 ML: 600; 310; 30; 20 INJECTION, SOLUTION INTRAVENOUS at 11:03

## 2023-10-16 RX ADMIN — TETRACAINE HYDROCHLORIDE 1 DROP: 5 SOLUTION OPHTHALMIC at 10:57

## 2023-10-16 RX ADMIN — MIDAZOLAM HYDROCHLORIDE 2 MG: 1 INJECTION, SOLUTION INTRAMUSCULAR; INTRAVENOUS at 12:03

## 2023-10-16 RX ADMIN — Medication 1 DROP: at 11:08

## 2023-10-16 NOTE — DISCHARGE INSTRUCTIONS
Post Operative Cataract Instructions     Left Eye  POST OPERATIVE INSTRUCTIONS  You have received anesthesia today. DO NOT drive, drink alcohol, sign legal documents.   After surgery, your eye will not hurt. It may feel scratchy, sticky or uncomfortable. Your eye will be sensitive to light.  Most people see better 1-3 days after the procedure, but it could take 3 weeks to get the full benefits and reach your visual potential. If your vision is blurry for a few days it is normal, and means you may have swelling outside or inside the eye. For some patients, a bubble is placed and there will be blurriness.   You should receive a post-op kit with tape and an eye shield. Wear the shield for the first 3 nights after surgery to keep you from rubbing the eye.  You may wear glasses or sunglasses during the day.  You must keep eye protected at all times.  Most people are able to return to their normal routine 1-3 days after surgery, however, due to the brain adjusting to your new vision you may have trouble judging distances and want to be careful when driving and going up and downstairs.   You can shower and wash your hair the day after surgery. Keep water, shampoo, hair spray and shaving lotion out of the eye, especially for the first week.   If eyes become stuck together after surgery you may soak with warm cloth.  During the first week, you should AVOID:   Rubbing or putting pressure on your eye.  Eye make-up, face cream or lotion, hair coloring or perms  Strenuous activities, such as running or lifting weights, as to avoid sweat from getting in the eye. Avoid swimming, hot tubs, fumes or juan pablo conditions.   Sleeping on operative side.  Excessive sneezing or coughing.  Hanging the head down for periods of time. Keep your head above your heart.    Some discomfort and blurred vision after surgery are normal, but if you have any unusual pain, swelling, bleeding or sudden decrease in vision, contact our office immediately.      Emergency assistance is available at any time by calling:    Dr.Mark Robbie Avalos  [phone]    If unable to reach call [facility] @ [phone]    You have been prescribed an eye drop to use after surgery, please follow these instructions and bring eye drops and instructions with you to post op appointment:    Prednisolone Acetate 1% (Pred Forte')  Shake well before use.    USE 1 DROP 4 (FOUR) TIMES A DAY FOR 1 WEEK THEN STARTING WEEK 2, ONLY USE 2 (TWO) TIMES A DAY UNTIL YOU RUN OUT OF DROPS.     PLACE A CARMEN IN THE DAY COLUMN EACH TIME YOU USE TO KEEP ON SCHEDULE    WEEK 1-USE 4  (FOUR) TIMES A DAY DAY 1  []   []    []   []     DAY 2  []   []    []   []  DAY 3  []   []    []   []  DAY 4  []   []    []   []  DAY 5  []   []    []   []  DAY 6  []   []    []   []  DAY 7  []   []    []   []      WEEK 2-USE 2 (TWO)  TIMES A DAY DAY 1  []   []  DAY 2  []   []  DAY 3  []   []  DAY 4  []   []  DAY 5  []   []  DAY 6  []   []  DAY 7  []   []      WEEK 3-USE 2 (TWO) TIMES A DAY DAY 1  []   []  DAY 2  []   []  DAY 3  []   []  DAY 4  []   []  DAY 5  []   []  DAY 6  []   []  DAY 7  []   []      WEEK 4-USE 2 (TWO)TIMES A DAY DAY 1  []   []  DAY 2  []   []  DAY 3  []   []  DAY 4  []   []  DAY 5  []   []  DAY 6  []   []  DAY 7  []   []

## 2023-10-16 NOTE — ANESTHESIA POSTPROCEDURE EVALUATION
Patient: Yamileth De Guzman    Procedure Summary       Date: 10/16/23 Room / Location: [facility] OR 3 /  [facility]    Anesthesia Start: 1201 Anesthesia Stop: 1219    Procedure: CATARACT PHACO EXTRACTION WITH INTRAOCULAR LENS IMPLANT LEFT (Left: Eye) Diagnosis:       Nuclear sclerotic cataract of left eye      (Nuclear sclerotic cataract of left eye [H25.12])    Surgeons: Jet Avalos MD Provider: Que Shaw CRNA    Anesthesia Type: MAC ASA Status: 3            Anesthesia Type: MAC    Vitals  Vitals Value Taken Time   /45 10/16/23 1222   Temp 97.2 °F (36.2 °C) 10/16/23 1222   Pulse 48 10/16/23 1222   Resp 14 10/16/23 1222   SpO2 97 % 10/16/23 1222           Post Anesthesia Care and Evaluation    Patient location during evaluation: bedside  Patient participation: complete - patient participated  Level of consciousness: awake and alert  Pain score: 0  Pain management: satisfactory to patient    Airway patency: patent  Anesthetic complications: No anesthetic complications  PONV Status: none  Cardiovascular status: acceptable and stable  Respiratory status: acceptable  Hydration status: acceptable    Comments: Vitals signs as noted in nursing documentation as per protocol.

## 2023-10-16 NOTE — ANESTHESIA PREPROCEDURE EVALUATION
Anesthesia Evaluation     Patient summary reviewed and Nursing notes reviewed   no history of anesthetic complications:   NPO Solid Status: > 8 hours  NPO Liquid Status: > 8 hours           Airway   Mallampati: I  TM distance: >3 FB  Neck ROM: full  Possible difficult intubation  Dental    (+) edentulous    Pulmonary - negative pulmonary ROS and normal exam   (+) Current,  Cardiovascular - negative cardio ROS  Exercise tolerance: good (4-7 METS)    ECG reviewed  Rhythm: regular  Rate: normal    (+) carotid bruits      Neuro/Psych  (+) psychiatric history Anxiety  GI/Hepatic/Renal/Endo    (+) GERD, thyroid problem hypothyroidism    Musculoskeletal     Abdominal  - normal exam   Substance History - negative use     OB/GYN negative ob/gyn ROS         Other   arthritis, autoimmune disease rheumatoid arthritis,                   Anesthesia Plan    ASA 3     MAC     (Risks and benefits discussed including risk of aspiration, recall and dental damage. All patient questions answered. Will continue with POC.  )  intravenous induction     Anesthetic plan, risks, benefits, and alternatives have been provided, discussed and informed consent has been obtained with: patient.  Pre-procedure education provided      CODE STATUS:

## 2023-10-16 NOTE — OP NOTE
OPERATIVE NOTE    Date of Procedure: 10/16/2023  Patient Name: Yamileth De Guzman  Patient MRN: [id number]  YOB: 1945     Preoperative Diagnosis: Left nuclear sclerotic cataract.     Postoperative Diagnosis: Left nuclear sclerotic cataract.     Procedure Performed: Phacoemulsification with implantation of a  foldable posterior chamber intraocular lens, Left eye.     Surgeon: Jet Aavlos MD    Anesthesia:  Monitored Anesthesia Care (MAC)      Brief History and Indication: The patient presents with a history of past progressive loss of vision.  Patient was diagnosed with cataract and requests removal for increased ability to read and see.     Operation Description: The patient was taken to the OR and prepped and draped in the usual sterile ophthalmic fashion. A lid speculum was placed in the eye.  A #75 blade was then used to make a stab incision two o’clock hours from the intended temporal clear cornea groove. The anterior chamber was then inflated with a Viscoelastic. A metal microkeratome blade was then used to enter the anterior chamber at the temporal clear cornea site. A three level tunnel incision was made. A curvilinear capsulorrhexis was then performed with a bent cystotome needle and capsulorrhexis forceps.  BSS on a 30 gauge bent cannula was used to hydro-dissect, and hydro-delineate the lens. Good fluid waves and hydro-delineation were noted. Phacoemulsification was then used to remove nuclear material without complications. The residual cortical and lenticular material was then removed with irrigation and aspiration. Viscoelastics were then used to inflate the bag in a soft shell technique. A PCIOL was injected into the bag. Post-implantation, there were no rents or tears in the bag and the lens was noted to be stable and centered. The residual Viscoelastic was then removed with irrigation and aspiration.  The wound was checked and found to be without leaks. Therefore a Polydex ointment  and one drop of Durezol eye drop was placed in the eye.     Implant Information:   Implant Name Type Inv. Item Serial No.  Lot No. LRB No. Used Action   LENS MONOFOCAL IQ [id number] - [id number] - [id number] Implant LENS MONOFOCAL IQ [id number] COLIN  Left 1 Implanted       Complications: None    Estimated Blood Loss:  Less than 1 cc.       []   Changed to complex procedure due to: []  hypermature, white cataract. Blue dye was used. []   small iris. A Malyugin Ring was used.    Discharge and Condition  The patient was transported to same day surgery in excellent condition and scheduled for follow-up tomorrow morning. The patient was given instructions on use of eye drops for the operative eye and was specifically instructed to call Dr. Avalos at his office or home for any nausea, vomiting, headache, decreased visual acuity, or pain, or if the patient had any general concerns.    Jet Avalos MD  10/16/2023

## 2023-10-16 NOTE — H&P
[facility]         History and Physical    Patient Name: Yamileth De Guzman  MRN: [id number]  : 1945  Gender: female     HPI: Patient complaint of PPLOV Left eye diagnosed with cataract. Patient requests PHACO PCIOL for Increase of VA/ADL.    History:    Past Medical History:   Diagnosis Date    Abdominal bloating     Acid reflux     Adenomatous polyp of colon 2020    Anxiety     Arthritis     Carotid bruit     Colon polyp     Constipation     Decreased hearing     Disease of thyroid gland     Fibromyalgia     Full dentures     Hematuria     Impaired mobility     Lung nodule     benign    Osteoporosis     Seasonal allergies     Sinusitis     Wears glasses     READING GLASSES ONLY       Past Surgical History:   Procedure Laterality Date    APPENDECTOMY      CATARACT EXTRACTION W/ INTRAOCULAR LENS IMPLANT Right 10/2/2023    Procedure: CATARACT PHACO EXTRACTION WITH INTRAOCULAR LENS IMPLANT RIGHT;  Surgeon: Jet Avalos MD;  Location: [facility] OR;  Service: Ophthalmology;  Laterality: Right;    COLONOSCOPY      COLONOSCOPY N/A 2020    Procedure: COLONOSCOPY WITH COLD SNARE POLYPECTOMY, COLD FORCEP POLYPECTOMY, SUBMUCOSAL INJECTION OF NORMAL SALINE, ENDOSCOPIC MUCOSAL RESECTION, HOT SNARE POLYPECTOMY, THERMAL ABLATION, QUICK CLIP PLACEMENT TIMES 4 AND BARBIE INK INJECTION;  Surgeon: Scar Rogers MD;  Location: [facility] ENDOSCOPY;  Service: Gastroenterology;  Laterality: N/A;    COLONOSCOPY N/A 2021    Procedure: COLONOSCOPY WITH BIOPSY AND POLYPECTOMY;  Surgeon: Scar Rogers MD;  Location: [facility] ENDOSCOPY;  Service: Gastroenterology;  Laterality: N/A;    COLONOSCOPY N/A 2022    Procedure: COLONOSCOPY with polypectomy x2;  Surgeon: Scar Rogers MD;  Location: [facility] ENDOSCOPY;  Service: Gastroenterology;  Laterality: N/A;    HIP TROCHANTERIC NAILING WITH INTRAMEDULLARY HIP SCREW Right 2023    Procedure: HIP  TROCHANTERIC NAILING WITH INTRAMEDULLARY HIP SCREW-SHORT NAIL;  Surgeon: Miguel Angel Flores MD;  Location: [facility];  Service: Orthopedics;  Laterality: Right;    HYSTERECTOMY      PARTIAL STILL HAS OVARIES    MULTIPLE TOOTH EXTRACTIONS      TUBAL ABDOMINAL LIGATION      UPPER GASTROINTESTINAL ENDOSCOPY  2012    VAGINAL DELIVERY      X1       Social History     Socioeconomic History    Marital status:    Tobacco Use    Smoking status: Every Day     Packs/day: 1.00     Years: 40.00     Additional pack years: 0.00     Total pack years: 40.00     Types: Cigarettes    Smokeless tobacco: Never   Vaping Use    Vaping Use: Never used   Substance and Sexual Activity    Alcohol use: Not Currently    Drug use: No    Sexual activity: Defer       Family History   Problem Relation Age of Onset    Hearing loss Father     Arthritis Father     Cancer Father     GI problems Father     Breast cancer Sister     Colon cancer Neg Hx        Prior to Admission Medications:  Medications Prior to Admission   Medication Sig Dispense Refill Last Dose    ALPRAZolam (XANAX) 0.5 MG tablet QHS and daily prn 30 tablet 2 10/15/2023    baclofen (LIORESAL) 10 MG tablet Take 1 tablet by mouth 2 (Two) Times a Day. (Patient taking differently: Take 1 tablet by mouth Daily.) 50 tablet 0 10/15/2023    cetirizine (zyrTEC) 10 MG tablet Take 1 tablet by mouth Every Night.   10/15/2023    fluticasone (FLONASE) 50 MCG/ACT nasal spray 2 sprays into the nostril(s) as directed by provider Daily. 48 g 2 Past Week    Garlic 400 MG tablet Take  by mouth Daily.   10/15/2023    levothyroxine (SYNTHROID, LEVOTHROID) 88 MCG tablet Take 1 tablet by mouth once daily 90 tablet 3 10/15/2023    Multiple Vitamin (MULTI VITAMIN DAILY PO) Take 1 tablet by mouth Daily.   10/15/2023    Omega-3 Fatty Acids (FISH OIL) 1000 MG capsule capsule Take 1 capsule by mouth Daily With Breakfast.   10/15/2023    omeprazole (priLOSEC) 20 MG capsule Take 1 capsule by mouth Every 12  (Twelve) Hours. 180 capsule 1 10/15/2023    sennosides-docusate (PERICOLACE) 8.6-50 MG per tablet Take 2 tablets by mouth 2 (Two) Times a Day.       vitamin C (ASCORBIC ACID) 250 MG tablet Take 1 tablet by mouth Daily.   Past Month    denosumab (PROLIA) 60 MG/ML solution syringe Inject 1 mL under the skin into the appropriate area as directed Every 6 (Six) Months.   More than a month       Allergies:  No Known Allergies     Vitals: Temp:  [98.1 °F (36.7 °C)] 98.1 °F (36.7 °C)  Heart Rate:  [55] 55  Resp:  [14] 14  BP: (132)/(54) 132/54    Review of Systems:   Within Normal Limits Abnormal   HEENT [x]    []     Cardiovascular [x]   []     Gastrointestinal [x]   []     Genitourinary [x]   []     Neurologic [x]   []     Pulmonary [x]   []       Physical Exam:   Within Normal Limits Abnormal   HEENT [x]    []     Heart [x]   []     Lungs [x]   []     Abdomen [x]   []     Extremities [x]   []       Impression: Left nuclear sclerotic cataract.     Plan: CATARACT PHACO EXTRACTION WITH INTRAOCULAR LENS IMPLANT LEFT (Left)     Jet Avalos MD  10/16/2023

## 2023-11-06 ENCOUNTER — HOSPITAL ENCOUNTER (OUTPATIENT)
Dept: MAMMOGRAPHY | Facility: HOSPITAL | Age: 78
Discharge: HOME | End: 2023-11-06
Admitting: INTERNAL MEDICINE
Payer: MEDICARE

## 2023-11-06 ENCOUNTER — APPOINTMENT (OUTPATIENT)
Dept: BONE DENSITY | Facility: HOSPITAL | Age: 78
End: 2023-11-06
Payer: MEDICARE

## 2023-11-06 DIAGNOSIS — M80.08XA AGE-RELATED OSTEOPOROSIS WITH CURRENT PATHOLOGICAL FRACTURE, VERTEBRA(E), INITIAL ENCOUNTER FOR FRACTURE: ICD-10-CM

## 2023-11-06 DIAGNOSIS — M80.00XD AGE-RELATED OSTEOPOROSIS WITH CURRENT PATHOLOGICAL FRACTURE WITH ROUTINE HEALING: Chronic | ICD-10-CM

## 2023-11-06 DIAGNOSIS — Z12.31 ENCOUNTER FOR SCREENING MAMMOGRAM FOR MALIGNANT NEOPLASM OF BREAST: ICD-10-CM

## 2023-11-06 PROCEDURE — 77063 BREAST TOMOSYNTHESIS BI: CPT

## 2023-11-06 PROCEDURE — 77080 DXA BONE DENSITY AXIAL: CPT

## 2023-11-06 PROCEDURE — 77067 SCR MAMMO BI INCL CAD: CPT

## 2023-11-16 DIAGNOSIS — M81.0 AGE-RELATED OSTEOPOROSIS WITHOUT CURRENT PATHOLOGICAL FRACTURE: Primary | ICD-10-CM

## 2023-12-07 ENCOUNTER — OFFICE VISIT (OUTPATIENT)
Dept: BEHAVIORAL HEALTH | Facility: CLINIC | Age: 78
End: 2023-12-07
Payer: MEDICARE

## 2023-12-07 VITALS — BODY MASS INDEX: 21.53 KG/M2 | WEIGHT: 134 LBS | HEIGHT: 66 IN

## 2023-12-07 DIAGNOSIS — Z51.81 ENCOUNTER FOR THERAPEUTIC DRUG MONITORING: ICD-10-CM

## 2023-12-07 DIAGNOSIS — F41.1 GENERALIZED ANXIETY DISORDER: Primary | ICD-10-CM

## 2023-12-07 PROCEDURE — 1160F RVW MEDS BY RX/DR IN RCRD: CPT

## 2023-12-07 PROCEDURE — 90792 PSYCH DIAG EVAL W/MED SRVCS: CPT

## 2023-12-07 PROCEDURE — 1159F MED LIST DOCD IN RCRD: CPT

## 2023-12-07 RX ORDER — ALPRAZOLAM 0.5 MG/1
TABLET ORAL
Qty: 30 TABLET | Refills: 2 | Status: SHIPPED | OUTPATIENT
Start: 2023-12-07

## 2023-12-07 NOTE — PROGRESS NOTES
"  New Patient Office Visit    Patient Name: Yamileth De Guzman  : 1945   MRN: [id number]   Care Team: Patient Care Team:  Salina Montanez MD as PCP - General (Family Medicine)  Mansoor Quezada DO ([facility])         Chief Complaint:    Chief Complaint   Patient presents with    Anxiety    Med Management       History of Present Illness: Yamileth De Guzman is a 78 y.o. female who is here today to establish care. Patient endorses a long struggle with anxiety and \"nerves\". PCP previously started on her Xanax PRN and that has been effective. PCP had been managing her medication until he retired. Since then she has been in need of a new medication management provider. She states she only takes the medication when needed, which is not that often. She reports that when she fell and broke her hip she felt that she need it a little more frequently. However, since then she has not needed it as frequently. She denies any complications with medication. She denies SI/HI today.     The following portion of the patient's history were reviewed and updated appropriately: allergies, current and past medications, family history, medical history and social history.  Subjective   Review of Systems:    Review of Systems   Psychiatric/Behavioral:  Positive for stress. The patient is nervous/anxious.    All other systems reviewed and are negative.      PSYCHIATRIC HISTORY   Current Psychiatric Medications:  Xanax   Prior Psychiatric Medications:  None   Currently in Counseling or Therapy:  None   Prior Psychiatric Outpatient Care:  PCP   Prior Psychiatric Hospitalizations:  None   Previous Suicide Attempts:  None   Previous Self-Harming Behavior:  None   History of Seizures or TBI:  None   Abuse History:  Verbal: no  Emotional: no  Mental: no  Physical: no  Sexual: no  Rape: no        Family Psychiatric History:  None   Any family history of suicide attempts:  None       Substance Abuse History:  Alcohol: " no  Smoking/Cigarettes: no  Vaping: no  Smokeless Tobacco: no  Illicit Substances: no  Prescription Medication Misuse: no    Social History:  Born: [place]   Raised: [place]   Currently resides in [facility]   Marriage status:    Children: one   Lives with: The patient's currently household consists of the patient and    Highest Level of Education: 12  Employment: retired None    History: None  Legal History, Arrests, or Incarcerations: No current legal charges pending.          Current Medications:   Current Outpatient Medications   Medication Sig Dispense Refill    ALPRAZolam (XANAX) 0.5 MG tablet QHS and daily prn 30 tablet 2    baclofen (LIORESAL) 10 MG tablet Take 1 tablet by mouth 2 (Two) Times a Day. (Patient taking differently: Take 1 tablet by mouth Daily.) 50 tablet 0    cetirizine (zyrTEC) 10 MG tablet Take 1 tablet by mouth Every Night.      denosumab (PROLIA) 60 MG/ML solution syringe Inject 1 mL under the skin into the appropriate area as directed Every 6 (Six) Months.      fluticasone (FLONASE) 50 MCG/ACT nasal spray 2 sprays into the nostril(s) as directed by provider Daily. 48 g 2    Garlic 400 MG tablet Take  by mouth Daily.      levothyroxine (SYNTHROID, LEVOTHROID) 88 MCG tablet Take 1 tablet by mouth once daily 90 tablet 3    Multiple Vitamin (MULTI VITAMIN DAILY PO) Take 1 tablet by mouth Daily.      Omega-3 Fatty Acids (FISH OIL) 1000 MG capsule capsule Take 1 capsule by mouth Daily With Breakfast.      omeprazole (priLOSEC) 20 MG capsule Take 1 capsule by mouth Every 12 (Twelve) Hours. 180 capsule 1    vitamin C (ASCORBIC ACID) 250 MG tablet Take 1 tablet by mouth Daily.      sennosides-docusate (PERICOLACE) 8.6-50 MG per tablet Take 2 tablets by mouth 2 (Two) Times a Day.       No current facility-administered medications for this visit.       Mental Status Exam:   Hygiene:   good  Cooperation:  Cooperative  Eye Contact:  Good  Psychomotor Behavior:   "Appropriate  Affect:  Appropriate  Mood: normal  Speech:  Normal  Thought Process:  Goal directed and Linear  Thought Content:  Normal and Mood congruent  Suicidal:  None  Homicidal:  None  Hallucinations:  None  Delusion:  None  Memory:  Intact  Orientation:  Person, Place, Time, and Situation  Reliability:  good  Insight:  Good  Judgement:  Good  Impulse Control:  Good  Physical/Medical Issues:  Yes see chart       Objective   Vital Signs:   Ht 167.6 cm (66\")   Wt 60.8 kg (134 lb)   BMI 21.63 kg/m²       Assessment / Plan    Diagnoses and all orders for this visit:    1. Generalized anxiety disorder (Primary)  -     ALPRAZolam (XANAX) 0.5 MG tablet; QHS and daily prn  Dispense: 30 tablet; Refill: 2  -     Compliance Drug Analysis, Ur - Urine, Clean Catch    2. Encounter for therapeutic drug monitoring  -     Compliance Drug Analysis, Ur - Urine, Clean Catch       Patient appears to be doing well on current medication. No issues reported and no indication for change. Will continue medication as ordered.   Obtained yearly urine drug screen and controlled substance agreement signed.     A psychological evaluation was conducted in order to assess past and current level of functioning. Areas assessed included, but were not limited to: perception of social support, perception of ability to face and deal with challenges in life (positive functioning), anxiety symptoms, depressive symptoms, perspective on beliefs/belief system, coping skills for stress, intelligence level,  Therapeutic rapport was established. Interventions conducted today were geared towards incorporating medication management along with support for continued therapy. Education was also provided as to the med management with this provider and what to expect in subsequent sessions.      We discussed risks, benefits, goals and side effects of the above medication and the patient was agreeable with the plan. Patient was educated on the importance of " compliance with treatment and follow-up appointments. Patient is aware to contact the [facility] with any worsening of symptoms. To call for questions or concerns and return early if necessary. Patent is agreeable to go to the Emergency Department or call 911 should they begin SI/HI.    PHQ-2/PHQ-9: Depression Screening  Little Interest or Pleasure in Doing Things: 1-->several days  Feeling Down, Depressed or Hopeless: 0-->not at all  PHQ-2 Total Score: 1  PHQ-9: Brief Depression Severity Measure Score: 1      PHQ-9 Score:   PHQ-9 Total Score: 1    Depression Screening:  Patient screened positive for depression based on a PHQ-9 score of 1 on 12/7/2023. Follow-up recommendations include: Suicide Risk Assessment performed.      CHERELLE-7 Score:  Feeling nervous, anxious or on edge: Several days  Not being able to stop or control worrying: Not at all  Worrying too much about different things: Several days  Trouble Relaxing: Not at all  Being so restless that it is hard to sit still: Several days  Feeling afraid as if something awful might happen: Not at all  Becoming easily annoyed or irritable: More than half the days  CHERELLE 7 Total Score: 5            MEDS ORDERED DURING VISIT:  New Medications Ordered This Visit   Medications    ALPRAZolam (XANAX) 0.5 MG tablet     Sig: QHS and daily prn     Dispense:  30 tablet     Refill:  2         Follow Up   Return in about 3 months (around 3/7/2024).  Patient was given instructions and counseling regarding her condition or for health maintenance advice. Please see specific information pulled into the AVS if appropriate.     TREATMENT PLAN/GOALS: Continue supportive psychotherapy efforts and medications as indicated. Treatment and medication options discussed during today's visit. Patient acknowledged and verbally consented to continue with current treatment plan and was educated on the importance of compliance with treatment and follow-up appointments.    MEDICATION  ISSUES:  Discussed medication options and treatment plan of prescribed medication as well as the risks, benefits, and side effects including potential falls, possible impaired driving and metabolic adversities among others. Patient is agreeable to call the office with any worsening of symptoms or onset of side effects. Patient is agreeable to call 911 or go to the nearest ER should he/she begin having SI/HI.        [facility]  [address] [address] [phone]     December 7, 2023 12:29 EST

## 2023-12-13 ENCOUNTER — OFFICE VISIT (OUTPATIENT)
Dept: INTERNAL MEDICINE | Facility: CLINIC | Age: 78
End: 2023-12-13
Payer: MEDICARE

## 2023-12-13 VITALS
SYSTOLIC BLOOD PRESSURE: 138 MMHG | OXYGEN SATURATION: 98 % | HEART RATE: 54 BPM | BODY MASS INDEX: 22.18 KG/M2 | WEIGHT: 138 LBS | HEIGHT: 66 IN | RESPIRATION RATE: 16 BRPM | DIASTOLIC BLOOD PRESSURE: 64 MMHG

## 2023-12-13 DIAGNOSIS — M06.9 RHEUMATOID ARTHRITIS, INVOLVING UNSPECIFIED SITE, UNSPECIFIED WHETHER RHEUMATOID FACTOR PRESENT: ICD-10-CM

## 2023-12-13 DIAGNOSIS — K21.9 GASTROESOPHAGEAL REFLUX DISEASE WITHOUT ESOPHAGITIS: ICD-10-CM

## 2023-12-13 DIAGNOSIS — E55.9 VITAMIN D DEFICIENCY, UNSPECIFIED: ICD-10-CM

## 2023-12-13 DIAGNOSIS — F41.1 GENERALIZED ANXIETY DISORDER: ICD-10-CM

## 2023-12-13 DIAGNOSIS — R91.8 LUNG MASS: ICD-10-CM

## 2023-12-13 DIAGNOSIS — E78.5 DYSLIPIDEMIA: Primary | Chronic | ICD-10-CM

## 2023-12-13 DIAGNOSIS — M81.0 AGE-RELATED OSTEOPOROSIS WITHOUT CURRENT PATHOLOGICAL FRACTURE: ICD-10-CM

## 2023-12-13 DIAGNOSIS — E03.9 ACQUIRED HYPOTHYROIDISM: ICD-10-CM

## 2023-12-13 LAB — DRUGS UR: NORMAL

## 2023-12-13 PROCEDURE — 99214 OFFICE O/P EST MOD 30 MIN: CPT | Performed by: STUDENT IN AN ORGANIZED HEALTH CARE EDUCATION/TRAINING PROGRAM

## 2023-12-13 NOTE — PROGRESS NOTES
Office Note     Name: Yamileth De Guzman    : 1945     MRN: [id number]     Chief Complaint  Follow-up (Chronic condition management)    Subjective     History of Present Illness:  Yamileth De Guzman is a 78 y.o. female who presents today for chronic conditions    Anxiety: follows with psychiatry, notes and medical records reviewed, on xanax  Hypothyroidism: stable on meds  GERD: daily omeprazole, occasionally takes a 2nd dose (about 3-4x/month)  Allergies: on zyrtec and flonase  HLD: on fish oil, high LDL   Trigger finger: Occasional on right fifth digit but does not bother her much right now, is tolerable     Smoker: cigarettes 1ppd x 50 years. LDCT 2023 lung nodules seen. Repeat in 1 year  Mammogram: 2023 birads 2  DEXA scan:  osteopenia and osteoporosis on L hip, on prolia (2-3 years now)      Family History:   Family History   Problem Relation Age of Onset    Hearing loss Father     Arthritis Father     Cancer Father     GI problems Father     Breast cancer Sister     Colon cancer Neg Hx        Social History:   Social History     Socioeconomic History    Marital status:    Tobacco Use    Smoking status: Every Day     Packs/day: 1.00     Years: 40.00     Additional pack years: 0.00     Total pack years: 40.00     Types: Cigarettes    Smokeless tobacco: Never   Vaping Use    Vaping Use: Never used   Substance and Sexual Activity    Alcohol use: Not Currently    Drug use: No    Sexual activity: Defer       Health Maintenance   Topic Date Due    COVID-19 Vaccine (1) 2025 (Originally 3/19/1946)    ZOSTER VACCINE (2 of 2) 2026 (Originally 2015)    ANNUAL WELLNESS VISIT  2024    LIPID PANEL  2024    LUNG CANCER SCREENING  2024    COLORECTAL CANCER SCREENING  2025    TDAP/TD VACCINES (3 - Td or Tdap) 04/15/2025    DXA SCAN  2025    HEPATITIS C SCREENING  Completed    INFLUENZA VACCINE  Completed    Pneumococcal Vaccine 65+  Completed  "      Objective     Vital Signs  /64   Pulse 54   Resp 16   Ht 167.6 cm (65.98\")   Wt 62.6 kg (138 lb)   SpO2 98%   BMI 22.28 kg/m²   Estimated body mass index is 22.28 kg/m² as calculated from the following:    Height as of this encounter: 167.6 cm (65.98\").    Weight as of this encounter: 62.6 kg (138 lb).  BMI is within normal parameters. No other follow-up for BMI required.    Physical Exam  Vitals and nursing note reviewed.   Constitutional:       Appearance: Normal appearance.   HENT:      Head: Normocephalic and atraumatic.   Cardiovascular:      Rate and Rhythm: Normal rate and regular rhythm.      Pulses: Normal pulses.      Heart sounds: Normal heart sounds.   Pulmonary:      Effort: Pulmonary effort is normal. No respiratory distress.      Breath sounds: Normal breath sounds. No wheezing, rhonchi or rales.   Abdominal:      General: Abdomen is flat. Bowel sounds are normal.      Palpations: Abdomen is soft.      Tenderness: There is no abdominal tenderness. There is no guarding.   Musculoskeletal:      Cervical back: Neck supple.   Skin:     General: Skin is warm.      Capillary Refill: Capillary refill takes less than 2 seconds.   Neurological:      General: No focal deficit present.      Mental Status: She is alert. Mental status is at baseline.   Psychiatric:         Mood and Affect: Mood normal.         Behavior: Behavior normal.          Procedures     Assessment and Plan     Diagnoses and all orders for this visit:    1. Dyslipidemia (Primary)  Assessment & Plan:  Stable  On omega 3 FA    Orders:  -     CBC & Differential  -     Comprehensive Metabolic Panel  -     Lipid Panel    2. Acquired hypothyroidism  Assessment & Plan:  On levothyroxine 88mcg  Stable on current medication and dosage. Will continue current management. Refill medication as necessary.  Last TSH normal  Repeat labs prior to next visit    Orders:  -     CBC & Differential  -     Comprehensive Metabolic Panel  -     TSH " Rfx On Abnormal To Free T4    3. Vitamin D deficiency, unspecified  Assessment & Plan:  Stable on daily Otc vitamin D  Last vitamin d level normal    Orders:  -     CBC & Differential  -     Comprehensive Metabolic Panel  -     Vitamin D,25-Hydroxy    4. Gastroesophageal reflux disease without esophagitis  Assessment & Plan:  Advised to stay compliant with omeprazole only once daily for reflux      5. Generalized anxiety disorder  Assessment & Plan:  Follows with psychiatry  On alprazolam      6. Rheumatoid arthritis, involving unspecified site, unspecified whether rheumatoid factor present  Assessment & Plan:  Follows with rheumatology      7. Lung mass  Assessment & Plan:  Stable, for repeat LDCT on 8/2024      8. Age-related osteoporosis without current pathological fracture  Assessment & Plan:  On prolia  Follows with rheumatology           Counseling was given to patient for the following topics: instructions for management, risks and benefits of treatment options, and importance of treatment compliance.    Follow Up  Return in about 7 months (around 7/18/2024) for Medicare Wellness.    MD ALBERTO Hughes MR  [facility]  [address]  [place] [address] [phone]

## 2023-12-13 NOTE — ASSESSMENT & PLAN NOTE
On levothyroxine 88mcg  Stable on current medication and dosage. Will continue current management. Refill medication as necessary.  Last TSH normal  Repeat labs prior to next visit

## 2023-12-22 ENCOUNTER — HOSPITAL ENCOUNTER (OUTPATIENT)
Dept: INFUSION THERAPY | Facility: HOSPITAL | Age: 78
Discharge: HOME | End: 2023-12-22
Admitting: INTERNAL MEDICINE
Payer: MEDICARE

## 2023-12-22 VITALS
OXYGEN SATURATION: 99 % | TEMPERATURE: 97.9 F | DIASTOLIC BLOOD PRESSURE: 45 MMHG | HEART RATE: 60 BPM | SYSTOLIC BLOOD PRESSURE: 135 MMHG | RESPIRATION RATE: 16 BRPM

## 2023-12-22 DIAGNOSIS — M80.00XD AGE-RELATED OSTEOPOROSIS WITH CURRENT PATHOLOGICAL FRACTURE WITH ROUTINE HEALING: Primary | ICD-10-CM

## 2023-12-22 PROCEDURE — 25010000002 DENOSUMAB 60 MG/ML SOLUTION PREFILLED SYRINGE: Performed by: INTERNAL MEDICINE

## 2023-12-22 PROCEDURE — 96372 THER/PROPH/DIAG INJ SC/IM: CPT

## 2023-12-22 RX ADMIN — DENOSUMAB 60 MG: 60 INJECTION SUBCUTANEOUS at 10:01

## 2024-03-05 DIAGNOSIS — K21.00 GASTROESOPHAGEAL REFLUX DISEASE WITH ESOPHAGITIS WITHOUT HEMORRHAGE: ICD-10-CM

## 2024-03-05 RX ORDER — OMEPRAZOLE 20 MG/1
20 CAPSULE, DELAYED RELEASE ORAL EVERY 12 HOURS
Qty: 180 CAPSULE | Refills: 0 | Status: SHIPPED | OUTPATIENT
Start: 2024-03-05

## 2024-03-07 ENCOUNTER — OFFICE VISIT (OUTPATIENT)
Dept: BEHAVIORAL HEALTH | Facility: CLINIC | Age: 79
End: 2024-03-07
Payer: MEDICARE

## 2024-03-07 VITALS — WEIGHT: 141.25 LBS | HEART RATE: 67 BPM | OXYGEN SATURATION: 97 % | BODY MASS INDEX: 22.7 KG/M2 | HEIGHT: 66 IN

## 2024-03-07 DIAGNOSIS — F41.1 GENERALIZED ANXIETY DISORDER: Primary | ICD-10-CM

## 2024-03-07 PROCEDURE — 1159F MED LIST DOCD IN RCRD: CPT

## 2024-03-07 PROCEDURE — 99213 OFFICE O/P EST LOW 20 MIN: CPT

## 2024-03-07 PROCEDURE — 1160F RVW MEDS BY RX/DR IN RCRD: CPT

## 2024-03-07 RX ORDER — ALPRAZOLAM 0.5 MG/1
TABLET ORAL
Qty: 30 TABLET | Refills: 2 | Status: SHIPPED | OUTPATIENT
Start: 2024-03-07

## 2024-03-07 RX ORDER — MELOXICAM 7.5 MG/1
TABLET ORAL
COMMUNITY
Start: 2023-12-12

## 2024-03-07 NOTE — PROGRESS NOTES
Follow Up Office Visit    Patient Name: Yamileth De Guzman  : 1945   MRN: [id number]   Care Team: Patient Care Team:  Salina Montanez MD as PCP - General (Family Medicine)  Mansoor Quezada DO ([facility])         Chief Complaint:    Chief Complaint   Patient presents with    Anxiety     3 month follow up for anxiety    Med Management       History of Present Illness: Yamileth De Guzman is a 78 y.o. female who is here today for a medication management follow up. Patient reports that overall medication continues to be effective. She feels that her anxiety is managed well. She did not see the need to make any changes and did not have any medication concerns at today's visit.     The following portion of the patient's history were reviewed and updated appropriately: allergies, current and past medications, family history, medical history and social history.  Subjective   Review of Systems:    Review of Systems   Respiratory: Negative.     Cardiovascular: Negative.    Neurological: Negative.    Psychiatric/Behavioral: Negative.     All other systems reviewed and are negative.      Current Medications:   Current Outpatient Medications   Medication Sig Dispense Refill    ALPRAZolam (XANAX) 0.5 MG tablet QHS and daily prn 30 tablet 2    baclofen (LIORESAL) 10 MG tablet Take 1 tablet by mouth 2 (Two) Times a Day. (Patient taking differently: Take 1 tablet by mouth Daily.) 50 tablet 0    cetirizine (zyrTEC) 10 MG tablet Take 1 tablet by mouth Every Night.      denosumab (PROLIA) 60 MG/ML solution syringe Inject 1 mL under the skin into the appropriate area as directed Every 6 (Six) Months.      fluticasone (FLONASE) 50 MCG/ACT nasal spray 2 sprays into the nostril(s) as directed by provider Daily. 48 g 2    Garlic 400 MG tablet Take  by mouth Daily.      levothyroxine (SYNTHROID, LEVOTHROID) 88 MCG tablet Take 1 tablet by mouth once daily 90 tablet 3    meloxicam (MOBIC) 7.5 MG tablet TAKE 1  "TABLET BY MOUTH ONCE DAILY AS NEEDED WITH FOOD      Multiple Vitamin (MULTI VITAMIN DAILY PO) Take 1 tablet by mouth Daily.      Omega-3 Fatty Acids (FISH OIL) 1000 MG capsule capsule Take 1 capsule by mouth Daily With Breakfast.      omeprazole (priLOSEC) 20 MG capsule TAKE 1 CAPSULE BY MOUTH EVERY 12 HOURS 180 capsule 0    vitamin C (ASCORBIC ACID) 250 MG tablet Take 1 tablet by mouth Daily.       No current facility-administered medications for this visit.       Mental Status Exam:   Hygiene:   good  Cooperation:  Cooperative  Eye Contact:  Good  Psychomotor Behavior:  Appropriate  Affect:  Appropriate  Mood: normal  Speech:  Normal  Thought Process:  Goal directed and Linear  Thought Content:  Normal and Mood congruent  Suicidal:  None  Homicidal:  None  Hallucinations:  None  Delusion:  None  Memory:  Intact  Orientation:  Person, Place, Time, and Situation  Reliability:  good  Insight:  Good  Judgement:  Good  Impulse Control:  Good  Physical/Medical Issues:  Yes see chart       Objective   Vital Signs:   Pulse 67   Ht 167.6 cm (65.98\")   Wt 64.1 kg (141 lb 4 oz)   SpO2 97%   BMI 22.81 kg/m²       On 12/22/2023: BP: 135/45    Assessment / Plan    Diagnoses and all orders for this visit:    1. Generalized anxiety disorder (Primary)  -     ALPRAZolam (XANAX) 0.5 MG tablet; QHS and daily prn  Dispense: 30 tablet; Refill: 2       Overall, patient appears to be doing well on current medication. No issues reported and no indication for change. Will continue medication as ordered.     PHQ-9  PHQ-2/PHQ-9: Depression Screening  Little Interest or Pleasure in Doing Things: 1-->several days  Feeling Down, Depressed or Hopeless: 0-->not at all  PHQ-2 Total Score: 1  PHQ-9: Brief Depression Severity Measure Score: 1      PHQ-9 Score:   PHQ-9 Total Score: 1    Depression Screening:  Patient screened positive for depression based on a PHQ-9 score of 1 on 3/7/2024. Follow-up recommendations include: Suicide Risk " Assessment performed.        CHERELLE-7  Over the last two weeks, how often have you been bothered by the following problems?  Feeling nervous, anxious or on edge: Several days  Not being able to stop or control worrying: Several days  Worrying too much about different things: Several days  Trouble Relaxing: Not at all  Being so restless that it is hard to sit still: Not at all  Becoming easily annoyed or irritable: Several days  Feeling afraid as if something awful might happen: Not at all  CHERELLE 7 Total Score: 4  If you checked any problems, how difficult have these problems made it for you to do your work, take care of things at home, or get along with other people: Not difficult at all      ADHD  Screening for Adults With ADHD - (1-6)  1. How often do you have trouble wrapping up the final details of a project, once the challenging parts have been done?: Rarely  2. How often do you have difficulty getting things in order when you have to do a task that requires organization?: Sometimes  3. How often do you have problems remembering appointments or obligations : Sometimes  4. When you have a task that requires a lot of thought, how often do you avoid or delay getting started ?: Often  5. How often do you fidget or squirm with your hands or feet when you have to sit down for a long time?: Very Often  6. How often do you feel overly active and compelled to do things, like you were driven by a motor?: Rarely  7. How often do you make careless mistakes when you have to work on a boring or difficult project?: Sometimes  8. How often do have difficulty keeping your attention when you are doing boring or repetitive work?: Often  9. How often do you have difficulty concentrating on what people say to you, even when they are speaking to you: Sometimes  10.How often do you misplace or have difficulty finding things at home or at work?: Very Often  11.How often are you distracted by activity or noise around you?: Often  12.How  often do you leave your seat in meetings or other situations in which you are expected to remain seated?: Never  13.How often do you feel restless or fidgety?: Sometimes  14.How often do you have difficulty unwinding and relaxing when you have time to yourself?: Sometimes  15.How often do you find yourself talking too much when you are in social situations?: Sometimes  16.When you’re in a conversation, how often do you find yourself finishing the sentences of the people you are talking to, before they can finish them themselves?: Sometimes  17.How often do you have difficulty waiting your turn in situations when turn taking is required?: Sometimes  18.How often do you interrupt others when they are busy?: Rarely    A psychological evaluation was conducted in order to assess past and current level of functioning. Areas assessed included, but were not limited to: perception of social support, perception of ability to face and deal with challenges in life (positive functioning), anxiety symptoms, depressive symptoms, perspective on beliefs/belief system, coping skills for stress, intelligence level,  Therapeutic rapport was established. Interventions conducted today were geared towards incorporating medication management along with support for continued therapy. Education was also provided as to the med management with this provider and what to expect in subsequent sessions.      We discussed risks, benefits, goals and side effects of the above medication and the patient was agreeable with the plan. Patient was educated on the importance of compliance with treatment and follow-up appointments. Patient is aware to contact the [facility] with any worsening of symptoms. To call for questions or concerns and return early if necessary. Patent is agreeable to go to the Emergency Department or call 911 should they begin SI/HI.    MEDS ORDERED DURING VISIT:  New Medications Ordered This Visit   Medications    ALPRAZolam  (XANAX) 0.5 MG tablet     Sig: QHS and daily prn     Dispense:  30 tablet     Refill:  2         Follow Up   Return in about 3 months (around 6/7/2024).  Patient was given instructions and counseling regarding her condition or for health maintenance advice. Please see specific information pulled into the AVS if appropriate.     TREATMENT PLAN/GOALS: Continue supportive psychotherapy efforts and medications as indicated. Treatment and medication options discussed during today's visit. Patient acknowledged and verbally consented to continue with current treatment plan and was educated on the importance of compliance with treatment and follow-up appointments.    MEDICATION ISSUES:  Discussed medication options and treatment plan of prescribed medication as well as the risks, benefits, and side effects including potential falls, possible impaired driving and metabolic adversities among others. Patient is agreeable to call the office with any worsening of symptoms or onset of side effects. Patient is agreeable to call 911 or go to the nearest ER should he/she begin having SI/HI.        [facility]  [address] [place] [address] [address] [place] [id number] [phone]    March 7, 2024 09:13 EST

## 2024-04-30 ENCOUNTER — OFFICE VISIT (OUTPATIENT)
Dept: GASTROENTEROLOGY | Facility: CLINIC | Age: 79
End: 2024-04-30
Payer: MEDICARE

## 2024-04-30 VITALS
BODY MASS INDEX: 22.98 KG/M2 | OXYGEN SATURATION: 99 % | WEIGHT: 143 LBS | RESPIRATION RATE: 12 BRPM | DIASTOLIC BLOOD PRESSURE: 80 MMHG | HEART RATE: 85 BPM | SYSTOLIC BLOOD PRESSURE: 124 MMHG | HEIGHT: 66 IN

## 2024-04-30 DIAGNOSIS — K21.9 GASTROESOPHAGEAL REFLUX DISEASE WITHOUT ESOPHAGITIS: ICD-10-CM

## 2024-04-30 DIAGNOSIS — K59.04 CHRONIC IDIOPATHIC CONSTIPATION: Chronic | ICD-10-CM

## 2024-04-30 DIAGNOSIS — Z86.010 PERSONAL HISTORY OF COLONIC POLYPS: Primary | ICD-10-CM

## 2024-04-30 PROCEDURE — 1159F MED LIST DOCD IN RCRD: CPT | Performed by: NURSE PRACTITIONER

## 2024-04-30 PROCEDURE — 1160F RVW MEDS BY RX/DR IN RCRD: CPT | Performed by: NURSE PRACTITIONER

## 2024-04-30 PROCEDURE — 99213 OFFICE O/P EST LOW 20 MIN: CPT | Performed by: NURSE PRACTITIONER

## 2024-04-30 NOTE — PROGRESS NOTES
Follow Up Note     Date: 2024   Patient Name: Yamileth De Guzman  MRN: [id number]  : 1945     Primary Care Provider: Salina Montanez MD     Chief Complaint   Patient presents with    Colon Cancer Screening     History of present illness:   2024  Yamileth De Guzman is a 78 y.o. female who is here today for follow up colon cancer screening. She needs to have her 3 year colonoscopy for surveillance. She has been doing well. No new problems or concerns. Denies GI bleeding.     Interval History:  2022  Yamileth De Guzman is a 76 y.o. female who is here today for follow up after colonoscopy. She has been doing well. Constipation controlled with stool softeners. Denies GI bleeding. No history of nausea or vomiting.     2020  There is a long standing history of constipation. The patient has 1 firm bowel movement every 3-4 days. She frequently has gas and bloating. For the past 2-3 months, constipation and gas have worsened, and she has been having pain in the lower abdomen. The pain comes and goes, and is a moderate, sharp pain. No history of fevers or chills, nausea or vomiting.     The patient is taking Omeprazole 20mg with reasonable control of heartburn. Reflux is worse at night with lying down. Heartburn is moderate. The patient denies difficulty swallowing.     Last EGD was in .    Subjective      Past Medical History:   Diagnosis Date    Abdominal bloating     Acid reflux     Adenomatous polyp of colon 2020    Anxiety     Arthritis     Carotid bruit     Colon polyp     Constipation     Decreased hearing     Disease of thyroid gland     Fibromyalgia     Full dentures     Hematuria     Impaired mobility     Lung nodule     benign    Osteoporosis     Seasonal allergies     Sinusitis     Wears glasses     READING GLASSES ONLY     Past Surgical History:   Procedure Laterality Date    APPENDECTOMY      CATARACT EXTRACTION W/ INTRAOCULAR LENS IMPLANT Right 10/2/2023     Procedure: CATARACT PHACO EXTRACTION WITH INTRAOCULAR LENS IMPLANT RIGHT;  Surgeon: Jet Avalos MD;  Location: [facility] OR;  Service: Ophthalmology;  Laterality: Right;    CATARACT EXTRACTION W/ INTRAOCULAR LENS IMPLANT Left 10/16/2023    Procedure: CATARACT PHACO EXTRACTION WITH INTRAOCULAR LENS IMPLANT LEFT;  Surgeon: Jet Avalos MD;  Location: [facility] OR;  Service: Ophthalmology;  Laterality: Left;    COLONOSCOPY  2012    COLONOSCOPY N/A 08/17/2020    Procedure: COLONOSCOPY WITH COLD SNARE POLYPECTOMY, COLD FORCEP POLYPECTOMY, SUBMUCOSAL INJECTION OF NORMAL SALINE, ENDOSCOPIC MUCOSAL RESECTION, HOT SNARE POLYPECTOMY, THERMAL ABLATION, QUICK CLIP PLACEMENT TIMES 4 AND BARBIE INK INJECTION;  Surgeon: Scar Rogers MD;  Location: [facility] ENDOSCOPY;  Service: Gastroenterology;  Laterality: N/A;    COLONOSCOPY N/A 03/01/2021    Procedure: COLONOSCOPY WITH BIOPSY AND POLYPECTOMY;  Surgeon: Scar Rogers MD;  Location: [facility] ENDOSCOPY;  Service: Gastroenterology;  Laterality: N/A;    COLONOSCOPY N/A 03/18/2022    Procedure: COLONOSCOPY with polypectomy x2;  Surgeon: Scar Rogers MD;  Location: [facility] ENDOSCOPY;  Service: Gastroenterology;  Laterality: N/A;    HIP TROCHANTERIC NAILING WITH INTRAMEDULLARY HIP SCREW Right 02/13/2023    Procedure: HIP TROCHANTERIC NAILING WITH INTRAMEDULLARY HIP SCREW-SHORT NAIL;  Surgeon: Miguel Angel Flores MD;  Location: [facility] OR;  Service: Orthopedics;  Laterality: Right;    HYSTERECTOMY      PARTIAL STILL HAS OVARIES    MULTIPLE TOOTH EXTRACTIONS      TUBAL ABDOMINAL LIGATION      UPPER GASTROINTESTINAL ENDOSCOPY  2012    VAGINAL DELIVERY      X1     Family History   Problem Relation Age of Onset    Hearing loss Father     Arthritis Father     Cancer Father     GI problems Father     Breast cancer Sister     Colon cancer Neg Hx      Social History     Socioeconomic History    Marital status:    Tobacco Use    Smoking status: Every Day      Current packs/day: 1.00     Average packs/day: 1 pack/day for 40.0 years (40.0 ttl pk-yrs)     Types: Cigarettes    Smokeless tobacco: Never   Vaping Use    Vaping status: Never Used   Substance and Sexual Activity    Alcohol use: Not Currently    Drug use: No    Sexual activity: Defer       Current Outpatient Medications:     ALPRAZolam (XANAX) 0.5 MG tablet, QHS and daily prn, Disp: 30 tablet, Rfl: 2    baclofen (LIORESAL) 10 MG tablet, Take 1 tablet by mouth 2 (Two) Times a Day. (Patient taking differently: Take 1 tablet by mouth Daily.), Disp: 50 tablet, Rfl: 0    cetirizine (zyrTEC) 10 MG tablet, Take 1 tablet by mouth Every Night., Disp: , Rfl:     denosumab (PROLIA) 60 MG/ML solution syringe, Inject 1 mL under the skin into the appropriate area as directed Every 6 (Six) Months., Disp: , Rfl:     fluticasone (FLONASE) 50 MCG/ACT nasal spray, 2 sprays into the nostril(s) as directed by provider Daily., Disp: 48 g, Rfl: 2    Garlic 400 MG tablet, Take  by mouth Daily., Disp: , Rfl:     levothyroxine (SYNTHROID, LEVOTHROID) 88 MCG tablet, Take 1 tablet by mouth once daily, Disp: 90 tablet, Rfl: 3    meloxicam (MOBIC) 7.5 MG tablet, TAKE 1 TABLET BY MOUTH ONCE DAILY AS NEEDED WITH FOOD, Disp: , Rfl:     Multiple Vitamin (MULTI VITAMIN DAILY PO), Take 1 tablet by mouth Daily., Disp: , Rfl:     Omega-3 Fatty Acids (FISH OIL) 1000 MG capsule capsule, Take 1 capsule by mouth Daily With Breakfast., Disp: , Rfl:     omeprazole (priLOSEC) 20 MG capsule, TAKE 1 CAPSULE BY MOUTH EVERY 12 HOURS, Disp: 180 capsule, Rfl: 0    vitamin C (ASCORBIC ACID) 250 MG tablet, Take 1 tablet by mouth Daily., Disp: , Rfl:     No Known Allergies    The following portions of the patient's history were reviewed and updated as appropriate: allergies, current medications, past family history, past medical history, past social history, past surgical history and problem list.  Objective     Physical Exam  Vitals and nursing note reviewed.  "  Constitutional:       General: She is not in acute distress.     Appearance: Normal appearance. She is well-developed.   HENT:      Head: Normocephalic and atraumatic.      Mouth/Throat:      Mouth: Mucous membranes are not pale, not dry and not cyanotic.   Eyes:      General: Lids are normal.   Neck:      Trachea: Trachea normal.   Cardiovascular:      Rate and Rhythm: Normal rate.   Pulmonary:      Effort: Pulmonary effort is normal. No respiratory distress.      Breath sounds: Normal breath sounds.   Abdominal:      Tenderness: There is no abdominal tenderness.   Skin:     General: Skin is warm and dry.   Neurological:      Mental Status: She is alert and oriented to person, place, and time.   Psychiatric:         Mood and Affect: Mood normal.         Speech: Speech normal.         Behavior: Behavior normal. Behavior is cooperative.       Vitals:    04/30/24 1025   BP: 124/80   Pulse: 85   Resp: 12   SpO2: 99%   Weight: 64.9 kg (143 lb)   Height: 167.6 cm (66\")     Body mass index is 23.08 kg/m².     Results Review:   I reviewed the patient's new clinical results.    Comprehensive Metabolic Panel (07/13/2023 10:05)  CBC & Differential (07/13/2023 10:05)  TSH (07/13/2023 10:05)  Iron Profile (07/13/2023 10:05)    US Abdomen Complete (07/17/2020 09:45)     Colonoscopy dated 8/17/2020 per Dr. Rogers  -  Six 6 to 8 mm polyps removed.    - One 20 to 25 mm polyp in the ascending colon removed by mucosal resection.    - Cecum, ascending colon, and transverse colon polyps with tubular adenoma.  No high-grade dysplasia.     Colonoscopy dated 3/1/2021 per Dr. Rogers  - One 6 to 8 mm polyp in the proximal ascending colon removed.    - 1 5 mm polyp in the proximal descending colon removed.   - A few diminutive polyps in the rectum and sigmoid colon removed.  - Diverticulosis in sigmoid colon.    - Hyperplastic changes of prior EMR site noted, biopsies obtained.    - Ascending colon polyp biopsy with tubular adenoma " without high-grade dysplasia.  Ascending colon biopsy proximal with tubular adenoma fragments without high-grade dysplasia.  Descending colon polyp biopsy of tubular adenoma fragments without high-grade dysplasia.  Rectosigmoid colon polyp x2 biopsy with hyperplastic polyps.     Colonoscopy dated 3/18/2022 per Dr. Rogers  - One 6 mm polyp in the proximal ascending colon, removed with a hot snare. Resected and retrieved. Edges were cauterized with soft coagulation  - A tattoo was seen in the proximal ascending colon distal to EMR scar.  - One 5 mm polyp at the hepatic flexure, removed with a hot snare. Resected and retrieved.  - Diverticulosis in the sigmoid colon.  - External and internal hemorrhoids.  - Ascending colon polyp biopsy with tubular adenoma, no dysplasia.  Hepatic flexure polyp biopsy tubular adenoma, no dysplasia.    Assessment / Plan      1. Personal history of colonic polyps  Colonoscopy in August 2020 with 6 polyps removed, tubular adenoma without dysplasia. Ascending colon with 20-25mm polyp removed by EMR, tubular adenoma without dysplasia.  Colonoscopy in March 2021with biopsy at site of previous removed 20-25mm polyp with tubular adenoma without dysplasia.  Colonoscopy dated 3/18/2022 with tattoo seen in the proximal ascending colon distal to EMR scar, 2 small polyps removed, adenomas without dysplasia.  There is no family history of colon cancer.   Colonoscopy for surveillance in 3 years, March 2025, or sooner if needed.    2. Chronic idiopathic constipation  Constipation reasonably controlled with stool softeners daily.  No history of GI bleeding.  Basic labs unremarkable.  TSH normal.  Colonoscopy 3/18/2022 with polyps removed, otherwise unremarkable.  High-fiber, low-fat diet with liberal water intake.  Metamucil or fiber Gummies daily.  Continue stool softeners daily.  May take MiraLAX 17 g daily as needed.  CTAP if symptoms worsen.    3. Gastroesophageal reflux disease without  esophagitis  Reflux reasonably controlled with low-dose PPI daily.  Continue same.  Denies difficulty swallowing.  She had an EGD in 2012 that was normal per patient, we do not have those results.  Antireflux measures.  EGD if symptoms worsen.    Patient Instructions   Antireflux measures: Avoid fried, fatty foods, alcohol, chocolate, coffee, tea,  soft drinks, peppermint and spearmint, spicy foods, tomatoes and tomato based foods, onion based foods, and smoking.  Other antireflux measures include weight reduction if overweight, avoiding tight clothing around the abdomen, elevating the head of the bed 6 inches with blocks under the head board, and don't drink or eat before going to bed and avoid lying down immediately after meals.  Omeprazole 20 mg 1 po daily in the am 30 minutes before breakfast.  Recommended to take Levothyroxine first in the am upon waking, wait 30 minutes, then take Omeprazole 20 mg, wait 30 minutes, then eat breakfast and take other medications.   High fiber, low fat diet with liberal water intake.   Metamucil 1 packet/scoop daily or fiber gummies 2-4 per day.   Continue stool softeners 2 per day for constipation.   May add Miralax 17 grams daily as needed. Adjust to have 1-2 soft bowel movements per day.   Low FODMAP diet - avoid all dairy. May use lactose free/dairy free alternatives such as almond milk, rice milk, oat milk, etc.   Colonoscopy in March 2025 or sooner if needed.   Follow up: January 2025 to schedule colonoscopy or sooner if needed.      Low-FODMAP Eating Plan    FODMAP stands for fermentable oligosaccharides, disaccharides, monosaccharides, and polyols. These are sugars that are hard for some people to digest. A low-FODMAP eating plan may help some people who have irritable bowel syndrome (IBS) and certain other bowel (intestinal) diseases to manage their symptoms.  This meal plan can be complicated to follow. Work with a diet and nutrition specialist (dietitian) to make a  low-FODMAP eating plan that is right for you. A dietitian can help make sure that you get enough nutrition from this diet.  What are tips for following this plan?  Reading food labels  Check labels for hidden FODMAPs such as:  High-fructose syrup.  Honey.  Agave.  Natural fruit flavors.  Onion or garlic powder.  Choose low-FODMAP foods that contain 3-4 grams of fiber per serving.  Check food labels for serving sizes. Eat only one serving at a time to make sure FODMAP levels stay low.  Shopping  Shop with a list of foods that are recommended on this diet and make a meal plan.  Meal planning  Follow a low-FODMAP eating plan for up to 6 weeks, or as told by your health care provider or dietitian.  To follow the eating plan:  Eliminate high-FODMAP foods from your diet completely. Choose only low-FODMAP foods to eat. You will do this for 2-6 weeks.  Gradually reintroduce high-FODMAP foods into your diet one at a time. Most people should wait a few days before introducing the next new high-FODMAP food into their meal plan. Your dietitian can recommend how quickly you may reintroduce foods.  Keep a daily record of what and how much you eat and drink. Make note of any symptoms that you have after eating.  Review your daily record with a dietitian regularly to identify which foods you can eat and which foods you should avoid.  General tips  Drink enough fluid each day to keep your urine pale yellow.  Avoid processed foods. These often have added sugar and may be high in FODMAPs.  Avoid most dairy products, whole grains, and sweeteners.  Work with a dietitian to make sure you get enough fiber in your diet.  Avoid high FODMAP foods at meals to manage symptoms.    Recommended foods  Fruits  Bananas, oranges, tangerines, aracely, limes, blueberries, raspberries, strawberries, grapes, cantaloupe, honeydew melon, kiwi, papaya, passion fruit, and pineapple. Limited amounts of dried cranberries, banana chips, and shredded  "coconut.  Vegetables  Eggplant, zucchini, cucumber, peppers, green beans, bean sprouts, lettuce, arugula, kale, Swiss chard, spinach, ailin greens, bok greg, summer squash, potato, and tomato. Limited amounts of corn, carrot, and sweet potato. Green parts of scallions.  Grains  Gluten-free grains, such as rice, oats, buckwheat, quinoa, corn, polenta, and millet. Gluten-free pasta, bread, or cereal. Rice noodles. Corn tortillas.  Meats and other proteins  Unseasoned beef, pork, poultry, or fish. Eggs. Hair. Tofu (firm) and tempeh. Limited amounts of nuts and seeds, such as almonds, walnuts, brazil nuts, pecans, peanuts, nut butters, pumpkin seeds, trevor seeds, and sunflower seeds.  Dairy  Lactose-free milk, yogurt, and kefir. Lactose-free cottage cheese and ice cream. Non-dairy milks, such as almond, coconut, hemp, and rice milk. Non-dairy yogurt. Limited amounts of goat cheese, brie, mozzarella, parmesan, swiss, and other hard cheeses.  Fats and oils  Butter-free spreads. Vegetable oils, such as olive, canola, and sunflower oil.  Seasoning and other foods  Artificial sweeteners with names that do not end in \"ol,\" such as aspartame, saccharine, and stevia. Maple syrup, white table sugar, raw sugar, brown sugar, and molasses. Mayonnaise, soy sauce, and tamari. Fresh basil, coriander, parsley, rosemary, and thyme.  Beverages  Water and mineral water. Sugar-sweetened soft drinks. Small amounts of orange juice or cranberry juice. Black and green tea. Most dry aljea. Coffee.  The items listed above may not be a complete list of foods and beverages you can eat. Contact a dietitian for more information.    Foods to avoid  Fruits  Fresh, dried, and juiced forms of apple, pear, watermelon, peach, plum, cherries, apricots, blackberries, boysenberries, figs, nectarines, and navneet. Avocado.  Vegetables  Chicory root, artichoke, asparagus, cabbage, snow peas, [place] sprouts, broccoli, sugar snap peas, mushrooms, celery, and " cauliflower. Onions, garlic, leeks, and the white part of scallions.  Grains  Wheat, including kamut, durum, and semolina. Barley and bulgur. Couscous. Wheat-based cereals. Wheat noodles, bread, crackers, and pastries.  Meats and other proteins  Fried or fatty meat. Sausage. Cashews and pistachios. Soybeans, baked beans, black beans, chickpeas, kidney beans, harpal beans, navy beans, lentils, black-eyed peas, and split peas.  Dairy  Milk, yogurt, ice cream, and soft cheese. Cream and sour cream. Milk-based sauces. Custard. Buttermilk. Soy milk.  Seasoning and other foods  Any sugar-free gum or candy. Foods that contain artificial sweeteners such as sorbitol, mannitol, isomalt, or xylitol. Foods that contain honey, high-fructose corn syrup, or agave. Bouillon, vegetable stock, beef stock, and chicken stock. Garlic and onion powder. Condiments made with onion, such as hummus, chutney, pickles, relish, salad dressing, and salsa. Tomato paste.  Beverages  Chicory-based drinks. Coffee substitutes. Chamomile tea. Fennel tea. Sweet or fortified aleja such as port or jenifer. Diet soft drinks made with isomalt, mannitol, maltitol, sorbitol, or xylitol. Apple, pear, and navneet juice. Juices with high-fructose corn syrup.  The items listed above may not be a complete list of foods and beverages you should avoid. Contact a dietitian for more information.    Summary  FODMAP stands for fermentable oligosaccharides, disaccharides, monosaccharides, and polyols. These are sugars that are hard for some people to digest.  A low-FODMAP eating plan is a short-term diet that helps to ease symptoms of certain bowel diseases.  The eating plan usually lasts up to 6 weeks. After that, high-FODMAP foods are reintroduced gradually and one at a time. This can help you find out which foods may be causing symptoms.  A low-FODMAP eating plan can be complicated. It is best to work with a dietitian who has experience with this type of plan.  This  information is not intended to replace advice given to you by your health care provider. Make sure you discuss any questions you have with your health care provider.  Document Revised: 05/06/2021 Document Reviewed: 05/06/2021  [employer] Patient Education © 2023 [employer]    Stew Grant, APRN  4/30/2024    Please note that portions of this note were completed with a voice recognition program.

## 2024-04-30 NOTE — PATIENT INSTRUCTIONS
Antireflux measures: Avoid fried, fatty foods, alcohol, chocolate, coffee, tea,  soft drinks, peppermint and spearmint, spicy foods, tomatoes and tomato based foods, onion based foods, and smoking.  Other antireflux measures include weight reduction if overweight, avoiding tight clothing around the abdomen, elevating the head of the bed 6 inches with blocks under the head board, and don't drink or eat before going to bed and avoid lying down immediately after meals.  Omeprazole 20 mg 1 po daily in the am 30 minutes before breakfast.  Recommended to take Levothyroxine first in the am upon waking, wait 30 minutes, then take Omeprazole 20 mg, wait 30 minutes, then eat breakfast and take other medications.   High fiber, low fat diet with liberal water intake.   Metamucil 1 packet/scoop daily or fiber gummies 2-4 per day.   Continue stool softeners 2 per day for constipation.   May add Miralax 17 grams daily as needed. Adjust to have 1-2 soft bowel movements per day.   Low FODMAP diet - avoid all dairy. May use lactose free/dairy free alternatives such as almond milk, rice milk, oat milk, etc.   Colonoscopy in March 2025 or sooner if needed.   Follow up: January 2025 to schedule colonoscopy or sooner if needed.      Low-FODMAP Eating Plan    FODMAP stands for fermentable oligosaccharides, disaccharides, monosaccharides, and polyols. These are sugars that are hard for some people to digest. A low-FODMAP eating plan may help some people who have irritable bowel syndrome (IBS) and certain other bowel (intestinal) diseases to manage their symptoms.  This meal plan can be complicated to follow. Work with a diet and nutrition specialist (dietitian) to make a low-FODMAP eating plan that is right for you. A dietitian can help make sure that you get enough nutrition from this diet.  What are tips for following this plan?  Reading food labels  Check labels for hidden FODMAPs such as:  High-fructose syrup.  Honey.  Agave.  Natural  fruit flavors.  Onion or garlic powder.  Choose low-FODMAP foods that contain 3-4 grams of fiber per serving.  Check food labels for serving sizes. Eat only one serving at a time to make sure FODMAP levels stay low.  Shopping  Shop with a list of foods that are recommended on this diet and make a meal plan.  Meal planning  Follow a low-FODMAP eating plan for up to 6 weeks, or as told by your health care provider or dietitian.  To follow the eating plan:  Eliminate high-FODMAP foods from your diet completely. Choose only low-FODMAP foods to eat. You will do this for 2-6 weeks.  Gradually reintroduce high-FODMAP foods into your diet one at a time. Most people should wait a few days before introducing the next new high-FODMAP food into their meal plan. Your dietitian can recommend how quickly you may reintroduce foods.  Keep a daily record of what and how much you eat and drink. Make note of any symptoms that you have after eating.  Review your daily record with a dietitian regularly to identify which foods you can eat and which foods you should avoid.  General tips  Drink enough fluid each day to keep your urine pale yellow.  Avoid processed foods. These often have added sugar and may be high in FODMAPs.  Avoid most dairy products, whole grains, and sweeteners.  Work with a dietitian to make sure you get enough fiber in your diet.  Avoid high FODMAP foods at meals to manage symptoms.    Recommended foods  Fruits  Bananas, oranges, tangerines, aracely, limes, blueberries, raspberries, strawberries, grapes, cantaloupe, honeydew melon, kiwi, papaya, passion fruit, and pineapple. Limited amounts of dried cranberries, banana chips, and shredded coconut.  Vegetables  Eggplant, zucchini, cucumber, peppers, green beans, bean sprouts, lettuce, arugula, kale, Swiss chard, spinach, ailin greens, bok greg, summer squash, potato, and tomato. Limited amounts of corn, carrot, and sweet potato. Green parts of  "scallions.  Grains  Gluten-free grains, such as rice, oats, buckwheat, quinoa, corn, polenta, and millet. Gluten-free pasta, bread, or cereal. Rice noodles. Corn tortillas.  Meats and other proteins  Unseasoned beef, pork, poultry, or fish. Eggs. Hair. Tofu (firm) and tempeh. Limited amounts of nuts and seeds, such as almonds, walnuts, brazil nuts, pecans, peanuts, nut butters, pumpkin seeds, trevor seeds, and sunflower seeds.  Dairy  Lactose-free milk, yogurt, and kefir. Lactose-free cottage cheese and ice cream. Non-dairy milks, such as almond, coconut, hemp, and rice milk. Non-dairy yogurt. Limited amounts of goat cheese, brie, mozzarella, parmesan, swiss, and other hard cheeses.  Fats and oils  Butter-free spreads. Vegetable oils, such as olive, canola, and sunflower oil.  Seasoning and other foods  Artificial sweeteners with names that do not end in \"ol,\" such as aspartame, saccharine, and stevia. Maple syrup, white table sugar, raw sugar, brown sugar, and molasses. Mayonnaise, soy sauce, and tamari. Fresh basil, coriander, parsley, rosemary, and thyme.  Beverages  Water and mineral water. Sugar-sweetened soft drinks. Small amounts of orange juice or cranberry juice. Black and green tea. Most dry aleja. Coffee.  The items listed above may not be a complete list of foods and beverages you can eat. Contact a dietitian for more information.    Foods to avoid  Fruits  Fresh, dried, and juiced forms of apple, pear, watermelon, peach, plum, cherries, apricots, blackberries, boysenberries, figs, nectarines, and navneet. Avocado.  Vegetables  Chicory root, artichoke, asparagus, cabbage, snow peas, [place] sprouts, broccoli, sugar snap peas, mushrooms, celery, and cauliflower. Onions, garlic, leeks, and the white part of scallions.  Grains  Wheat, including kamut, durum, and semolina. Barley and bulgur. Couscous. Wheat-based cereals. Wheat noodles, bread, crackers, and pastries.  Meats and other proteins  Fried or fatty " meat. Sausage. Cashews and pistachios. Soybeans, baked beans, black beans, chickpeas, kidney beans, harpal beans, navy beans, lentils, black-eyed peas, and split peas.  Dairy  Milk, yogurt, ice cream, and soft cheese. Cream and sour cream. Milk-based sauces. Custard. Buttermilk. Soy milk.  Seasoning and other foods  Any sugar-free gum or candy. Foods that contain artificial sweeteners such as sorbitol, mannitol, isomalt, or xylitol. Foods that contain honey, high-fructose corn syrup, or agave. Bouillon, vegetable stock, beef stock, and chicken stock. Garlic and onion powder. Condiments made with onion, such as hummus, chutney, pickles, relish, salad dressing, and salsa. Tomato paste.  Beverages  Chicory-based drinks. Coffee substitutes. Chamomile tea. Fennel tea. Sweet or fortified aleja such as port or jenifer. Diet soft drinks made with isomalt, mannitol, maltitol, sorbitol, or xylitol. Apple, pear, and navneet juice. Juices with high-fructose corn syrup.  The items listed above may not be a complete list of foods and beverages you should avoid. Contact a dietitian for more information.    Summary  FODMAP stands for fermentable oligosaccharides, disaccharides, monosaccharides, and polyols. These are sugars that are hard for some people to digest.  A low-FODMAP eating plan is a short-term diet that helps to ease symptoms of certain bowel diseases.  The eating plan usually lasts up to 6 weeks. After that, high-FODMAP foods are reintroduced gradually and one at a time. This can help you find out which foods may be causing symptoms.  A low-FODMAP eating plan can be complicated. It is best to work with a dietitian who has experience with this type of plan.  This information is not intended to replace advice given to you by your health care provider. Make sure you discuss any questions you have with your health care provider.  Document Revised: 05/06/2021 Document Reviewed: 05/06/2021  Elsevier Patient Education © 2023  [employer]

## 2024-06-05 DIAGNOSIS — K21.00 GASTROESOPHAGEAL REFLUX DISEASE WITH ESOPHAGITIS WITHOUT HEMORRHAGE: ICD-10-CM

## 2024-06-05 RX ORDER — OMEPRAZOLE 20 MG/1
20 CAPSULE, DELAYED RELEASE ORAL EVERY 12 HOURS
Qty: 180 CAPSULE | Refills: 1 | Status: SHIPPED | OUTPATIENT
Start: 2024-06-05

## 2024-06-11 ENCOUNTER — OFFICE VISIT (OUTPATIENT)
Dept: BEHAVIORAL HEALTH | Facility: CLINIC | Age: 79
End: 2024-06-11
Payer: MEDICARE

## 2024-06-11 VITALS
BODY MASS INDEX: 23.2 KG/M2 | WEIGHT: 144.38 LBS | HEIGHT: 66 IN | DIASTOLIC BLOOD PRESSURE: 60 MMHG | HEART RATE: 65 BPM | SYSTOLIC BLOOD PRESSURE: 110 MMHG | OXYGEN SATURATION: 98 %

## 2024-06-11 DIAGNOSIS — F41.1 GENERALIZED ANXIETY DISORDER: Primary | ICD-10-CM

## 2024-06-11 PROCEDURE — 99213 OFFICE O/P EST LOW 20 MIN: CPT

## 2024-06-11 RX ORDER — ALPRAZOLAM 0.5 MG/1
TABLET ORAL
Qty: 30 TABLET | Refills: 2 | Status: SHIPPED | OUTPATIENT
Start: 2024-06-11

## 2024-06-11 RX ORDER — TACROLIMUS 1 MG/G
OINTMENT TOPICAL
COMMUNITY
Start: 2024-05-23

## 2024-06-11 NOTE — PROGRESS NOTES
Follow Up Office Visit    Patient Name: Yamileth De Guzman  : 1945   MRN: [id number]   Care Team: Patient Care Team:  Salina Montanez MD as PCP - General (Family Medicine)  Mansoor Quezada DO ([facility])  Stew Grant APRN as Nurse Practitioner (Gastroenterology)         Chief Complaint:    Chief Complaint   Patient presents with    Anxiety    Med Management       History of Present Illness: Yamileth De Guzman is a 78 y.o. female who is here today for a medication management follow up. Patient repots that overall mediation continues to be effective and her anxiety has been managed well. She did not see the need to make any changes and did not have any medication concerns at today's visit. She denies SI/HI today.     The following portion of the patient's history were reviewed and updated appropriately: allergies, current and past medications, family history, medical history and social history. BERTO reviewed and appropriate.   Subjective   Review of Systems:    Review of Systems   Respiratory: Negative.     Cardiovascular: Negative.  Negative for chest pain and palpitations.   Neurological: Negative.    Psychiatric/Behavioral: Negative.  Positive for stress.    All other systems reviewed and are negative.      Current Medications:   Current Outpatient Medications   Medication Sig Dispense Refill    ALPRAZolam (XANAX) 0.5 MG tablet QHS and daily prn 30 tablet 2    baclofen (LIORESAL) 10 MG tablet Take 1 tablet by mouth 2 (Two) Times a Day. (Patient taking differently: Take 1 tablet by mouth Daily.) 50 tablet 0    cetirizine (zyrTEC) 10 MG tablet Take 1 tablet by mouth Every Night.      denosumab (PROLIA) 60 MG/ML solution syringe Inject 1 mL under the skin into the appropriate area as directed Every 6 (Six) Months.      fluticasone (FLONASE) 50 MCG/ACT nasal spray 2 sprays into the nostril(s) as directed by provider Daily. 48 g 2    Garlic 400 MG tablet Take  by mouth Daily.       "levothyroxine (SYNTHROID, LEVOTHROID) 88 MCG tablet Take 1 tablet by mouth once daily 90 tablet 3    meloxicam (MOBIC) 7.5 MG tablet TAKE 1 TABLET BY MOUTH ONCE DAILY AS NEEDED WITH FOOD      Multiple Vitamin (MULTI VITAMIN DAILY PO) Take 1 tablet by mouth Daily.      mupirocin (BACTROBAN) 2 % ointment APPLY OINTMENT EXTERNALLY TWICE DAILY      Omega-3 Fatty Acids (FISH OIL) 1000 MG capsule capsule Take 1 capsule by mouth Daily With Breakfast.      omeprazole (priLOSEC) 20 MG capsule TAKE 1 CAPSULE BY MOUTH EVERY 12 HOURS 180 capsule 1    tacrolimus (PROTOPIC) 0.1 % ointment APPLY OINTMENT EXTERNALLY TO RASH TWICE DAILY      vitamin C (ASCORBIC ACID) 250 MG tablet Take 1 tablet by mouth Daily.       No current facility-administered medications for this visit.       Mental Status Exam:   Hygiene:   good  Cooperation:  Cooperative  Eye Contact:  Good  Psychomotor Behavior:  Appropriate  Affect:  Appropriate  Mood: normal  Speech:  Normal  Thought Process:  Goal directed and Linear  Thought Content:  Normal and Mood congruent  Suicidal:  None  Homicidal:  None  Hallucinations:  None  Delusion:  None  Memory:  Intact  Orientation:  Person, Place, Time, and Situation  Reliability:  good  Insight:  Good  Judgement:  Good  Impulse Control:  Good  Physical/Medical Issues:  Yes see chart       Objective   Vital Signs:   /60   Pulse 65   Ht 167.6 cm (65.98\")   Wt 65.5 kg (144 lb 6 oz)   SpO2 98%   BMI 23.31 kg/m²       Assessment / Plan    Diagnoses and all orders for this visit:    1. Generalized anxiety disorder (Primary)  -     ALPRAZolam (XANAX) 0.5 MG tablet; QHS and daily prn  Dispense: 30 tablet; Refill: 2       Patient appears to be doing well on current medication. No issues reported and no indication for change. Will continue medication as ordered.     History and physical exam exhibit continued safe and appropriate use of controlled substance.    As part of patient's treatment plan I am prescribing a " controlled substance.  The patient has been made aware of the appropriate use of such medications, including potential risk of somnolence, limited ability to drive and/or work safely, and potential for dependence and/or overdose.  It has also been made clear that these medications are for use by this patient only, without concomitant use of alcohol or other substances, unless prescribed.    Patient has completed a prescribing agreement detailing terms of continued prescribing of controlled substances, including monitoring BERTO reports, urine drug screening, and pill counts if necessary.  Patient is aware that inappropriate use will result in cessation of prescribing such medications.      PHQ-9  PHQ-2/PHQ-9: Depression Screening  Little Interest or Pleasure in Doing Things: 2-->more than half the days  Feeling Down, Depressed or Hopeless: 0-->not at all  PHQ-2 Total Score: 2  Feeling Tired or Having Little Energy: 3-->nearly every day  Poor Appetite or Overeatin-->several days  Feeling Bad about Yourself - or that You are a Failure or Have Let Yourself or Your Family Down: 0-->not at all  Trouble Concentrating on Things, Such as Reading the Newspaper or Watching Television: 0-->not at all  Moving or Speaking So Slowly that Other People Could Have Noticed? Or the Opposite - Being So Fidgety: 0-->not at all  Thoughts that You Would be Better Off Dead or of Hurting Yourself in Some Way: 0-->not at all  PHQ-9: Brief Depression Severity Measure Score: 6  If You Checked Off Any Problems, How Difficult Have These Problems Made It For You to Do Your Work, Take Care of Things at Home, or Get Along with Other People?: somewhat difficult      PHQ-9 Score:   PHQ-9 Total Score: 6    Depression Screening:  Patient screened positive for depression based on a PHQ-9 score of 6 on 2024. Follow-up recommendations include: Suicide Risk Assessment performed.        CHERELLE-7  Over the last two weeks, how often have you been  bothered by the following problems?  Feeling nervous, anxious or on edge: Several days  Not being able to stop or control worrying: Not at all  Worrying too much about different things: Several days  Trouble Relaxing: Several days  Being so restless that it is hard to sit still: Several days  Becoming easily annoyed or irritable: Several days  Feeling afraid as if something awful might happen: Not at all  CHERELLE 7 Total Score: 5  If you checked any problems, how difficult have these problems made it for you to do your work, take care of things at home, or get along with other people: Somewhat difficult      ADHD  Screening for Adults With ADHD - (1-6)  1. How often do you have trouble wrapping up the final details of a project, once the challenging parts have been done?: Rarely  2. How often do you have difficulty getting things in order when you have to do a task that requires organization?: Rarely  3. How often do you have problems remembering appointments or obligations : Rarely  4. When you have a task that requires a lot of thought, how often do you avoid or delay getting started ?: Sometimes  5. How often do you fidget or squirm with your hands or feet when you have to sit down for a long time?: Often  6. How often do you feel overly active and compelled to do things, like you were driven by a motor?: Rarely  7. How often do you make careless mistakes when you have to work on a boring or difficult project?: Rarely  8. How often do have difficulty keeping your attention when you are doing boring or repetitive work?: Often  9. How often do you have difficulty concentrating on what people say to you, even when they are speaking to you: Rarely  10.How often do you misplace or have difficulty finding things at home or at work?: Often  11.How often are you distracted by activity or noise around you?: Sometimes  12.How often do you leave your seat in meetings or other situations in which you are expected to remain  seated?: Never  13.How often do you feel restless or fidgety?: Sometimes  14.How often do you have difficulty unwinding and relaxing when you have time to yourself?: Rarely  15.How often do you find yourself talking too much when you are in social situations?: Rarely  16.When you’re in a conversation, how often do you find yourself finishing the sentences of the people you are talking to, before they can finish them themselves?: Rarely  17.How often do you have difficulty waiting your turn in situations when turn taking is required?: Rarely  18.How often do you interrupt others when they are busy?: Rarely    A psychological evaluation was conducted in order to assess past and current level of functioning. Areas assessed included, but were not limited to: perception of social support, perception of ability to face and deal with challenges in life (positive functioning), anxiety symptoms, depressive symptoms, perspective on beliefs/belief system, coping skills for stress, intelligence level,  Therapeutic rapport was established. Interventions conducted today were geared towards incorporating medication management along with support for continued therapy. Education was also provided as to the med management with this provider and what to expect in subsequent sessions.      We discussed risks, benefits, goals and side effects of the above medication and the patient was agreeable with the plan. Patient was educated on the importance of compliance with treatment and follow-up appointments. Patient is aware to contact the [facility] with any worsening of symptoms. To call for questions or concerns and return early if necessary. Patent is agreeable to go to the Emergency Department or call 911 should they begin SI/HI.    MEDS ORDERED DURING VISIT:  New Medications Ordered This Visit   Medications    ALPRAZolam (XANAX) 0.5 MG tablet     Sig: QHS and daily prn     Dispense:  30 tablet     Refill:  2         Follow Up    Return in about 3 months (around 9/11/2024).  Patient was given instructions and counseling regarding her condition or for health maintenance advice. Please see specific information pulled into the AVS if appropriate.     TREATMENT PLAN/GOALS: Continue supportive psychotherapy efforts and medications as indicated. Treatment and medication options discussed during today's visit. Patient acknowledged and verbally consented to continue with current treatment plan and was educated on the importance of compliance with treatment and follow-up appointments.    MEDICATION ISSUES:  Discussed medication options and treatment plan of prescribed medication as well as the risks, benefits, and side effects including potential falls, possible impaired driving and metabolic adversities among others. Patient is agreeable to call the office with any worsening of symptoms or onset of side effects. Patient is agreeable to call 911 or go to the nearest ER should he/she begin having SI/HI.        KRISTI [facility]  [address] [place] [address]  [phone]    June 11, 2024 11:26 EDT

## 2024-06-28 ENCOUNTER — TRANSCRIBE ORDERS (OUTPATIENT)
Dept: ADMINISTRATIVE | Facility: HOSPITAL | Age: 79
End: 2024-06-28
Payer: MEDICARE

## 2024-06-28 DIAGNOSIS — Z12.31 SCREENING MAMMOGRAM FOR BREAST CANCER: Primary | ICD-10-CM

## 2024-07-15 ENCOUNTER — OFFICE VISIT (OUTPATIENT)
Dept: INTERNAL MEDICINE | Facility: CLINIC | Age: 79
End: 2024-07-15
Payer: MEDICARE

## 2024-07-15 VITALS
WEIGHT: 140 LBS | RESPIRATION RATE: 16 BRPM | HEART RATE: 57 BPM | SYSTOLIC BLOOD PRESSURE: 140 MMHG | DIASTOLIC BLOOD PRESSURE: 72 MMHG | BODY MASS INDEX: 22.5 KG/M2 | HEIGHT: 66 IN

## 2024-07-15 DIAGNOSIS — E03.9 ACQUIRED HYPOTHYROIDISM: Primary | ICD-10-CM

## 2024-07-15 DIAGNOSIS — F17.210 NICOTINE DEPENDENCE, CIGARETTES, UNCOMPLICATED: ICD-10-CM

## 2024-07-15 DIAGNOSIS — M81.0 AGE-RELATED OSTEOPOROSIS WITHOUT CURRENT PATHOLOGICAL FRACTURE: ICD-10-CM

## 2024-07-15 DIAGNOSIS — M06.9 RHEUMATOID ARTHRITIS, INVOLVING UNSPECIFIED SITE, UNSPECIFIED WHETHER RHEUMATOID FACTOR PRESENT: ICD-10-CM

## 2024-07-15 DIAGNOSIS — F41.1 GENERALIZED ANXIETY DISORDER: ICD-10-CM

## 2024-07-15 DIAGNOSIS — R91.1 LUNG NODULE: ICD-10-CM

## 2024-07-15 DIAGNOSIS — E78.5 DYSLIPIDEMIA: Chronic | ICD-10-CM

## 2024-07-15 DIAGNOSIS — K21.9 GASTROESOPHAGEAL REFLUX DISEASE WITHOUT ESOPHAGITIS: ICD-10-CM

## 2024-07-15 PROBLEM — R91.8 LUNG MASS: Status: RESOLVED | Noted: 2020-07-21 | Resolved: 2024-07-15

## 2024-07-15 LAB
ALBUMIN SERPL-MCNC: 4.5 G/DL (ref 3.5–5.2)
ALBUMIN/GLOB SERPL: 2 G/DL
ALP SERPL-CCNC: 70 U/L (ref 39–117)
ALT SERPL-CCNC: 16 U/L (ref 1–33)
AST SERPL-CCNC: 21 U/L (ref 1–32)
BASOPHILS # BLD AUTO: 0.04 10*3/MM3 (ref 0–0.2)
BASOPHILS NFR BLD AUTO: 0.5 % (ref 0–1.5)
BILIRUB SERPL-MCNC: 0.3 MG/DL (ref 0–1.2)
BUN SERPL-MCNC: 15 MG/DL (ref 8–23)
BUN/CREAT SERPL: 19.7 (ref 7–25)
CALCIUM SERPL-MCNC: 9.5 MG/DL (ref 8.6–10.5)
CHLORIDE SERPL-SCNC: 105 MMOL/L (ref 98–107)
CHOLEST SERPL-MCNC: 210 MG/DL (ref 0–200)
CO2 SERPL-SCNC: 27.6 MMOL/L (ref 22–29)
CREAT SERPL-MCNC: 0.76 MG/DL (ref 0.57–1)
EGFRCR SERPLBLD CKD-EPI 2021: 81.3 ML/MIN/1.73
EOSINOPHIL # BLD AUTO: 0.12 10*3/MM3 (ref 0–0.4)
EOSINOPHIL NFR BLD AUTO: 1.6 % (ref 0.3–6.2)
ERYTHROCYTE [DISTWIDTH] IN BLOOD BY AUTOMATED COUNT: 12.4 % (ref 12.3–15.4)
GLOBULIN SER CALC-MCNC: 2.3 GM/DL
GLUCOSE SERPL-MCNC: 89 MG/DL (ref 65–99)
HCT VFR BLD AUTO: 39.9 % (ref 34–46.6)
HDLC SERPL-MCNC: 62 MG/DL (ref 40–60)
HGB BLD-MCNC: 13 G/DL (ref 12–15.9)
IMM GRANULOCYTES # BLD AUTO: 0.02 10*3/MM3 (ref 0–0.05)
IMM GRANULOCYTES NFR BLD AUTO: 0.3 % (ref 0–0.5)
LDLC SERPL CALC-MCNC: 124 MG/DL (ref 0–100)
LYMPHOCYTES # BLD AUTO: 2.68 10*3/MM3 (ref 0.7–3.1)
LYMPHOCYTES NFR BLD AUTO: 34.7 % (ref 19.6–45.3)
MCH RBC QN AUTO: 31.9 PG (ref 26.6–33)
MCHC RBC AUTO-ENTMCNC: 32.6 G/DL (ref 31.5–35.7)
MCV RBC AUTO: 97.8 FL (ref 79–97)
MONOCYTES # BLD AUTO: 0.62 10*3/MM3 (ref 0.1–0.9)
MONOCYTES NFR BLD AUTO: 8 % (ref 5–12)
NEUTROPHILS # BLD AUTO: 4.25 10*3/MM3 (ref 1.7–7)
NEUTROPHILS NFR BLD AUTO: 54.9 % (ref 42.7–76)
NRBC BLD AUTO-RTO: 0 /100 WBC (ref 0–0.2)
PLATELET # BLD AUTO: 205 10*3/MM3 (ref 140–450)
POTASSIUM SERPL-SCNC: 4 MMOL/L (ref 3.5–5.2)
PROT SERPL-MCNC: 6.8 G/DL (ref 6–8.5)
RBC # BLD AUTO: 4.08 10*6/MM3 (ref 3.77–5.28)
SODIUM SERPL-SCNC: 142 MMOL/L (ref 136–145)
TRIGL SERPL-MCNC: 138 MG/DL (ref 0–150)
TSH SERPL DL<=0.005 MIU/L-ACNC: 1.41 UIU/ML (ref 0.27–4.2)
VLDLC SERPL CALC-MCNC: 24 MG/DL (ref 5–40)
WBC # BLD AUTO: 7.73 10*3/MM3 (ref 3.4–10.8)

## 2024-07-15 PROCEDURE — 1126F AMNT PAIN NOTED NONE PRSNT: CPT | Performed by: STUDENT IN AN ORGANIZED HEALTH CARE EDUCATION/TRAINING PROGRAM

## 2024-07-15 PROCEDURE — G0439 PPPS, SUBSEQ VISIT: HCPCS | Performed by: STUDENT IN AN ORGANIZED HEALTH CARE EDUCATION/TRAINING PROGRAM

## 2024-07-15 PROCEDURE — 1170F FXNL STATUS ASSESSED: CPT | Performed by: STUDENT IN AN ORGANIZED HEALTH CARE EDUCATION/TRAINING PROGRAM

## 2024-07-15 RX ORDER — PREDNISOLONE ACETATE 10 MG/ML
1 SUSPENSION/ DROPS OPHTHALMIC 2 TIMES DAILY
COMMUNITY
Start: 2024-06-21

## 2024-07-15 NOTE — PROGRESS NOTES
The ABCs of the Annual Wellness Visit  Subsequent Medicare Wellness Visit    Subjective      Yamileth De Guzman is a 78 y.o. female who presents for a Subsequent Medicare Wellness Visit.  Anxiety: follows with psychiatry, notes and medical records reviewed, on xanax  Hypothyroidism: stable on meds  GERD: daily omeprazole, occasionally takes a 2nd dose (about 3-4x/month)  Allergies: on zyrtec and flonase  HLD: on fish oil, high LDL   Trigger finger: Occasional on right fifth digit but does not bother her much right now, is tolerable  Osteoarthritis: on as needed meloxicam     Smoker: cigarettes 1ppd x 50 years. LDCT 8/2023 lung nodules seen. Repeat in 1 year  Mammogram: 11/2023 birads 2, scheduled  DEXA scan: 2023 osteopenia and osteoporosis on L hip, on prolia follows with rheumatology (2-3 years now)    Review of Systems   All other systems reviewed and are negative.       The following portions of the patient's history were reviewed and   updated as appropriate: allergies, current medications, past family history, past medical history, past social history, past surgical history, and problem list.    Compared to one year ago, the patient feels her physical   health is better.    Compared to one year ago, the patient feels her mental   health is better.    Recent Hospitalizations:  She was not admitted to the hospital during the last year.       Current Medical Providers:  Patient Care Team:  Salina Montanez MD as PCP - General (Family Medicine)  Mansoor Quezada DO ([facility])  Stew Grant APRN as Nurse Practitioner (Gastroenterology)    Outpatient Medications Prior to Visit   Medication Sig Dispense Refill    ALPRAZolam (XANAX) 0.5 MG tablet QHS and daily prn 30 tablet 2    baclofen (LIORESAL) 10 MG tablet Take 1 tablet by mouth 2 (Two) Times a Day. (Patient taking differently: Take 1 tablet by mouth Daily.) 50 tablet 0    cetirizine (zyrTEC) 10 MG tablet Take 1 tablet by mouth Every  Night.      denosumab (PROLIA) 60 MG/ML solution syringe Inject 1 mL under the skin into the appropriate area as directed Every 6 (Six) Months.      fluticasone (FLONASE) 50 MCG/ACT nasal spray 2 sprays into the nostril(s) as directed by provider Daily. 48 g 2    Garlic 400 MG tablet Take  by mouth Daily.      levothyroxine (SYNTHROID, LEVOTHROID) 88 MCG tablet Take 1 tablet by mouth once daily 90 tablet 3    meloxicam (MOBIC) 7.5 MG tablet TAKE 1 TABLET BY MOUTH ONCE DAILY AS NEEDED WITH FOOD      Multiple Vitamin (MULTI VITAMIN DAILY PO) Take 1 tablet by mouth Daily.      mupirocin (BACTROBAN) 2 % ointment APPLY OINTMENT EXTERNALLY TWICE DAILY      Omega-3 Fatty Acids (FISH OIL) 1000 MG capsule capsule Take 1 capsule by mouth Daily With Breakfast.      omeprazole (priLOSEC) 20 MG capsule TAKE 1 CAPSULE BY MOUTH EVERY 12 HOURS 180 capsule 1    prednisoLONE acetate (PRED FORTE) 1 % ophthalmic suspension Administer 1 drop to both eyes 2 (Two) Times a Day.      tacrolimus (PROTOPIC) 0.1 % ointment APPLY OINTMENT EXTERNALLY TO RASH TWICE DAILY      vitamin C (ASCORBIC ACID) 250 MG tablet Take 1 tablet by mouth Daily.       No facility-administered medications prior to visit.       No opioid medication identified on active medication list. I have reviewed chart for other potential  high risk medication/s and harmful drug interactions in the elderly.        Aspirin is not on active medication list.  Aspirin use is not indicated based on review of current medical condition/s. Risk of harm outweighs potential benefits.  .    Patient Active Problem List   Diagnosis    Rheumatoid arthritis    Gastroesophageal reflux disease without esophagitis    Generalized anxiety disorder    Dyslipidemia    Hypothyroidism    Vitamin D deficiency, unspecified    Age-related osteoporosis with current pathological fracture with routine healing    Carotid bruit    Hematuria    Adenomatous polyps    Nicotine dependence, cigarettes,  "uncomplicated     Allergies    Chronic idiopathic constipation    History of hip surgery    Nuclear sclerotic cataract of right eye    Nuclear sclerotic cataract of left eye    Age-related osteoporosis without current pathological fracture    Lung nodule     Advance Care Planning  Advance Directive is on file.  ACP discussion was held with the patient during this visit. Patient has an advance directive in EMR which is still valid.      Objective    Vitals:    07/15/24 0803   BP: 140/72   Pulse: 57   Resp: 16   Weight: 63.5 kg (140 lb)   Height: 167.6 cm (65.98\")   PainSc: 0-No pain     Estimated body mass index is 22.61 kg/m² as calculated from the following:    Height as of this encounter: 167.6 cm (65.98\").    Weight as of this encounter: 63.5 kg (140 lb).    Physical Exam  Vitals and nursing note reviewed.   Constitutional:       Appearance: Normal appearance.   HENT:      Head: Normocephalic and atraumatic.   Cardiovascular:      Rate and Rhythm: Normal rate and regular rhythm.      Pulses: Normal pulses.      Heart sounds: Normal heart sounds.   Pulmonary:      Effort: Pulmonary effort is normal. No respiratory distress.      Breath sounds: Normal breath sounds. No wheezing, rhonchi or rales.   Abdominal:      General: Abdomen is flat. Bowel sounds are normal.      Palpations: Abdomen is soft.      Tenderness: There is no abdominal tenderness. There is no guarding.   Musculoskeletal:      Cervical back: Neck supple.   Skin:     General: Skin is warm.      Capillary Refill: Capillary refill takes less than 2 seconds.   Neurological:      General: No focal deficit present.      Mental Status: She is alert. Mental status is at baseline.   Psychiatric:         Mood and Affect: Mood normal.         Behavior: Behavior normal.         BMI is within normal parameters. No other follow-up for BMI required.      Does the patient have evidence of cognitive impairment?   No            HEALTH RISK ASSESSMENT    Smoking " Status:  Social History     Tobacco Use   Smoking Status Every Day    Current packs/day: 1.00    Average packs/day: 1 pack/day for 40.0 years (40.0 ttl pk-yrs)    Types: Cigarettes   Smokeless Tobacco Never     Alcohol Consumption:  Social History     Substance and Sexual Activity   Alcohol Use Not Currently     Fall Risk Screen:    TANA Fall Risk Assessment was completed, and patient is at LOW risk for falls.Assessment completed on:7/15/2024    Depression Screenin/15/2024     8:01 AM   PHQ-2/PHQ-9 Depression Screening   Little Interest or Pleasure in Doing Things 0-->not at all   Feeling Down, Depressed or Hopeless 0-->not at all   PHQ-9: Brief Depression Severity Measure Score 0       Health Habits and Functional and Cognitive Screenin/15/2024     8:00 AM   Functional & Cognitive Status   Do you have difficulty preparing food and eating? No   Do you have difficulty bathing yourself, getting dressed or grooming yourself? No   Do you have difficulty using the toilet? No   Do you have difficulty moving around from place to place? No   Do you have trouble with steps or getting out of a bed or a chair? No   Current Diet Well Balanced Diet   Dental Exam Up to date   Eye Exam Up to date   Exercise (times per week) 7 times per week   Current Exercises Include Yard Work;Walking   Do you need help using the phone?  No   Are you deaf or do you have serious difficulty hearing?  No   Do you need help to go to places out of walking distance? No   Do you need help shopping? No   Do you need help preparing meals?  No   Do you need help with housework?  No   Do you need help with laundry? No   Do you need help taking your medications? No   Do you need help managing money? No   Do you ever drive or ride in a car without wearing a seat belt? No   Have you felt unusual stress, anger or loneliness in the last month? No   Who do you live with? Spouse   If you need help, do you have trouble finding someone available  to you? No   Have you been bothered in the last four weeks by sexual problems? No   Do you have difficulty concentrating, remembering or making decisions? No       Age-appropriate Screening Schedule:  Refer to the list below for future screening recommendations based on patient's age, sex and/or medical conditions. Orders for these recommended tests are listed in the plan section. The patient has been provided with a written plan.    Health Maintenance   Topic Date Due    ANNUAL WELLNESS VISIT  07/13/2024    LIPID PANEL  07/13/2024    LUNG CANCER SCREENING  08/14/2024    COVID-19 Vaccine (1 - 2023-24 season) 07/15/2025 (Originally 9/1/2023)    RSV Vaccine - Adults (1 - 1-dose 60+ series) 07/15/2025 (Originally 9/19/2007)    INFLUENZA VACCINE  08/01/2024    COLORECTAL CANCER SCREENING  03/18/2025    TDAP/TD VACCINES (3 - Td or Tdap) 04/15/2025    DXA SCAN  11/06/2025    HEPATITIS C SCREENING  Completed    Pneumococcal Vaccine 65+  Completed    ZOSTER VACCINE  Completed                CMS Preventative Services Quick Reference  Risk Factors Identified During Encounter:    Dental Screening Recommended  Vision Screening Recommended    The above risks/problems have been discussed with the patient.  Pertinent information has been shared with the patient in the After Visit Summary.    Diagnoses and all orders for this visit:    1. Acquired hypothyroidism (Primary)  Assessment & Plan:  Have labs done today, continue levothyroxine for now  Stable on current medication and dosage. Will continue current management. Refill medication as necessary.      Orders:  -     CBC & Differential  -     Comprehensive Metabolic Panel  -     TSH Rfx On Abnormal To Free T4    2. Dyslipidemia  Assessment & Plan:  Have labs done today continue fish oil for now we will continue to monitor    Orders:  -     Lipid Panel    3. Gastroesophageal reflux disease without esophagitis  Assessment & Plan:  Stable on current medication and dosage. Will  continue current management. Refill medication as necessary.  Omeprazole      4. Generalized anxiety disorder  Assessment & Plan:  Follows with behavioral health on as needed Xanax       5. Age-related osteoporosis without current pathological fracture  Assessment & Plan:  Follows with rheumatology, on Prolia      6. Nicotine dependence, cigarettes, uncomplicated   -      CT Chest Low Dose Cancer Screening WO; Future    7. Lung nodule  Assessment & Plan:  Obtain low-dose lung CT to monitor    Orders:  -      CT Chest Low Dose Cancer Screening WO; Future    8. Rheumatoid arthritis, involving unspecified site, unspecified whether rheumatoid factor present  Assessment & Plan:  Follows with rheumatology stable at this time          Follow Up:   Next Medicare Wellness visit to be scheduled in 1 year.      An After Visit Summary and PPPS were made available to the patient.

## 2024-07-15 NOTE — ASSESSMENT & PLAN NOTE
Have labs done today, continue levothyroxine for now  Stable on current medication and dosage. Will continue current management. Refill medication as necessary.

## 2024-07-15 NOTE — ASSESSMENT & PLAN NOTE
Stable on current medication and dosage. Will continue current management. Refill medication as necessary.  Omeprazole

## 2024-07-16 DIAGNOSIS — E03.9 ACQUIRED HYPOTHYROIDISM: ICD-10-CM

## 2024-07-16 DIAGNOSIS — F41.1 GENERALIZED ANXIETY DISORDER: ICD-10-CM

## 2024-07-16 DIAGNOSIS — E55.9 VITAMIN D DEFICIENCY, UNSPECIFIED: ICD-10-CM

## 2024-07-16 DIAGNOSIS — E78.5 DYSLIPIDEMIA: Primary | Chronic | ICD-10-CM

## 2024-07-16 RX ORDER — ATORVASTATIN CALCIUM 20 MG/1
20 TABLET, FILM COATED ORAL DAILY
Qty: 90 TABLET | Refills: 1 | Status: SHIPPED | OUTPATIENT
Start: 2024-07-16

## 2024-07-31 PROBLEM — M81.0 SENILE OSTEOPOROSIS: Status: ACTIVE | Noted: 2024-07-31

## 2024-07-31 RX ORDER — ACETAMINOPHEN 500 MG
1000 TABLET ORAL ONCE
Status: CANCELLED
Start: 2024-08-01 | End: 2024-08-01

## 2024-07-31 RX ORDER — DIPHENHYDRAMINE HCL 25 MG
25 CAPSULE ORAL ONCE
Status: CANCELLED
Start: 2024-08-01 | End: 2024-08-01

## 2024-08-01 ENCOUNTER — HOSPITAL ENCOUNTER (OUTPATIENT)
Dept: INFUSION THERAPY | Facility: HOSPITAL | Age: 79
Discharge: HOME | End: 2024-08-01
Payer: MEDICARE

## 2024-08-01 VITALS — SYSTOLIC BLOOD PRESSURE: 106 MMHG | HEART RATE: 61 BPM | DIASTOLIC BLOOD PRESSURE: 69 MMHG | RESPIRATION RATE: 18 BRPM

## 2024-08-01 DIAGNOSIS — M81.0 AGE-RELATED OSTEOPOROSIS WITHOUT CURRENT PATHOLOGICAL FRACTURE: Primary | ICD-10-CM

## 2024-08-01 PROCEDURE — 25010000002 DENOSUMAB 60 MG/ML SOLUTION PREFILLED SYRINGE: Performed by: INTERNAL MEDICINE

## 2024-08-01 PROCEDURE — 96372 THER/PROPH/DIAG INJ SC/IM: CPT

## 2024-08-01 RX ORDER — DIPHENHYDRAMINE HCL 25 MG
25 CAPSULE ORAL ONCE
Start: 2025-01-30 | End: 2025-01-30

## 2024-08-01 RX ORDER — ACETAMINOPHEN 500 MG
1000 TABLET ORAL ONCE
Status: DISCONTINUED | OUTPATIENT
Start: 2024-08-01 | End: 2024-08-03 | Stop reason: HOSPADM

## 2024-08-01 RX ORDER — DIPHENHYDRAMINE HCL 25 MG
25 CAPSULE ORAL ONCE
Status: DISCONTINUED | OUTPATIENT
Start: 2024-08-01 | End: 2024-08-03 | Stop reason: HOSPADM

## 2024-08-01 RX ORDER — ACETAMINOPHEN 500 MG
1000 TABLET ORAL ONCE
Start: 2025-01-30 | End: 2025-01-30

## 2024-08-01 RX ADMIN — DENOSUMAB 60 MG: 60 INJECTION SUBCUTANEOUS at 08:30

## 2024-08-01 NOTE — CODE DOCUMENTATION
Patient reports taking calcium and vitamin D. Patient was given medication guide and has no questions regarding PROLIA today.

## 2024-08-12 RX ORDER — FLUTICASONE PROPIONATE 50 MCG
2 SPRAY, SUSPENSION (ML) NASAL DAILY
Qty: 48 G | Refills: 0 | Status: SHIPPED | OUTPATIENT
Start: 2024-08-12

## 2024-09-11 ENCOUNTER — OFFICE VISIT (OUTPATIENT)
Dept: BEHAVIORAL HEALTH | Facility: CLINIC | Age: 79
End: 2024-09-11
Payer: MEDICARE

## 2024-09-11 VITALS — SYSTOLIC BLOOD PRESSURE: 118 MMHG | HEART RATE: 64 BPM | OXYGEN SATURATION: 98 % | DIASTOLIC BLOOD PRESSURE: 60 MMHG

## 2024-09-11 DIAGNOSIS — F41.1 GENERALIZED ANXIETY DISORDER: Primary | ICD-10-CM

## 2024-09-11 DIAGNOSIS — F41.1 GENERALIZED ANXIETY DISORDER: ICD-10-CM

## 2024-09-11 PROCEDURE — 99213 OFFICE O/P EST LOW 20 MIN: CPT

## 2024-09-11 PROCEDURE — 1159F MED LIST DOCD IN RCRD: CPT

## 2024-09-11 PROCEDURE — 1160F RVW MEDS BY RX/DR IN RCRD: CPT

## 2024-09-11 RX ORDER — ALPRAZOLAM 0.5 MG
TABLET ORAL
Qty: 30 TABLET | Refills: 2 | Status: SHIPPED | OUTPATIENT
Start: 2024-09-11

## 2024-09-11 RX ORDER — ALPRAZOLAM 0.5 MG
TABLET ORAL
Qty: 30 TABLET | Refills: 0 | OUTPATIENT
Start: 2024-09-11

## 2024-09-11 NOTE — PROGRESS NOTES
Follow Up Office Visit    Patient Name: Yamileht De Guzman  : 1945   MRN: [id number]   Care Team: Patient Care Team:  Salina Montanez MD as PCP - General (Family Medicine)  Mansoor Quezada DO ([facility])  Stew Grant APRN as Nurse Practitioner (Gastroenterology)         Chief Complaint:    Chief Complaint   Patient presents with    Anxiety    Med Management       History of Present Illness: Yamileth De Guzman is a 78 y.o. female who is here today for a medication management follow up. Patient reports that overall medication continues to be effective. She feels that her anxiety has been managed well. She did not see the need to make any changes and did not have any medication concerns at today's visit. She denies SI/HI today.     The following portion of the patient's history were reviewed and updated appropriately: allergies, current and past medications, family history, medical history and social history. BERTO reviewed and appropriate.   Subjective   Review of Systems:    Review of Systems   Respiratory: Negative.     Cardiovascular: Negative.  Negative for chest pain and palpitations.   Neurological: Negative.    Psychiatric/Behavioral: Negative.     All other systems reviewed and are negative.      Current Medications:   Current Outpatient Medications   Medication Sig Dispense Refill    ALPRAZolam (XANAX) 0.5 MG tablet QHS and daily prn 30 tablet 2    atorvastatin (LIPITOR) 20 MG tablet Take 1 tablet by mouth Daily. 90 tablet 1    baclofen (LIORESAL) 10 MG tablet Take 1 tablet by mouth 2 (Two) Times a Day. (Patient taking differently: Take 1 tablet by mouth Daily.) 50 tablet 0    cetirizine (zyrTEC) 10 MG tablet Take 1 tablet by mouth Every Night.      denosumab (PROLIA) 60 MG/ML solution syringe Inject 1 mL under the skin into the appropriate area as directed Every 6 (Six) Months.      fluticasone (FLONASE) 50 MCG/ACT nasal spray Use 2 spray(s) in each nostril once daily  48 g 0    Garlic 400 MG tablet Take  by mouth Daily.      levothyroxine (SYNTHROID, LEVOTHROID) 88 MCG tablet Take 1 tablet by mouth once daily 90 tablet 3    meloxicam (MOBIC) 7.5 MG tablet TAKE 1 TABLET BY MOUTH ONCE DAILY AS NEEDED WITH FOOD      Multiple Vitamin (MULTI VITAMIN DAILY PO) Take 1 tablet by mouth Daily.      mupirocin (BACTROBAN) 2 % ointment APPLY OINTMENT EXTERNALLY TWICE DAILY      Omega-3 Fatty Acids (FISH OIL) 1000 MG capsule capsule Take 1 capsule by mouth Daily With Breakfast.      omeprazole (priLOSEC) 20 MG capsule TAKE 1 CAPSULE BY MOUTH EVERY 12 HOURS 180 capsule 1    prednisoLONE acetate (PRED FORTE) 1 % ophthalmic suspension Administer 1 drop to both eyes 2 (Two) Times a Day.      tacrolimus (PROTOPIC) 0.1 % ointment APPLY OINTMENT EXTERNALLY TO RASH TWICE DAILY      vitamin C (ASCORBIC ACID) 250 MG tablet Take 1 tablet by mouth Daily.       No current facility-administered medications for this visit.       Mental Status Exam:   Hygiene:   good  Cooperation:  Cooperative  Eye Contact:  Good  Psychomotor Behavior:  Appropriate  Affect:  Appropriate  Mood: normal  Speech:  Normal  Thought Process:  Goal directed and Linear  Thought Content:  Normal and Mood congruent  Suicidal:  None  Homicidal:  None  Hallucinations:  None  Delusion:  None  Memory:  Intact  Orientation:  Person, Place, Time, and Situation  Reliability:  good  Insight:  Good  Judgement:  Good  Impulse Control:  Good  Physical/Medical Issues:  Yes see chart       Objective   Vital Signs:   /60   Pulse 64   SpO2 98%       Assessment / Plan    Diagnoses and all orders for this visit:    1. Generalized anxiety disorder (Primary)  -     ALPRAZolam (XANAX) 0.5 MG tablet; QHS and daily prn  Dispense: 30 tablet; Refill: 2       Patient appears to be doing well on current medication. No issues reported and no indication for change. Will continue medication as ordered.     History and physical exam exhibit continued safe  and appropriate use of controlled substance.    As part of patient's treatment plan I am prescribing a controlled substance.  The patient has been made aware of the appropriate use of such medications, including potential risk of somnolence, limited ability to drive and/or work safely, and potential for dependence and/or overdose.  It has also been made clear that these medications are for use by this patient only, without concomitant use of alcohol or other substances, unless prescribed.    Patient has completed a prescribing agreement detailing terms of continued prescribing of controlled substances, including monitoring BERTO reports, urine drug screening, and pill counts if necessary.  Patient is aware that inappropriate use will result in cessation of prescribing such medications.        PHQ-9  PHQ-2/PHQ-9: Depression Screening  Little Interest or Pleasure in Doing Things: 1-->several days  Feeling Down, Depressed or Hopeless: 0-->not at all  PHQ-2 Total Score: 1  PHQ-9: Brief Depression Severity Measure Score: 1      PHQ-9 Score:   PHQ-9 Total Score: 1      CHERELLE-7  Over the last two weeks, how often have you been bothered by the following problems?  Feeling nervous, anxious or on edge: Several days  Not being able to stop or control worrying: Not at all  Worrying too much about different things: Several days  Trouble Relaxing: Not at all  Being so restless that it is hard to sit still: Not at all  Becoming easily annoyed or irritable: Not at all  Feeling afraid as if something awful might happen: Several days  CHERELLE 7 Total Score: 3  If you checked any problems, how difficult have these problems made it for you to do your work, take care of things at home, or get along with other people: Not difficult at all      A psychological evaluation was conducted in order to assess past and current level of functioning. Areas assessed included, but were not limited to: perception of social support, perception of ability  to face and deal with challenges in life (positive functioning), anxiety symptoms, depressive symptoms, perspective on beliefs/belief system, coping skills for stress, intelligence level,  Therapeutic rapport was established. Interventions conducted today were geared towards incorporating medication management along with support for continued therapy. Education was also provided as to the med management with this provider and what to expect in subsequent sessions.      We discussed risks, benefits, goals and side effects of the above medication and the patient was agreeable with the plan. Patient was educated on the importance of compliance with treatment and follow-up appointments. Patient is aware to contact the [facility] with any worsening of symptoms. To call for questions or concerns and return early if necessary. Patent is agreeable to go to the Emergency Department or call 911 should they begin SI/HI.    MEDS ORDERED DURING VISIT:  New Medications Ordered This Visit   Medications    ALPRAZolam (XANAX) 0.5 MG tablet     Sig: QHS and daily prn     Dispense:  30 tablet     Refill:  2         Follow Up   Return in about 3 months (around 12/11/2024).  Patient was given instructions and counseling regarding her condition or for health maintenance advice. Please see specific information pulled into the AVS if appropriate.     TREATMENT PLAN/GOALS: Continue supportive psychotherapy efforts and medications as indicated. Treatment and medication options discussed during today's visit. Patient acknowledged and verbally consented to continue with current treatment plan and was educated on the importance of compliance with treatment and follow-up appointments.    MEDICATION ISSUES:  Discussed medication options and treatment plan of prescribed medication as well as the risks, benefits, and side effects including potential falls, possible impaired driving and metabolic adversities among others. Patient is agreeable to  call the office with any worsening of symptoms or onset of side effects. Patient is agreeable to call 911 or go to the nearest ER should he/she begin having SI/HI.        [facility]  [address] [place] [address] DR JIMBO MADRID [id number] [phone]    September 11, 2024 09:51 EDT

## 2024-10-23 ENCOUNTER — HOSPITAL ENCOUNTER (OUTPATIENT)
Dept: CT IMAGING | Facility: HOSPITAL | Age: 79
Discharge: HOME | End: 2024-10-23
Admitting: STUDENT IN AN ORGANIZED HEALTH CARE EDUCATION/TRAINING PROGRAM
Payer: MEDICARE

## 2024-10-23 DIAGNOSIS — F17.210 NICOTINE DEPENDENCE, CIGARETTES, UNCOMPLICATED: ICD-10-CM

## 2024-10-23 DIAGNOSIS — R91.1 LUNG NODULE: ICD-10-CM

## 2024-10-23 PROCEDURE — 71271 CT THORAX LUNG CANCER SCR C-: CPT

## 2024-11-07 DIAGNOSIS — E03.9 HYPOTHYROIDISM, UNSPECIFIED TYPE: ICD-10-CM

## 2024-11-07 RX ORDER — LEVOTHYROXINE SODIUM 88 UG/1
TABLET ORAL
Qty: 90 TABLET | Refills: 1 | Status: SHIPPED | OUTPATIENT
Start: 2024-11-07

## 2024-11-08 ENCOUNTER — HOSPITAL ENCOUNTER (OUTPATIENT)
Dept: MAMMOGRAPHY | Facility: HOSPITAL | Age: 79
Discharge: HOME | End: 2024-11-08
Admitting: STUDENT IN AN ORGANIZED HEALTH CARE EDUCATION/TRAINING PROGRAM
Payer: MEDICARE

## 2024-11-08 DIAGNOSIS — Z12.31 SCREENING MAMMOGRAM FOR BREAST CANCER: ICD-10-CM

## 2024-11-08 PROCEDURE — 77063 BREAST TOMOSYNTHESIS BI: CPT

## 2024-11-08 PROCEDURE — 77067 SCR MAMMO BI INCL CAD: CPT

## 2024-11-15 ENCOUNTER — TRANSCRIBE ORDERS (OUTPATIENT)
Dept: ADMINISTRATIVE | Facility: HOSPITAL | Age: 79
End: 2024-11-15
Payer: MEDICARE

## 2024-11-15 DIAGNOSIS — R92.8 ABNORMAL MAMMOGRAM: Primary | ICD-10-CM

## 2024-12-10 DIAGNOSIS — F41.1 GENERALIZED ANXIETY DISORDER: ICD-10-CM

## 2024-12-10 RX ORDER — FLUTICASONE PROPIONATE 50 MCG
2 SPRAY, SUSPENSION (ML) NASAL DAILY
Qty: 48 G | Refills: 0 | Status: SHIPPED | OUTPATIENT
Start: 2024-12-10

## 2024-12-10 RX ORDER — ALPRAZOLAM 0.5 MG
TABLET ORAL
Qty: 30 TABLET | Refills: 2 | Status: SHIPPED | OUTPATIENT
Start: 2024-12-10

## 2024-12-10 NOTE — TELEPHONE ENCOUNTER
Rx Refill Note  Requested Prescriptions     Pending Prescriptions Disp Refills    ALPRAZolam (XANAX) 0.5 MG tablet [Pharmacy Med Name: ALPRAZolam 0.5 MG Oral Tablet] 30 tablet 0     Sig: TAKE 1 TABLET BY MOUTH AT BEDTIME AND DAILY AS NEEDED      Last office visit with prescribing clinician: 9/11/2024   Last telemedicine visit with prescribing clinician: Visit date not found   Next office visit with prescribing clinician: 12/11/2024                         Would you like a call back once the refill request has been completed: [] Yes [] No    If the office needs to give you a call back, can they leave a voicemail: [] Yes [] No    Ruth Demarco MA  12/10/24, 11:54 EST

## 2024-12-11 ENCOUNTER — OFFICE VISIT (OUTPATIENT)
Dept: BEHAVIORAL HEALTH | Facility: CLINIC | Age: 79
End: 2024-12-11
Payer: MEDICARE

## 2024-12-11 VITALS
OXYGEN SATURATION: 96 % | DIASTOLIC BLOOD PRESSURE: 68 MMHG | HEART RATE: 52 BPM | HEIGHT: 66 IN | WEIGHT: 141 LBS | SYSTOLIC BLOOD PRESSURE: 108 MMHG | BODY MASS INDEX: 22.66 KG/M2

## 2024-12-11 DIAGNOSIS — F41.1 GENERALIZED ANXIETY DISORDER: Primary | ICD-10-CM

## 2024-12-11 DIAGNOSIS — Z51.81 ENCOUNTER FOR THERAPEUTIC DRUG MONITORING: ICD-10-CM

## 2024-12-11 NOTE — PROGRESS NOTES
Follow Up Office Visit    Patient Name: Yamileth De Guzman  : 1945   MRN: [id number]   Care Team: Patient Care Team:  Salina Montanez MD as PCP - General (Family Medicine)  Mansoor Quezada DO ([facility])  Stew Grant APRN as Nurse Practitioner (Gastroenterology)         Chief Complaint:    Chief Complaint   Patient presents with    Anxiety    Med Management       History of Present Illness: Yamileth De Guzman is a 79 y.o. female who is here today for a medication management follow up. Patient reports that overall medication continues to be effective. She feels that her anxiety has been managed well. She did not see the need to make any changes and did not have any medication concerns at today's visit. She denies SI/HI today.     The following portion of the patient's history were reviewed and updated appropriately: allergies, current and past medications, family history, medical history and social history. BERTO reviewed and appropriate.   Subjective   Review of Systems:    Review of Systems   Respiratory: Negative.     Cardiovascular: Negative.  Negative for chest pain and palpitations.   Neurological: Negative.    Psychiatric/Behavioral: Negative.     All other systems reviewed and are negative.      Current Medications:   Current Outpatient Medications   Medication Sig Dispense Refill    ALPRAZolam (XANAX) 0.5 MG tablet TAKE 1 TABLET BY MOUTH AT BEDTIME AND DAILY AS NEEDED 30 tablet 2    atorvastatin (LIPITOR) 20 MG tablet Take 1 tablet by mouth Daily. 90 tablet 1    cetirizine (zyrTEC) 10 MG tablet Take 1 tablet by mouth Every Night.      denosumab (PROLIA) 60 MG/ML solution syringe Inject 1 mL under the skin into the appropriate area as directed Every 6 (Six) Months.      fluticasone (FLONASE) 50 MCG/ACT nasal spray Use 2 spray(s) in each nostril once daily 48 g 0    Garlic 400 MG tablet Take  by mouth Daily.      levothyroxine (SYNTHROID, LEVOTHROID) 88 MCG tablet Take 1  "tablet by mouth once daily 90 tablet 1    Multiple Vitamin (MULTI VITAMIN DAILY PO) Take 1 tablet by mouth Daily.      Omega-3 Fatty Acids (FISH OIL) 1000 MG capsule capsule Take 1 capsule by mouth Daily With Breakfast.      omeprazole (priLOSEC) 20 MG capsule TAKE 1 CAPSULE BY MOUTH EVERY 12 HOURS 180 capsule 1    tacrolimus (PROTOPIC) 0.1 % ointment APPLY OINTMENT EXTERNALLY TO RASH TWICE DAILY      vitamin C (ASCORBIC ACID) 250 MG tablet Take 1 tablet by mouth Daily.      baclofen (LIORESAL) 10 MG tablet Take 1 tablet by mouth 2 (Two) Times a Day. (Patient not taking: Reported on 12/11/2024) 50 tablet 0    meloxicam (MOBIC) 7.5 MG tablet TAKE 1 TABLET BY MOUTH ONCE DAILY AS NEEDED WITH FOOD (Patient not taking: Reported on 12/11/2024)      mupirocin (BACTROBAN) 2 % ointment APPLY OINTMENT EXTERNALLY TWICE DAILY (Patient not taking: Reported on 12/11/2024)      prednisoLONE acetate (PRED FORTE) 1 % ophthalmic suspension Administer 1 drop to both eyes 2 (Two) Times a Day. (Patient not taking: Reported on 12/11/2024)       No current facility-administered medications for this visit.       Mental Status Exam:   Hygiene:   good  Cooperation:  Cooperative  Eye Contact:  Good  Psychomotor Behavior:  Appropriate  Affect:  Appropriate  Mood: normal  Speech:  Normal  Thought Process:  Goal directed and Linear  Thought Content:  Normal and Mood congruent  Suicidal:  None  Homicidal:  None  Hallucinations:  None  Delusion:  None  Memory:  Intact  Orientation:  Person, Place, Time, and Situation  Reliability:  good  Insight:  Good  Judgement:  Good  Impulse Control:  Good  Physical/Medical Issues:  Yes se chart       Objective   Vital Signs:   /68   Pulse 52   Ht 167.6 cm (65.98\")   Wt 64 kg (141 lb)   SpO2 96%   BMI 22.77 kg/m²         Lab Results:   No visits with results within 3 Month(s) from this visit.   Latest known visit with results is:   Office Visit on 07/15/2024   Component Date Value Ref Range Status "    WBC 07/15/2024 7.73  3.40 - 10.80 10*3/mm3 Final    RBC 07/15/2024 4.08  3.77 - 5.28 10*6/mm3 Final    Hemoglobin 07/15/2024 13.0  12.0 - 15.9 g/dL Final    Hematocrit 07/15/2024 39.9  34.0 - 46.6 % Final    MCV 07/15/2024 97.8 (H)  79.0 - 97.0 fL Final    MCH 07/15/2024 31.9  26.6 - 33.0 pg Final    MCHC 07/15/2024 32.6  31.5 - 35.7 g/dL Final    RDW 07/15/2024 12.4  12.3 - 15.4 % Final    Platelets 07/15/2024 205  140 - 450 10*3/mm3 Final    Neutrophil Rel % 07/15/2024 54.9  42.7 - 76.0 % Final    Lymphocyte Rel % 07/15/2024 34.7  19.6 - 45.3 % Final    Monocyte Rel % 07/15/2024 8.0  5.0 - 12.0 % Final    Eosinophil Rel % 07/15/2024 1.6  0.3 - 6.2 % Final    Basophil Rel % 07/15/2024 0.5  0.0 - 1.5 % Final    Neutrophils Absolute 07/15/2024 4.25  1.70 - 7.00 10*3/mm3 Final    Lymphocytes Absolute 07/15/2024 2.68  0.70 - 3.10 10*3/mm3 Final    Monocytes Absolute 07/15/2024 0.62  0.10 - 0.90 10*3/mm3 Final    Eosinophils Absolute 07/15/2024 0.12  0.00 - 0.40 10*3/mm3 Final    Basophils Absolute 07/15/2024 0.04  0.00 - 0.20 10*3/mm3 Final    Immature Granulocyte Rel % 07/15/2024 0.3  0.0 - 0.5 % Final    Immature Grans Absolute 07/15/2024 0.02  0.00 - 0.05 10*3/mm3 Final    nRBC 07/15/2024 0.0  0.0 - 0.2 /100 WBC Final    Glucose 07/15/2024 89  65 - 99 mg/dL Final    BUN 07/15/2024 15  8 - 23 mg/dL Final    Creatinine 07/15/2024 0.76  0.57 - 1.00 mg/dL Final    EGFR Result 07/15/2024 81.3  >60.0 mL/min/1.73 Final    Comment: GFR Normal >60  Chronic Kidney Disease <60  Kidney Failure <15  The GFR formula is only valid for adults with stable renal  function between ages 18 and 70.      BUN/Creatinine Ratio 07/15/2024 19.7  7.0 - 25.0 Final    Sodium 07/15/2024 142  136 - 145 mmol/L Final    Potassium 07/15/2024 4.0  3.5 - 5.2 mmol/L Final    Chloride 07/15/2024 105  98 - 107 mmol/L Final    Total CO2 07/15/2024 27.6  22.0 - 29.0 mmol/L Final    Calcium 07/15/2024 9.5  8.6 - 10.5 mg/dL Final    Total Protein  07/15/2024 6.8  6.0 - 8.5 g/dL Final    Albumin 07/15/2024 4.5  3.5 - 5.2 g/dL Final    Globulin 07/15/2024 2.3  gm/dL Final    A/G Ratio 07/15/2024 2.0  g/dL Final    Total Bilirubin 07/15/2024 0.3  0.0 - 1.2 mg/dL Final    Alkaline Phosphatase 07/15/2024 70  39 - 117 U/L Final    AST (SGOT) 07/15/2024 21  1 - 32 U/L Final    ALT (SGPT) 07/15/2024 16  1 - 33 U/L Final    Total Cholesterol 07/15/2024 210 (H)  0 - 200 mg/dL Final    Comment: Cholesterol Reference Ranges  (U.S. Department of Health and Human Services ATP III  Classifications)  Desirable          <200 mg/dL  Borderline High    200-239 mg/dL  High Risk          >240 mg/dL  Triglyceride Reference Ranges  (U.S. Department of Health and Human Services ATP III  Classifications)  Normal           <150 mg/dL  Borderline High  150-199 mg/dL  High             200-499 mg/dL  Very High        >500 mg/dL  HDL Reference Ranges  (U.S. Department of Health and Human Services ATP III  Classifications)  Low     <40 mg/dl (major risk factor for CHD)  High    >60 mg/dl ('negative' risk factor for CHD)  LDL Reference Ranges  (U.S. Department of Health and Human Services ATP III  Classifications)  Optimal          <100 mg/dL  Near Optimal     100-129 mg/dL  Borderline High  130-159 mg/dL  High             160-189 mg/dL  Very High        >189 mg/dL      Triglycerides 07/15/2024 138  0 - 150 mg/dL Final    HDL Cholesterol 07/15/2024 62 (H)  40 - 60 mg/dL Final    VLDL Cholesterol Cole 07/15/2024 24  5 - 40 mg/dL Final    LDL Chol Calc (NIH) 07/15/2024 124 (H)  0 - 100 mg/dL Final    TSH 07/15/2024 1.410  0.270 - 4.200 uIU/mL Final       Assessment / Plan    Diagnoses and all orders for this visit:    1. Generalized anxiety disorder (Primary)    2. Encounter for therapeutic drug monitoring  -     Compliance Drug Analysis, Ur - Urine, Clean Catch     Patient appears to be doing well on current medication. No issues reported and no indication for change. Will continue medication  as ordered. Obtained yearly urine drug screen and controlled substance agreement signed.     History and physical exam exhibit continued safe and appropriate use of controlled substance.    As part of patient's treatment plan I am prescribing a controlled substance.  The patient has been made aware of the appropriate use of such medications, including potential risk of somnolence, limited ability to drive and/or work safely, and potential for dependence and/or overdose.  It has also been made clear that these medications are for use by this patient only, without concomitant use of alcohol or other substances, unless prescribed.    Patient has completed a prescribing agreement detailing terms of continued prescribing of controlled substances, including monitoring BERTO reports, urine drug screening, and pill counts if necessary.  Patient is aware that inappropriate use will result in cessation of prescribing such medications.    PHQ-9 Depression Screening  Little interest or pleasure in doing things? Several days   Feeling down, depressed, or hopeless? Not at all   Trouble falling or staying asleep, or sleeping too much? Not at all   Feeling tired or having little energy? Not at all   Poor appetite or overeating? Several days   Feeling bad about yourself - or that you are a failure or have let yourself or your family down? Not at all   Trouble concentrating on things, such as reading the newspaper or watching television? Several days   Moving or speaking so slowly that other people could have noticed? Or the opposite - being so fidgety or restless that you have been moving around a lot more than usual? Not at all   Thoughts that you would be better off dead, or of hurting yourself in some way? Not at all   PHQ-9 Total Score 3   If you checked off any problems, how difficult have these problems made it for you to do your work, take care of things at home, or get along with other people? Somewhat difficult     PHQ-9  Score:   PHQ-9 Total Score: 3    Depression Screening:  Patient screened positive for depression based on a PHQ-9 score of 3 on 12/11/2024. Follow-up recommendations include: Suicide Risk Assessment performed.        CHERELLE-7  Over the last two weeks, how often have you been bothered by the following problems?  Feeling nervous, anxious or on edge: Several days  Not being able to stop or control worrying: Not at all  Worrying too much about different things: Several days  Trouble Relaxing: Nearly every day  Being so restless that it is hard to sit still: Not at all  Becoming easily annoyed or irritable: Not at all  Feeling afraid as if something awful might happen: More than half the days  CHERELLE 7 Total Score: 7  If you checked any problems, how difficult have these problems made it for you to do your work, take care of things at home, or get along with other people: Somewhat difficult      ADHD  Screening for Adults With ADHD - (1-6)  1. How often do you have trouble wrapping up the final details of a project, once the challenging parts have been done?: Rarely  2. How often do you have difficulty getting things in order when you have to do a task that requires organization?: Often  3. How often do you have problems remembering appointments or obligations : Often  4. When you have a task that requires a lot of thought, how often do you avoid or delay getting started ?: Often  5. How often do you fidget or squirm with your hands or feet when you have to sit down for a long time?: Often  6. How often do you feel overly active and compelled to do things, like you were driven by a motor?: Never  7. How often do you make careless mistakes when you have to work on a boring or difficult project?: Rarely  8. How often do have difficulty keeping your attention when you are doing boring or repetitive work?: Sometimes  9. How often do you have difficulty concentrating on what people say to you, even when they are speaking to you:  Rarely  10.How often do you misplace or have difficulty finding things at home or at work?: Often  12.How often do you leave your seat in meetings or other situations in which you are expected to remain seated?: Rarely  13.How often do you feel restless or fidgety?: Rarely  14.How often do you have difficulty unwinding and relaxing when you have time to yourself?: Sometimes  15.How often do you find yourself talking too much when you are in social situations?: Sometimes  16.When you’re in a conversation, how often do you find yourself finishing the sentences of the people you are talking to, before they can finish them themselves?: Rarely  17.How often do you have difficulty waiting your turn in situations when turn taking is required?: Rarely  18.How often do you interrupt others when they are busy?: Rarely    A psychological evaluation was conducted in order to assess past and current level of functioning. Areas assessed included, but were not limited to: perception of social support, perception of ability to face and deal with challenges in life (positive functioning), anxiety symptoms, depressive symptoms, perspective on beliefs/belief system, coping skills for stress, intelligence level,  Therapeutic rapport was established. Interventions conducted today were geared towards incorporating medication management along with support for continued therapy. Education was also provided as to the med management with this provider and what to expect in subsequent sessions.      We discussed risks, benefits, goals and side effects of the above medication and the patient was agreeable with the plan. Patient was educated on the importance of compliance with treatment and follow-up appointments. Patient is aware to contact the [facility] with any worsening of symptoms. To call for questions or concerns and return early if necessary. Patent is agreeable to go to the Emergency Department or call 911 should they begin  SI/HI.    MEDS ORDERED DURING VISIT:  No orders of the defined types were placed in this encounter.        Follow Up   Return in about 3 months (around 3/11/2025).  Patient was given instructions and counseling regarding her condition or for health maintenance advice. Please see specific information pulled into the AVS if appropriate.     TREATMENT PLAN/GOALS: Continue supportive psychotherapy efforts and medications as indicated. Treatment and medication options discussed during today's visit. Patient acknowledged and verbally consented to continue with current treatment plan and was educated on the importance of compliance with treatment and follow-up appointments.    MEDICATION ISSUES:  Discussed medication options and treatment plan of prescribed medication as well as the risks, benefits, and side effects including potential falls, possible impaired driving and metabolic adversities among others. Patient is agreeable to call the office with any worsening of symptoms or onset of side effects. Patient is agreeable to call 911 or go to the nearest ER should he/she begin having SI/HI.        [facility]STI [facility]  [address] [place] [address] [address] [place] [address] [phone]    December 11, 2024 14:09 EST

## 2024-12-16 ENCOUNTER — HOSPITAL ENCOUNTER (OUTPATIENT)
Dept: MAMMOGRAPHY | Facility: HOSPITAL | Age: 79
Discharge: HOME | End: 2024-12-16
Payer: MEDICARE

## 2024-12-16 ENCOUNTER — HOSPITAL ENCOUNTER (OUTPATIENT)
Dept: ULTRASOUND IMAGING | Facility: HOSPITAL | Age: 79
Discharge: HOME | End: 2024-12-16
Payer: MEDICARE

## 2024-12-16 DIAGNOSIS — R92.8 ABNORMAL MAMMOGRAM: ICD-10-CM

## 2024-12-16 PROCEDURE — G0279 TOMOSYNTHESIS, MAMMO: HCPCS

## 2024-12-16 PROCEDURE — 77065 DX MAMMO INCL CAD UNI: CPT

## 2024-12-16 PROCEDURE — 76642 ULTRASOUND BREAST LIMITED: CPT

## 2024-12-20 LAB — DRUGS UR: NORMAL

## 2025-01-16 ENCOUNTER — OFFICE VISIT (OUTPATIENT)
Dept: INTERNAL MEDICINE | Facility: CLINIC | Age: 80
End: 2025-01-16
Payer: MEDICARE

## 2025-01-16 VITALS
HEART RATE: 56 BPM | SYSTOLIC BLOOD PRESSURE: 139 MMHG | OXYGEN SATURATION: 100 % | BODY MASS INDEX: 22.5 KG/M2 | RESPIRATION RATE: 16 BRPM | WEIGHT: 140 LBS | DIASTOLIC BLOOD PRESSURE: 62 MMHG | TEMPERATURE: 99.2 F | HEIGHT: 66 IN

## 2025-01-16 DIAGNOSIS — T78.40XD ALLERGY, SUBSEQUENT ENCOUNTER: ICD-10-CM

## 2025-01-16 DIAGNOSIS — R91.1 LUNG NODULE: ICD-10-CM

## 2025-01-16 DIAGNOSIS — E03.9 ACQUIRED HYPOTHYROIDISM: ICD-10-CM

## 2025-01-16 DIAGNOSIS — E78.5 DYSLIPIDEMIA: Primary | Chronic | ICD-10-CM

## 2025-01-16 DIAGNOSIS — F41.1 GENERALIZED ANXIETY DISORDER: ICD-10-CM

## 2025-01-16 DIAGNOSIS — F17.210 NICOTINE DEPENDENCE, CIGARETTES, UNCOMPLICATED: ICD-10-CM

## 2025-01-16 DIAGNOSIS — M80.00XD AGE-RELATED OSTEOPOROSIS WITH CURRENT PATHOLOGICAL FRACTURE WITH ROUTINE HEALING: Chronic | ICD-10-CM

## 2025-01-16 DIAGNOSIS — K21.9 GASTROESOPHAGEAL REFLUX DISEASE WITHOUT ESOPHAGITIS: ICD-10-CM

## 2025-01-16 NOTE — ASSESSMENT & PLAN NOTE
Have labs in a week prior to next visit continue atorvastatin 20 for now we will hold off on increasing atorvastatin dose due to history of myalgia.  ASCVD risk 30%, advised diet and exercise

## 2025-01-16 NOTE — ASSESSMENT & PLAN NOTE
Stable on current medication and dosage. Will continue current management. Refill medication as necessary.  Prolia 60 mg once every 6 months

## 2025-01-16 NOTE — PROGRESS NOTES
Office Note     Name: Yamileth De Guzman    : 1945     MRN: [id number]     Chief Complaint  Hypothyroidism (6 month follow up)    Subjective     History of Present Illness:  Yamileth De Guzman is a 79 y.o. female who presents today for chronic conditions.    Anxiety: follows with psychiatry, notes and medical records reviewed, on xanax  Hypothyroidism: stable on meds  GERD: daily omeprazole, occasionally takes a 2nd dose (about 3-4x/month)  Allergies: on zyrtec and flonase  HLD: on fish oil, high LDL on atorvastatin 20 but previously had muscle aches  Trigger finger: Occasional on right fifth digit but does not bother her much right now, is tolerable  Osteoarthritis: on as needed meloxicam     Smoker: cigarettes 1ppd x 50 years.10/2024 stable lung nodules 1.2cm, repeat 1 year  Mammogram: 2024 biards 2  DEXA scan:  osteopenia and osteoporosis on L hip, on prolia 60mg once every 6 months, was following with rheumatology but now out of network, pt requested to have it done here    The 10-year ASCVD risk score (Latesha MATHIAS, et al., 2019) is: 30.8%    Values used to calculate the score:      Age: 79 years      Sex: Female      Is Non- : No      Diabetic: No      Tobacco smoker: Yes      Systolic Blood Pressure: 139 mmHg      Is BP treated: No      HDL Cholesterol: 62 mg/dL      Total Cholesterol: 210 mg/dL      Family History:   Family History   Problem Relation Age of Onset    Hearing loss Father     Arthritis Father     Cancer Father     GI problems Father     Breast cancer Sister     Breast cancer Sister     Colon cancer Neg Hx        Social History:   Social History     Socioeconomic History    Marital status:    Tobacco Use    Smoking status: Every Day     Current packs/day: 1.00     Average packs/day: 1 pack/day for 40.0 years (40.0 ttl pk-yrs)     Types: Cigarettes    Smokeless tobacco: Never   Vaping Use    Vaping status: Never Used   Substance and Sexual Activity  "   Alcohol use: Not Currently    Drug use: No    Sexual activity: Not Currently     Partners: Male       Health Maintenance   Topic Date Due    COLORECTAL CANCER SCREENING  03/18/2025    RSV Vaccine - Adults (1 - 1-dose 75+ series) 07/15/2025 (Originally 9/19/2022)    COVID-19 Vaccine (1 - 2024-25 season) 01/16/2026 (Originally 9/1/2024)    TDAP/TD VACCINES (3 - Td or Tdap) 04/15/2025    ANNUAL WELLNESS VISIT  07/15/2025    LIPID PANEL  07/15/2025    LUNG CANCER SCREENING  10/23/2025    DXA SCAN  11/06/2025    HEPATITIS C SCREENING  Completed    INFLUENZA VACCINE  Completed    Pneumococcal Vaccine 65+  Completed    ZOSTER VACCINE  Completed    MAMMOGRAM  Discontinued     Patient Care Team:  Salina Montanez MD as PCP - General (Family Medicine)  Mansoor Quezada DO ([facility] Facility)  Stew Grant APRN as Nurse Practitioner (Gastroenterology)  Stacy Leyva APRN as Nurse Practitioner (Behavioral Health)  Mike Abebe MD as Consulting Physician (Rheumatology)  Mary Kay Jones OD (Optometry)  Missy Feng MD (Dermatology)    Objective     Vital Signs  /62   Pulse 56   Temp 99.2 °F (37.3 °C) (Infrared)   Resp 16   Ht 167.6 cm (65.98\")   Wt 63.5 kg (140 lb)   SpO2 100%   BMI 22.61 kg/m²   Estimated body mass index is 22.61 kg/m² as calculated from the following:    Height as of this encounter: 167.6 cm (65.98\").    Weight as of this encounter: 63.5 kg (140 lb).  BMI is within normal parameters. No other follow-up for BMI required.    Physical Exam  Vitals and nursing note reviewed.   Constitutional:       Appearance: Normal appearance.   HENT:      Head: Normocephalic and atraumatic.   Cardiovascular:      Rate and Rhythm: Normal rate and regular rhythm.      Pulses: Normal pulses.      Heart sounds: Normal heart sounds.   Pulmonary:      Effort: Pulmonary effort is normal. No respiratory distress.      Breath sounds: Normal breath sounds. No wheezing, rhonchi or rales. "   Abdominal:      General: Abdomen is flat. Bowel sounds are normal.      Palpations: Abdomen is soft.      Tenderness: There is no abdominal tenderness. There is no guarding.   Musculoskeletal:      Cervical back: Neck supple.   Skin:     General: Skin is warm.      Capillary Refill: Capillary refill takes less than 2 seconds.   Neurological:      General: No focal deficit present.      Mental Status: She is alert. Mental status is at baseline.   Psychiatric:         Mood and Affect: Mood normal.         Behavior: Behavior normal.          Procedures     Assessment and Plan     Diagnoses and all orders for this visit:    1. Dyslipidemia (Primary)  Assessment & Plan:  Have labs in a week prior to next visit continue atorvastatin 20 for now we will hold off on increasing atorvastatin dose due to history of myalgia.  ASCVD risk 30%, advised diet and exercise    Orders:  -     Lipid Panel    2. Acquired hypothyroidism  Assessment & Plan:  Have labs done a week prior to next visit currently on levothyroxine continue for now    Orders:  -     CBC (No Diff)  -     TSH Rfx On Abnormal To Free T4    3. Generalized anxiety disorder  Assessment & Plan:  Follows with behavioral health on as needed Xanax     Orders:  -     CBC (No Diff)  -     Basic Metabolic Panel    4. Age-related osteoporosis with current pathological fracture with routine healing  Assessment & Plan:  Stable on current medication and dosage. Will continue current management. Refill medication as necessary.  Prolia 60 mg once every 6 months    Orders:  -     CBC (No Diff)    5. Gastroesophageal reflux disease without esophagitis  Assessment & Plan:  Stable on current medication and dosage. Will continue current management. Refill medication as necessary.  Omeprazole      6. Allergy, subsequent encounter  Assessment & Plan:  On cetirizine and occasional Flonase   stable on current medication and dosage. Will continue current management. Refill medication as  necessary.        7. Nicotine dependence, cigarettes, uncomplicated   Assessment & Plan:  Continue lung cancer screening                                             8. Lung nodule  Assessment & Plan:  Stable, repeat low-dose lung CT in 1 year around 10/2025      Other orders  -     denosumab (PROLIA) 60 MG/ML solution prefilled syringe syringe; Inject 1 mL under the skin into the appropriate area as directed 1 (One) Time for 1 dose.  Dispense: 1 mL; Refill: 0         Counseling was given to patient for the following topics: instructions for management, risks and benefits of treatment options, and importance of treatment compliance.    Follow Up  Return in about 6 months (around 7/16/2025) for Annual.    MD ALBERTO [facility]  [address] [address] [phone]

## 2025-01-18 DIAGNOSIS — E78.5 DYSLIPIDEMIA: Chronic | ICD-10-CM

## 2025-01-20 RX ORDER — ATORVASTATIN CALCIUM 20 MG/1
20 TABLET, FILM COATED ORAL DAILY
Qty: 90 TABLET | Refills: 1 | Status: SHIPPED | OUTPATIENT
Start: 2025-01-20

## 2025-02-03 ENCOUNTER — HOSPITAL ENCOUNTER (OUTPATIENT)
Facility: HOSPITAL | Age: 80
Discharge: HOME | End: 2025-02-03
Admitting: INTERNAL MEDICINE
Payer: MEDICARE

## 2025-02-03 VITALS
RESPIRATION RATE: 14 BRPM | DIASTOLIC BLOOD PRESSURE: 62 MMHG | WEIGHT: 140.8 LBS | SYSTOLIC BLOOD PRESSURE: 122 MMHG | HEART RATE: 65 BPM | TEMPERATURE: 98.1 F | OXYGEN SATURATION: 98 % | BODY MASS INDEX: 22.74 KG/M2

## 2025-02-03 DIAGNOSIS — M81.0 AGE-RELATED OSTEOPOROSIS WITHOUT CURRENT PATHOLOGICAL FRACTURE: Primary | ICD-10-CM

## 2025-02-03 PROCEDURE — 25010000002 DENOSUMAB 60 MG/ML SOLUTION PREFILLED SYRINGE: Performed by: INTERNAL MEDICINE

## 2025-02-03 PROCEDURE — 96372 THER/PROPH/DIAG INJ SC/IM: CPT

## 2025-02-03 RX ORDER — ACETAMINOPHEN 500 MG
1000 TABLET ORAL ONCE
Status: CANCELLED
Start: 2025-07-28 | End: 2025-07-28

## 2025-02-03 RX ORDER — DIPHENHYDRAMINE HCL 25 MG
25 CAPSULE ORAL ONCE
Status: CANCELLED
Start: 2025-07-28 | End: 2025-07-28

## 2025-02-03 RX ORDER — ACETAMINOPHEN 500 MG
1000 TABLET ORAL ONCE
Status: DISCONTINUED | OUTPATIENT
Start: 2025-02-03 | End: 2025-02-04 | Stop reason: HOSPADM

## 2025-02-03 RX ORDER — DIPHENHYDRAMINE HCL 25 MG
25 CAPSULE ORAL ONCE
Status: DISCONTINUED | OUTPATIENT
Start: 2025-02-03 | End: 2025-02-04 | Stop reason: HOSPADM

## 2025-02-03 RX ADMIN — DENOSUMAB 60 MG: 60 INJECTION SUBCUTANEOUS at 08:58

## 2025-02-13 ENCOUNTER — OFFICE VISIT (OUTPATIENT)
Dept: GASTROENTEROLOGY | Facility: CLINIC | Age: 80
End: 2025-02-13
Payer: MEDICARE

## 2025-02-13 VITALS
HEART RATE: 61 BPM | SYSTOLIC BLOOD PRESSURE: 126 MMHG | DIASTOLIC BLOOD PRESSURE: 84 MMHG | WEIGHT: 142 LBS | BODY MASS INDEX: 22.93 KG/M2 | OXYGEN SATURATION: 99 %

## 2025-02-13 DIAGNOSIS — K21.9 GASTROESOPHAGEAL REFLUX DISEASE WITHOUT ESOPHAGITIS: Chronic | ICD-10-CM

## 2025-02-13 DIAGNOSIS — Z86.0100 PERSONAL HISTORY OF COLON POLYPS, UNSPECIFIED: Primary | ICD-10-CM

## 2025-02-13 DIAGNOSIS — K59.04 CHRONIC IDIOPATHIC CONSTIPATION: Chronic | ICD-10-CM

## 2025-02-13 RX ORDER — SODIUM CHLORIDE 9 MG/ML
70 INJECTION, SOLUTION INTRAVENOUS CONTINUOUS PRN
OUTPATIENT
Start: 2025-02-13 | End: 2025-02-14

## 2025-02-13 RX ORDER — SODIUM, POTASSIUM,MAG SULFATES 17.5-3.13G
SOLUTION, RECONSTITUTED, ORAL ORAL
Qty: 177 ML | Refills: 0 | Status: SHIPPED | OUTPATIENT
Start: 2025-02-13

## 2025-02-13 RX ORDER — BISACODYL 5 MG/1
TABLET, DELAYED RELEASE ORAL
Qty: 4 TABLET | Refills: 0 | Status: SHIPPED | OUTPATIENT
Start: 2025-02-13

## 2025-02-13 NOTE — PROGRESS NOTES
Follow Up Note     Date: 2025   Patient Name: Yamileth De Guzman  MRN: [id number]  : 1945     Primary Care Provider: Salina Montanez MD     Chief Complaint   Patient presents with    Colon Cancer Screening     2025  History of Present Illness  The patient is a 79-year-old female who is here for follow-up to schedule a colonoscopy for screening.    She reports persistent constipation, which she manages with daily milk of magnesia and Miralax and/or stool softeners when she remembers to take them. Reflux reasonably controlled with low dose PPI. She denies any abdominal pain, nausea or vomiting. No difficulty swallowing. She denies any GI bleeding.      Interval History:  2024  Yamileth De Guzman is a 78 y.o. female who is here today for follow up colon cancer screening. She needs to have her 3 year colonoscopy for surveillance. She has been doing well. No new problems or concerns. Denies GI bleeding.      2022  Yamileth De Guzman is a 76 y.o. female who is here today for follow up after colonoscopy. She has been doing well. Constipation controlled with stool softeners. Denies GI bleeding. No history of nausea or vomiting.      2020  There is a long standing history of constipation. The patient has 1 firm bowel movement every 3-4 days. She frequently has gas and bloating. For the past 2-3 months, constipation and gas have worsened, and she has been having pain in the lower abdomen. The pain comes and goes, and is a moderate, sharp pain. No history of fevers or chills, nausea or vomiting.     The patient is taking Omeprazole 20mg with reasonable control of heartburn. Reflux is worse at night with lying down. Heartburn is moderate. The patient denies difficulty swallowing.     Last EGD was in .    Subjective      Past Medical History:   Diagnosis Date    Abdominal bloating     Acid reflux     Adenomatous polyp of colon 2020    Anxiety     Arthritis     Carotid bruit      Colon polyp 2012    Constipation     Decreased hearing     Disease of thyroid gland     Fibromyalgia     Full dentures     Hematuria     Impaired mobility     Lung nodule     benign    Osteoporosis     Seasonal allergies     Sinusitis     Wears glasses     READING GLASSES ONLY     Past Surgical History:   Procedure Laterality Date    APPENDECTOMY      CATARACT EXTRACTION W/ INTRAOCULAR LENS IMPLANT Right 10/2/2023    Procedure: CATARACT PHACO EXTRACTION WITH INTRAOCULAR LENS IMPLANT RIGHT;  Surgeon: Jet Avalos MD;  Location: [facility] OR;  Service: Ophthalmology;  Laterality: Right;    CATARACT EXTRACTION W/ INTRAOCULAR LENS IMPLANT Left 10/16/2023    Procedure: CATARACT PHACO EXTRACTION WITH INTRAOCULAR LENS IMPLANT LEFT;  Surgeon: Jet Avalos MD;  Location: [facility] OR;  Service: Ophthalmology;  Laterality: Left;    COLONOSCOPY  2012    COLONOSCOPY N/A 08/17/2020    Procedure: COLONOSCOPY WITH COLD SNARE POLYPECTOMY, COLD FORCEP POLYPECTOMY, SUBMUCOSAL INJECTION OF NORMAL SALINE, ENDOSCOPIC MUCOSAL RESECTION, HOT SNARE POLYPECTOMY, THERMAL ABLATION, QUICK CLIP PLACEMENT TIMES 4 AND BARBIE INK INJECTION;  Surgeon: Scar Rogers MD;  Location: [facility] ENDOSCOPY;  Service: Gastroenterology;  Laterality: N/A;    COLONOSCOPY N/A 03/01/2021    Procedure: COLONOSCOPY WITH BIOPSY AND POLYPECTOMY;  Surgeon: Scar Rogers MD;  Location: [facility] ENDOSCOPY;  Service: Gastroenterology;  Laterality: N/A;    COLONOSCOPY N/A 03/18/2022    Procedure: COLONOSCOPY with polypectomy x2;  Surgeon: Scar Rogers MD;  Location: [facility] ENDOSCOPY;  Service: Gastroenterology;  Laterality: N/A;    HIP TROCHANTERIC NAILING WITH INTRAMEDULLARY HIP SCREW Right 02/13/2023    Procedure: HIP TROCHANTERIC NAILING WITH INTRAMEDULLARY HIP SCREW-SHORT NAIL;  Surgeon: Miguel Angel Flores MD;  Location: [facility] OR;  Service: Orthopedics;  Laterality: Right;    HYSTERECTOMY      PARTIAL STILL HAS OVARIES    MULTIPLE  TOOTH EXTRACTIONS      TUBAL ABDOMINAL LIGATION      UPPER GASTROINTESTINAL ENDOSCOPY  2012    VAGINAL DELIVERY      X1     Family History   Problem Relation Age of Onset    Hearing loss Father     Arthritis Father     Cancer Father     GI problems Father     Breast cancer Sister     Breast cancer Sister     Colon cancer Neg Hx      Social History     Socioeconomic History    Marital status:    Tobacco Use    Smoking status: Every Day     Current packs/day: 1.00     Average packs/day: 1 pack/day for 40.0 years (40.0 ttl pk-yrs)     Types: Cigarettes    Smokeless tobacco: Never   Vaping Use    Vaping status: Never Used   Substance and Sexual Activity    Alcohol use: Not Currently    Drug use: No    Sexual activity: Not Currently     Partners: Male       Current Outpatient Medications:     ALPRAZolam (XANAX) 0.5 MG tablet, TAKE 1 TABLET BY MOUTH AT BEDTIME AND DAILY AS NEEDED, Disp: 30 tablet, Rfl: 2    atorvastatin (LIPITOR) 20 MG tablet, Take 1 tablet by mouth once daily, Disp: 90 tablet, Rfl: 1    cetirizine (zyrTEC) 10 MG tablet, Take 1 tablet by mouth Every Night., Disp: , Rfl:     [START ON 8/3/2025] denosumab (PROLIA) 60 MG/ML solution prefilled syringe syringe, Inject 1 mL under the skin into the appropriate area as directed 1 (One) Time for 1 dose., Disp: 1 mL, Rfl: 0    fluticasone (FLONASE) 50 MCG/ACT nasal spray, Use 2 spray(s) in each nostril once daily, Disp: 48 g, Rfl: 0    Garlic 400 MG tablet, Take  by mouth Daily., Disp: , Rfl:     levothyroxine (SYNTHROID, LEVOTHROID) 88 MCG tablet, Take 1 tablet by mouth once daily, Disp: 90 tablet, Rfl: 1    Multiple Vitamin (MULTI VITAMIN DAILY PO), Take 1 tablet by mouth Daily., Disp: , Rfl:     Omega-3 Fatty Acids (FISH OIL) 1000 MG capsule capsule, Take 1 capsule by mouth Daily With Breakfast., Disp: , Rfl:     omeprazole (priLOSEC) 20 MG capsule, TAKE 1 CAPSULE BY MOUTH EVERY 12 HOURS, Disp: 180 capsule, Rfl: 1    tacrolimus (PROTOPIC) 0.1 % ointment,  APPLY OINTMENT EXTERNALLY TO RASH TWICE DAILY, Disp: , Rfl:     vitamin C (ASCORBIC ACID) 250 MG tablet, Take 1 tablet by mouth Daily., Disp: , Rfl:     bisacodyl (DULCOLAX) 5 MG EC tablet, Take as directed for colon prep, Disp: 4 tablet, Rfl: 0    sodium-potassium-magnesium sulfates (Suprep Bowel Prep Kit) 17.5-3.13-1.6 GM/177ML solution oral solution, Use as directed for colonoscopy prep. Patient has instructions., Disp: 177 mL, Rfl: 0    No Known Allergies    The following portions of the patient's history were reviewed and updated as appropriate: allergies, current medications, past family history, past medical history, past social history, past surgical history and problem list.  Objective     Physical Exam  Vitals and nursing note reviewed.   Constitutional:       General: She is not in acute distress.     Appearance: Normal appearance. She is well-developed.   HENT:      Head: Normocephalic and atraumatic.      Mouth/Throat:      Mouth: Mucous membranes are not pale, not dry and not cyanotic.   Eyes:      General: Lids are normal.   Neck:      Trachea: Trachea normal.   Cardiovascular:      Rate and Rhythm: Normal rate.   Pulmonary:      Effort: Pulmonary effort is normal. No respiratory distress.      Breath sounds: Normal breath sounds.   Abdominal:      Tenderness: There is no abdominal tenderness.   Skin:     General: Skin is warm and dry.   Neurological:      Mental Status: She is alert and oriented to person, place, and time.   Psychiatric:         Mood and Affect: Mood normal.         Speech: Speech normal.         Behavior: Behavior normal. Behavior is cooperative.       Vitals:    02/13/25 1020   BP: 126/84   Pulse: 61   SpO2: 99%   Weight: 64.4 kg (142 lb)     Body mass index is 22.93 kg/m².     Results Review:   I reviewed the patient's new clinical results.    Comprehensive Metabolic Panel (07/15/2024 08:31)  CBC & Differential (07/15/2024 08:31)  TSH Rfx On Abnormal To Free T4 (07/15/2024  08:31)    US Abdomen Complete (07/17/2020 09:45)  CT Chest Low Dose Cancer Screening WO (10/23/2024 09:40)     Colonoscopy dated 8/17/2020 per Dr. Rogers  -  Six 6 to 8 mm polyps removed.    - One 20 to 25 mm polyp in the ascending colon removed by mucosal resection.    - Cecum, ascending colon, and transverse colon polyps with tubular adenoma.  No high-grade dysplasia.     Colonoscopy dated 3/1/2021 per Dr. Rogers  - One 6 to 8 mm polyp in the proximal ascending colon removed.    - 1 5 mm polyp in the proximal descending colon removed.   - A few diminutive polyps in the rectum and sigmoid colon removed.  - Diverticulosis in sigmoid colon.    - Hyperplastic changes of prior EMR site noted, biopsies obtained.    - Ascending colon polyp biopsy with tubular adenoma without high-grade dysplasia.  Ascending colon biopsy proximal with tubular adenoma fragments without high-grade dysplasia.  Descending colon polyp biopsy of tubular adenoma fragments without high-grade dysplasia.  Rectosigmoid colon polyp x2 biopsy with hyperplastic polyps.     Colonoscopy dated 3/18/2022 per Dr. Rogers  - One 6 mm polyp in the proximal ascending colon, removed with a hot snare. Resected and retrieved. Edges were cauterized with soft coagulation  - A tattoo was seen in the proximal ascending colon distal to EMR scar.  - One 5 mm polyp at the hepatic flexure, removed with a hot snare. Resected and retrieved.  - Diverticulosis in the sigmoid colon.  - External and internal hemorrhoids.  - Ascending colon polyp biopsy with tubular adenoma, no dysplasia.  Hepatic flexure polyp biopsy tubular adenoma, no dysplasia.      Assessment / Plan      1. Personal history of colon polyps, unspecified  Colonoscopy in August 2020 with 6 polyps removed, tubular adenoma without dysplasia. Ascending colon with 20-25mm polyp removed by EMR, tubular adenoma without dysplasia.  Colonoscopy in March 2021with biopsy at site of previous removed 20-25mm polyp  with tubular adenoma without dysplasia.  Colonoscopy dated 3/18/2022 with tattoo seen in the proximal ascending colon distal to EMR scar, 2 small polyps removed, adenomas without dysplasia. She ws recommended colonoscopy for surveillance in 3 years at that time. There is no family history of colon cancer.   Colonoscopy for surveillance.    - Case Request  - sodium-potassium-magnesium sulfates (Suprep Bowel Prep Kit) 17.5-3.13-1.6 GM/177ML solution oral solution; Use as directed for colonoscopy prep. Patient has instructions.  Dispense: 177 mL; Refill: 0  - bisacodyl (DULCOLAX) 5 MG EC tablet; Take as directed for colon prep  Dispense: 4 tablet; Refill: 0    2. Chronic idiopathic constipation  , MiraLAX and stool softeners.  Denies any abdominal pain or GI bleeding.  Basic labs unremarkable.  TSH normal.  Colonoscopy 3/18/2022 with polyps removed, otherwise unremarkable.  High-fiber, low-fat diet with liberal water intake.  Metamucil or fiber Gummies daily.  Continue milk of magnesia, MiraLAX and stool softeners daily as needed.  She is due for colonoscopy for surveillance, will schedule.  CTAP if symptoms worsen.    3. Gastroesophageal reflux disease without esophagitis  Reflux reasonably controlled with low-dose PPI.  Continue same.  No difficulty swallowing.  She had EGD in 2012 that was normal according to the patient, we do not have those results.  Chest CT dated 10/23/2024 with no abnormality of esophagus noted.  Antireflux measures.  EGD if symptoms worsen.    Patient Instructions   Antireflux measures: Avoid fried, fatty foods, alcohol, chocolate, coffee, tea,  soft drinks, peppermint and spearmint, spicy foods, tomatoes and tomato based foods, onion based foods, and smoking.  Other antireflux measures include weight reduction if overweight, avoiding tight clothing around the abdomen, elevating the head of the bed 6 inches with blocks under the head board, and don't drink or eat before going to bed and avoid  lying down immediately after meals.  Omeprazole 20 mg 1 po daily in the am 30 minutes before breakfast.  Recommended to take Levothyroxine first in the am upon waking, wait 30 minutes, then take Omeprazole 20 mg, wait 30 minutes, then eat breakfast and take other medications.   High fiber, low fat diet with liberal water intake.   Metamucil 1 packet/scoop daily or fiber gummies 2-4 per day.   Continue stool softeners 2 per day for constipation.   May add Miralax 17 grams daily as needed. Adjust to have 1-2 soft bowel movements per day.   Low FODMAP diet - avoid all dairy. May use lactose free/dairy free alternatives such as almond milk, rice milk, oat milk, etc.   Colonoscopy: The indications, technique, alternatives and potential risk and complications were discussed with the patient including but not limited to bleeding, perforations, missing lesions and anesthetic complications. The patient understands and wishes to proceed with the procedure and has given their verbal consent. Written patient education information was given to the patient.   The patient will call if they have further questions before procedure.         Low-FODMAP Eating Plan    FODMAP stands for fermentable oligosaccharides, disaccharides, monosaccharides, and polyols. These are sugars that are hard for some people to digest. A low-FODMAP eating plan may help some people who have irritable bowel syndrome (IBS) and certain other bowel (intestinal) diseases to manage their symptoms.  This meal plan can be complicated to follow. Work with a diet and nutrition specialist (dietitian) to make a low-FODMAP eating plan that is right for you. A dietitian can help make sure that you get enough nutrition from this diet.  What are tips for following this plan?  Reading food labels  Check labels for hidden FODMAPs such as:  High-fructose syrup.  Honey.  Agave.  Natural fruit flavors.  Onion or garlic powder.  Choose low-FODMAP foods that contain 3-4 grams of  fiber per serving.  Check food labels for serving sizes. Eat only one serving at a time to make sure FODMAP levels stay low.  Shopping  Shop with a list of foods that are recommended on this diet and make a meal plan.  Meal planning  Follow a low-FODMAP eating plan for up to 6 weeks, or as told by your health care provider or dietitian.  To follow the eating plan:  Eliminate high-FODMAP foods from your diet completely. Choose only low-FODMAP foods to eat. You will do this for 2-6 weeks.  Gradually reintroduce high-FODMAP foods into your diet one at a time. Most people should wait a few days before introducing the next new high-FODMAP food into their meal plan. Your dietitian can recommend how quickly you may reintroduce foods.  Keep a daily record of what and how much you eat and drink. Make note of any symptoms that you have after eating.  Review your daily record with a dietitian regularly to identify which foods you can eat and which foods you should avoid.  General tips  Drink enough fluid each day to keep your urine pale yellow.  Avoid processed foods. These often have added sugar and may be high in FODMAPs.  Avoid most dairy products, whole grains, and sweeteners.  Work with a dietitian to make sure you get enough fiber in your diet.  Avoid high FODMAP foods at meals to manage symptoms.     Recommended foods  Fruits  Bananas, oranges, tangerines, aracely, limes, blueberries, raspberries, strawberries, grapes, cantaloupe, honeydew melon, kiwi, papaya, passion fruit, and pineapple. Limited amounts of dried cranberries, banana chips, and shredded coconut.  Vegetables  Eggplant, zucchini, cucumber, peppers, green beans, bean sprouts, lettuce, arugula, kale, Swiss chard, spinach, ailin greens, bok greg, summer squash, potato, and tomato. Limited amounts of corn, carrot, and sweet potato. Green parts of scallions.  Grains  Gluten-free grains, such as rice, oats, buckwheat, quinoa, corn, polenta, and millet.  "Gluten-free pasta, bread, or cereal. Rice noodles. Corn tortillas.  Meats and other proteins  Unseasoned beef, pork, poultry, or fish. Eggs. Hair. Tofu (firm) and tempeh. Limited amounts of nuts and seeds, such as almonds, walnuts, brazil nuts, pecans, peanuts, nut butters, pumpkin seeds, trevor seeds, and sunflower seeds.  Dairy  Lactose-free milk, yogurt, and kefir. Lactose-free cottage cheese and ice cream. Non-dairy milks, such as almond, coconut, hemp, and rice milk. Non-dairy yogurt. Limited amounts of goat cheese, brie, mozzarella, parmesan, swiss, and other hard cheeses.  Fats and oils  Butter-free spreads. Vegetable oils, such as olive, canola, and sunflower oil.  Seasoning and other foods  Artificial sweeteners with names that do not end in \"ol,\" such as aspartame, saccharine, and stevia. Maple syrup, white table sugar, raw sugar, brown sugar, and molasses. Mayonnaise, soy sauce, and tamari. Fresh basil, coriander, parsley, rosemary, and thyme.  Beverages  Water and mineral water. Sugar-sweetened soft drinks. Small amounts of orange juice or cranberry juice. Black and green tea. Most dry aleja. Coffee.  The items listed above may not be a complete list of foods and beverages you can eat. Contact a dietitian for more information.     Foods to avoid  Fruits  Fresh, dried, and juiced forms of apple, pear, watermelon, peach, plum, cherries, apricots, blackberries, boysenberries, figs, nectarines, and navneet. Avocado.  Vegetables  Chicory root, artichoke, asparagus, cabbage, snow peas, [place] sprouts, broccoli, sugar snap peas, mushrooms, celery, and cauliflower. Onions, garlic, leeks, and the white part of scallions.  Grains  Wheat, including kamut, durum, and semolina. Barley and bulgur. Couscous. Wheat-based cereals. Wheat noodles, bread, crackers, and pastries.  Meats and other proteins  Fried or fatty meat. Sausage. Cashews and pistachios. Soybeans, baked beans, black beans, chickpeas, kidney beans, harpal " beans, navy beans, lentils, black-eyed peas, and split peas.  Dairy  Milk, yogurt, ice cream, and soft cheese. Cream and sour cream. Milk-based sauces. Custard. Buttermilk. Soy milk.  Seasoning and other foods  Any sugar-free gum or candy. Foods that contain artificial sweeteners such as sorbitol, mannitol, isomalt, or xylitol. Foods that contain honey, high-fructose corn syrup, or agave. Bouillon, vegetable stock, beef stock, and chicken stock. Garlic and onion powder. Condiments made with onion, such as hummus, chutney, pickles, relish, salad dressing, and salsa. Tomato paste.  Beverages  Chicory-based drinks. Coffee substitutes. Chamomile tea. Fennel tea. Sweet or fortified aleja such as port or jenifer. Diet soft drinks made with isomalt, mannitol, maltitol, sorbitol, or xylitol. Apple, pear, and navneet juice. Juices with high-fructose corn syrup.  The items listed above may not be a complete list of foods and beverages you should avoid. Contact a dietitian for more information.     Summary  FODMAP stands for fermentable oligosaccharides, disaccharides, monosaccharides, and polyols. These are sugars that are hard for some people to digest.  A low-FODMAP eating plan is a short-term diet that helps to ease symptoms of certain bowel diseases.  The eating plan usually lasts up to 6 weeks. After that, high-FODMAP foods are reintroduced gradually and one at a time. This can help you find out which foods may be causing symptoms.  A low-FODMAP eating plan can be complicated. It is best to work with a dietitian who has experience with this type of plan.  This information is not intended to replace advice given to you by your health care provider. Make sure you discuss any questions you have with your health care provider.  Document Revised: 05/06/2021 Document Reviewed: 05/06/2021  [employer] Patient Education © 2023 [employer]     Stew Grant, APRN  2/13/2025    Please note that portions of this note were completed  with a voice recognition program.     Patient or patient representative verbalized consent for the use of Ambient Listening during the visit with  KRISTI Altamirano for chart documentation. 2/13/2025  10:48 EST

## 2025-02-13 NOTE — PATIENT INSTRUCTIONS
Antireflux measures: Avoid fried, fatty foods, alcohol, chocolate, coffee, tea,  soft drinks, peppermint and spearmint, spicy foods, tomatoes and tomato based foods, onion based foods, and smoking.  Other antireflux measures include weight reduction if overweight, avoiding tight clothing around the abdomen, elevating the head of the bed 6 inches with blocks under the head board, and don't drink or eat before going to bed and avoid lying down immediately after meals.  Omeprazole 20 mg 1 po daily in the am 30 minutes before breakfast.  Recommended to take Levothyroxine first in the am upon waking, wait 30 minutes, then take Omeprazole 20 mg, wait 30 minutes, then eat breakfast and take other medications.   High fiber, low fat diet with liberal water intake.   Metamucil 1 packet/scoop daily or fiber gummies 2-4 per day.   Continue stool softeners 2 per day for constipation.   May add Miralax 17 grams daily as needed. Adjust to have 1-2 soft bowel movements per day.   Low FODMAP diet - avoid all dairy. May use lactose free/dairy free alternatives such as almond milk, rice milk, oat milk, etc.   Colonoscopy: The indications, technique, alternatives and potential risk and complications were discussed with the patient including but not limited to bleeding, perforations, missing lesions and anesthetic complications. The patient understands and wishes to proceed with the procedure and has given their verbal consent. Written patient education information was given to the patient.   The patient will call if they have further questions before procedure.         Low-FODMAP Eating Plan    FODMAP stands for fermentable oligosaccharides, disaccharides, monosaccharides, and polyols. These are sugars that are hard for some people to digest. A low-FODMAP eating plan may help some people who have irritable bowel syndrome (IBS) and certain other bowel (intestinal) diseases to manage their symptoms.  This meal plan can be complicated to  follow. Work with a diet and nutrition specialist (dietitian) to make a low-FODMAP eating plan that is right for you. A dietitian can help make sure that you get enough nutrition from this diet.  What are tips for following this plan?  Reading food labels  Check labels for hidden FODMAPs such as:  High-fructose syrup.  Honey.  Agave.  Natural fruit flavors.  Onion or garlic powder.  Choose low-FODMAP foods that contain 3-4 grams of fiber per serving.  Check food labels for serving sizes. Eat only one serving at a time to make sure FODMAP levels stay low.  Shopping  Shop with a list of foods that are recommended on this diet and make a meal plan.  Meal planning  Follow a low-FODMAP eating plan for up to 6 weeks, or as told by your health care provider or dietitian.  To follow the eating plan:  Eliminate high-FODMAP foods from your diet completely. Choose only low-FODMAP foods to eat. You will do this for 2-6 weeks.  Gradually reintroduce high-FODMAP foods into your diet one at a time. Most people should wait a few days before introducing the next new high-FODMAP food into their meal plan. Your dietitian can recommend how quickly you may reintroduce foods.  Keep a daily record of what and how much you eat and drink. Make note of any symptoms that you have after eating.  Review your daily record with a dietitian regularly to identify which foods you can eat and which foods you should avoid.  General tips  Drink enough fluid each day to keep your urine pale yellow.  Avoid processed foods. These often have added sugar and may be high in FODMAPs.  Avoid most dairy products, whole grains, and sweeteners.  Work with a dietitian to make sure you get enough fiber in your diet.  Avoid high FODMAP foods at meals to manage symptoms.     Recommended foods  Fruits  Bananas, oranges, tangerines, aracely, limes, blueberries, raspberries, strawberries, grapes, cantaloupe, honeydew melon, kiwi, papaya, passion fruit, and pineapple.  "Limited amounts of dried cranberries, banana chips, and shredded coconut.  Vegetables  Eggplant, zucchini, cucumber, peppers, green beans, bean sprouts, lettuce, arugula, kale, Swiss chard, spinach, ailin greens, bok greg, summer squash, potato, and tomato. Limited amounts of corn, carrot, and sweet potato. Green parts of scallions.  Grains  Gluten-free grains, such as rice, oats, buckwheat, quinoa, corn, polenta, and millet. Gluten-free pasta, bread, or cereal. Rice noodles. Corn tortillas.  Meats and other proteins  Unseasoned beef, pork, poultry, or fish. Eggs. Hair. Tofu (firm) and tempeh. Limited amounts of nuts and seeds, such as almonds, walnuts, brazil nuts, pecans, peanuts, nut butters, pumpkin seeds, trevor seeds, and sunflower seeds.  Dairy  Lactose-free milk, yogurt, and kefir. Lactose-free cottage cheese and ice cream. Non-dairy milks, such as almond, coconut, hemp, and rice milk. Non-dairy yogurt. Limited amounts of goat cheese, brie, mozzarella, parmesan, swiss, and other hard cheeses.  Fats and oils  Butter-free spreads. Vegetable oils, such as olive, canola, and sunflower oil.  Seasoning and other foods  Artificial sweeteners with names that do not end in \"ol,\" such as aspartame, saccharine, and stevia. Maple syrup, white table sugar, raw sugar, brown sugar, and molasses. Mayonnaise, soy sauce, and tamari. Fresh basil, coriander, parsley, rosemary, and thyme.  Beverages  Water and mineral water. Sugar-sweetened soft drinks. Small amounts of orange juice or cranberry juice. Black and green tea. Most dry aleja. Coffee.  The items listed above may not be a complete list of foods and beverages you can eat. Contact a dietitian for more information.     Foods to avoid  Fruits  Fresh, dried, and juiced forms of apple, pear, watermelon, peach, plum, cherries, apricots, blackberries, boysenberries, figs, nectarines, and navneet. Avocado.  Vegetables  Chicory root, artichoke, asparagus, cabbage, snow peas, " [place] sprouts, broccoli, sugar snap peas, mushrooms, celery, and cauliflower. Onions, garlic, leeks, and the white part of scallions.  Grains  Wheat, including kamut, durum, and semolina. Barley and bulgur. Couscous. Wheat-based cereals. Wheat noodles, bread, crackers, and pastries.  Meats and other proteins  Fried or fatty meat. Sausage. Cashews and pistachios. Soybeans, baked beans, black beans, chickpeas, kidney beans, harpal beans, navy beans, lentils, black-eyed peas, and split peas.  Dairy  Milk, yogurt, ice cream, and soft cheese. Cream and sour cream. Milk-based sauces. Custard. Buttermilk. Soy milk.  Seasoning and other foods  Any sugar-free gum or candy. Foods that contain artificial sweeteners such as sorbitol, mannitol, isomalt, or xylitol. Foods that contain honey, high-fructose corn syrup, or agave. Bouillon, vegetable stock, beef stock, and chicken stock. Garlic and onion powder. Condiments made with onion, such as hummus, chutney, pickles, relish, salad dressing, and salsa. Tomato paste.  Beverages  Chicory-based drinks. Coffee substitutes. Chamomile tea. Fennel tea. Sweet or fortified aleja such as port or jenifer. Diet soft drinks made with isomalt, mannitol, maltitol, sorbitol, or xylitol. Apple, pear, and navneet juice. Juices with high-fructose corn syrup.  The items listed above may not be a complete list of foods and beverages you should avoid. Contact a dietitian for more information.     Summary  FODMAP stands for fermentable oligosaccharides, disaccharides, monosaccharides, and polyols. These are sugars that are hard for some people to digest.  A low-FODMAP eating plan is a short-term diet that helps to ease symptoms of certain bowel diseases.  The eating plan usually lasts up to 6 weeks. After that, high-FODMAP foods are reintroduced gradually and one at a time. This can help you find out which foods may be causing symptoms.  A low-FODMAP eating plan can be complicated. It is best to work  with a dietitian who has experience with this type of plan.  This information is not intended to replace advice given to you by your health care provider. Make sure you discuss any questions you have with your health care provider.  Document Revised: 05/06/2021 Document Reviewed: 05/06/2021  [employer] Patient Education © 2023 [employer]

## 2025-03-11 ENCOUNTER — OFFICE VISIT (OUTPATIENT)
Dept: BEHAVIORAL HEALTH | Facility: CLINIC | Age: 80
End: 2025-03-11
Payer: MEDICARE

## 2025-03-11 VITALS
DIASTOLIC BLOOD PRESSURE: 66 MMHG | HEIGHT: 66 IN | OXYGEN SATURATION: 100 % | HEART RATE: 60 BPM | BODY MASS INDEX: 22.66 KG/M2 | WEIGHT: 141 LBS | SYSTOLIC BLOOD PRESSURE: 112 MMHG

## 2025-03-11 DIAGNOSIS — F41.1 GENERALIZED ANXIETY DISORDER: ICD-10-CM

## 2025-03-11 PROCEDURE — 1159F MED LIST DOCD IN RCRD: CPT

## 2025-03-11 PROCEDURE — 99213 OFFICE O/P EST LOW 20 MIN: CPT

## 2025-03-11 PROCEDURE — 1160F RVW MEDS BY RX/DR IN RCRD: CPT

## 2025-03-11 RX ORDER — ALPRAZOLAM 0.5 MG
TABLET ORAL
Qty: 30 TABLET | Refills: 2 | Status: SHIPPED | OUTPATIENT
Start: 2025-03-11

## 2025-03-11 RX ORDER — ALPRAZOLAM 0.5 MG
TABLET ORAL
Qty: 30 TABLET | Refills: 0 | OUTPATIENT
Start: 2025-03-11

## 2025-03-11 NOTE — PROGRESS NOTES
Follow Up Office Visit    Patient Name: Yamileth De Guzman  : 1945   MRN: [id number]   Care Team: Patient Care Team:  Salina Montanez MD as PCP - General (Family Medicine)  Mansoor Quezada DO ([facility])  Stew Grant APRN as Nurse Practitioner (Gastroenterology)  Stacy Leyva APRN as Nurse Practitioner (Behavioral Health)  Mike Abebe MD as Consulting Physician (Rheumatology)  Mary Kay Jones OD (Optometry)  Missy Feng MD (Dermatology)         Chief Complaint:    Chief Complaint   Patient presents with    Anxiety    Med Management       History of Present Illness: Yamileth De Guzman is a 79 y.o. female who is here today for a medication management follow up.  Patient reports that overall medication continues to be effective.  She feels that her anxiety has been managed well. She did not see the need to make any changes and did not have any medication concerns at today's visit. She denies SI/HI today.     The following portion of the patient's history were reviewed and updated appropriately: allergies, current and past medications, family history, medical history and social history. BERTO reviewed and appropriate.   Subjective   Review of Systems:    Review of Systems   Respiratory: Negative.     Cardiovascular: Negative.  Negative for chest pain and palpitations.   Neurological: Negative.    Psychiatric/Behavioral: Negative.     All other systems reviewed and are negative.    Current Medications:   Current Outpatient Medications   Medication Sig Dispense Refill    ALPRAZolam (XANAX) 0.5 MG tablet TAKE 1 TABLET BY MOUTH AT BEDTIME AND DAILY AS NEEDED 30 tablet 2    atorvastatin (LIPITOR) 20 MG tablet Take 1 tablet by mouth once daily 90 tablet 1    cetirizine (zyrTEC) 10 MG tablet Take 1 tablet by mouth Every Night.      [START ON 8/3/2025] denosumab (PROLIA) 60 MG/ML solution prefilled syringe syringe Inject 1 mL under the skin into the appropriate area  "as directed 1 (One) Time for 1 dose. 1 mL 0    fluticasone (FLONASE) 50 MCG/ACT nasal spray Use 2 spray(s) in each nostril once daily 48 g 0    Garlic 400 MG tablet Take  by mouth Daily.      levothyroxine (SYNTHROID, LEVOTHROID) 88 MCG tablet Take 1 tablet by mouth once daily 90 tablet 1    Multiple Vitamin (MULTI VITAMIN DAILY PO) Take 1 tablet by mouth Daily.      Omega-3 Fatty Acids (FISH OIL) 1000 MG capsule capsule Take 1 capsule by mouth Daily With Breakfast.      omeprazole (priLOSEC) 20 MG capsule TAKE 1 CAPSULE BY MOUTH EVERY 12 HOURS 180 capsule 1    tacrolimus (PROTOPIC) 0.1 % ointment APPLY OINTMENT EXTERNALLY TO RASH TWICE DAILY      vitamin C (ASCORBIC ACID) 250 MG tablet Take 1 tablet by mouth Daily.      bisacodyl (DULCOLAX) 5 MG EC tablet Take as directed for colon prep (Patient not taking: Reported on 3/11/2025) 4 tablet 0    sodium-potassium-magnesium sulfates (Suprep Bowel Prep Kit) 17.5-3.13-1.6 GM/177ML solution oral solution Use as directed for colonoscopy prep. Patient has instructions. (Patient not taking: Reported on 3/11/2025) 177 mL 0     No current facility-administered medications for this visit.       Mental Status Exam:   Hygiene:   good  Cooperation:  Cooperative  Eye Contact:  Good  Psychomotor Behavior:  Appropriate  Affect:  Appropriate  Mood: normal  Speech:  Normal  Thought Process:  Goal directed and Linear  Thought Content:  Normal and Mood congruent  Suicidal:  None  Homicidal:  None  Hallucinations:  None  Delusion:  None  Memory:  Intact  Orientation:  Person, Place, Time, and Situation  Reliability:  good  Insight:  Good  Judgement:  Good  Impulse Control:  Good  Physical/Medical Issues:  Yes see chart       Objective   Vital Signs:   /66   Pulse 60   Ht 167.6 cm (65.98\")   Wt 64 kg (141 lb)   SpO2 100%   BMI 22.77 kg/m²         Lab Results:   Lab Results   Component Value Date    WBC 7.73 07/15/2024    HGB 13.0 07/15/2024    HCT 39.9 07/15/2024    MCV 97.8 (H) " 07/15/2024     07/15/2024        Lab Results   Component Value Date    GLUCOSE 89 07/15/2024    BUN 15 07/15/2024    CREATININE 0.76 07/15/2024     07/15/2024    K 4.0 07/15/2024     07/15/2024    CALCIUM 9.5 07/15/2024    PROTEINTOT 6.8 07/15/2024    ALBUMIN 4.5 07/15/2024    ALT 16 07/15/2024    AST 21 07/15/2024    ALKPHOS 70 07/15/2024    BILITOT 0.3 07/15/2024    GLOB 2.3 07/15/2024    AGRATIO 2.0 07/15/2024    BCR 19.7 07/15/2024    ANIONGAP 4.0 (L) 02/15/2023    EGFR 81.3 07/15/2024        Lab Results   Component Value Date    CHLPL 210 (H) 07/15/2024    TRIG 138 07/15/2024    HDL 62 (H) 07/15/2024     (H) 07/15/2024        Lab Results   Component Value Date    HGBA1C 5.7 (H) 06/07/2022        Lab Results   Component Value Date    TSH 1.410 07/15/2024            Assessment / Plan    Diagnoses and all orders for this visit:    1. Generalized anxiety disorder  -     ALPRAZolam (XANAX) 0.5 MG tablet; TAKE 1 TABLET BY MOUTH AT BEDTIME AND DAILY AS NEEDED  Dispense: 30 tablet; Refill: 2    Patient appears to be doing well on current medication. No issues reported and no indication for change. Will continue medication as ordered.     History and physical exam exhibit continued safe and appropriate use of controlled substance.    As part of patient's treatment plan I am prescribing a controlled substance.  The patient has been made aware of the appropriate use of such medications, including potential risk of somnolence, limited ability to drive and/or work safely, and potential for dependence and/or overdose.  It has also been made clear that these medications are for use by this patient only, without concomitant use of alcohol or other substances, unless prescribed.    Patient/guardian has completed a prescribing agreement detailing terms of continued prescribing of controlled substances, including monitoring BERTO reports, urine drug screening, and pill counts if necessary.  Patient is  aware that inappropriate use will result in cessation of prescribing such medications.     PHQ-9 Depression Screening  Little interest or pleasure in doing things? Several days   Feeling down, depressed, or hopeless? Several days   Trouble falling or staying asleep, or sleeping too much? Over half   Feeling tired or having little energy? Over half   Poor appetite or overeating? Not at all   Feeling bad about yourself - or that you are a failure or have let yourself or your family down? Not at all   Trouble concentrating on things, such as reading the newspaper or watching television? Not at all   Moving or speaking so slowly that other people could have noticed? Or the opposite - being so fidgety or restless that you have been moving around a lot more than usual? Several days   Thoughts that you would be better off dead, or of hurting yourself in some way? Not at all   PHQ-9 Total Score 7   If you checked off any problems, how difficult have these problems made it for you to do your work, take care of things at home, or get along with other people? Not difficult at all     PHQ-9 Score:   PHQ-9 Total Score: 7    Depression Screening:  Patient screened positive for depression based on a PHQ-9 score of 7 on 3/11/2025. Follow-up recommendations include: Suicide Risk Assessment performed.        CHERELLE-7  Over the last two weeks, how often have you been bothered by the following problems?  Feeling nervous, anxious or on edge: Several days  Not being able to stop or control worrying: Not at all  Worrying too much about different things: Not at all  Trouble Relaxing: Several days  Being so restless that it is hard to sit still: Several days  Becoming easily annoyed or irritable: Several days  Feeling afraid as if something awful might happen: Not at all  CHERELLE 7 Total Score: 4  If you checked any problems, how difficult have these problems made it for you to do your work, take care of things at home, or get along with other  people: Not difficult at all      ADHD  Screening for Adults With ADHD - (1-6)  1. How often do you have trouble wrapping up the final details of a project, once the challenging parts have been done?: Rarely  2. How often do you have difficulty getting things in order when you have to do a task that requires organization?: Sometimes  3. How often do you have problems remembering appointments or obligations : Rarely  4. When you have a task that requires a lot of thought, how often do you avoid or delay getting started ?: Often  5. How often do you fidget or squirm with your hands or feet when you have to sit down for a long time?: Often  6. How often do you feel overly active and compelled to do things, like you were driven by a motor?: Rarely  7. How often do you make careless mistakes when you have to work on a boring or difficult project?: Rarely  8. How often do have difficulty keeping your attention when you are doing boring or repetitive work?: Often  9. How often do you have difficulty concentrating on what people say to you, even when they are speaking to you: Sometimes  10.How often do you misplace or have difficulty finding things at home or at work?: Often  11.How often are you distracted by activity or noise around you?: Sometimes  12.How often do you leave your seat in meetings or other situations in which you are expected to remain seated?: Rarely  13.How often do you feel restless or fidgety?: Sometimes  14.How often do you have difficulty unwinding and relaxing when you have time to yourself?: Rarely  15.How often do you find yourself talking too much when you are in social situations?: Rarely  16.When you’re in a conversation, how often do you find yourself finishing the sentences of the people you are talking to, before they can finish them themselves?: Rarely  17.How often do you have difficulty waiting your turn in situations when turn taking is required?: Rarely  18.How often do you interrupt  others when they are busy?: Sometimes    A psychological evaluation was conducted in order to assess past and current level of functioning. Areas assessed included, but were not limited to: perception of social support, perception of ability to face and deal with challenges in life (positive functioning), anxiety symptoms, depressive symptoms, perspective on beliefs/belief system, coping skills for stress, intelligence level,  Therapeutic rapport was established. Interventions conducted today were geared towards incorporating medication management along with support for continued therapy. Education was also provided as to the med management with this provider and what to expect in subsequent sessions.      We discussed risks, benefits, goals and side effects of the above medication and the patient was agreeable with the plan. Patient was educated on the importance of compliance with treatment and follow-up appointments. Patient is aware to contact the [facility] with any worsening of symptoms. To call for questions or concerns and return early if necessary. Patent is agreeable to go to the Emergency Department or call 911 should they begin SI/HI.    MEDS ORDERED DURING VISIT:  New Medications Ordered This Visit   Medications    ALPRAZolam (XANAX) 0.5 MG tablet     Sig: TAKE 1 TABLET BY MOUTH AT BEDTIME AND DAILY AS NEEDED     Dispense:  30 tablet     Refill:  2         Follow Up   Return in about 3 months (around 6/11/2025).  Patient was given instructions and counseling regarding her condition or for health maintenance advice. Please see specific information pulled into the AVS if appropriate.     TREATMENT PLAN/GOALS: Continue supportive psychotherapy efforts and medications as indicated. Treatment and medication options discussed during today's visit. Patient acknowledged and verbally consented to continue with current treatment plan and was educated on the importance of compliance with treatment and  follow-up appointments.    MEDICATION ISSUES:  Discussed medication options and treatment plan of prescribed medication as well as the risks, benefits, and side effects including potential falls, possible impaired driving and metabolic adversities among others. Patient is agreeable to call the office with any worsening of symptoms or onset of side effects. Patient is agreeable to call 911 or go to the nearest ER should he/she begin having SI/HI.        [facility]  [address] [place] [address] [address] [place] [address] [phone]    March 11, 2025 09:58 EDT

## 2025-03-12 RX ORDER — POLYETHYLENE GLYCOL 3350, SODIUM SULFATE, POTASSIUM CHLORIDE, MAGNESIUM SULFATE, AND SODIUM CHLORIDE FOR ORAL SOLUTION 178.7-7.3G
1 KIT ORAL
Qty: 1 EACH | Refills: 0 | COMMUNITY
Start: 2025-03-12 | End: 2025-03-12

## 2025-03-12 RX ORDER — FLUTICASONE PROPIONATE 50 MCG
2 SPRAY, SUSPENSION (ML) NASAL DAILY
Qty: 48 G | Refills: 0 | Status: SHIPPED | OUTPATIENT
Start: 2025-03-12

## 2025-03-28 ENCOUNTER — TELEPHONE (OUTPATIENT)
Dept: GASTROENTEROLOGY | Facility: CLINIC | Age: 80
End: 2025-03-28
Payer: MEDICARE

## 2025-03-28 NOTE — TELEPHONE ENCOUNTER
The patient called and left a  on 03/24/25.  She got sick on the prep and wanted to reschedule.  I have called the patient a total of 3 time, 2 on home number, one on cell number.  Have not been able to reach her.

## 2025-05-03 DIAGNOSIS — E03.9 HYPOTHYROIDISM, UNSPECIFIED TYPE: ICD-10-CM

## 2025-05-03 RX ORDER — LEVOTHYROXINE SODIUM 88 UG/1
88 TABLET ORAL DAILY
Qty: 90 TABLET | Refills: 0 | Status: SHIPPED | OUTPATIENT
Start: 2025-05-03

## 2025-06-10 DIAGNOSIS — F41.1 GENERALIZED ANXIETY DISORDER: ICD-10-CM

## 2025-06-10 RX ORDER — ALPRAZOLAM 0.5 MG
TABLET ORAL
Qty: 30 TABLET | Refills: 0 | Status: SHIPPED | OUTPATIENT
Start: 2025-06-10

## 2025-06-10 NOTE — TELEPHONE ENCOUNTER
Rx Refill Note  Requested Prescriptions     Pending Prescriptions Disp Refills    ALPRAZolam (XANAX) 0.5 MG tablet [Pharmacy Med Name: ALPRAZolam 0.5 MG Oral Tablet] 30 tablet 0     Sig: TAKE 1 TABLET BY MOUTH AT BEDTIME AND DAILY AS NEEDED      Last office visit with prescribing clinician: 3/11/2025   Last telemedicine visit with prescribing clinician: Visit date not found   Next office visit with prescribing clinician: 6/11/2025                         Would you like a call back once the refill request has been completed: [] Yes [] No    If the office needs to give you a call back, can they leave a voicemail: [] Yes [] No    Antonina Prieto MA  06/10/25, 09:20 EDT

## 2025-06-11 ENCOUNTER — OFFICE VISIT (OUTPATIENT)
Dept: BEHAVIORAL HEALTH | Facility: CLINIC | Age: 80
End: 2025-06-11
Payer: MEDICARE

## 2025-06-11 VITALS
BODY MASS INDEX: 22.29 KG/M2 | HEART RATE: 68 BPM | DIASTOLIC BLOOD PRESSURE: 60 MMHG | HEIGHT: 67 IN | SYSTOLIC BLOOD PRESSURE: 102 MMHG | OXYGEN SATURATION: 97 % | WEIGHT: 142 LBS

## 2025-06-11 DIAGNOSIS — F41.1 GENERALIZED ANXIETY DISORDER: Primary | ICD-10-CM

## 2025-06-11 PROCEDURE — 1159F MED LIST DOCD IN RCRD: CPT

## 2025-06-11 PROCEDURE — 99214 OFFICE O/P EST MOD 30 MIN: CPT

## 2025-06-11 PROCEDURE — 1160F RVW MEDS BY RX/DR IN RCRD: CPT

## 2025-06-11 NOTE — PROGRESS NOTES
Follow Up Office Visit    Patient Name: Yamileth De Guzman  : 1945   MRN: [id number]   Care Team: Patient Care Team:  Salina Montanez MD as PCP - General (Family Medicine)  Mansoor Quezada DO ([facility])  Stew Grant APRN as Nurse Practitioner (Gastroenterology)  Stacy Leyva APRN as Nurse Practitioner (Behavioral Health)  Mike Abebe MD as Consulting Physician (Rheumatology)  Mary Kay Jones OD (Optometry)  Missy Feng MD (Dermatology)         Chief Complaint:    Chief Complaint   Patient presents with    Anxiety    Med Management       History of Present Illness: Yamileth De Guzman is a 79 y.o. female who is here today for a medication management follow up. Patient reports that overall medication continues to be effective and her anxiety has been managed well. She did not see the need to make any changes and did not have any medication concerns at today's visit. She denies SI/HI today.     The following portion of the patient's history were reviewed and updated appropriately: allergies, current and past medications, family history, medical history and social history. BERTO reviewed and appropriate.   Subjective   Review of Systems:    Review of Systems   Respiratory: Negative.     Cardiovascular: Negative.  Negative for chest pain and palpitations.   Neurological: Negative.    Psychiatric/Behavioral: Negative.     All other systems reviewed and are negative.      Current Medications:   Current Outpatient Medications   Medication Sig Dispense Refill    ALPRAZolam (XANAX) 0.5 MG tablet TAKE 1 TABLET BY MOUTH AT BEDTIME AND DAILY AS NEEDED 30 tablet 0    atorvastatin (LIPITOR) 20 MG tablet Take 1 tablet by mouth once daily 90 tablet 1    cetirizine (zyrTEC) 10 MG tablet Take 1 tablet by mouth Every Night.      [START ON 8/3/2025] denosumab (PROLIA) 60 MG/ML solution prefilled syringe syringe Inject 1 mL under the skin into the appropriate area as directed  "1 (One) Time for 1 dose. 1 mL 0    fluticasone (FLONASE) 50 MCG/ACT nasal spray Use 2 spray(s) in each nostril once daily 48 g 0    Garlic 400 MG tablet Take  by mouth Daily.      levothyroxine (SYNTHROID, LEVOTHROID) 88 MCG tablet Take 1 tablet by mouth once daily 90 tablet 0    Multiple Vitamin (MULTI VITAMIN DAILY PO) Take 1 tablet by mouth Daily.      Omega-3 Fatty Acids (FISH OIL) 1000 MG capsule capsule Take 1 capsule by mouth Daily With Breakfast.      omeprazole (priLOSEC) 20 MG capsule TAKE 1 CAPSULE BY MOUTH EVERY 12 HOURS 180 capsule 1    tacrolimus (PROTOPIC) 0.1 % ointment       vitamin C (ASCORBIC ACID) 250 MG tablet Take 1 tablet by mouth Daily.       No current facility-administered medications for this visit.       Mental Status Exam:   Hygiene:   good  Cooperation:  Cooperative  Eye Contact:  Good  Psychomotor Behavior:  Appropriate  Affect:  Appropriate  Mood: normal  Speech:  Normal  Thought Process:  Goal directed and Linear  Thought Content:  Normal and Mood congruent  Suicidal:  None  Homicidal:  None  Hallucinations:  None  Delusion:  None  Memory:  Intact  Orientation:  Person, Place, Time, and Situation  Reliability:  good  Insight:  Good  Judgement:  Good  Impulse Control:  Good  Physical/Medical Issues:  Yes see chart     Objective   Vital Signs:   /60   Pulse 68   Ht 168.9 cm (66.5\")   Wt 64.4 kg (142 lb)   SpO2 97%   BMI 22.58 kg/m²         Lab Results:   Lab Results   Component Value Date    WBC 7.73 07/15/2024    HGB 13.0 07/15/2024    HCT 39.9 07/15/2024    MCV 97.8 (H) 07/15/2024     07/15/2024        Lab Results   Component Value Date    GLUCOSE 89 07/15/2024    BUN 15 07/15/2024    CREATININE 0.76 07/15/2024     07/15/2024    K 4.0 07/15/2024     07/15/2024    CALCIUM 9.5 07/15/2024    PROTEINTOT 6.8 07/15/2024    ALBUMIN 4.5 07/15/2024    ALT 16 07/15/2024    AST 21 07/15/2024    ALKPHOS 70 07/15/2024    BILITOT 0.3 07/15/2024    GLOB 2.3 07/15/2024 "    AGRATIO 2.0 07/15/2024    BCR 19.7 07/15/2024    ANIONGAP 4.0 (L) 02/15/2023    EGFR 81.3 07/15/2024        Lab Results   Component Value Date    CHLPL 210 (H) 07/15/2024    TRIG 138 07/15/2024    HDL 62 (H) 07/15/2024     (H) 07/15/2024        Lab Results   Component Value Date    HGBA1C 5.7 (H) 06/07/2022        Lab Results   Component Value Date    TSH 1.410 07/15/2024            Assessment / Plan    Diagnoses and all orders for this visit:    1. Generalized anxiety disorder (Primary)     Patient appears to be doing well on current medication. No issues reported and no indication for change. Will continue medication as ordered.     History and physical exam exhibit continued safe and appropriate use of controlled substance.    As part of patient's treatment plan I am prescribing a controlled substance.  The patient has been made aware of the appropriate use of such medications, including potential risk of somnolence, limited ability to drive and/or work safely, and potential for dependence and/or overdose.  It has also been made clear that these medications are for use by this patient only, without concomitant use of alcohol or other substances, unless prescribed.    Patient/guardian has completed a prescribing agreement detailing terms of continued prescribing of controlled substances, including monitoring BERTO reports, urine drug screening, and pill counts if necessary.  Patient is aware that inappropriate use will result in cessation of prescribing such medications.    PHQ-9 Depression Screening  Little interest or pleasure in doing things? Several days   Feeling down, depressed, or hopeless? Not at all   Trouble falling or staying asleep, or sleeping too much? Several days   Feeling tired or having little energy? Almost all   Poor appetite or overeating? Not at all   Feeling bad about yourself - or that you are a failure or have let yourself or your family down? Not at all   Trouble concentrating  on things, such as reading the newspaper or watching television? Not at all   Moving or speaking so slowly that other people could have noticed? Or the opposite - being so fidgety or restless that you have been moving around a lot more than usual? Not at all   Thoughts that you would be better off dead, or of hurting yourself in some way? Not at all   PHQ-9 Total Score 5   If you checked off any problems, how difficult have these problems made it for you to do your work, take care of things at home, or get along with other people? Somewhat difficult     PHQ-9 Score:   PHQ-9 Total Score: 5    Depression Screening:  Patient screened positive for depression based on a PHQ-9 score of 5 on 6/11/2025. Follow-up recommendations include: Suicide Risk Assessment performed and Continue with medication management.        CHERELLE-7  Over the last two weeks, how often have you been bothered by the following problems?  Feeling nervous, anxious or on edge: Several days  Not being able to stop or control worrying: Not at all  Worrying too much about different things: Not at all  Trouble Relaxing: Several days  Being so restless that it is hard to sit still: Several days  Becoming easily annoyed or irritable: Several days  Feeling afraid as if something awful might happen: Not at all  CHERELLE 7 Total Score: 4  If you checked any problems, how difficult have these problems made it for you to do your work, take care of things at home, or get along with other people: Somewhat difficult      ADHD  Screening for Adults With ADHD - (1-6)  1. How often do you have trouble wrapping up the final details of a project, once the challenging parts have been done?: Rarely  2. How often do you have difficulty getting things in order when you have to do a task that requires organization?: Sometimes  3. How often do you have problems remembering appointments or obligations : Sometimes  4. When you have a task that requires a lot of thought, how often do you  avoid or delay getting started ?: Often  5. How often do you fidget or squirm with your hands or feet when you have to sit down for a long time?: Very Often  6. How often do you feel overly active and compelled to do things, like you were driven by a motor?: Rarely  7. How often do you make careless mistakes when you have to work on a boring or difficult project?: Rarely  8. How often do have difficulty keeping your attention when you are doing boring or repetitive work?: Sometimes  9. How often do you have difficulty concentrating on what people say to you, even when they are speaking to you: Rarely  10.How often do you misplace or have difficulty finding things at home or at work?: Very Often  11.How often are you distracted by activity or noise around you?: Sometimes  12.How often do you leave your seat in meetings or other situations in which you are expected to remain seated?: Rarely  13.How often do you feel restless or fidgety?: Sometimes  14.How often do you have difficulty unwinding and relaxing when you have time to yourself?: Sometimes  15.How often do you find yourself talking too much when you are in social situations?: Rarely  16.When you’re in a conversation, how often do you find yourself finishing the sentences of the people you are talking to, before they can finish them themselves?: Rarely  17.How often do you have difficulty waiting your turn in situations when turn taking is required?: Rarely  18.How often do you interrupt others when they are busy?: Sometimes    A psychological evaluation was conducted in order to assess past and current level of functioning. Areas assessed included, but were not limited to: perception of social support, perception of ability to face and deal with challenges in life (positive functioning), anxiety symptoms, depressive symptoms, perspective on beliefs/belief system, coping skills for stress, intelligence level,  Therapeutic rapport was established. Interventions  conducted today were geared towards incorporating medication management along with support for continued therapy. Education was also provided as to the med management with this provider and what to expect in subsequent sessions.      We discussed risks, benefits, goals and side effects of the above medication and the patient was agreeable with the plan. Patient was educated on the importance of compliance with treatment and follow-up appointments. Patient is aware to contact the [facility] with any worsening of symptoms. To call for questions or concerns and return early if necessary. Patent is agreeable to go to the Emergency Department or call 911 should they begin SI/HI.    MEDS ORDERED DURING VISIT:  No orders of the defined types were placed in this encounter.        Follow Up   Return in about 3 months (around 9/11/2025).  Patient was given instructions and counseling regarding her condition or for health maintenance advice. Please see specific information pulled into the AVS if appropriate.     TREATMENT PLAN/GOALS: Continue supportive psychotherapy efforts and medications as indicated. Treatment and medication options discussed during today's visit. Patient acknowledged and verbally consented to continue with current treatment plan and was educated on the importance of compliance with treatment and follow-up appointments.    MEDICATION ISSUES:  Discussed medication options and treatment plan of prescribed medication as well as the risks, benefits, and side effects including potential falls, possible impaired driving and metabolic adversities among others. Patient is agreeable to call the office with any worsening of symptoms or onset of side effects. Patient is agreeable to call 911 or go to the nearest ER should he/she begin having SI/HI.        [facility]  [address] [place] [address] [address] [place] [address] [phone]    June  11, 2025 11:05 EDT

## 2025-07-08 RX ORDER — FLUTICASONE PROPIONATE 50 MCG
2 SPRAY, SUSPENSION (ML) NASAL DAILY
Qty: 48 G | Refills: 0 | Status: SHIPPED | OUTPATIENT
Start: 2025-07-08

## 2025-07-11 DIAGNOSIS — F41.1 GENERALIZED ANXIETY DISORDER: ICD-10-CM

## 2025-07-11 RX ORDER — ALPRAZOLAM 0.5 MG
TABLET ORAL
Qty: 30 TABLET | Refills: 2 | Status: SHIPPED | OUTPATIENT
Start: 2025-07-11

## 2025-07-11 NOTE — TELEPHONE ENCOUNTER
Rx Refill Note  Requested Prescriptions     Pending Prescriptions Disp Refills    ALPRAZolam (XANAX) 0.5 MG tablet [Pharmacy Med Name: ALPRAZolam 0.5 MG Oral Tablet] 30 tablet 0     Sig: TAKE 1 TABLET BY MOUTH AT BEDTIME AND  DAILY  AS  NEEDED      Last office visit with prescribing clinician: 6/11/2025   Last telemedicine visit with prescribing clinician: Visit date not found   Next office visit with prescribing clinician: 9/16/2025                         Would you like a call back once the refill request has been completed: [] Yes [] No    If the office needs to give you a call back, can they leave a voicemail: [] Yes [] No    Antonina Prieto MA  07/11/25, 07:32 EDT

## 2025-07-12 LAB
BUN SERPL-MCNC: 15 MG/DL (ref 8–23)
BUN/CREAT SERPL: 17.2 (ref 7–25)
CALCIUM SERPL-MCNC: 9.6 MG/DL (ref 8.6–10.5)
CHLORIDE SERPL-SCNC: 103 MMOL/L (ref 98–107)
CHOLEST SERPL-MCNC: 144 MG/DL (ref 0–200)
CO2 SERPL-SCNC: 27.6 MMOL/L (ref 22–29)
CREAT SERPL-MCNC: 0.87 MG/DL (ref 0.57–1)
EGFRCR SERPLBLD CKD-EPI 2021: 68.7 ML/MIN/1.73
ERYTHROCYTE [DISTWIDTH] IN BLOOD BY AUTOMATED COUNT: 12.2 % (ref 12.3–15.4)
GLUCOSE SERPL-MCNC: 84 MG/DL (ref 65–99)
HCT VFR BLD AUTO: 41.6 % (ref 34–46.6)
HDLC SERPL-MCNC: 66 MG/DL (ref 40–60)
HGB BLD-MCNC: 13.6 G/DL (ref 12–15.9)
LDLC SERPL CALC-MCNC: 65 MG/DL (ref 0–100)
MCH RBC QN AUTO: 32.3 PG (ref 26.6–33)
MCHC RBC AUTO-ENTMCNC: 32.7 G/DL (ref 31.5–35.7)
MCV RBC AUTO: 98.8 FL (ref 79–97)
PLATELET # BLD AUTO: 196 10*3/MM3 (ref 140–450)
POTASSIUM SERPL-SCNC: 4.4 MMOL/L (ref 3.5–5.2)
RBC # BLD AUTO: 4.21 10*6/MM3 (ref 3.77–5.28)
SODIUM SERPL-SCNC: 141 MMOL/L (ref 136–145)
TRIGL SERPL-MCNC: 65 MG/DL (ref 0–150)
TSH SERPL DL<=0.005 MIU/L-ACNC: 0.95 UIU/ML (ref 0.27–4.2)
VLDLC SERPL CALC-MCNC: 13 MG/DL (ref 5–40)
WBC # BLD AUTO: 8.29 10*3/MM3 (ref 3.4–10.8)

## 2025-07-16 ENCOUNTER — OFFICE VISIT (OUTPATIENT)
Dept: INTERNAL MEDICINE | Facility: CLINIC | Age: 80
End: 2025-07-16
Payer: MEDICARE

## 2025-07-16 VITALS
BODY MASS INDEX: 22.66 KG/M2 | RESPIRATION RATE: 17 BRPM | HEART RATE: 57 BPM | WEIGHT: 141 LBS | TEMPERATURE: 98.7 F | DIASTOLIC BLOOD PRESSURE: 54 MMHG | SYSTOLIC BLOOD PRESSURE: 119 MMHG | HEIGHT: 66 IN | OXYGEN SATURATION: 98 %

## 2025-07-16 DIAGNOSIS — H61.23 BILATERAL IMPACTED CERUMEN: ICD-10-CM

## 2025-07-16 DIAGNOSIS — K21.00 GASTROESOPHAGEAL REFLUX DISEASE WITH ESOPHAGITIS WITHOUT HEMORRHAGE: ICD-10-CM

## 2025-07-16 DIAGNOSIS — E03.9 ACQUIRED HYPOTHYROIDISM: Primary | ICD-10-CM

## 2025-07-16 DIAGNOSIS — F41.1 GENERALIZED ANXIETY DISORDER: ICD-10-CM

## 2025-07-16 DIAGNOSIS — Z00.00 ANNUAL VISIT FOR GENERAL ADULT MEDICAL EXAMINATION WITHOUT ABNORMAL FINDINGS: ICD-10-CM

## 2025-07-16 DIAGNOSIS — E78.5 DYSLIPIDEMIA: Chronic | ICD-10-CM

## 2025-07-16 DIAGNOSIS — M65.331 TRIGGER MIDDLE FINGER OF RIGHT HAND: ICD-10-CM

## 2025-07-16 PROBLEM — R31.9 HEMATURIA: Status: RESOLVED | Noted: 2020-07-21 | Resolved: 2025-07-16

## 2025-07-16 PROCEDURE — G0439 PPPS, SUBSEQ VISIT: HCPCS | Performed by: STUDENT IN AN ORGANIZED HEALTH CARE EDUCATION/TRAINING PROGRAM

## 2025-07-16 PROCEDURE — 99213 OFFICE O/P EST LOW 20 MIN: CPT | Performed by: STUDENT IN AN ORGANIZED HEALTH CARE EDUCATION/TRAINING PROGRAM

## 2025-07-16 PROCEDURE — 1170F FXNL STATUS ASSESSED: CPT | Performed by: STUDENT IN AN ORGANIZED HEALTH CARE EDUCATION/TRAINING PROGRAM

## 2025-07-16 PROCEDURE — 1125F AMNT PAIN NOTED PAIN PRSNT: CPT | Performed by: STUDENT IN AN ORGANIZED HEALTH CARE EDUCATION/TRAINING PROGRAM

## 2025-07-16 PROCEDURE — 69209 REMOVE IMPACTED EAR WAX UNI: CPT | Performed by: STUDENT IN AN ORGANIZED HEALTH CARE EDUCATION/TRAINING PROGRAM

## 2025-07-16 RX ORDER — OMEPRAZOLE 20 MG/1
20 CAPSULE, DELAYED RELEASE ORAL EVERY 12 HOURS
Qty: 180 CAPSULE | Refills: 1 | Status: SHIPPED | OUTPATIENT
Start: 2025-07-16

## 2025-07-16 RX ORDER — LEVOTHYROXINE SODIUM 88 UG/1
88 TABLET ORAL DAILY
Qty: 90 TABLET | Refills: 1 | Status: SHIPPED | OUTPATIENT
Start: 2025-07-16

## 2025-07-16 RX ORDER — ATORVASTATIN CALCIUM 20 MG/1
20 TABLET, FILM COATED ORAL DAILY
Qty: 90 TABLET | Refills: 1 | Status: SHIPPED | OUTPATIENT
Start: 2025-07-16 | End: 2025-07-16 | Stop reason: SDUPTHER

## 2025-07-16 RX ORDER — ATORVASTATIN CALCIUM 20 MG/1
20 TABLET, FILM COATED ORAL DAILY
Qty: 90 TABLET | Refills: 1 | Status: SHIPPED | OUTPATIENT
Start: 2025-07-16

## 2025-07-16 NOTE — ASSESSMENT & PLAN NOTE
Stable on current medication and dosage. Will continue current management. Refill medication as necessary.    Orders:    levothyroxine (SYNTHROID, LEVOTHROID) 88 MCG tablet; Take 1 tablet by mouth Daily.

## 2025-07-16 NOTE — PROGRESS NOTES
Subjective   The ABCs of the Annual Wellness Visit  Medicare Wellness Visit      Yamileth De Guzman is a 79 y.o. patient who presents for a Medicare Wellness Visit.    Anxiety: follows with psychiatry, notes and medical records reviewed, on xanax  Hypothyroidism: stable on meds  GERD: daily omeprazole, occasionally takes a 2nd dose (about 3-4x/month)  Allergies: on zyrtec and flonase  HLD: on fish oil, high LDL on atorvastatin 20 but previously had muscle aches  Trigger finger: Occasional on right fifth digit but does not bother her much right now, is tolerable  Osteoarthritis: on as needed meloxicam     Smoker: cigarettes 1ppd x 50 years.10/2024 stable lung nodules 1.2cm, repeat 1 year  Mammogram: 12/2024 biards 2 repeat in 1year  DEXA scan: 2023 osteopenia and osteoporosis on L hip, on prolia 60mg once every 6 months, was following with rheumatology but now out of network, pt requested to have it done here    The following portions of the patient's history were reviewed and   updated as appropriate: allergies, current medications, past family history, past medical history, past social history, past surgical history, and problem list.    Compared to one year ago, the patient's physical   health is better.  Compared to one year ago, the patient's mental   health is better.    Recent Hospitalizations:  She was not admitted to the hospital during the last year.     Current Medical Providers:  Patient Care Team:  Salina Montanez MD as PCP - General (Family Medicine)  Mansoor Quezada, DO ([facility])  Stew Grant APRN as Nurse Practitioner (Gastroenterology)  Stacy Leyva APRN as Nurse Practitioner (Behavioral Health)  Mike Abebe MD as Consulting Physician (Rheumatology)  Mary Kay Jones OD (Optometry)  Missy Feng MD (Dermatology)    Outpatient Medications Prior to Visit   Medication Sig Dispense Refill    ALPRAZolam (XANAX) 0.5 MG tablet TAKE 1 TABLET BY MOUTH AT  BEDTIME AND  DAILY  AS  NEEDED 30 tablet 2    cetirizine (zyrTEC) 10 MG tablet Take 1 tablet by mouth Every Night.      [START ON 8/3/2025] denosumab (PROLIA) 60 MG/ML solution prefilled syringe syringe Inject 1 mL under the skin into the appropriate area as directed 1 (One) Time for 1 dose. 1 mL 0    fluticasone (FLONASE) 50 MCG/ACT nasal spray Use 2 spray(s) in each nostril once daily 48 g 0    Garlic 400 MG tablet Take  by mouth Daily.      Multiple Vitamin (MULTI VITAMIN DAILY PO) Take 1 tablet by mouth Daily.      Omega-3 Fatty Acids (FISH OIL) 1000 MG capsule capsule Take 1 capsule by mouth Daily With Breakfast.      tacrolimus (PROTOPIC) 0.1 % ointment       vitamin C (ASCORBIC ACID) 250 MG tablet Take 1 tablet by mouth Daily.      atorvastatin (LIPITOR) 20 MG tablet Take 1 tablet by mouth once daily 90 tablet 1    levothyroxine (SYNTHROID, LEVOTHROID) 88 MCG tablet Take 1 tablet by mouth once daily 90 tablet 0    omeprazole (priLOSEC) 20 MG capsule TAKE 1 CAPSULE BY MOUTH EVERY 12 HOURS 180 capsule 1     No facility-administered medications prior to visit.     No opioid medication identified on active medication list. I have reviewed chart for other potential  high risk medication/s and harmful drug interactions in the elderly.      Aspirin is not on active medication list.  Aspirin use is not indicated based on review of current medical condition/s. Risk of harm outweighs potential benefits.  .    Patient Active Problem List   Diagnosis    Rheumatoid arthritis    Gastroesophageal reflux disease without esophagitis    Generalized anxiety disorder    Dyslipidemia    Hypothyroidism    Vitamin D deficiency, unspecified    Age-related osteoporosis with current pathological fracture with routine healing    Carotid bruit    Adenomatous polyps    Nicotine dependence, cigarettes, uncomplicated     Allergies    Chronic idiopathic constipation    History of hip surgery    Nuclear sclerotic cataract of right eye     "Nuclear sclerotic cataract of left eye    Age-related osteoporosis without current pathological fracture    Lung nodule    Senile osteoporosis    Personal history of colon polyps, unspecified     Advance Care Planning Advance Directive is on file.  ACP discussion was held with the patient during this visit. Patient has an advance directive in EMR which is still valid.             Objective   Vitals:    07/16/25 0827   BP: 119/54   Pulse: 57   Resp: 17   Temp: 98.7 °F (37.1 °C)   TempSrc: Infrared   SpO2: 98%   Weight: 64 kg (141 lb)   Height: 168.9 cm (66.5\")   PainSc: 9    PainLoc: Leg       Estimated body mass index is 22.42 kg/m² as calculated from the following:    Height as of this encounter: 168.9 cm (66.5\").    Weight as of this encounter: 64 kg (141 lb).    BMI is within normal parameters. No other follow-up for BMI required.           Does the patient have evidence of cognitive impairment? No  Lab Results   Component Value Date    CHLPL 144 07/11/2025    TRIG 65 07/11/2025    HDL 66 (H) 07/11/2025    LDL 65 07/11/2025    VLDL 13 07/11/2025     Ear Cerumen Removal    Date/Time: 7/16/2025 9:44 AM    Performed by: Salina Montanez MD  Authorized by: Salina Montanez MD  Consent: Verbal consent obtained  Risks and benefits: risks, benefits and alternatives were discussed  Consent given by: patient  Patient understanding: patient states understanding of the procedure being performed  Patient consent: the patient's understanding of the procedure matches consent given    Anesthesia:  Local Anesthetic: none  Location details: left ear and right ear  Patient tolerance: patient tolerated the procedure well with no immediate complications  Comments: Minimal amount of cerumen removed, still with bilateral partial cerumen impaction after irrigation  Procedure type: irrigation   Sedation:  Patient sedated: no                                                                                                  Health  " Risk Assessment    Smoking Status:  Social History     Tobacco Use   Smoking Status Every Day    Current packs/day: 1.00    Average packs/day: 1 pack/day for 40.0 years (40.0 ttl pk-yrs)    Types: Cigarettes   Smokeless Tobacco Never     Alcohol Consumption:  Social History     Substance and Sexual Activity   Alcohol Use Not Currently       Fall Risk Screen  TANA Fall Risk Assessment was completed, and patient is at LOW risk for falls.Assessment completed on:2025    Depression Screening   Little interest or pleasure in doing things? Not at all   Feeling down, depressed, or hopeless? Not at all   PHQ-2 Total Score 0      Health Habits and Functional and Cognitive Screenin/16/2025     8:26 AM   Functional & Cognitive Status   Do you have difficulty preparing food and eating? No   Do you have difficulty bathing yourself, getting dressed or grooming yourself? No   Do you have difficulty using the toilet? No   Do you have difficulty moving around from place to place? No   Do you have trouble with steps or getting out of a bed or a chair? No   Current Diet Limited Junk Food   Dental Exam Up to date   Eye Exam Up to date   Exercise (times per week) 7 times per week   Current Exercises Include Walking;House Cleaning   Do you need help using the phone?  No   Are you deaf or do you have serious difficulty hearing?  No   Do you need help to go to places out of walking distance? No   Do you need help shopping? No   Do you need help preparing meals?  No   Do you need help with housework?  No   Do you need help with laundry? No   Do you need help taking your medications? No   Do you need help managing money? No   Do you ever drive or ride in a car without wearing a seat belt? No   Have you felt unusual fatigue (could be tiredness), stress, anger or loneliness in the last month? No   Who do you live with? Spouse   If you need help, do you have trouble finding someone available to you? No   Have you been bothered in  the last four weeks by sexual problems? No   Do you have difficulty concentrating, remembering or making decisions? No           Age-appropriate Screening Schedule:  Refer to the list below for future screening recommendations based on patient's age, sex and/or medical conditions. Orders for these recommended tests are listed in the plan section. The patient has been provided with a written plan.    Health Maintenance List  Health Maintenance   Topic Date Due    RSV Vaccine - Adults (1 - 1-dose 75+ series) Never done    COLORECTAL CANCER SCREENING  03/18/2025    TDAP/TD VACCINES (3 - Td or Tdap) 04/15/2025    COVID-19 Vaccine (1 - 2024-25 season) 01/16/2026 (Originally 9/1/2024)    INFLUENZA VACCINE  10/01/2025    LUNG CANCER SCREENING  10/23/2025    DXA SCAN  11/06/2025    ANNUAL WELLNESS VISIT  07/16/2026    HEPATITIS C SCREENING  Completed    Pneumococcal Vaccine 50+  Completed    ZOSTER VACCINE  Completed    MAMMOGRAM  Discontinued                                                                                                                                                CMS Preventative Services Quick Reference  Risk Factors Identified During Encounter  Dental Screening Recommended  Vision Screening Recommended    The above risks/problems have been discussed with the patient.  Pertinent information has been shared with the patient in the After Visit Summary.  An After Visit Summary and PPPS were made available to the patient.    Follow Up:   Next Medicare Wellness visit to be scheduled in 1 year.         Additional E&M Note during same encounter follows:  Patient has additional, significant, and separately identifiable condition(s)/problem(s) that require work above and beyond the Medicare Wellness Visit     Chief Complaint  Medicare Wellness-subsequent (Annual Medicare Wellness Visit/)    Subjective   HPI  Yamileth is also being seen today for additional medical problem/s.  Bilateral 3rd fingers get locked up  "bent then will need manual extension. Has been happening in the past but getting worse and bothersome.  Also complains of bilateral ear fullness, no pain, discharge of fever              Objective   Vital Signs:  /54   Pulse 57   Temp 98.7 °F (37.1 °C) (Infrared)   Resp 17   Ht 168.9 cm (66.5\")   Wt 64 kg (141 lb)   SpO2 98%   BMI 22.42 kg/m²   Physical Exam  Vitals and nursing note reviewed.   Constitutional:       Appearance: Normal appearance.   HENT:      Head: Normocephalic and atraumatic.      Ears:      Comments: Very narrow bilateral EAC, right ear has partial cerumen obstruction about 2/3 of EAC, left ear has about 1/3 of EAC. TM normal  Cardiovascular:      Rate and Rhythm: Normal rate and regular rhythm.      Pulses: Normal pulses.      Heart sounds: Normal heart sounds.   Pulmonary:      Effort: Pulmonary effort is normal. No respiratory distress.      Breath sounds: Normal breath sounds. No wheezing, rhonchi or rales.   Abdominal:      General: Abdomen is flat. Bowel sounds are normal.      Palpations: Abdomen is soft.      Tenderness: There is no abdominal tenderness. There is no guarding.   Musculoskeletal:      Cervical back: Neck supple.   Skin:     General: Skin is warm.      Capillary Refill: Capillary refill takes less than 2 seconds.   Neurological:      General: No focal deficit present.      Mental Status: She is alert. Mental status is at baseline.   Psychiatric:         Mood and Affect: Mood normal.         Behavior: Behavior normal.                    Assessment and Plan      Gastroesophageal reflux disease with esophagitis without hemorrhage  Stable on current medication and dosage. Will continue current management. Refill medication as necessary.    Orders:    omeprazole (priLOSEC) 20 MG capsule; Take 1 capsule by mouth Every 12 (Twelve) Hours.    Dyslipidemia  Stable on current medication and dosage. Will continue current management. Refill medication as " necessary.    Orders:    atorvastatin (LIPITOR) 20 MG tablet; Take 1 tablet by mouth Daily.    Acquired hypothyroidism  Stable on current medication and dosage. Will continue current management. Refill medication as necessary.    Orders:    levothyroxine (SYNTHROID, LEVOTHROID) 88 MCG tablet; Take 1 tablet by mouth Daily.    Generalized anxiety disorder    Follows with psychiatry stable       Trigger middle finger of right hand  Will refer to ortho (Sandra) as she was already seeing him in the past, for possible release  Orders:    Ambulatory Referral to Orthopedic Surgery    Annual visit for general adult medical examination without abnormal findings    Orders:    CBC (No Diff)    Basic Metabolic Panel    Lipid Panel    TSH Rfx On Abnormal To Free T4    Bilateral impacted cerumen    Orders:    Ambulatory Referral to ENT (Otolaryngology)            Follow Up   No follow-ups on file.  Patient was given instructions and counseling regarding her condition or for health maintenance advice. Please see specific information pulled into the AVS if appropriate.

## 2025-07-16 NOTE — ASSESSMENT & PLAN NOTE
Stable on current medication and dosage. Will continue current management. Refill medication as necessary.    Orders:    atorvastatin (LIPITOR) 20 MG tablet; Take 1 tablet by mouth Daily.

## 2025-08-04 ENCOUNTER — TRANSCRIBE ORDERS (OUTPATIENT)
Facility: HOSPITAL | Age: 80
End: 2025-08-04
Payer: MEDICARE

## 2025-08-04 DIAGNOSIS — M81.0 AGE-RELATED OSTEOPOROSIS WITHOUT CURRENT PATHOLOGICAL FRACTURE: Primary | ICD-10-CM

## 2025-08-11 ENCOUNTER — TELEPHONE (OUTPATIENT)
Dept: INTERNAL MEDICINE | Facility: CLINIC | Age: 80
End: 2025-08-11
Payer: MEDICARE

## (undated) DEVICE — BLD BEAVER MICROSHARP 15D 3MM BLU LF

## (undated) DEVICE — Device

## (undated) DEVICE — ENDOSCOPY PORT CONNECTOR FOR OLYMPUS® SCOPES: Brand: ERBE

## (undated) DEVICE — SOL IRRIG H2O 1000ML STRL

## (undated) DEVICE — K-WIRE
Type: IMPLANTABLE DEVICE | Site: HIP | Status: NON-FUNCTIONAL
Removed: 2023-02-13

## (undated) DEVICE — RICH MAJOR PROCEDURE: Brand: MEDLINE INDUSTRIES, INC.

## (undated) DEVICE — FRCP BIOP COLD ENDOJAW ALLGTR W/NDL 2.8X2300MM BLU

## (undated) DEVICE — SYR LUERLOK 5CC

## (undated) DEVICE — SNAR POLYP SENSATION STDOVL 27 240 BX40

## (undated) DEVICE — GLV SURG SENSICARE W/ALOE PF LF 7.5 STRL

## (undated) DEVICE — HYBRID TUBING/CAP SET FOR OLYMPUS® SCOPES: Brand: ERBE

## (undated) DEVICE — LUBE JELLY PK/2.75GM STRL BX/144

## (undated) DEVICE — CANN IRR/INJ AIR ANT CHAMBER 6MM BEND 27G

## (undated) DEVICE — QUICK CATCH IN-LINE SUCTION POLYP TRAP IS USED FOR SUCTION RETRIEVAL OF ENDOSCOPICALLY REMOVED POLYPS.

## (undated) DEVICE — THE MONARCH® "D" CARTRIDGE IS A SINGLE-USE POLYPROPYLENE CARTRIDGE FOR POSTERIOR CHAMBER IOL DELIVERY: Brand: MONARCH® III

## (undated) DEVICE — CLAVICLE STRAP: Brand: DEROYAL

## (undated) DEVICE — SINGLE-USE POLYPECTOMY SNARE: Brand: CAPTIVATOR II

## (undated) DEVICE — CLEARCUT® HP2 SLIT KNIFE INTREPID MICRO-COAXIAL SYSTEM 2.4 DB: Brand: CLEARCUT®; INTREPID

## (undated) DEVICE — HP CONCL INTREPID COAX I/A CRV .3MM

## (undated) DEVICE — 6619 2 PTNT ISO SYS INCISE AREA&LT;(&GT;&&LT;)&GT;P: Brand: STERI-DRAPE™ IOBAN™ 2

## (undated) DEVICE — DRILL, AO SMALL: Brand: GAMMA

## (undated) DEVICE — VLV SXN AIR/H2O ORCAPOD3 1P/U STRL

## (undated) DEVICE — LENS MONOFOCAL IQ CC60WF205: Type: IMPLANTABLE DEVICE | Site: POSTERIOR CHAMBER | Status: NON-FUNCTIONAL

## (undated) DEVICE — GLV SURG SENSICARE PI LF PF 8 GRN STRL

## (undated) DEVICE — K-WIRE, STERILE
Type: IMPLANTABLE DEVICE | Site: HIP | Status: NON-FUNCTIONAL
Removed: 2023-02-13

## (undated) DEVICE — GLV SURG SENSICARE GREEN W/ALOE PF LF 8 STRL

## (undated) DEVICE — SUT VIC 2/0 CT1 27IN J259H

## (undated) DEVICE — DRSNG GZ PETROLTM XEROFORM CURAD 1X8IN STRL

## (undated) DEVICE — EYE CARE POST OP KIT: Brand: MEDLINE INDUSTRIES, INC.

## (undated) DEVICE — PAD GRND REM POLYHESIVE A/ DISP

## (undated) DEVICE — NDL SCLEROTHERAPY INTERJECT 25G 4 240CM